# Patient Record
Sex: FEMALE | Race: WHITE | NOT HISPANIC OR LATINO | Employment: OTHER | URBAN - METROPOLITAN AREA
[De-identification: names, ages, dates, MRNs, and addresses within clinical notes are randomized per-mention and may not be internally consistent; named-entity substitution may affect disease eponyms.]

---

## 2017-01-10 ENCOUNTER — OFFICE VISIT (OUTPATIENT)
Dept: INTERNAL MEDICINE | Facility: CLINIC | Age: 67
End: 2017-01-10

## 2017-01-10 VITALS
HEART RATE: 77 BPM | WEIGHT: 186.8 LBS | DIASTOLIC BLOOD PRESSURE: 68 MMHG | HEIGHT: 70 IN | OXYGEN SATURATION: 97 % | BODY MASS INDEX: 26.74 KG/M2 | SYSTOLIC BLOOD PRESSURE: 100 MMHG

## 2017-01-10 DIAGNOSIS — I10 ESSENTIAL HYPERTENSION: Primary | ICD-10-CM

## 2017-01-10 DIAGNOSIS — R91.1 PULMONARY NODULE: ICD-10-CM

## 2017-01-10 DIAGNOSIS — R00.2 PALPITATIONS: ICD-10-CM

## 2017-01-10 DIAGNOSIS — I10 ESSENTIAL HYPERTENSION: ICD-10-CM

## 2017-01-10 PROCEDURE — 99214 OFFICE O/P EST MOD 30 MIN: CPT | Performed by: INTERNAL MEDICINE

## 2017-01-10 NOTE — PROGRESS NOTES
Subjective   Tran Sosa is a 66 y.o. female here today to f/u from ER for chest pains.  Pt states that she gets SOB heart palpitations, they last about 10 mins and becoming more frequent    History of Present Illness   She has been having int episodes of feeling lightheaded and dizzy for the last 10months.  She has had elevated BP with the episodes.  She feels flushed with episodes.  She denies any anxiety.    She has had a couple episodes of palp and SOB over the last few days. She had some chest pain yesterday that lasted a few minutes  No relation to exertion   she is scheduled to see cards in the am    The following portions of the patient's history were reviewed and updated as appropriate: allergies, current medications, past medical history, past social history and problem list.  SHe has not been exercising because because she juts hasnt felt well   She has stopped sugar  And lost 22 lbs  Review of Systems   Constitutional: Negative.    HENT: Negative.    Respiratory: Positive for chest tightness and shortness of breath. Negative for apnea, cough, choking, wheezing and stridor.    Cardiovascular: Positive for chest pain and palpitations. Negative for leg swelling.   Gastrointestinal: Negative.    All other systems reviewed and are negative.      Objective   Physical Exam   Constitutional: She is oriented to person, place, and time. She appears well-developed and well-nourished.   HENT:   Head: Normocephalic and atraumatic.   Right Ear: External ear normal.   Left Ear: External ear normal.   Mouth/Throat: Oropharynx is clear and moist.   Eyes: Conjunctivae and EOM are normal. Pupils are equal, round, and reactive to light.   Neck: Normal range of motion. No tracheal deviation present. No thyromegaly present.   Cardiovascular: Normal rate, regular rhythm, normal heart sounds and intact distal pulses.    Pulmonary/Chest: Effort normal and breath sounds normal.   Abdominal: Soft. Bowel sounds are normal. She  exhibits no distension. There is no tenderness.   Musculoskeletal: Normal range of motion. She exhibits no edema or deformity.   Neurological: She is alert and oriented to person, place, and time.   Skin: Skin is warm and dry.   Psychiatric: She has a normal mood and affect. Her behavior is normal. Judgment and thought content normal.   Vitals reviewed.      Vitals:    01/10/17 1608   BP: 100/68   Pulse: 77   SpO2: 97%        Current Outpatient Prescriptions:   •  aspirin 81 MG tablet, Take 81 mg by mouth daily. HOLD PRIOR TO SURGERY, Disp: , Rfl:   •  atorvastatin (LIPITOR) 80 MG tablet, Take 1 tablet by mouth Daily., Disp: 90 tablet, Rfl: 3  •  Calcium Carbonate (CALCIUM 600 PO), Take 1 tablet by mouth Daily. HOLD PRIOR TO SURGERY, Disp: , Rfl:   •  celecoxib (CELEBREX) 200 MG capsule, Take 200 mg by mouth daily as needed. HOLD PRIOR TO SURGERY, Disp: , Rfl:   •  gabapentin (NEURONTIN) 300 MG capsule, Take 1 capsule by mouth Every Night., Disp: 90 capsule, Rfl: 3  •  Multiple Vitamin (MULTI VITAMIN DAILY PO), Take 1 tablet by mouth daily. HOLD PRIOR TO SURGERY, Disp: , Rfl:   •  traZODone (DESYREL) 150 MG tablet, Take 1 tablet by mouth Every Night., Disp: 90 tablet, Rfl: 3  •  traZODone (DESYREL) 50 MG tablet, Take 1 tablet by mouth At Night As Needed for sleep., Disp: 90 tablet, Rfl: 3  •  triamterene-hydrochlorothiazide (MAXZIDE-25) 37.5-25 MG per tablet, Take 1 tablet by mouth Every Morning., Disp: 90 tablet, Rfl: 3      Assessment/Plan   Diagnoses and all orders for this visit:    Essential hypertension    Pulmonary nodule    Palpitations      1. HTN- with palp- she has some classic sx of a pheo so I will order 24hr urine for VMA and metanephrines.  2. Chest tightness-with sx above- she is seeing cards in the am and likely needs a stress test and a halter/event monitor  3. pulm nodule-   4.  Left hip pain-  She is avoiding NSAIDs right now.  She is seeing ortho

## 2017-01-10 NOTE — MR AVS SNAPSHOT
Tran Sosa   1/10/2017 4:00 PM   Office Visit    Dept Phone:  [phone]   Encounter #:  [id number]    Provider:  Diane Macdonald MD   Department:  [facility] INTERNAL MEDICINE                Your Full Care Plan              Today's Medication Changes          These changes are accurate as of: 1/10/17  5:17 PM.  If you have any questions, ask your nurse or doctor.               Stop taking medication(s)listed here:     mometasone 50 MCG/ACT nasal spray   Commonly known as:  NASONEX   Stopped by:  Diane Macdonald MD           montelukast 10 MG tablet   Commonly known as:  SINGULAIR   Stopped by:  Diane Macdonald MD           PEPCID 20 MG tablet   Generic drug:  famotidine   Stopped by:  Diane Macdonald MD           Vitamin D3 2000 UNITS capsule   Stopped by:  Diane Macdonald MD                      Your Updated Medication List          This list is accurate as of: 1/10/17  5:17 PM.  Always use your most recent med list.                aspirin 81 MG tablet       atorvastatin 80 MG tablet   Commonly known as:  LIPITOR   Take 1 tablet by mouth Daily.       CALCIUM 600 PO       CELEBREX 200 MG capsule   Generic drug:  celecoxib       gabapentin 300 MG capsule   Commonly known as:  NEURONTIN   Take 1 capsule by mouth Every Night.       MULTI VITAMIN DAILY PO       * traZODone 150 MG tablet   Commonly known as:  DESYREL   Take 1 tablet by mouth Every Night.       * traZODone 50 MG tablet   Commonly known as:  DESYREL   Take 1 tablet by mouth At Night As Needed for sleep.       triamterene-hydrochlorothiazide 37.5-25 MG per tablet   Commonly known as:  MAXZIDE-25   Take 1 tablet by mouth Every Morning.       * Notice:  This list has 2 medication(s) that are the same as other medications prescribed for you. Read the directions carefully, and ask your doctor or other care provider to review them with you.            We Performed the Following     Catecholamine+VMA, 24-Hr Urine     Metanephrines,  Pheochromocyt       You Were Diagnosed With        Codes Comments    Essential hypertension    -  Primary ICD-10-CM: I10  ICD-9-CM: 401.9     Pulmonary nodule     ICD-10-CM: R91.1  ICD-9-CM: 793.11     Palpitations     ICD-10-CM: R00.2  ICD-9-CM: 785.1       Instructions     None    Patient Instructions History      Upcoming Appointments     Visit Type Date Time Department    OFFICE VISIT 1/10/2017  4:00 PM [facility]    NEW PATIENT 2017 10:00 AM [facility] [place]    [facility] 2017  9:40 AM [facility]    PHYSICAL 2017  9:00 AM [facility]      [employer] Signup     [employer]App[employer]ink allows you to send messages to your doctor, view your test results, renew your prescriptions, schedule appointments, and more. To sign up, go to Placely and click on the Sign Up Now link in the New User? box. Enter your Plerts Activation Code exactly as it appears below along with the last four digits of your Social Security Number and your Date of Birth () to complete the sign-up process. If you do not sign up before the expiration date, you must request a new code.    Plerts Activation Code: [email or web address]  Expires: 2017  5:17 PM    If you have questions, you can email [email or web address] or call [phone] to talk to our Plerts staff. Remember, Plerts is NOT to be used for urgent needs. For medical emergencies, dial 911.               Other Info from Your Visit           Your Appointments     2017 10:00 AM EST   New Patient with Sandra Fall, ENRRIQUE   [facility] CARDIOLOGY (--)    [address]  [facility] [id number] [phone]           Bring all previous medical records and films, along with current medications and insurance information.            2017  9:40 AM EDT   [facility]   [facility] INTERNAL MEDICINE (--)    [address] [place] [address] [phone]            Apr 20, 2017  9:00 AM EDT   Physical with Diane Macdonald MD   [facility] INTERNAL MEDICINE (--)    [address] [place] [address] [phone]           Arrive 15 minutes prior to appointment.              Allergies     Sulfa Antibiotics  Hives    Morphine And Related  Rash      Vital Signs     Blood Pressure Pulse Height Weight Last Menstrual Period Oxygen Saturation    100/68 (BP Location: Left arm, Patient Position: Sitting) 77 70\" (177.8 cm) 186 lb 12.8 oz (84.7 kg) (LMP Unknown) 97%    Body Mass Index Smoking Status                26.8 kg/m2 Former Smoker          Problems and Diagnoses Noted     High blood pressure    Pulmonary nodule        Palpitations            "

## 2017-01-11 ENCOUNTER — OFFICE VISIT (OUTPATIENT)
Dept: CARDIOLOGY | Facility: CLINIC | Age: 67
End: 2017-01-11

## 2017-01-11 VITALS
DIASTOLIC BLOOD PRESSURE: 80 MMHG | SYSTOLIC BLOOD PRESSURE: 130 MMHG | BODY MASS INDEX: 26.74 KG/M2 | HEIGHT: 70 IN | WEIGHT: 186.8 LBS | HEART RATE: 71 BPM

## 2017-01-11 DIAGNOSIS — R07.2 PRECORDIAL PAIN: Primary | ICD-10-CM

## 2017-01-11 DIAGNOSIS — R00.2 PALPITATIONS: ICD-10-CM

## 2017-01-11 DIAGNOSIS — R06.02 SHORTNESS OF BREATH: ICD-10-CM

## 2017-01-11 DIAGNOSIS — R06.00 PND (PAROXYSMAL NOCTURNAL DYSPNEA): ICD-10-CM

## 2017-01-11 DIAGNOSIS — I10 ESSENTIAL HYPERTENSION: ICD-10-CM

## 2017-01-11 PROCEDURE — 93000 ELECTROCARDIOGRAM COMPLETE: CPT | Performed by: NURSE PRACTITIONER

## 2017-01-11 PROCEDURE — 99204 OFFICE O/P NEW MOD 45 MIN: CPT | Performed by: NURSE PRACTITIONER

## 2017-01-11 NOTE — MR AVS SNAPSHOT
Tran Sosa   1/11/2017 10:00 AM   Office Visit    Dept Phone:  [phone]   Encounter #:  [id number]    Provider:  ENRRIQUE Gr   Department:  [facility] CARDIOLOGY                Your Full Care Plan              Your Updated Medication List          This list is accurate as of: 1/11/17 11:10 AM.  Always use your most recent med list.                aspirin 81 MG tablet       atorvastatin 80 MG tablet   Commonly known as:  LIPITOR   Take 1 tablet by mouth Daily.       CALCIUM 600 PO       CELEBREX 200 MG capsule   Generic drug:  celecoxib       gabapentin 300 MG capsule   Commonly known as:  NEURONTIN   Take 1 capsule by mouth Every Night.       MULTI VITAMIN DAILY PO       * traZODone 150 MG tablet   Commonly known as:  DESYREL   Take 1 tablet by mouth Every Night.       * traZODone 50 MG tablet   Commonly known as:  DESYREL   Take 1 tablet by mouth At Night As Needed for sleep.       triamterene-hydrochlorothiazide 37.5-25 MG per tablet   Commonly known as:  MAXZIDE-25   Take 1 tablet by mouth Every Morning.       * Notice:  This list has 2 medication(s) that are the same as other medications prescribed for you. Read the directions carefully, and ask your doctor or other care provider to review them with you.            We Performed the Following     Ambulatory Referral to Sleep Medicine     Holter / Event CHRISTIEO Patch       You Were Diagnosed With        Codes Comments    Precordial pain    -  Primary ICD-10-CM: R07.2  ICD-9-CM: 786.51     Shortness of breath     ICD-10-CM: R06.02  ICD-9-CM: 786.05     Palpitations     ICD-10-CM: R00.2  ICD-9-CM: 785.1     PND (paroxysmal nocturnal dyspnea)     ICD-10-CM: R06.00  ICD-9-CM: 786.09     Essential hypertension     ICD-10-CM: I10  ICD-9-CM: 401.9       Instructions     None    Patient Instructions History      Upcoming Appointments     Visit Type Date Time Department    NEW PATIENT 1/11/2017 10:00 AM  [facility] CV ECHO STRESS TEST [place] 2017  1:15 PM  [facility]    [facility] 2017  9:40 AM [facility]    PHYSICAL 2017  9:00 AM [facility]      M-DISChart Signup     [facility] Myoonet allows you to send messages to your doctor, view your test results, renew your prescriptions, schedule appointments, and more. To sign up, go to Ium and click on the Sign Up Now link in the New User? box. Enter your Myoonet Activation Code exactly as it appears below along with the last four digits of your Social Security Number and your Date of Birth () to complete the sign-up process. If you do not sign up before the expiration date, you must request a new code.    Myoonet Activation Code: [email or web address]  Expires: 2017  5:17 PM    If you have questions, you can email [email or web address] or call [phone] to talk to our Myoonet staff. Remember, Myoonet is NOT to be used for urgent needs. For medical emergencies, dial 911.               Other Info from Your Visit           Your Appointments     2017  1:15 PM EST   ECHO STRESS TEST VISIT with [facility] E[facility]/VAS   [facility] OUTPATIENT ECHOCARDIOGRAPHY ([place])    [address] [place] [id number] [phone]           No food or drink for 2 hours prior to your test. No caffeine or chocolate 24 hours prior to you test. (this includes coffee, tea, soda, all decaffeinated beverages, cappuccino flavorings, noooz or vivarian, diet medications, excedrin, anacin or any energy drinks) wear comfortable clothing and walking shoes. Bring your insurance cards with you. Bring all medications in their original bottles. If you are diabetic, do not take your diabetic medications after consulting with your primary care physician. if you take insulin, only take half the dose after consulting with your primary care physician.            2017  9:40 AM EDT   [facility]  "with [facility]   [facility] INTERNAL MEDICINE (--)    [address] [id number] [phone]            Apr 20, 2017  9:00 AM EDT   Physical with Diane Macdonald MD   [facility] INTERNAL MEDICINE (--)    [address] [id number] [phone]           Arrive 15 minutes prior to appointment.              Allergies     Sulfa Antibiotics  Hives    Morphine And Related  Rash      Reason for Visit     Chest Pain           Vital Signs     Blood Pressure Pulse Height Weight Last Menstrual Period Body Mass Index    130/80 (BP Location: Right arm) 71 70\" (177.8 cm) 186 lb 12.8 oz (84.7 kg) (LMP Unknown) 26.8 kg/m2    Smoking Status                   Former Smoker           Problems and Diagnoses Noted     High blood pressure    Precordial chest pain    -  Primary    Shortness of breath        Palpitations        Shortness of breath at night            "

## 2017-01-11 NOTE — PROGRESS NOTES
"  Date of Office Visit: 2017  Encounter Provider: ENRRIQUE Bagley  Place of Service: [facility] CARDIOLOGY  Patient Name: Tran Sosa  :1950    Chief Complaint   Patient presents with   • Chest Pain   :     HPI: Tran Sosa is a 66 y.o. female who presents today for evaluation of recent symptoms.  The patient reports a past medical history of breast cancer, pulmonary nodules, and arthritis.  She also has a history of essential hypertension which she states has been transient recently.  She has a history of hyperlipidemia which she feels is well-controlled on her current dose of atorvastatin.  She denies any history of coronary artery disease, heart failure, or diabetes.  The patient explains October she had one month of dizziness which she felt was secondary to vertigo.    The patient reports that she has had \"small episodes\" over the past 10 months of waking up in the middle the night short of breath with her heart pounding.  She said initially the symptoms would only last a few minutes and were very intermittent.  She said she may go weeks without having an episode.  Her daughter who is a nurse practitioner actually spent the night with her one night and said that she does not snore.  The patient explains that she had foot surgery in August and had to really \"bad episodes\" of waking up short of breath with palpitations.    The patient said in December she was at work and had not been feeling well that week.  She had been very fatigued and had a decreased appetite.  She said it was as if she was trying to get sick.  She had checked her blood pressure which showed diastolic readings elevated at .  He said her coworker said one day the left side of her face was red and the right side was within normal color.  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  The pain radiated to her neck " and ears.  She said it was very intense.  She said after 15 minutes of the chest pain and started to ease and resolved within an hour and 40 minutes.  She decided to present to the emergency department for further evaluation.    The patient presented to the [facility] emergency department on 12/27/2016 with the concern of fatigue and elevated diastolic blood pressure readings in the 90s and 100s.  The day that she presented to the ED she was working and had an abrupt wave of pain diffusely across her chest that radiated to her neck.  This was associated with a rapid palpitations, nausea, diaphoresis, and shortness of breath.  She checked her blood pressure which was elevated at 154/105.  She decided to present to the emergency department for further evaluation.  Upon review of the ED records, her blood pressure was elevated at 169/91 and heart rate 82 bpm.  She had an EKG completed which was interpreted as normal.  Her d-dimer was within normal limits.  She had a chest x-ray which showed multiple pulmonary nodules that is been stable since 2013 although the left lower lobe nodule was slightly larger than compared to 2011.  She was recommended to follow-up with her primary care physician.  Her hemoglobin, hematocrit, comprehensive metabolic panel, BNP, and troponin level were all within normal limits.    Since the emergency department visit she is continued to experience intermittent chest pain that occurs with nonexertional activities.  She continues to experience shortness of breath, palpitations, and paroxysmal nocturnal dyspnea.  This past Sunday she said her heart was racing and felt irregular as if she was having PVCs.  She was very fatigued after that episode.  On Tuesday she felt fluttering and shortness of breath.  She just overall has not been feeling herself.  She did follow-up with her primary care physician who has recommended that she have adrenal gland testing and a CT scan of her chest.      Past  Medical History   Diagnosis Date   • Allergic rhinitis    • Arthralgia of multiple sites    • Diverticulosis    • Dysphagia    • Elevated TSH    • Fatigue    • History of breast cancer 1984     RIGHT    • Hyperlipidemia    • Hypertension    • Insomnia    • Junctional rhythm      one time; she think year    • Osteopenia    • Postmenopausal status    • Pulmonary nodules    • Rib fracture    • Vitamin B 12 deficiency    • Vitamin D deficiency        Past Surgical History   Procedure Laterality Date   •  section     • Uterine artery embolization     • Hysterectomy       W/BSO   • Mastectomy Left      PROPHYLACTICALLY   • Mastectomy partial with axillary lymph node excision Right      MALIGNANT   • Tram flap delayed     • Tonsillectomy     • Orif wrist fracture Right    • Hammer toe repair Right 2016     Procedure: MULTIPLE RT CLAW TOE REPAIR WITH WEIL OSTEOTOMY; RIGHT GREAT TOE AKIN OSTEOTOMY.;  Surgeon: Rigoberto Ba MD;  Location: [facility] OR [facility];  Service:    • Cardiac catheterization        at [facility]; told normal coronary arteries        Social History     Social History   • Marital status:      Spouse name: N/A   • Number of children: 1   • Years of education: N/A     Occupational History   • RN AT [employer]      Social History Main Topics   • Smoking status: Former Smoker     Packs/day: 1.00     Years: 10.00     Types: Cigarettes     Quit date:    • Smokeless tobacco: Not on file      Comment: QUIT AT AGE 30 YRS. OLD   • Alcohol use Yes      Comment: SPECIAL OCCASION   • Drug use: No   • Sexual activity: Defer     Other Topics Concern   • Not on file     Social History Narrative       Family History   Problem Relation Age of Onset   • Ovarian cancer Mother    • Lung cancer Father    • Hypertension Sister    • Hyperlipidemia Sister    • Other Sister      RENAL DISEASE   • Hypertension Brother    • Hyperlipidemia Brother        Review of Systems   Constitution: Negative  for chills, diaphoresis, fever, malaise/fatigue, night sweats, weight gain and weight loss.   HENT: Negative for hearing loss, nosebleeds, sore throat and tinnitus.    Eyes: Negative for blurred vision, double vision, pain and visual disturbance.   Cardiovascular: Positive for chest pain, dyspnea on exertion, palpitations and paroxysmal nocturnal dyspnea. Negative for claudication, cyanosis, irregular heartbeat, leg swelling, near-syncope, orthopnea and syncope.   Respiratory: Positive for shortness of breath. Negative for cough, hemoptysis, snoring and wheezing.    Endocrine: Negative for cold intolerance, heat intolerance and polyuria.   Hematologic/Lymphatic: Negative for bleeding problem. Does not bruise/bleed easily.   Skin: Negative for color change, dry skin, flushing and itching.   Musculoskeletal: Positive for joint pain. Negative for falls, joint swelling, muscle cramps, muscle weakness and myalgias.   Gastrointestinal: Negative for abdominal pain, constipation, heartburn, melena, nausea and vomiting.   Genitourinary: Negative for dysuria and hematuria.   Neurological: Negative for excessive daytime sleepiness, dizziness, light-headedness, loss of balance, numbness, paresthesias, seizures and vertigo.   Psychiatric/Behavioral: Negative for altered mental status, depression, memory loss and substance abuse. The patient does not have insomnia and is not nervous/anxious.    Allergic/Immunologic: Negative for environmental allergies.       Allergies   Allergen Reactions   • Sulfa Antibiotics Hives   • Morphine And Related Rash         Current Outpatient Prescriptions:   •  aspirin 81 MG tablet, Take 81 mg by mouth daily. HOLD PRIOR TO SURGERY, Disp: , Rfl:   •  atorvastatin (LIPITOR) 80 MG tablet, Take 1 tablet by mouth Daily., Disp: 90 tablet, Rfl: 3  •  Calcium Carbonate (CALCIUM 600 PO), Take 1 tablet by mouth Daily. HOLD PRIOR TO SURGERY, Disp: , Rfl:   •  celecoxib (CELEBREX) 200 MG capsule, Take 200 mg  "by mouth daily as needed. HOLD PRIOR TO SURGERY, Disp: , Rfl:   •  gabapentin (NEURONTIN) 300 MG capsule, Take 1 capsule by mouth Every Night., Disp: 90 capsule, Rfl: 3  •  Multiple Vitamin (MULTI VITAMIN DAILY PO), Take 1 tablet by mouth daily. HOLD PRIOR TO SURGERY, Disp: , Rfl:   •  traZODone (DESYREL) 150 MG tablet, Take 1 tablet by mouth Every Night., Disp: 90 tablet, Rfl: 3  •  traZODone (DESYREL) 50 MG tablet, Take 1 tablet by mouth At Night As Needed for sleep., Disp: 90 tablet, Rfl: 3  •  triamterene-hydrochlorothiazide (MAXZIDE-25) 37.5-25 MG per tablet, Take 1 tablet by mouth Every Morning., Disp: 90 tablet, Rfl: 3     Objective:     Vitals:    01/11/17 1009 01/11/17 1011 01/11/17 1013 01/11/17 1014   BP: 130/82 132/84 132/82 130/80   BP Location: Right arm Left arm Left arm Right arm   Patient Position: Lying Sitting Standing    Pulse: 65 69 83 71   Weight: 186 lb 12.8 oz (84.7 kg)      Height: 70\" (177.8 cm)        Body mass index is 26.8 kg/(m^2).    PHYSICAL EXAM:    Vitals Reviewed.   General Appearance: No acute distress, well developed and well nourished.   Eyes: Conjunctiva and lids: No erythema, swelling, or discharge. Sclera non-icteric. Glasses.   HENT: Atraumatic, normocephalic. External eyes, ears, and nose normal. No hearing loss noted. Mucous membranes normal. Lips not cyanotic. Neck supple with no tenderness.  Respiratory: No signs of respiratory distress. Respiration rhythm and depth normal.   Clear to auscultation. No rales, crackles, rhonchi, or wheezing auscultated.   Cardiovascular:  Jugular Venous Pressure: Normal  Heart Rate and Rhythm: Normal, Heart Sounds: Normal S1 and S2. No S3 or S4 noted.  Murmurs: No murmurs noted. No rubs, thrills, or gallops.   Arterial Pulses: Carotid pulses normal. No carotid bruit noted. Posterior tibialis and dorsalis pedis pulses normal.   Lower Extremities: No edema noted.  Gastrointestinal:  Abdomen soft, non-distended, non-tender. Normal bowel " sounds. No hepatomegaly.   Musculoskeletal: Normal movement of extremities  Skin and Nails: General appearance normal. No pallor, cyanosis, diaphoresis. Skin temperature normal. No clubbing of fingernails.   Psychiatric: Patient alert and oriented to person, place, and time. Speech and behavior appropriate. Normal mood and affect.       ECG 12 Lead  Date/Time: 1/11/2017 9:54 AM  Performed by: ELIESER CARDOZO  Authorized by: ELIESER CARDOZO   Comparison: compared with previous ECG from 12/27/2016  Similar to previous ECG  Rhythm: sinus rhythm  Rate: normal  BPM: 71  Conduction: conduction normal  ST Segments: ST segments normal  T Waves: T waves normal  QRS axis: normal  Other: no other findings  Clinical impression: normal ECG              Assessment:       Diagnosis Plan   1. Precordial pain  Adult Stress Echo With Color & Doppler    Holter / Event ZIO Patch   2. Shortness of breath  Adult Stress Echo With Color & Doppler    Holter / Event ZIO Patch    Ambulatory Referral to Sleep Medicine   3. Palpitations  Adult Stress Echo With Color & Doppler    Holter / Event ZIO Patch    Ambulatory Referral to Sleep Medicine   4. PND (paroxysmal nocturnal dyspnea)  Adult Stress Echo With Color & Doppler    Holter / Event ZIO Patch    Ambulatory Referral to Sleep Medicine   5. Essential hypertension  Adult Stress Echo With Color & Doppler    Holter / Event ZIO Patch          Plan:       The patient presents today with intermittent episodes of chest pain, shortness of breath, palpitations, dizziness, and paroxysmal nocturnal dyspnea over the past 10 months.  I reviewed all of her ED records which did not show any significant abnormalities as far as a cardiac standpoint.  She is following up with her primary care physician about her blood pressure, adrenal glands issues, and pulmonary nodules.  From a cardiac standpoint, I have recommended that she have a stress echocardiogram completed for further evaluation of the chest pain  and shortness of breath.  For the palpitations have recommended a 14 day Zio patch monitor.  If she has any episodes of palpitations of asked her to record these symptoms in her diary.  I reviewed her recent blood work from October and she was noted to have a normal TSH at that time.  The patient will continue to monitor her blood pressure.  She has been experiencing paroxysmal nocturnal dyspnea and palpitations in the middle of the night.  I have recommended that she have a sleep apnea evaluation completed.  I will follow-up with the patient with the results of her testing.  I also told her if she has any palpitations while working over at [facility] that she can come over to the office for EKG and just call the office ahead of time.    As always, it has been a pleasure to participate in your patient's care.      Sincerely,         ENRRIQUE Beard

## 2017-01-13 ENCOUNTER — TELEPHONE (OUTPATIENT)
Dept: CARDIOLOGY | Facility: CLINIC | Age: 67
End: 2017-01-13

## 2017-01-13 ENCOUNTER — HOSPITAL ENCOUNTER (OUTPATIENT)
Dept: CARDIOLOGY | Facility: HOSPITAL | Age: 67
Discharge: HOME | End: 2017-01-13
Admitting: NURSE PRACTITIONER

## 2017-01-13 VITALS
OXYGEN SATURATION: 98 % | HEIGHT: 70 IN | WEIGHT: 186 LBS | BODY MASS INDEX: 26.63 KG/M2 | SYSTOLIC BLOOD PRESSURE: 136 MMHG | HEART RATE: 84 BPM | DIASTOLIC BLOOD PRESSURE: 84 MMHG

## 2017-01-13 DIAGNOSIS — R07.2 PRECORDIAL PAIN: ICD-10-CM

## 2017-01-13 DIAGNOSIS — R06.02 SHORTNESS OF BREATH: ICD-10-CM

## 2017-01-13 DIAGNOSIS — R06.00 PND (PAROXYSMAL NOCTURNAL DYSPNEA): ICD-10-CM

## 2017-01-13 DIAGNOSIS — R00.2 PALPITATIONS: ICD-10-CM

## 2017-01-13 DIAGNOSIS — I10 ESSENTIAL HYPERTENSION: ICD-10-CM

## 2017-01-13 LAB
ASCENDING AORTA: 3.4 CM
BH CV ECHO MEAS - ACS: 2.1 CM
BH CV ECHO MEAS - AO MAX PG: 12.1 MMHG
BH CV ECHO MEAS - AO ROOT AREA (BSA CORRECTED): 1.7
BH CV ECHO MEAS - AO ROOT AREA: 8.9 CM^2
BH CV ECHO MEAS - AO ROOT DIAM: 3.4 CM
BH CV ECHO MEAS - AO V2 MAX: 173.7 CM/SEC
BH CV ECHO MEAS - BSA(HAYCOCK): 2.1 M^2
BH CV ECHO MEAS - BSA: 2 M^2
BH CV ECHO MEAS - BZI_BMI: 26.7 KILOGRAMS/M^2
BH CV ECHO MEAS - BZI_METRIC_HEIGHT: 177.8 CM
BH CV ECHO MEAS - BZI_METRIC_WEIGHT: 84.4 KG
BH CV ECHO MEAS - CONTRAST EF 4CH: 63.3 ML/M^2
BH CV ECHO MEAS - EDV(CUBED): 142.6 ML
BH CV ECHO MEAS - EDV(MOD-SP4): 98 ML
BH CV ECHO MEAS - EDV(TEICH): 130.9 ML
BH CV ECHO MEAS - EF(CUBED): 76.4 %
BH CV ECHO MEAS - EF(MOD-SP4): 63.3 %
BH CV ECHO MEAS - EF(TEICH): 68.1 %
BH CV ECHO MEAS - ESV(CUBED): 33.6 ML
BH CV ECHO MEAS - ESV(MOD-SP4): 36 ML
BH CV ECHO MEAS - ESV(TEICH): 41.8 ML
BH CV ECHO MEAS - FS: 38.2 %
BH CV ECHO MEAS - IVS/LVPW: 1
BH CV ECHO MEAS - IVSD: 0.96 CM
BH CV ECHO MEAS - LAT PEAK E' VEL: 8 CM/SEC
BH CV ECHO MEAS - LV DIASTOLIC VOL/BSA (35-75): 48.4 ML/M^2
BH CV ECHO MEAS - LV MASS(C)D: 180.5 GRAMS
BH CV ECHO MEAS - LV MASS(C)DI: 89.2 GRAMS/M^2
BH CV ECHO MEAS - LV SYSTOLIC VOL/BSA (12-30): 17.8 ML/M^2
BH CV ECHO MEAS - LVIDD: 5.2 CM
BH CV ECHO MEAS - LVIDS: 3.2 CM
BH CV ECHO MEAS - LVLD AP4: 6.4 CM
BH CV ECHO MEAS - LVLS AP4: 5.7 CM
BH CV ECHO MEAS - LVPWD: 0.92 CM
BH CV ECHO MEAS - MED PEAK E' VEL: 12 CM/SEC
BH CV ECHO MEAS - MR MAX PG: 100.7 MMHG
BH CV ECHO MEAS - MR MAX VEL: 501.7 CM/SEC
BH CV ECHO MEAS - MV A DUR: 0.08 SEC
BH CV ECHO MEAS - MV A MAX VEL: 73.2 CM/SEC
BH CV ECHO MEAS - MV DEC SLOPE: 320.7 CM/SEC^2
BH CV ECHO MEAS - MV DEC TIME: 0.23 SEC
BH CV ECHO MEAS - MV E MAX VEL: 76.2 CM/SEC
BH CV ECHO MEAS - MV E/A: 1
BH CV ECHO MEAS - MV P1/2T MAX VEL: 76.6 CM/SEC
BH CV ECHO MEAS - MV P1/2T: 70 MSEC
BH CV ECHO MEAS - MVA P1/2T LCG: 2.9 CM^2
BH CV ECHO MEAS - MVA(P1/2T): 3.1 CM^2
BH CV ECHO MEAS - PULM A REVS DUR: 0.1 SEC
BH CV ECHO MEAS - PULM A REVS VEL: 27.2 CM/SEC
BH CV ECHO MEAS - PULM DIAS VEL: 35.1 CM/SEC
BH CV ECHO MEAS - PULM S/D: 1.4
BH CV ECHO MEAS - PULM SYS VEL: 50.4 CM/SEC
BH CV ECHO MEAS - RAP SYSTOLE: 8 MMHG
BH CV ECHO MEAS - RVSP: 29 MMHG
BH CV ECHO MEAS - SI(CUBED): 53.8 ML/M^2
BH CV ECHO MEAS - SI(MOD-SP4): 30.6 ML/M^2
BH CV ECHO MEAS - SI(TEICH): 44 ML/M^2
BH CV ECHO MEAS - SV(CUBED): 109 ML
BH CV ECHO MEAS - SV(MOD-SP4): 62 ML
BH CV ECHO MEAS - SV(TEICH): 89.1 ML
BH CV ECHO MEAS - TAPSE (>1.6): 2 CM2
BH CV ECHO MEAS - TR MAX VEL: 229.4 CM/SEC
BH CV STRESS BP STAGE 1: NORMAL
BH CV STRESS BP STAGE 2: NORMAL
BH CV STRESS DURATION MIN STAGE 1: 3
BH CV STRESS DURATION MIN STAGE 2: 3
BH CV STRESS DURATION SEC STAGE 1: 0
BH CV STRESS DURATION SEC STAGE 2: 0
BH CV STRESS ECHO POST STRESS EJECTION FRACTION EF: 75 %
BH CV STRESS GRADE STAGE 1: 10
BH CV STRESS GRADE STAGE 2: 12
BH CV STRESS HR STAGE 1: 125
BH CV STRESS HR STAGE 2: 145
BH CV STRESS METS STAGE 1: 5
BH CV STRESS METS STAGE 2: 7.5
BH CV STRESS PROTOCOL 1: NORMAL
BH CV STRESS SPEED STAGE 1: 1.7
BH CV STRESS SPEED STAGE 2: 2.5
BH CV STRESS STAGE 1: 1
BH CV STRESS STAGE 2: 2
BH CV XLRA - RV BASE: 3.2 CM
BH CV XLRA - TDI S': 11 CM/SEC
E/E' RATIO: 10
LEFT ATRIUM VOLUME INDEX: 20 ML/M2
MAXIMAL PREDICTED HEART RATE: 156 BPM
PERCENT MAX PREDICTED HR: 92.95 %
SINUS: 2.8 CM
STJ: 2.8 CM
STRESS BASELINE BP: NORMAL MMHG
STRESS BASELINE HR: 84 BPM
STRESS O2 SAT REST: 98 %
STRESS PERCENT HR: 109 %
STRESS POST ESTIMATED WORKLOAD: 7 METS
STRESS POST EXERCISE DUR MIN: 6 MIN
STRESS POST EXERCISE DUR SEC: 0 SEC
STRESS POST PEAK BP: NORMAL MMHG
STRESS POST PEAK HR: 145 BPM
STRESS TARGET HR: 133 BPM

## 2017-01-13 PROCEDURE — 93320 DOPPLER ECHO COMPLETE: CPT | Performed by: INTERNAL MEDICINE

## 2017-01-13 PROCEDURE — 93018 CV STRESS TEST I&R ONLY: CPT | Performed by: INTERNAL MEDICINE

## 2017-01-13 PROCEDURE — 93016 CV STRESS TEST SUPVJ ONLY: CPT | Performed by: INTERNAL MEDICINE

## 2017-01-13 PROCEDURE — 93017 CV STRESS TEST TRACING ONLY: CPT

## 2017-01-13 PROCEDURE — 93325 DOPPLER ECHO COLOR FLOW MAPG: CPT

## 2017-01-13 PROCEDURE — 93350 STRESS TTE ONLY: CPT | Performed by: INTERNAL MEDICINE

## 2017-01-13 PROCEDURE — 93320 DOPPLER ECHO COMPLETE: CPT

## 2017-01-13 PROCEDURE — 93325 DOPPLER ECHO COLOR FLOW MAPG: CPT | Performed by: INTERNAL MEDICINE

## 2017-01-13 PROCEDURE — 93350 STRESS TTE ONLY: CPT

## 2017-01-13 NOTE — TELEPHONE ENCOUNTER
Stress echocardiogram results:    · Normal Baseline echo  · Mild mitral valve regurgitation is present.  · STRESS ECHO  · Arrhythmias during recovery: rare PACs, rare PVCs.  · Normal stress echo with no significant echocardiographic evidence for myocardial ischemia.    The patient was placed on a 14 day Zio patch monitor.  I suspect she is experiencing ectopy and we'll further evaluate.  I have left a message for patient to return my call.

## 2017-01-16 ENCOUNTER — APPOINTMENT (OUTPATIENT)
Dept: LAB | Facility: HOSPITAL | Age: 67
End: 2017-01-16

## 2017-01-16 PROCEDURE — 84585 ASSAY OF URINE VMA: CPT | Performed by: INTERNAL MEDICINE

## 2017-01-16 PROCEDURE — 82384 ASSAY THREE CATECHOLAMINES: CPT | Performed by: INTERNAL MEDICINE

## 2017-01-16 NOTE — TELEPHONE ENCOUNTER
Patient informed about stress echocardiogram results. She is wearing 14 zio patch monitor results. I will follow up with her about those results.

## 2017-01-16 NOTE — TELEPHONE ENCOUNTER
-Patient called for echo results. 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

## 2017-01-18 ENCOUNTER — HOSPITAL ENCOUNTER (OUTPATIENT)
Dept: CT IMAGING | Facility: HOSPITAL | Age: 67
Discharge: HOME | End: 2017-01-18

## 2017-01-18 DIAGNOSIS — R91.1 PULMONARY NODULE: ICD-10-CM

## 2017-01-18 PROCEDURE — 82565 ASSAY OF CREATININE: CPT

## 2017-01-19 ENCOUNTER — TELEPHONE (OUTPATIENT)
Dept: CARDIOLOGY | Facility: HOSPITAL | Age: 67
End: 2017-01-19

## 2017-01-19 LAB
CREAT BLDA-MCNC: 1 MG/DL (ref 0.6–1.3)
DOPAMINE 24H UR-MRATE: 154 UG/24 HR (ref 0–510)
DOPAMINE UR-MCNC: 77 UG/L
EPINEPH 24H UR-MRATE: 2 UG/24 HR (ref 0–20)
EPINEPH UR-MCNC: 1 UG/L
NOREPINEPH 24H UR-MRATE: 30 UG/24 HR (ref 0–135)
NOREPINEPH UR-MCNC: 15 UG/L
VMA 24H UR-MRATE: 3 MG/24 HR (ref 0–7.5)
VMA UR-MCNC: 1.5 MG/L

## 2017-01-19 NOTE — TELEPHONE ENCOUNTER
----- Message from ENRRIQUE Gr sent at 1/18/2017  5:23 PM EST -----    The patient canceled the stress echocardiogram and Holter monitor    ----- Message -----     From: ENRRIQUE Gr     Sent: 1/11/2017  11:23 AM       To: ENRRIQUE Gr      Follow up on stress echocardiogram 1/18/2017

## 2017-02-02 LAB — TOAL ENROLLMENT DAYS: 14

## 2017-02-03 ENCOUNTER — TELEPHONE (OUTPATIENT)
Dept: CARDIOLOGY | Facility: HOSPITAL | Age: 67
End: 2017-02-03

## 2017-02-03 DIAGNOSIS — I47.1 PAT (PAROXYSMAL ATRIAL TACHYCARDIA) (HCC): Primary | ICD-10-CM

## 2017-02-03 NOTE — TELEPHONE ENCOUNTER
ZIO PATCH Monitors:    The total recording time was 14 days  2. There were 8 triggered events and to diary entries  3. The average heart rate was 77 with a minimum 49 and a maximum of 203.  4. The underlying rhythm is sinus. There were occasional runs of supraventricular tachycardia. The longest was 9 beats at a maximum rate of 203 bpm. These events do not appear to be atrial fibrillation.  5. There were occasional premature ventricular ectopics including 13 couplets and one triplet. There were no runs of ventricular tachycardia.  6. There were no pauses or bradycardia recorded.    I have left a message for patient to return my call to discuss.

## 2017-02-03 NOTE — TELEPHONE ENCOUNTER
----- Message from ENRRIQUE Gr sent at 1/13/2017  6:16 PM EST -----    Follow-up on 14 day Zio patch monitor placed 1/13/2017

## 2017-02-07 ENCOUNTER — TELEPHONE (OUTPATIENT)
Dept: CARDIOLOGY | Facility: CLINIC | Age: 67
End: 2017-02-07

## 2017-02-07 ENCOUNTER — HOSPITAL ENCOUNTER (OUTPATIENT)
Dept: CT IMAGING | Facility: HOSPITAL | Age: 67
Discharge: HOME | End: 2017-02-07
Admitting: INTERNAL MEDICINE

## 2017-02-07 PROCEDURE — 82565 ASSAY OF CREATININE: CPT

## 2017-02-07 PROCEDURE — 71260 CT THORAX DX C+: CPT

## 2017-02-07 PROCEDURE — 0 IOPAMIDOL 61 % SOLUTION: Performed by: INTERNAL MEDICINE

## 2017-02-07 RX ADMIN — IOPAMIDOL 75 ML: 612 INJECTION, SOLUTION INTRAVENOUS at 13:54

## 2017-02-07 NOTE — TELEPHONE ENCOUNTER
Patient returning your call regarding her Zio patch.  If you like you can leave results on phone. [phone]

## 2017-02-07 NOTE — TELEPHONE ENCOUNTER
Patient informed about the test results. She is having more symptoms and happening more frequently. She would like to consider ablation for PSVT. I will discuss with Dr. Koby Choudhury. She would like to have the procedure completed as soon as possible.

## 2017-02-08 LAB — CREAT BLDA-MCNC: 1 MG/DL (ref 0.6–1.3)

## 2017-02-10 PROBLEM — I47.19 PAT (PAROXYSMAL ATRIAL TACHYCARDIA): Status: ACTIVE | Noted: 2017-02-10

## 2017-02-10 PROBLEM — I47.1 PAT (PAROXYSMAL ATRIAL TACHYCARDIA): Status: ACTIVE | Noted: 2017-02-10

## 2017-02-10 RX ORDER — METOPROLOL SUCCINATE 25 MG/1
25 TABLET, EXTENDED RELEASE ORAL DAILY
Qty: 30 TABLET | Refills: 1 | Status: SHIPPED | OUTPATIENT
Start: 2017-02-10 | End: 2017-04-16 | Stop reason: SDUPTHER

## 2017-02-10 NOTE — TELEPHONE ENCOUNTER
Patient called today and said that she would like to go ahead with oblation and would accept any time that you could get schedule. She will just work her schedule around the date. [phone]

## 2017-02-10 NOTE — TELEPHONE ENCOUNTER
I discussed the plan of care Dr. Koby Choudhury. He reviewed the Zio patch strips. He said the patient is experiencing periods of atrial tachycardia.  He feels that she would benefit from a beta blocker and a sleep apnea test. She will then need to follow up with Dr. Choudhury.     I have left a message for patient to return my call.

## 2017-02-13 NOTE — TELEPHONE ENCOUNTER
"  I just spoke with patient via phone. She received my message on Friday and started the beta blocker. She felt much better after taking the beta blocker. She denies any further palpitations.     She was questioning if she should take her Maxzide.  She really wants to because she feels \"puffy\".    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.  I told her that she can take the Maxzide in the morning the beta blocker at nighttime.  If she notices that her blood pressure heart rates are getting too low, she will call me.    She is scheduled to have a sleep apnea consultation in early April.  I've asked her to keep a diary of her palpitations.  She will call with any concerns.  I will arrange a follow-up appointment with Dr. Koby Choudhury in late April or early May.   "

## 2017-03-23 ENCOUNTER — TRANSCRIBE ORDERS (OUTPATIENT)
Dept: ADMINISTRATIVE | Facility: HOSPITAL | Age: 67
End: 2017-03-23

## 2017-03-23 DIAGNOSIS — M25.552 LEFT HIP PAIN: Primary | ICD-10-CM

## 2017-03-28 ENCOUNTER — HOSPITAL ENCOUNTER (OUTPATIENT)
Dept: GENERAL RADIOLOGY | Facility: HOSPITAL | Age: 67
Discharge: HOME | End: 2017-03-28
Attending: ORTHOPAEDIC SURGERY | Admitting: ORTHOPAEDIC SURGERY

## 2017-03-28 DIAGNOSIS — M25.552 LEFT HIP PAIN: ICD-10-CM

## 2017-03-28 PROCEDURE — 25010000002 METHYLPREDNISOLONE PER 40 MG: Performed by: RADIOLOGY

## 2017-03-28 PROCEDURE — 0 IOPAMIDOL 61 % SOLUTION: Performed by: RADIOLOGY

## 2017-03-28 PROCEDURE — 77002 NEEDLE LOCALIZATION BY XRAY: CPT

## 2017-03-28 RX ORDER — LIDOCAINE HYDROCHLORIDE 10 MG/ML
10 INJECTION, SOLUTION INFILTRATION; PERINEURAL
Status: COMPLETED | OUTPATIENT
Start: 2017-03-28 | End: 2017-03-28

## 2017-03-28 RX ORDER — BUPIVACAINE HYDROCHLORIDE 2.5 MG/ML
10 INJECTION, SOLUTION EPIDURAL; INFILTRATION; INTRACAUDAL
Status: COMPLETED | OUTPATIENT
Start: 2017-03-28 | End: 2017-03-28

## 2017-03-28 RX ORDER — METHYLPREDNISOLONE ACETATE 40 MG/ML
80 INJECTION, SUSPENSION INTRA-ARTICULAR; INTRALESIONAL; INTRAMUSCULAR; SOFT TISSUE
Status: COMPLETED | OUTPATIENT
Start: 2017-03-28 | End: 2017-03-28

## 2017-03-28 RX ADMIN — LIDOCAINE HYDROCHLORIDE 3 ML: 10 INJECTION, SOLUTION INFILTRATION; PERINEURAL at 11:16

## 2017-03-28 RX ADMIN — BUPIVACAINE HYDROCHLORIDE 5 ML: 2.5 INJECTION, SOLUTION EPIDURAL; INFILTRATION; INTRACAUDAL; PERINEURAL at 11:16

## 2017-03-28 RX ADMIN — IOPAMIDOL 1 ML: 612 INJECTION, SOLUTION INTRAVENOUS at 11:16

## 2017-03-28 RX ADMIN — METHYLPREDNISOLONE ACETATE 80 MG: 40 INJECTION, SUSPENSION INTRA-ARTICULAR; INTRALESIONAL; INTRAMUSCULAR; SOFT TISSUE at 11:16

## 2017-03-29 ENCOUNTER — HOSPITAL ENCOUNTER (OUTPATIENT)
Dept: GENERAL RADIOLOGY | Facility: HOSPITAL | Age: 67
Discharge: HOME | End: 2017-03-29
Attending: ORTHOPAEDIC SURGERY

## 2017-04-11 ENCOUNTER — HOSPITAL ENCOUNTER (OUTPATIENT)
Dept: SLEEP MEDICINE | Facility: HOSPITAL | Age: 67
Discharge: HOME | End: 2017-04-11
Admitting: NURSE PRACTITIONER

## 2017-04-11 DIAGNOSIS — R06.02 SHORTNESS OF BREATH: ICD-10-CM

## 2017-04-11 DIAGNOSIS — G47.33 OSA (OBSTRUCTIVE SLEEP APNEA): Primary | ICD-10-CM

## 2017-04-11 DIAGNOSIS — R00.2 PALPITATIONS: ICD-10-CM

## 2017-04-11 DIAGNOSIS — R06.00 PND (PAROXYSMAL NOCTURNAL DYSPNEA): ICD-10-CM

## 2017-04-11 PROCEDURE — G0463 HOSPITAL OUTPT CLINIC VISIT: HCPCS

## 2017-04-12 NOTE — PROGRESS NOTES
DATE OF CONSULTATION: 04/11/2017    SLEEP MEDICINE CONSULTATION    HISTORY OF PRESENT ILLNESS: The patient is a 66-year-old  female who complains of loud snoring, awakening with gasping for breath, sore throat, and dry mouth.     At night she has chronic pain, that interferes with sleep, and she has bruxism and awakens with sour taste or burning sensation in the chest. She always had sleep problems and during childhood she had sleepwalking symptoms.     Sleep schedule: Bedtime 8 p.m. to 4 a.m., gets about 8 hours of sleep, and sleep latency is 5 minutes. When she wakes up she does feel refreshed.    Her [place] Sleepiness Scale score is 5.     PAST MEDICAL HISTORY:   1.  Heart disease with irregular heartbeats.    2.  High blood pressure.    3.  Supraventricular tachycardia.    4.  Atrial tachycardia.     CURRENT MEDICATIONS:  1.  Maxzide.  2.  Metoprolol XL 25 mg daily.    3.  Trazodone.    4.  Pravastatin.  5.  Neurontin.  6.  Biotene.  7.  Calcium.    ALLERGIES:  1.  SULFA.   2.  ANTIBIOTICS.    3.  MORPHINE.     FAMILY HISTORY: Positive for heart disease and cancer.     SOCIAL HISTORY: Smokes 1-2 packs of cigarettes daily, started at age 20 but stopped at age 30. Alcohol intake is minimal. Caffeine intake is minimal.   REVIEW OF SYSTEMS: A 10-system review significant for nasal congestion, postnasal drip, pain in joints and muscles, irregular heartbeat, and shortness of breath.     PHYSICAL EXAMINATION:  VITAL SIGNS: Weight 188 pounds, height 5 feet 10 inches, body mass index 27, neck collar size 14 inches, blood pressure 116/79, heart rate 64.  HEENT: Normal.  NECK:  Supple. No bruits.  CARDIAC: Normal.   LUNGS: Clear to auscultation.   EXTREMITIES: No edema.     IMPRESSION: Patient has symptoms of obstructive sleep apnea syndrome, has supraventricular tachycardia and atrial tachycardia, and should be evaluated for obstructive sleep apnea syndrome.     RECOMMENDATIONS: Proceed with polysomnography.  Treat with CPAP therapy if needed. Follow up following the polysomnography.       Adam Ruiz M.D.  RCSHAYLA:lavelle  D:   04/11/2017 11:33:00  T:   04/11/2017 23:57:44  Job ID:   [id number]  Document ID:   [id number]  cc:   Diane Macdonald M.D.

## 2017-04-14 DIAGNOSIS — M85.80 OSTEOPENIA: ICD-10-CM

## 2017-04-14 DIAGNOSIS — E78.5 HYPERLIPIDEMIA, UNSPECIFIED HYPERLIPIDEMIA TYPE: ICD-10-CM

## 2017-04-16 DIAGNOSIS — I47.1 PAT (PAROXYSMAL ATRIAL TACHYCARDIA) (HCC): ICD-10-CM

## 2017-04-17 RX ORDER — METOPROLOL SUCCINATE 25 MG/1
TABLET, EXTENDED RELEASE ORAL
Qty: 30 TABLET | Refills: 0 | Status: SHIPPED | OUTPATIENT
Start: 2017-04-17 | End: 2017-04-28 | Stop reason: SDUPTHER

## 2017-04-28 ENCOUNTER — OFFICE VISIT (OUTPATIENT)
Dept: CARDIOLOGY | Facility: CLINIC | Age: 67
End: 2017-04-28

## 2017-04-28 VITALS
DIASTOLIC BLOOD PRESSURE: 98 MMHG | HEIGHT: 71 IN | HEART RATE: 58 BPM | SYSTOLIC BLOOD PRESSURE: 150 MMHG | WEIGHT: 190 LBS | BODY MASS INDEX: 26.6 KG/M2

## 2017-04-28 DIAGNOSIS — R00.0 SINUS TACHYCARDIA: ICD-10-CM

## 2017-04-28 DIAGNOSIS — I47.1 PAT (PAROXYSMAL ATRIAL TACHYCARDIA) (HCC): Primary | ICD-10-CM

## 2017-04-28 PROCEDURE — 99203 OFFICE O/P NEW LOW 30 MIN: CPT | Performed by: INTERNAL MEDICINE

## 2017-04-28 PROCEDURE — 93000 ELECTROCARDIOGRAM COMPLETE: CPT | Performed by: INTERNAL MEDICINE

## 2017-04-28 RX ORDER — METOPROLOL SUCCINATE 50 MG/1
50 TABLET, EXTENDED RELEASE ORAL DAILY
Qty: 90 TABLET | Refills: 3 | Status: SHIPPED | OUTPATIENT
Start: 2017-04-28 | End: 2017-08-10 | Stop reason: SDUPTHER

## 2017-04-28 NOTE — PROGRESS NOTES
Date of Office Visit: 2017  Encounter Provider: Koby Choudhury MD  Place of Service: [facility] CARDIOLOGY  Patient Name: Tran Sosa  : 1950    Subjective:     Encounter Date:2017      Patient ID: Tran Sosa is a 66 y.o. female who has a cc of irreg hb for years and had worse episodes in Aug -- More paroxysms in Nov.     Better w bb already     Normal stress echo in    No anginal chest pain,   No sig jain,   No soa except when not having episodes   No fainting,  No orthostasis.   No edema.   Exercise tolerance: good     There have been no hospital admission since the last visit.     There have been no bleeding events.       Past Medical History:   Diagnosis Date   • Abdominal pain, epigastric    • Allergic rhinitis    • Arthralgia of multiple sites    • Bronchitis    • Diverticulosis    • Dysphagia    • Elevated TSH    • Fatigue    • History of breast cancer     RIGHT    • Hyperlipidemia    • Hypertension    • Insomnia    • Junctional rhythm     one time; she think year    • Lung mass    • Mitral regurgitation     Mild per 2-D echocardiogram 2017   • Osteopenia    • Osteoporosis    • Postmenopausal status    • Pulmonary nodules    • Reactive airway disease    • Recurrent urinary tract infection    • Rib fracture    • Uterine leiomyoma    • Vitamin B 12 deficiency    • Vitamin D deficiency        Social History     Social History   • Marital status:      Spouse name: N/A   • Number of children: 1   • Years of education: N/A     Occupational History   • RN AT [employer]      Social History Main Topics   • Smoking status: Former Smoker     Packs/day: 1.00     Years: 10.00     Types: Cigarettes     Quit date:    • Smokeless tobacco: Not on file      Comment: QUIT AT AGE 30 YRS. OLD   • Alcohol use Yes      Comment: SPECIAL OCCASION   • Drug use: No   • Sexual activity: Defer     Other Topics Concern   • Not on file     Social History  "Narrative       Review of Systems   Constitution: Negative for fever and night sweats.   HENT: Negative for ear pain and stridor.    Eyes: Negative for discharge and visual halos.   Cardiovascular: Negative for cyanosis.   Respiratory: Negative for hemoptysis and sputum production.    Hematologic/Lymphatic: Negative for adenopathy.   Skin: Negative for nail changes and unusual hair distribution.   Musculoskeletal: Negative for gout and joint swelling.   Gastrointestinal: Negative for bowel incontinence and flatus.   Genitourinary: Negative for dysuria and flank pain.   Neurological: Negative for seizures and tremors.   Psychiatric/Behavioral: Negative for altered mental status. The patient is not nervous/anxious.             Objective:     Vitals:    04/28/17 0835   BP: 150/98   Pulse: 58   Weight: 190 lb (86.2 kg)   Height: 70.5\" (179.1 cm)         Physical Exam   Constitutional: She is oriented to person, place, and time.   HENT:   Head: Normocephalic and atraumatic.   Eyes: Right eye exhibits no discharge. Left eye exhibits no discharge.   Neck: No JVD present. No thyromegaly present.   Cardiovascular: Normal rate and regular rhythm.  Exam reveals no gallop and no friction rub.    No murmur heard.  Pulmonary/Chest: Effort normal and breath sounds normal. She has no rales.   Abdominal: Soft. Bowel sounds are normal. There is no tenderness.   Musculoskeletal: Normal range of motion. She exhibits no edema or deformity.   Neurological: She is alert and oriented to person, place, and time. She exhibits normal muscle tone.   Skin: Skin is warm and dry. No erythema.   Psychiatric: She has a normal mood and affect. Her behavior is normal. Thought content normal.         ECG 12 Lead  Date/Time: 4/28/2017 9:23 AM  Performed by: KANDY NAVA  Authorized by: KANDY NAVA   Comparison: compared with previous ECG   Similar to previous ECG  Rhythm: sinus rhythm  Rate: normal  Conduction: conduction normal  ST Segments: ST " segments normal  T Waves: T waves normal  QRS axis: normal  Clinical impression: normal ECG            Lab Review:       Assessment:          Diagnosis Plan   1. PAT (paroxysmal atrial tachycardia)  ECG 12 Lead   2. Sinus tachycardia  ECG 12 Lead          Plan:           She has PAT of prob mult morphologies that have been responsive to bb   She has no heart disease by history, exam and ecg and echo   No AF     I think we should simply increase dose of bb and then supplement it on a prn basis.             I spent 20 minutes or more w pt and chart.

## 2017-05-10 ENCOUNTER — HOSPITAL ENCOUNTER (OUTPATIENT)
Dept: SLEEP MEDICINE | Facility: HOSPITAL | Age: 67
Discharge: HOME | End: 2017-05-10
Admitting: PSYCHIATRY & NEUROLOGY

## 2017-05-10 DIAGNOSIS — G47.33 OSA (OBSTRUCTIVE SLEEP APNEA): ICD-10-CM

## 2017-05-10 PROCEDURE — G0398 HOME SLEEP TEST/TYPE 2 PORTA: HCPCS

## 2017-05-25 ENCOUNTER — TELEPHONE (OUTPATIENT)
Dept: SLEEP MEDICINE | Facility: HOSPITAL | Age: 67
End: 2017-05-25

## 2017-08-08 ENCOUNTER — HOSPITAL ENCOUNTER (OUTPATIENT)
Dept: SLEEP MEDICINE | Facility: HOSPITAL | Age: 67
Discharge: HOME | End: 2017-08-08
Admitting: PSYCHIATRY & NEUROLOGY

## 2017-08-08 PROCEDURE — G0463 HOSPITAL OUTPT CLINIC VISIT: HCPCS

## 2017-08-08 NOTE — PROGRESS NOTES
Tran Sosa  1950  67 y.o. female     Date of service 8/8/2017     SLEEP MEDICINE FOLLOW UP    HISTORY OF PRESENT ILLNESS: The patient is a 67 y.o.  female has moderately severe obstructive sleep apnea syndrome.  Her home sleep study in May 2017 revealed moderately severe obstructive sleep apnea syndrome with AHI of 15.0 per sleep hour (normal less than 5 per sleep hour) and minimum SPO2 84 %.     The patient is on auto CPAP therapy.  I have reviewed the compliance data and it indicates excellent compliance with 86.7% usage for more than 4 hours with an average usage of 6 hours and 7 minutes and AHI down to 3.7 and auto CPAP mean pressure 5.8 cm of water.  The patient is compliant and benefiting from it.    At night she has chronic pain, that interferes with sleep, and she has bruxism and awakens with sour taste or burning sensation in the chest. She always had sleep problems and during childhood she had sleepwalking symptoms.     Sleep schedule: Bedtime 9 p.m. to 5 a.m., gets about 8 hours of sleep, and sleep latency is 5 minutes. When she wakes up she does feel refreshed.    Her Astor Sleepiness Scale score was 5 before CPAP therapy and is down to 2 now.     PAST MEDICAL HISTORY:   1.  Heart disease with irregular heartbeats.    2.  High blood pressure.    3.  Supraventricular tachycardia.    4.  Atrial tachycardia.   5.  The patient has osteoarthritis and had a traumatic fall and undergoing left hip surgery soon.    CURRENT MEDICATIONS:  1.  Maxzide.  2.  Metoprolol XL 25 mg daily.    3.  Trazodone.    4.  Pravastatin.  5.  Neurontin.  6.  Biotene.  7.  Calcium.  8.  Tramadol when necessary.    ALLERGIES:  1.  SULFA.   2.  ANTIBIOTICS.    3.  MORPHINE.     FAMILY HISTORY: Positive for heart disease and cancer.     SOCIAL HISTORY: Smokes 1-2 packs of cigarettes daily, started at age 20 but stopped at age 30. Alcohol intake is minimal. Caffeine intake is minimal.     REVIEW OF SYSTEMS: A  10-system review significant for nasal congestion, postnasal drip, acid reflux, left hip pain and pain in joints and muscles, irregular heartbeat, and shortness of breath.     PHYSICAL EXAMINATION:  VITAL SIGNS: Weight 200 pounds, height 5 feet 10 inches, body mass index 29, neck collar size 14 inches, blood pressure 131/76, heart rate 64.  HEENT: Normal.  NECK:  Supple. No bruits.  CARDIAC: Normal.   LUNGS: Clear to auscultation.   EXTREMITIES: No edema.     IMPRESSION: Moderately severe obstructive sleep apnea syndrome with AHI of 15.0 per sleep hour (normal less than 5 per sleep hour) and minimum SPO2 84 %.     The patient is on auto CPAP therapy.  I have reviewed the compliance data and it indicates excellent compliance with 86.7% usage for more than 4 hours with an average usage of 6 hours and 7 minutes and AHI down to 3.7 and auto CPAP mean pressure 5.8 cm of water.  The patient is compliant and benefiting from it.  The patient reported that she is completely awake and clear headed now.    RECOMMENDATIONS: Continue auto CPAP therapy and follow-up one year.    Adam Ruiz M.D.  8/8/2017

## 2017-08-09 LAB
25(OH)D3+25(OH)D2 SERPL-MCNC: 37.9 NG/ML (ref 30–100)
ALBUMIN SERPL-MCNC: 4 G/DL (ref 3.5–5.2)
ALBUMIN/GLOB SERPL: 1.3 G/DL
ALP SERPL-CCNC: 73 U/L (ref 39–117)
ALT SERPL-CCNC: 19 U/L (ref 1–33)
AST SERPL-CCNC: 15 U/L (ref 1–32)
BILIRUB SERPL-MCNC: 0.3 MG/DL (ref 0.1–1.2)
BUN SERPL-MCNC: 14 MG/DL (ref 8–23)
BUN/CREAT SERPL: 15.4 (ref 7–25)
CALCIUM SERPL-MCNC: 9.5 MG/DL (ref 8.6–10.5)
CHLORIDE SERPL-SCNC: 105 MMOL/L (ref 98–107)
CHOLEST SERPL-MCNC: 165 MG/DL (ref 0–200)
CO2 SERPL-SCNC: 25.5 MMOL/L (ref 22–29)
CREAT SERPL-MCNC: 0.91 MG/DL (ref 0.57–1)
GLOBULIN SER CALC-MCNC: 3 GM/DL
GLUCOSE SERPL-MCNC: 95 MG/DL (ref 65–99)
HCV AB S/CO SERPL IA: 0.1 S/CO RATIO (ref 0–0.9)
HDLC SERPL-MCNC: 51 MG/DL (ref 40–60)
LDLC SERPL CALC-MCNC: 83 MG/DL (ref 0–100)
LDLC/HDLC SERPL: 1.62 {RATIO}
POTASSIUM SERPL-SCNC: 4.2 MMOL/L (ref 3.5–5.2)
PROT SERPL-MCNC: 7 G/DL (ref 6–8.5)
SODIUM SERPL-SCNC: 147 MMOL/L (ref 136–145)
TRIGL SERPL-MCNC: 156 MG/DL (ref 0–150)
TSH SERPL DL<=0.005 MIU/L-ACNC: 1.94 MIU/ML (ref 0.27–4.2)
VLDLC SERPL CALC-MCNC: 31.2 MG/DL (ref 5–40)

## 2017-08-10 ENCOUNTER — OFFICE VISIT (OUTPATIENT)
Dept: INTERNAL MEDICINE | Facility: CLINIC | Age: 67
End: 2017-08-10

## 2017-08-10 VITALS
HEIGHT: 71 IN | BODY MASS INDEX: 28.21 KG/M2 | SYSTOLIC BLOOD PRESSURE: 120 MMHG | OXYGEN SATURATION: 97 % | HEART RATE: 64 BPM | WEIGHT: 201.5 LBS | DIASTOLIC BLOOD PRESSURE: 72 MMHG

## 2017-08-10 DIAGNOSIS — G47.33 OSA ON CPAP: ICD-10-CM

## 2017-08-10 DIAGNOSIS — I47.1 PAT (PAROXYSMAL ATRIAL TACHYCARDIA) (HCC): Primary | ICD-10-CM

## 2017-08-10 DIAGNOSIS — M25.552 PAIN OF LEFT HIP JOINT: ICD-10-CM

## 2017-08-10 DIAGNOSIS — Z00.00 HEALTH CARE MAINTENANCE: ICD-10-CM

## 2017-08-10 DIAGNOSIS — M54.30 SCIATICA, UNSPECIFIED LATERALITY: ICD-10-CM

## 2017-08-10 DIAGNOSIS — M81.0 OSTEOPOROSIS: ICD-10-CM

## 2017-08-10 DIAGNOSIS — Z99.89 OSA ON CPAP: ICD-10-CM

## 2017-08-10 PROCEDURE — 90670 PCV13 VACCINE IM: CPT | Performed by: INTERNAL MEDICINE

## 2017-08-10 PROCEDURE — 90471 IMMUNIZATION ADMIN: CPT | Performed by: INTERNAL MEDICINE

## 2017-08-10 PROCEDURE — 99397 PER PM REEVAL EST PAT 65+ YR: CPT | Performed by: INTERNAL MEDICINE

## 2017-08-10 RX ORDER — TRAZODONE HYDROCHLORIDE 150 MG/1
150 TABLET ORAL NIGHTLY
Qty: 90 TABLET | Refills: 3 | Status: SHIPPED | OUTPATIENT
Start: 2017-08-10 | End: 2017-11-02

## 2017-08-10 RX ORDER — ATORVASTATIN CALCIUM 80 MG/1
80 TABLET, FILM COATED ORAL DAILY
Qty: 90 TABLET | Refills: 3 | Status: SHIPPED | OUTPATIENT
Start: 2017-08-10 | End: 2017-11-02

## 2017-08-10 RX ORDER — METOPROLOL SUCCINATE 50 MG/1
50 TABLET, EXTENDED RELEASE ORAL DAILY
Qty: 90 TABLET | Refills: 3 | Status: SHIPPED | OUTPATIENT
Start: 2017-08-10 | End: 2018-09-05 | Stop reason: SDUPTHER

## 2017-08-10 RX ORDER — CELECOXIB 200 MG/1
200 CAPSULE ORAL DAILY PRN
Qty: 90 CAPSULE | Refills: 3 | Status: SHIPPED | OUTPATIENT
Start: 2017-08-10 | End: 2017-08-31 | Stop reason: HOSPADM

## 2017-08-10 RX ORDER — TRAZODONE HYDROCHLORIDE 50 MG/1
50 TABLET ORAL NIGHTLY PRN
Qty: 90 TABLET | Refills: 3 | Status: SHIPPED | OUTPATIENT
Start: 2017-08-10 | End: 2017-12-26 | Stop reason: SDUPTHER

## 2017-08-10 RX ORDER — HYDROCODONE BITARTRATE AND ACETAMINOPHEN 7.5; 325 MG/1; MG/1
1 TABLET ORAL EVERY 6 HOURS PRN
Qty: 30 TABLET | Refills: 0 | Status: SHIPPED | OUTPATIENT
Start: 2017-08-10 | End: 2017-08-31 | Stop reason: HOSPADM

## 2017-08-10 RX ORDER — GABAPENTIN 300 MG/1
300 CAPSULE ORAL NIGHTLY
Qty: 90 CAPSULE | Refills: 3 | Status: SHIPPED | OUTPATIENT
Start: 2017-08-10 | End: 2017-11-02

## 2017-08-10 RX ORDER — TRAMADOL HYDROCHLORIDE 50 MG/1
TABLET ORAL
Refills: 0 | COMMUNITY
Start: 2017-07-03 | End: 2017-08-15 | Stop reason: SDUPTHER

## 2017-08-10 RX ORDER — TRIAMTERENE AND HYDROCHLOROTHIAZIDE 37.5; 25 MG/1; MG/1
1 TABLET ORAL EVERY MORNING
Qty: 90 TABLET | Refills: 3 | Status: SHIPPED | OUTPATIENT
Start: 2017-08-10 | End: 2018-07-19

## 2017-08-10 NOTE — PROGRESS NOTES
Subjective   Tran Sosa is a 67 y.o. female and is here for a comprehensive physical exam. The patient reports problems - aleyda.  She was dx with ALEYDA and is on a CPAP.  SHe thinks that this is now helping  Pt also seen by cards and on a beta blocker. For PAT and she is doing well  She has now been having worsening hip pain for the past few months.  She has seen ortho and she is scheduling to get her hip replaced.  She takes celebrex daily with tramadol rpn and she is not sure that is helping much  Injections initially helped but now not.  SHe is getting left hip sx later this month    Pt is UTD with annual gyn exam and mammo     Do you take any herbs or supplements that were not prescribed by a doctor? She does calcium and vit d     Social History:no reg exercise due to hip pain  Not eating a healthy diet lately    Social History     Social History   • Marital status:      Spouse name: N/A   • Number of children: 1   • Years of education: N/A     Occupational History   • RN AT [employer]      Social History Main Topics   • Smoking status: Former Smoker     Packs/day: 1.00     Years: 10.00     Types: Cigarettes     Quit date: 1980   • Smokeless tobacco: Not on file      Comment: QUIT AT AGE 30 YRS. OLD   • Alcohol use Yes      Comment: SPECIAL OCCASION   • Drug use: No   • Sexual activity: Defer     Other Topics Concern   • Not on file     Social History Narrative       Family History:   Family History   Problem Relation Age of Onset   • Ovarian cancer Mother    • Lung cancer Father    • Hypertension Sister      benign essential hypertension   • Hyperlipidemia Sister    • Other Sister      RENAL DISEASE   • Hypertension Brother      benign essential hypertension   • Hyperlipidemia Brother        Past Medical History:   Past Medical History:   Diagnosis Date   • Abdominal pain, epigastric    • Allergic rhinitis    • Arthralgia of multiple sites    • Bronchitis    • Diverticulosis    • Dysphagia    • Elevated  "TSH    • Fatigue    • History of breast cancer 1984    RIGHT    • Hyperlipidemia    • Hypertension    • Insomnia    • Junctional rhythm     one time; she think year 2000   • Lung mass    • Mitral regurgitation     Mild per 2-D echocardiogram 1/13/2017   • Osteopenia    • Osteoporosis    • Postmenopausal status    • Pulmonary nodules    • Reactive airway disease    • Recurrent urinary tract infection    • Rib fracture    • Uterine leiomyoma    • Vitamin B 12 deficiency    • Vitamin D deficiency            Review of Systems    A comprehensive review of systems was negative.    Objective   /72 (BP Location: Left arm, Patient Position: Sitting, Cuff Size: Large Adult)  Pulse 64  Ht 70.5\" (179.1 cm)  Wt 201 lb 8 oz (91.4 kg)  LMP  (LMP Unknown)  SpO2 97%  BMI 28.5 kg/m2    General Appearance:    Alert, cooperative, no distress, appears stated age   Head:    Normocephalic, without obvious abnormality, atraumatic   Eyes:    PERRL, conjunctiva/corneas clear, EOM's intact, fundi     benign, both eyes   Ears:    Normal TM's and external ear canals, both ears   Nose:   Nares normal, septum midline, mucosa normal, no drainage    or sinus tenderness   Throat:   Lips, mucosa, and tongue normal; teeth and gums normal   Neck:   Supple, symmetrical, trachea midline, no adenopathy;     thyroid:  no enlargement/tenderness/nodules; no carotid    bruit or JVD   Back:     Symmetric, no curvature, ROM normal, no CVA tenderness   Lungs:     Clear to auscultation bilaterally, respirations unlabored   Chest Wall:    No tenderness or deformity    Heart:    Regular rate and rhythm, S1 and S2 normal, no murmur, rub   or gallop       Abdomen:     Soft, non-tender, bowel sounds active all four quadrants,     no masses, no organomegaly           Extremities:   Extremities normal, atraumatic, no cyanosis or edema   Pulses:   2+ and symmetric all extremities   Skin:   Skin color, texture, turgor normal, no rashes or lesions   Lymph " nodes:   Cervical, supraclavicular, and axillary nodes normal   Neurologic:   CNII-XII intact, normal strength, sensation and reflexes     throughout         Medications:   Current Outpatient Prescriptions:   •  aspirin 81 MG tablet, Take 81 mg by mouth daily. HOLD PRIOR TO SURGERY, Disp: , Rfl:   •  atorvastatin (LIPITOR) 80 MG tablet, Take 1 tablet by mouth Daily., Disp: 90 tablet, Rfl: 3  •  Calcium Carbonate (CALCIUM 600 PO), Take 1 tablet by mouth Daily. HOLD PRIOR TO SURGERY, Disp: , Rfl:   •  celecoxib (CELEBREX) 200 MG capsule, Take 200 mg by mouth daily as needed. HOLD PRIOR TO SURGERY, Disp: , Rfl:   •  gabapentin (NEURONTIN) 300 MG capsule, Take 1 capsule by mouth Every Night., Disp: 90 capsule, Rfl: 3  •  metoprolol succinate XL (TOPROL-XL) 50 MG 24 hr tablet, Take 1 tablet by mouth Daily., Disp: 90 tablet, Rfl: 3  •  Multiple Vitamin (MULTI VITAMIN DAILY PO), Take 1 tablet by mouth daily. HOLD PRIOR TO SURGERY, Disp: , Rfl:   •  traMADol (ULTRAM) 50 MG tablet, TK 1 - 2 TS PO Q 4 - 6 H PRN  P, Disp: , Rfl: 0  •  traZODone (DESYREL) 150 MG tablet, Take 1 tablet by mouth Every Night., Disp: 90 tablet, Rfl: 3  •  traZODone (DESYREL) 50 MG tablet, Take 1 tablet by mouth At Night As Needed for sleep., Disp: 90 tablet, Rfl: 3  •  triamterene-hydrochlorothiazide (MAXZIDE-25) 37.5-25 MG per tablet, Take 1 tablet by mouth Every Morning., Disp: 90 tablet, Rfl: 3       Assessment/Plan   Healthy female exam.      1. Healthcare Maintenance:  2. Patient Counseling:  --Nutrition: Stressed importance of moderation in sodium/caffeine intake, saturated fat and cholesterol, caloric balance, sufficient intake of fresh fruits, vegetables, fiber, calcium and vit D  --Exercise: she plans to resume exercise after her hip sx  --Substance Abuse: no tob occas etoh  --Dental health: she does go to the dentist rec  --Immunizations reviewed.prevnar today  --Discussed benefits of screening colonoscopy itd  3. PAT- stable with  metoprolol  4. Left hip pain-  Worsening sleep as well  She is scheduled to have sx at the end of the month.  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  She also cannot sleep at the end of his days because the pain is so severe.  The tramadol is not helping at all.  I'm giving her some hydrocodone to get her through until her surgery particularly for the days after she works  5. HPl- cont atovastatin  6. HTN-  Doing well with maxide

## 2017-08-15 ENCOUNTER — HOSPITAL ENCOUNTER (OUTPATIENT)
Dept: GENERAL RADIOLOGY | Facility: HOSPITAL | Age: 67
Discharge: HOME | End: 2017-08-15
Admitting: ORTHOPAEDIC SURGERY

## 2017-08-15 ENCOUNTER — HOSPITAL ENCOUNTER (OUTPATIENT)
Dept: GENERAL RADIOLOGY | Facility: HOSPITAL | Age: 67
Discharge: HOME | End: 2017-08-15

## 2017-08-15 ENCOUNTER — APPOINTMENT (OUTPATIENT)
Dept: PREADMISSION TESTING | Facility: HOSPITAL | Age: 67
End: 2017-08-15

## 2017-08-15 VITALS
OXYGEN SATURATION: 96 % | TEMPERATURE: 98.4 F | HEIGHT: 71 IN | BODY MASS INDEX: 28.42 KG/M2 | DIASTOLIC BLOOD PRESSURE: 86 MMHG | RESPIRATION RATE: 16 BRPM | WEIGHT: 203 LBS | HEART RATE: 62 BPM | SYSTOLIC BLOOD PRESSURE: 139 MMHG

## 2017-08-15 LAB
ABO GROUP BLD: NORMAL
BILIRUB UR QL STRIP: NEGATIVE
BLD GP AB SCN SERPL QL: NEGATIVE
CLARITY UR: CLEAR
COLOR UR: YELLOW
DEPRECATED RDW RBC AUTO: 44.1 FL (ref 37–54)
ERYTHROCYTE [DISTWIDTH] IN BLOOD BY AUTOMATED COUNT: 12.6 % (ref 11.7–13)
GLUCOSE UR STRIP-MCNC: NEGATIVE MG/DL
HCT VFR BLD AUTO: 38.1 % (ref 35.6–45.5)
HGB BLD-MCNC: 12 G/DL (ref 11.9–15.5)
HGB UR QL STRIP.AUTO: NEGATIVE
INR PPP: 0.96 (ref 0.9–1.1)
KETONES UR QL STRIP: NEGATIVE
LEUKOCYTE ESTERASE UR QL STRIP.AUTO: NEGATIVE
MCH RBC QN AUTO: 30.2 PG (ref 26.9–32)
MCHC RBC AUTO-ENTMCNC: 31.5 G/DL (ref 32.4–36.3)
MCV RBC AUTO: 95.7 FL (ref 80.5–98.2)
NITRITE UR QL STRIP: NEGATIVE
PH UR STRIP.AUTO: 6 [PH] (ref 5–8)
PLATELET # BLD AUTO: 252 10*3/MM3 (ref 140–500)
PMV BLD AUTO: 10.8 FL (ref 6–12)
PROT UR QL STRIP: NEGATIVE
PROTHROMBIN TIME: 12.4 SECONDS (ref 11.7–14.2)
RBC # BLD AUTO: 3.98 10*6/MM3 (ref 3.9–5.2)
RH BLD: POSITIVE
SP GR UR STRIP: 1.01 (ref 1–1.03)
UROBILINOGEN UR QL STRIP: NORMAL
WBC NRBC COR # BLD: 4.82 10*3/MM3 (ref 4.5–10.7)

## 2017-08-15 PROCEDURE — 93005 ELECTROCARDIOGRAM TRACING: CPT

## 2017-08-15 PROCEDURE — 86850 RBC ANTIBODY SCREEN: CPT | Performed by: ORTHOPAEDIC SURGERY

## 2017-08-15 PROCEDURE — 73502 X-RAY EXAM HIP UNI 2-3 VIEWS: CPT

## 2017-08-15 PROCEDURE — 81003 URINALYSIS AUTO W/O SCOPE: CPT | Performed by: ORTHOPAEDIC SURGERY

## 2017-08-15 PROCEDURE — 93010 ELECTROCARDIOGRAM REPORT: CPT | Performed by: INTERNAL MEDICINE

## 2017-08-15 PROCEDURE — 36415 COLL VENOUS BLD VENIPUNCTURE: CPT

## 2017-08-15 PROCEDURE — 86901 BLOOD TYPING SEROLOGIC RH(D): CPT | Performed by: ORTHOPAEDIC SURGERY

## 2017-08-15 PROCEDURE — 86900 BLOOD TYPING SEROLOGIC ABO: CPT | Performed by: ORTHOPAEDIC SURGERY

## 2017-08-15 PROCEDURE — 71020 HC CHEST PA AND LATERAL: CPT

## 2017-08-15 PROCEDURE — 85027 COMPLETE CBC AUTOMATED: CPT | Performed by: ORTHOPAEDIC SURGERY

## 2017-08-15 PROCEDURE — 85610 PROTHROMBIN TIME: CPT | Performed by: ORTHOPAEDIC SURGERY

## 2017-08-15 RX ORDER — TRAMADOL HYDROCHLORIDE 50 MG/1
50 TABLET ORAL EVERY 6 HOURS PRN
COMMUNITY
End: 2017-08-31 | Stop reason: HOSPADM

## 2017-08-15 NOTE — DISCHARGE INSTRUCTIONS
Take the following medications the morning of surgery with a small sip of water:    HYDROCODONE  OR TRAMADOL IF NEEDED    General Instructions:  • Do not eat solid food after midnight the night before surgery.  • You may drink clear liquids day of surgery but must stop at least one hour before your hospital arrival time @ 0500 AM STOP DRINKING!!!  • It is beneficial for you to have a clear drink that contains carbohydrates the day of surgery.  We suggest a 20 ounce bottle of Gatorade or Powerade for non-diabetic patients or a 20 ounce bottle of G2 or Powerade Zero for diabetic patients.    Clear liquids are liquids you can see through.  Nothing red in color.     Plain water                               Sports drinks  Sodas                                   Gelatin (Jell-O)  Fruit juices without pulp such as white grape juice and apple juice  Popsicles that contain no fruit or yogurt  Tea or coffee (no cream or milk added)  Gatorade / Powerade  G2 / Powerade Zero  • Patients who avoid smoking, chewing tobacco and alcohol for 4 weeks prior to surgery have a reduced risk of post-operative complications.  Quit smoking as many days before surgery as you can.  • Do not smoke, use chewing tobacco or drink alcohol the day of surgery.   • BRING your C-PAP machine.  • Bring any papers given to you in the doctor’s office.  • Wear clean comfortable clothes and socks.  • Do not wear contact lenses or make-up.  Bring a case for your glasses.   • Bring crutches or walker if applicable.  • Leave all other valuables and jewelry at home.  • The Pre-Admission Testing nurse will instruct you to bring medications if unable to obtain an accurate list in Pre-Admission Testing.        You were given a blood bank ID arm band remember to bring it with you the day of surgery.    Preventing a Surgical Site Infection:  • For 2 to 3 days before surgery, avoid shaving with a razor because the razor can irritate skin and make it easier to develop  an infection.  • The night prior to surgery sleep in a clean bed with clean clothing.  Do not allow pets to sleep with you.  • Shower on the morning of surgery using a fresh bar of anti-bacterial soap (such as Dial) and clean washcloth.  Dry with a clean towel and dress in clean clothing.  • Ask your surgeon if you will be receiving antibiotics prior to surgery.  • Make sure you, your family, and all healthcare providers clean their hands with soap and water or an alcohol based hand  before caring for you or your wound.    Day of surgery: 08- ARRIVE @ 0600 AM REPORT TO THE MAIN OR   Upon arrival, a Pre-op nurse and Anesthesiologist will review your health history, obtain vital signs, and answer questions you may have.  The only belongings needed at this time will be your home medications and  your C-PAP machine.  If you are staying overnight your family can leave the rest of your belongings in the car and bring them to your room later.  A Pre-op nurse will start an IV and you may receive medication in preparation for surgery, including something to help you relax.  Your family will be able to see you in the Pre-op area.  While you are in surgery your family should notify the waiting room  if they leave the waiting room area and provide a contact phone number.    Please be aware that surgery does come with discomfort.  We want to make every effort to control your discomfort so please discuss any uncontrolled symptoms with your nurse.   Your doctor will most likely have prescribed pain medications.      If you are going home after surgery you will receive individualized written care instructions before being discharged.  A responsible adult must drive you to and from the hospital on the day of your surgery and stay with you for 24 hours.    If you are staying overnight following surgery, you will be transported to your hospital room following the recovery period.  [facility]  has all private rooms.    If you have any questions please call Pre-Admission Testing at [phone].  Deductibles and co-payments are collected on the day of service. Please be prepared to pay the required co-pay, deductible or deposit on the day of service as defined by your plan.    2% CHLORAHEXIDINE GLUCONATE* CLOTH  Preparing or “prepping” skin before surgery can reduce the risk of infection at the surgical site. To make the process easier, [facility] has chosen disposable cloths moistened with a rinse-free, 2% Chlorhexidine Gluconate (CHG) antiseptic solution. The steps below outline the prepping process and should be carefully followed.        Use the prep cloth on the area that is circled in the diagram             Directions Night before Surgery 08- PM PRIOR TO SURGERY (LEFT HIP AREA)  1) Shower using a fresh bar of anti-bacterial soap (such as Dial) and clean washcloth.  Use a clean towel to completely dry your skin.  2) Do not use any lotions, oils or creams on your skin.  3) Open the package and remove 1 cloth, wipe your skin for 30 seconds in a circular motion.  Allow to dry for 3 minutes.  4) Repeat #3 with second cloth.  5) Do not touch your eyes, ears, or mouth with the prep cloth.  6) Allow the wet prep solution to air dry.  7) Discard the prep cloth and wash your hands with soap and water.   8) Dress in clean bed clothes and sleep on fresh clean bed sheets.   9) You may experience some temporary itching after the prep.    Directions Day of Surgery 08- am prior to surgery (LEFT HIP AREA)  1) Repeat steps 1,2,3,4,5,6,7, and 9.   2) Dress in clean clothes before coming to the hospital.    BACTROBAN NASAL OINTMENT  There are many germs normally in your nose. Bactroban is an ointment that will help reduce these germs. Please follow these instructions for Bactroban use:    ____The day before surgery in the morning  [id number]_______    ____The day before surgery in the  evening              [id number]_______    ____The day of surgery in the morning    [id number]_______    **Squirt ½ package of Bactroban Ointment onto a cotton applicator and apply to inside of 1st nostril.  Squirt the remaining Bactroban and apply to the inside of the other nostril.

## 2017-08-18 ENCOUNTER — HOSPITAL ENCOUNTER (OUTPATIENT)
Dept: BONE DENSITY | Facility: HOSPITAL | Age: 67
Discharge: HOME | End: 2017-08-18
Admitting: INTERNAL MEDICINE

## 2017-08-18 DIAGNOSIS — M81.0 OSTEOPOROSIS: ICD-10-CM

## 2017-08-18 PROCEDURE — 77080 DXA BONE DENSITY AXIAL: CPT

## 2017-08-29 ENCOUNTER — ANESTHESIA (OUTPATIENT)
Dept: PERIOP | Facility: HOSPITAL | Age: 67
End: 2017-08-29

## 2017-08-29 ENCOUNTER — APPOINTMENT (OUTPATIENT)
Dept: GENERAL RADIOLOGY | Facility: HOSPITAL | Age: 67
End: 2017-08-29

## 2017-08-29 ENCOUNTER — ANESTHESIA EVENT (OUTPATIENT)
Dept: PERIOP | Facility: HOSPITAL | Age: 67
End: 2017-08-29

## 2017-08-29 ENCOUNTER — HOSPITAL ENCOUNTER (INPATIENT)
Facility: HOSPITAL | Age: 67
LOS: 2 days | Discharge: HOME HEALTH | End: 2017-08-31
Attending: ORTHOPAEDIC SURGERY | Admitting: ORTHOPAEDIC SURGERY

## 2017-08-29 DIAGNOSIS — Z96.642 STATUS POST TOTAL REPLACEMENT OF LEFT HIP: Primary | ICD-10-CM

## 2017-08-29 DIAGNOSIS — R53.1 GENERALIZED WEAKNESS: ICD-10-CM

## 2017-08-29 PROBLEM — M16.9 OA (OSTEOARTHRITIS) OF HIP: Status: ACTIVE | Noted: 2017-08-29

## 2017-08-29 PROCEDURE — 25010000002 PROPOFOL 10 MG/ML EMULSION: Performed by: ANESTHESIOLOGY

## 2017-08-29 PROCEDURE — 73501 X-RAY EXAM HIP UNI 1 VIEW: CPT

## 2017-08-29 PROCEDURE — 25010000002 FENTANYL CITRATE (PF) 100 MCG/2ML SOLUTION: Performed by: ANESTHESIOLOGY

## 2017-08-29 PROCEDURE — 25010000002 DEXAMETHASONE PER 1 MG: Performed by: ANESTHESIOLOGY

## 2017-08-29 PROCEDURE — 25010000002 ROPIVACAINE PER 1 MG: Performed by: ANESTHESIOLOGY

## 2017-08-29 PROCEDURE — 97162 PT EVAL MOD COMPLEX 30 MIN: CPT

## 2017-08-29 PROCEDURE — 25010000002 PHENYLEPHRINE PER 1 ML: Performed by: ANESTHESIOLOGY

## 2017-08-29 PROCEDURE — 25010000002 MIDAZOLAM PER 1 MG: Performed by: ANESTHESIOLOGY

## 2017-08-29 PROCEDURE — 25010000002 NEOSTIGMINE PER 0.5 MG: Performed by: ANESTHESIOLOGY

## 2017-08-29 PROCEDURE — 25010000002 KETOROLAC TROMETHAMINE PER 15 MG: Performed by: ORTHOPAEDIC SURGERY

## 2017-08-29 PROCEDURE — 25010000002 EPINEPHRINE PER 0.1 MG: Performed by: ORTHOPAEDIC SURGERY

## 2017-08-29 PROCEDURE — C1776 JOINT DEVICE (IMPLANTABLE): HCPCS | Performed by: ORTHOPAEDIC SURGERY

## 2017-08-29 PROCEDURE — 0SRB04Z REPLACEMENT OF LEFT HIP JOINT WITH CERAMIC ON POLYETHYLENE SYNTHETIC SUBSTITUTE, OPEN APPROACH: ICD-10-PCS | Performed by: ORTHOPAEDIC SURGERY

## 2017-08-29 PROCEDURE — 25010000002 ONDANSETRON PER 1 MG: Performed by: ANESTHESIOLOGY

## 2017-08-29 PROCEDURE — 25010000002 ROPIVACAINE PER 1 MG: Performed by: ORTHOPAEDIC SURGERY

## 2017-08-29 PROCEDURE — 25010000003 CEFAZOLIN IN DEXTROSE 2-4 GM/100ML-% SOLUTION: Performed by: ORTHOPAEDIC SURGERY

## 2017-08-29 PROCEDURE — 97110 THERAPEUTIC EXERCISES: CPT

## 2017-08-29 DEVICE — IMPLANTABLE DEVICE: Type: IMPLANTABLE DEVICE | Site: HIP | Status: FUNCTIONAL

## 2017-08-29 DEVICE — SHLL 3 SPK 54MM: Type: IMPLANTABLE DEVICE | Site: HIP | Status: FUNCTIONAL

## 2017-08-29 DEVICE — CAP TOTL HIP PRIMARY MEDIUM DEMAND: Type: IMPLANTABLE DEVICE | Site: HIP | Status: FUNCTIONAL

## 2017-08-29 DEVICE — ADAPT HIP BIOLOX OPTN TYPE1 TPR PLS3: Type: IMPLANTABLE DEVICE | Site: HIP | Status: FUNCTIONAL

## 2017-08-29 DEVICE — LINER ACET HIWALL EPOLY PLS3 SZ24 40: Type: IMPLANTABLE DEVICE | Site: HIP | Status: FUNCTIONAL

## 2017-08-29 DEVICE — HD FEM/HIP G7 BIOLOX/DELTA OPTN 40MM: Type: IMPLANTABLE DEVICE | Site: HIP | Status: FUNCTIONAL

## 2017-08-29 RX ORDER — ROPIVACAINE HYDROCHLORIDE 2 MG/ML
INJECTION, SOLUTION EPIDURAL; INFILTRATION; PERINEURAL AS NEEDED
Status: DISCONTINUED | OUTPATIENT
Start: 2017-08-29 | End: 2017-08-29 | Stop reason: SURG

## 2017-08-29 RX ORDER — NALOXONE HCL 0.4 MG/ML
0.2 VIAL (ML) INJECTION AS NEEDED
Status: DISCONTINUED | OUTPATIENT
Start: 2017-08-29 | End: 2017-08-29 | Stop reason: HOSPADM

## 2017-08-29 RX ORDER — SENNA AND DOCUSATE SODIUM 50; 8.6 MG/1; MG/1
2 TABLET, FILM COATED ORAL 2 TIMES DAILY
Status: DISCONTINUED | OUTPATIENT
Start: 2017-08-29 | End: 2017-08-31 | Stop reason: HOSPADM

## 2017-08-29 RX ORDER — ASPIRIN 325 MG
325 TABLET, DELAYED RELEASE (ENTERIC COATED) ORAL 2 TIMES DAILY WITH MEALS
Status: DISCONTINUED | OUTPATIENT
Start: 2017-08-29 | End: 2017-08-31 | Stop reason: HOSPADM

## 2017-08-29 RX ORDER — ONDANSETRON 2 MG/ML
4 INJECTION INTRAMUSCULAR; INTRAVENOUS EVERY 6 HOURS PRN
Status: DISCONTINUED | OUTPATIENT
Start: 2017-08-29 | End: 2017-08-31 | Stop reason: HOSPADM

## 2017-08-29 RX ORDER — MIDAZOLAM HYDROCHLORIDE 1 MG/ML
2 INJECTION INTRAMUSCULAR; INTRAVENOUS
Status: DISCONTINUED | OUTPATIENT
Start: 2017-08-29 | End: 2017-08-29 | Stop reason: HOSPADM

## 2017-08-29 RX ORDER — ONDANSETRON 4 MG/1
4 TABLET, FILM COATED ORAL EVERY 6 HOURS PRN
Status: DISCONTINUED | OUTPATIENT
Start: 2017-08-29 | End: 2017-08-31 | Stop reason: HOSPADM

## 2017-08-29 RX ORDER — TRAMADOL HYDROCHLORIDE 50 MG/1
50 TABLET ORAL EVERY 6 HOURS PRN
Status: DISCONTINUED | OUTPATIENT
Start: 2017-08-29 | End: 2017-08-29 | Stop reason: SDUPTHER

## 2017-08-29 RX ORDER — ACETAMINOPHEN 500 MG
1000 TABLET ORAL ONCE
Status: COMPLETED | OUTPATIENT
Start: 2017-08-29 | End: 2017-08-29

## 2017-08-29 RX ORDER — KETOROLAC TROMETHAMINE 30 MG/ML
15 INJECTION, SOLUTION INTRAMUSCULAR; INTRAVENOUS EVERY 8 HOURS PRN
Status: DISCONTINUED | OUTPATIENT
Start: 2017-08-29 | End: 2017-08-31 | Stop reason: HOSPADM

## 2017-08-29 RX ORDER — FAMOTIDINE 10 MG/ML
20 INJECTION, SOLUTION INTRAVENOUS ONCE
Status: COMPLETED | OUTPATIENT
Start: 2017-08-29 | End: 2017-08-29

## 2017-08-29 RX ORDER — DIAZEPAM 5 MG/ML
5 INJECTION, SOLUTION INTRAMUSCULAR; INTRAVENOUS 2 TIMES DAILY PRN
Status: ACTIVE | OUTPATIENT
Start: 2017-08-29 | End: 2017-08-30

## 2017-08-29 RX ORDER — PROMETHAZINE HYDROCHLORIDE 25 MG/ML
12.5 INJECTION, SOLUTION INTRAMUSCULAR; INTRAVENOUS ONCE AS NEEDED
Status: DISCONTINUED | OUTPATIENT
Start: 2017-08-29 | End: 2017-08-29 | Stop reason: HOSPADM

## 2017-08-29 RX ORDER — METOPROLOL SUCCINATE 50 MG/1
50 TABLET, EXTENDED RELEASE ORAL EVERY EVENING
Status: DISCONTINUED | OUTPATIENT
Start: 2017-08-29 | End: 2017-08-31 | Stop reason: HOSPADM

## 2017-08-29 RX ORDER — TRAZODONE HYDROCHLORIDE 150 MG/1
150 TABLET ORAL NIGHTLY
Status: DISCONTINUED | OUTPATIENT
Start: 2017-08-29 | End: 2017-08-29

## 2017-08-29 RX ORDER — OXYCODONE HYDROCHLORIDE AND ACETAMINOPHEN 5; 325 MG/1; MG/1
2 TABLET ORAL EVERY 4 HOURS PRN
Status: DISCONTINUED | OUTPATIENT
Start: 2017-08-29 | End: 2017-08-30

## 2017-08-29 RX ORDER — FENTANYL CITRATE 50 UG/ML
50 INJECTION, SOLUTION INTRAMUSCULAR; INTRAVENOUS
Status: DISCONTINUED | OUTPATIENT
Start: 2017-08-29 | End: 2017-08-29 | Stop reason: HOSPADM

## 2017-08-29 RX ORDER — DIPHENHYDRAMINE HCL 25 MG
50 CAPSULE ORAL EVERY 6 HOURS PRN
Status: DISCONTINUED | OUTPATIENT
Start: 2017-08-29 | End: 2017-08-31 | Stop reason: HOSPADM

## 2017-08-29 RX ORDER — PROMETHAZINE HYDROCHLORIDE 25 MG/1
25 SUPPOSITORY RECTAL ONCE AS NEEDED
Status: DISCONTINUED | OUTPATIENT
Start: 2017-08-29 | End: 2017-08-29 | Stop reason: HOSPADM

## 2017-08-29 RX ORDER — TRANEXAMIC ACID 100 MG/ML
INJECTION, SOLUTION INTRAVENOUS AS NEEDED
Status: DISCONTINUED | OUTPATIENT
Start: 2017-08-29 | End: 2017-08-29 | Stop reason: SURG

## 2017-08-29 RX ORDER — CEFAZOLIN SODIUM 2 G/100ML
2 INJECTION, SOLUTION INTRAVENOUS ONCE
Status: COMPLETED | OUTPATIENT
Start: 2017-08-29 | End: 2017-08-29

## 2017-08-29 RX ORDER — SODIUM CHLORIDE 0.9 % (FLUSH) 0.9 %
1-10 SYRINGE (ML) INJECTION AS NEEDED
Status: DISCONTINUED | OUTPATIENT
Start: 2017-08-29 | End: 2017-08-31 | Stop reason: HOSPADM

## 2017-08-29 RX ORDER — ONDANSETRON 2 MG/ML
4 INJECTION INTRAMUSCULAR; INTRAVENOUS ONCE AS NEEDED
Status: DISCONTINUED | OUTPATIENT
Start: 2017-08-29 | End: 2017-08-29 | Stop reason: HOSPADM

## 2017-08-29 RX ORDER — CLINDAMYCIN PHOSPHATE 900 MG/50ML
900 INJECTION INTRAVENOUS EVERY 8 HOURS
Status: COMPLETED | OUTPATIENT
Start: 2017-08-29 | End: 2017-08-30

## 2017-08-29 RX ORDER — PROMETHAZINE HYDROCHLORIDE 25 MG/1
12.5 TABLET ORAL ONCE AS NEEDED
Status: DISCONTINUED | OUTPATIENT
Start: 2017-08-29 | End: 2017-08-29 | Stop reason: HOSPADM

## 2017-08-29 RX ORDER — ROCURONIUM BROMIDE 10 MG/ML
INJECTION, SOLUTION INTRAVENOUS AS NEEDED
Status: DISCONTINUED | OUTPATIENT
Start: 2017-08-29 | End: 2017-08-29 | Stop reason: SURG

## 2017-08-29 RX ORDER — MIDAZOLAM HYDROCHLORIDE 1 MG/ML
1 INJECTION INTRAMUSCULAR; INTRAVENOUS
Status: DISCONTINUED | OUTPATIENT
Start: 2017-08-29 | End: 2017-08-29 | Stop reason: HOSPADM

## 2017-08-29 RX ORDER — SODIUM CHLORIDE, SODIUM LACTATE, POTASSIUM CHLORIDE, CALCIUM CHLORIDE 600; 310; 30; 20 MG/100ML; MG/100ML; MG/100ML; MG/100ML
9 INJECTION, SOLUTION INTRAVENOUS CONTINUOUS
Status: DISCONTINUED | OUTPATIENT
Start: 2017-08-29 | End: 2017-08-31 | Stop reason: HOSPADM

## 2017-08-29 RX ORDER — MAGNESIUM HYDROXIDE 1200 MG/15ML
LIQUID ORAL
Status: DISCONTINUED | OUTPATIENT
Start: 1840-12-31 | End: 2017-08-29 | Stop reason: HOSPADM

## 2017-08-29 RX ORDER — ACETAMINOPHEN 325 MG/1
325 TABLET ORAL EVERY 4 HOURS PRN
Status: DISCONTINUED | OUTPATIENT
Start: 2017-08-29 | End: 2017-08-31 | Stop reason: HOSPADM

## 2017-08-29 RX ORDER — DOCUSATE SODIUM 100 MG/1
100 CAPSULE, LIQUID FILLED ORAL 2 TIMES DAILY PRN
Status: DISCONTINUED | OUTPATIENT
Start: 2017-08-29 | End: 2017-08-31 | Stop reason: HOSPADM

## 2017-08-29 RX ORDER — ACETAMINOPHEN 325 MG/1
650 TABLET ORAL EVERY 4 HOURS PRN
Status: DISCONTINUED | OUTPATIENT
Start: 2017-08-29 | End: 2017-08-31 | Stop reason: HOSPADM

## 2017-08-29 RX ORDER — CELECOXIB 200 MG/1
200 CAPSULE ORAL ONCE
Status: COMPLETED | OUTPATIENT
Start: 2017-08-29 | End: 2017-08-29

## 2017-08-29 RX ORDER — HYDRALAZINE HYDROCHLORIDE 20 MG/ML
5 INJECTION INTRAMUSCULAR; INTRAVENOUS
Status: DISCONTINUED | OUTPATIENT
Start: 2017-08-29 | End: 2017-08-29 | Stop reason: HOSPADM

## 2017-08-29 RX ORDER — EPHEDRINE SULFATE 50 MG/ML
INJECTION, SOLUTION INTRAVENOUS AS NEEDED
Status: DISCONTINUED | OUTPATIENT
Start: 2017-08-29 | End: 2017-08-29 | Stop reason: SURG

## 2017-08-29 RX ORDER — PROMETHAZINE HYDROCHLORIDE 25 MG/1
25 TABLET ORAL ONCE AS NEEDED
Status: DISCONTINUED | OUTPATIENT
Start: 2017-08-29 | End: 2017-08-29 | Stop reason: HOSPADM

## 2017-08-29 RX ORDER — FENTANYL CITRATE 50 UG/ML
INJECTION, SOLUTION INTRAMUSCULAR; INTRAVENOUS AS NEEDED
Status: DISCONTINUED | OUTPATIENT
Start: 2017-08-29 | End: 2017-08-29 | Stop reason: SURG

## 2017-08-29 RX ORDER — ONDANSETRON 4 MG/1
4 TABLET, ORALLY DISINTEGRATING ORAL EVERY 6 HOURS PRN
Status: DISCONTINUED | OUTPATIENT
Start: 2017-08-29 | End: 2017-08-31 | Stop reason: HOSPADM

## 2017-08-29 RX ORDER — DIPHENHYDRAMINE HYDROCHLORIDE 50 MG/ML
12.5 INJECTION INTRAMUSCULAR; INTRAVENOUS
Status: DISCONTINUED | OUTPATIENT
Start: 2017-08-29 | End: 2017-08-29 | Stop reason: HOSPADM

## 2017-08-29 RX ORDER — ATORVASTATIN CALCIUM 80 MG/1
80 TABLET, FILM COATED ORAL NIGHTLY
Status: DISCONTINUED | OUTPATIENT
Start: 2017-08-29 | End: 2017-08-31 | Stop reason: HOSPADM

## 2017-08-29 RX ORDER — BISACODYL 10 MG
10 SUPPOSITORY, RECTAL RECTAL DAILY PRN
Status: DISCONTINUED | OUTPATIENT
Start: 2017-08-29 | End: 2017-08-31 | Stop reason: HOSPADM

## 2017-08-29 RX ORDER — SODIUM CHLORIDE 450 MG/100ML
100 INJECTION, SOLUTION INTRAVENOUS CONTINUOUS
Status: DISCONTINUED | OUTPATIENT
Start: 2017-08-29 | End: 2017-08-31 | Stop reason: HOSPADM

## 2017-08-29 RX ORDER — FERROUS SULFATE 325(65) MG
325 TABLET ORAL
Status: DISCONTINUED | OUTPATIENT
Start: 2017-08-29 | End: 2017-08-31 | Stop reason: HOSPADM

## 2017-08-29 RX ORDER — OXYCODONE HYDROCHLORIDE AND ACETAMINOPHEN 5; 325 MG/1; MG/1
1 TABLET ORAL EVERY 4 HOURS PRN
Status: DISCONTINUED | OUTPATIENT
Start: 2017-08-29 | End: 2017-08-30

## 2017-08-29 RX ORDER — UREA 10 %
1 LOTION (ML) TOPICAL NIGHTLY PRN
Status: DISCONTINUED | OUTPATIENT
Start: 2017-08-29 | End: 2017-08-31 | Stop reason: HOSPADM

## 2017-08-29 RX ORDER — FLUMAZENIL 0.1 MG/ML
0.2 INJECTION INTRAVENOUS AS NEEDED
Status: DISCONTINUED | OUTPATIENT
Start: 2017-08-29 | End: 2017-08-29 | Stop reason: HOSPADM

## 2017-08-29 RX ORDER — GABAPENTIN 300 MG/1
300 CAPSULE ORAL NIGHTLY
Status: DISCONTINUED | OUTPATIENT
Start: 2017-08-29 | End: 2017-08-31 | Stop reason: HOSPADM

## 2017-08-29 RX ORDER — SODIUM CHLORIDE 0.9 % (FLUSH) 0.9 %
1-10 SYRINGE (ML) INJECTION AS NEEDED
Status: DISCONTINUED | OUTPATIENT
Start: 2017-08-29 | End: 2017-08-29 | Stop reason: HOSPADM

## 2017-08-29 RX ORDER — DEXAMETHASONE SODIUM PHOSPHATE 10 MG/ML
INJECTION INTRAMUSCULAR; INTRAVENOUS AS NEEDED
Status: DISCONTINUED | OUTPATIENT
Start: 2017-08-29 | End: 2017-08-29 | Stop reason: SURG

## 2017-08-29 RX ORDER — GLYCOPYRROLATE 0.2 MG/ML
INJECTION INTRAMUSCULAR; INTRAVENOUS AS NEEDED
Status: DISCONTINUED | OUTPATIENT
Start: 2017-08-29 | End: 2017-08-29 | Stop reason: SURG

## 2017-08-29 RX ORDER — TRAZODONE HYDROCHLORIDE 50 MG/1
50 TABLET ORAL NIGHTLY PRN
Status: DISCONTINUED | OUTPATIENT
Start: 2017-08-29 | End: 2017-08-31 | Stop reason: HOSPADM

## 2017-08-29 RX ORDER — DIAZEPAM 5 MG/1
5 TABLET ORAL EVERY 6 HOURS PRN
Status: DISCONTINUED | OUTPATIENT
Start: 2017-08-29 | End: 2017-08-31 | Stop reason: HOSPADM

## 2017-08-29 RX ORDER — LIDOCAINE HYDROCHLORIDE 20 MG/ML
INJECTION, SOLUTION INFILTRATION; PERINEURAL AS NEEDED
Status: DISCONTINUED | OUTPATIENT
Start: 2017-08-29 | End: 2017-08-29 | Stop reason: SURG

## 2017-08-29 RX ORDER — LABETALOL HYDROCHLORIDE 5 MG/ML
5 INJECTION, SOLUTION INTRAVENOUS
Status: DISCONTINUED | OUTPATIENT
Start: 2017-08-29 | End: 2017-08-29 | Stop reason: HOSPADM

## 2017-08-29 RX ORDER — BUPIVACAINE HYDROCHLORIDE AND EPINEPHRINE 2.5; 5 MG/ML; UG/ML
INJECTION, SOLUTION EPIDURAL; INFILTRATION; INTRACAUDAL; PERINEURAL AS NEEDED
Status: DISCONTINUED | OUTPATIENT
Start: 2017-08-29 | End: 2017-08-29 | Stop reason: SURG

## 2017-08-29 RX ORDER — TRIAMTERENE AND HYDROCHLOROTHIAZIDE 37.5; 25 MG/1; MG/1
1 TABLET ORAL EVERY MORNING
Status: DISCONTINUED | OUTPATIENT
Start: 2017-08-30 | End: 2017-08-31 | Stop reason: HOSPADM

## 2017-08-29 RX ORDER — EPHEDRINE SULFATE 50 MG/ML
5 INJECTION, SOLUTION INTRAVENOUS ONCE AS NEEDED
Status: DISCONTINUED | OUTPATIENT
Start: 2017-08-29 | End: 2017-08-29 | Stop reason: HOSPADM

## 2017-08-29 RX ORDER — PROPOFOL 10 MG/ML
VIAL (ML) INTRAVENOUS AS NEEDED
Status: DISCONTINUED | OUTPATIENT
Start: 2017-08-29 | End: 2017-08-29 | Stop reason: SURG

## 2017-08-29 RX ORDER — ONDANSETRON 2 MG/ML
INJECTION INTRAMUSCULAR; INTRAVENOUS AS NEEDED
Status: DISCONTINUED | OUTPATIENT
Start: 2017-08-29 | End: 2017-08-29 | Stop reason: SURG

## 2017-08-29 RX ORDER — TRAZODONE HYDROCHLORIDE 100 MG/1
100 TABLET ORAL NIGHTLY
Status: DISCONTINUED | OUTPATIENT
Start: 2017-08-29 | End: 2017-08-31 | Stop reason: HOSPADM

## 2017-08-29 RX ADMIN — FENTANYL CITRATE 50 MCG: 50 INJECTION INTRAMUSCULAR; INTRAVENOUS at 08:53

## 2017-08-29 RX ADMIN — EPHEDRINE SULFATE 5 MG: 50 INJECTION INTRAMUSCULAR; INTRAVENOUS; SUBCUTANEOUS at 09:17

## 2017-08-29 RX ADMIN — ATORVASTATIN CALCIUM 80 MG: 80 TABLET, FILM COATED ORAL at 20:54

## 2017-08-29 RX ADMIN — EPHEDRINE SULFATE 5 MG: 50 INJECTION INTRAMUSCULAR; INTRAVENOUS; SUBCUTANEOUS at 10:37

## 2017-08-29 RX ADMIN — CEFAZOLIN SODIUM 2 G: 2 INJECTION, SOLUTION INTRAVENOUS at 10:33

## 2017-08-29 RX ADMIN — CEFAZOLIN SODIUM 2 G: 2 INJECTION, SOLUTION INTRAVENOUS at 08:59

## 2017-08-29 RX ADMIN — SODIUM CHLORIDE, POTASSIUM CHLORIDE, SODIUM LACTATE AND CALCIUM CHLORIDE 9 ML/HR: 600; 310; 30; 20 INJECTION, SOLUTION INTRAVENOUS at 12:04

## 2017-08-29 RX ADMIN — ROPIVACAINE HYDROCHLORIDE 20 ML: 2 INJECTION, SOLUTION EPIDURAL; INFILTRATION at 08:13

## 2017-08-29 RX ADMIN — OXYCODONE HYDROCHLORIDE AND ACETAMINOPHEN 1 TABLET: 5; 325 TABLET ORAL at 20:54

## 2017-08-29 RX ADMIN — KETOROLAC TROMETHAMINE 15 MG: 30 INJECTION, SOLUTION INTRAMUSCULAR at 12:08

## 2017-08-29 RX ADMIN — FERROUS SULFATE TAB 325 MG (65 MG ELEMENTAL FE) 325 MG: 325 (65 FE) TAB at 17:29

## 2017-08-29 RX ADMIN — EPHEDRINE SULFATE 10 MG: 50 INJECTION INTRAMUSCULAR; INTRAVENOUS; SUBCUTANEOUS at 09:46

## 2017-08-29 RX ADMIN — PHENYLEPHRINE HYDROCHLORIDE 100 MCG: 10 INJECTION INTRAVENOUS at 09:46

## 2017-08-29 RX ADMIN — DIAZEPAM 5 MG: 5 TABLET ORAL at 11:55

## 2017-08-29 RX ADMIN — METOPROLOL SUCCINATE 50 MG: 50 TABLET, FILM COATED, EXTENDED RELEASE ORAL at 17:29

## 2017-08-29 RX ADMIN — OXYCODONE HYDROCHLORIDE AND ACETAMINOPHEN 1 TABLET: 5; 325 TABLET ORAL at 13:59

## 2017-08-29 RX ADMIN — ONDANSETRON 4 MG: 2 INJECTION INTRAMUSCULAR; INTRAVENOUS at 10:31

## 2017-08-29 RX ADMIN — FENTANYL CITRATE 50 MCG: 50 INJECTION INTRAMUSCULAR; INTRAVENOUS at 08:37

## 2017-08-29 RX ADMIN — PROPOFOL 40 MG: 10 INJECTION, EMULSION INTRAVENOUS at 08:57

## 2017-08-29 RX ADMIN — CLINDAMYCIN PHOSPHATE 900 MG: 18 INJECTION, SOLUTION INTRAMUSCULAR; INTRAVENOUS at 17:29

## 2017-08-29 RX ADMIN — FAMOTIDINE 20 MG: 10 INJECTION INTRAVENOUS at 08:06

## 2017-08-29 RX ADMIN — PHENYLEPHRINE HYDROCHLORIDE 100 MCG: 10 INJECTION INTRAVENOUS at 10:08

## 2017-08-29 RX ADMIN — LIDOCAINE HYDROCHLORIDE 50 MG: 20 INJECTION, SOLUTION INFILTRATION; PERINEURAL at 08:53

## 2017-08-29 RX ADMIN — MIDAZOLAM 2 MG: 1 INJECTION INTRAMUSCULAR; INTRAVENOUS at 08:06

## 2017-08-29 RX ADMIN — DEXAMETHASONE SODIUM PHOSPHATE 4 MG: 10 INJECTION INTRAMUSCULAR; INTRAVENOUS at 09:01

## 2017-08-29 RX ADMIN — FENTANYL CITRATE 50 MCG: 50 INJECTION INTRAMUSCULAR; INTRAVENOUS at 11:28

## 2017-08-29 RX ADMIN — EPHEDRINE SULFATE 5 MG: 50 INJECTION INTRAMUSCULAR; INTRAVENOUS; SUBCUTANEOUS at 09:13

## 2017-08-29 RX ADMIN — SODIUM CHLORIDE, POTASSIUM CHLORIDE, SODIUM LACTATE AND CALCIUM CHLORIDE 9 ML/HR: 600; 310; 30; 20 INJECTION, SOLUTION INTRAVENOUS at 08:06

## 2017-08-29 RX ADMIN — SODIUM CHLORIDE, POTASSIUM CHLORIDE, SODIUM LACTATE AND CALCIUM CHLORIDE: 600; 310; 30; 20 INJECTION, SOLUTION INTRAVENOUS at 09:42

## 2017-08-29 RX ADMIN — PHENYLEPHRINE HYDROCHLORIDE 100 MCG: 10 INJECTION INTRAVENOUS at 10:28

## 2017-08-29 RX ADMIN — GLYCOPYRROLATE 0.4 MG: 0.2 INJECTION INTRAMUSCULAR; INTRAVENOUS at 10:37

## 2017-08-29 RX ADMIN — CELECOXIB 200 MG: 200 CAPSULE ORAL at 06:38

## 2017-08-29 RX ADMIN — FENTANYL CITRATE 50 MCG: 50 INJECTION INTRAMUSCULAR; INTRAVENOUS at 11:23

## 2017-08-29 RX ADMIN — NEOSTIGMINE METHYLSULFATE 3.5 MG: 1 INJECTION INTRAMUSCULAR; INTRAVENOUS; SUBCUTANEOUS at 10:37

## 2017-08-29 RX ADMIN — DOCUSATE SODIUM -SENNOSIDES 2 TABLET: 50; 8.6 TABLET, COATED ORAL at 17:28

## 2017-08-29 RX ADMIN — ACETAMINOPHEN 1000 MG: 500 TABLET ORAL at 06:37

## 2017-08-29 RX ADMIN — BUPIVACAINE HYDROCHLORIDE AND EPINEPHRINE BITARTRATE 30 ML: 2.5; .0091 INJECTION, SOLUTION EPIDURAL; INFILTRATION; INTRACAUDAL; PERINEURAL at 08:13

## 2017-08-29 RX ADMIN — TRAZODONE HYDROCHLORIDE 100 MG: 100 TABLET, FILM COATED ORAL at 20:55

## 2017-08-29 RX ADMIN — PROPOFOL 150 MG: 10 INJECTION, EMULSION INTRAVENOUS at 08:53

## 2017-08-29 RX ADMIN — GABAPENTIN 300 MG: 300 CAPSULE ORAL at 20:55

## 2017-08-29 RX ADMIN — SODIUM CHLORIDE, POTASSIUM CHLORIDE, SODIUM LACTATE AND CALCIUM CHLORIDE: 600; 310; 30; 20 INJECTION, SOLUTION INTRAVENOUS at 08:49

## 2017-08-29 RX ADMIN — ROCURONIUM BROMIDE 40 MG: 10 INJECTION INTRAVENOUS at 08:53

## 2017-08-29 RX ADMIN — FENTANYL CITRATE 50 MCG: 50 INJECTION INTRAMUSCULAR; INTRAVENOUS at 12:03

## 2017-08-29 RX ADMIN — ASPIRIN 325 MG: 325 TABLET, DELAYED RELEASE ORAL at 17:32

## 2017-08-29 RX ADMIN — TRANEXAMIC ACID 1000 MG: 100 INJECTION, SOLUTION INTRAVENOUS at 09:21

## 2017-08-29 RX ADMIN — FENTANYL CITRATE 50 MCG: 50 INJECTION INTRAMUSCULAR; INTRAVENOUS at 11:47

## 2017-08-29 RX ADMIN — TRAZODONE HYDROCHLORIDE 50 MG: 150 TABLET, FILM COATED ORAL at 22:01

## 2017-08-29 RX ADMIN — FENTANYL CITRATE 50 MCG: 50 INJECTION INTRAMUSCULAR; INTRAVENOUS at 08:07

## 2017-08-29 RX ADMIN — PHENYLEPHRINE HYDROCHLORIDE 100 MCG: 10 INJECTION INTRAVENOUS at 10:46

## 2017-08-29 NOTE — ANESTHESIA PROCEDURE NOTES
Airway  Urgency: elective    Airway not difficult    General Information and Staff    Patient location during procedure: OR  Anesthesiologist: REJI LOFTON    Indications and Patient Condition  Indications for airway management: airway protection    Preoxygenated: yes  MILS maintained throughout  Mask difficulty assessment: 1 - vent by mask    Final Airway Details  Final airway type: endotracheal airway      Successful airway: ETT  Cuffed: yes   Successful intubation technique: direct laryngoscopy  Facilitating devices/methods: intubating stylet  Endotracheal tube insertion site: oral  Blade: Venita  Blade size: #3  ETT size: 7.0 mm  Cormack-Lehane Classification: grade IIa - partial view of glottis  Placement verified by: chest auscultation and capnometry   Measured from: lips  ETT to lips (cm): 21  Number of attempts at approach: 1

## 2017-08-29 NOTE — ANESTHESIA POSTPROCEDURE EVALUATION
"Patient: Tran Sosa    Procedure Summary     Date Anesthesia Start Anesthesia Stop Room / Location    08/29/17 0849 1109  [facility] /  [facility]       Procedure Diagnosis Surgeon Provider    LT TOTAL HIP ARTHROPLASTY (Left Hip) Unilateral primary osteoarthritis, left hip  (Unilateral primary osteoarthritis, left hip [M16.12]) MD Elizabeth Cary MD          Anesthesia Type: general  Last vitals  BP   134/68 (08/29/17 1125)    Temp        Pulse   68 (08/29/17 1125)   Resp   14 (08/29/17 1125)    SpO2   97 % (08/29/17 1125)      Post Anesthesia Care and Evaluation    Patient location during evaluation: PACU  Patient participation: complete - patient participated  Level of consciousness: awake and alert  Pain management: adequate  Airway patency: patent  Anesthetic complications: No anesthetic complications    Cardiovascular status: acceptable  Respiratory status: acceptable  Hydration status: acceptable    Comments: /68  Pulse 68  Temp 36.5 °C (97.7 °F) (Oral)   Resp 14  Ht 71\" (180.3 cm)  Wt 206 lb 3 oz (93.5 kg)  LMP  (LMP Unknown)  SpO2 97%  BMI 28.76 kg/m2        "

## 2017-08-29 NOTE — ANESTHESIA PROCEDURE NOTES
Peripheral Block    Patient location during procedure: holding area  Start time: 8/29/2017 8:05 AM  Stop time: 8/29/2017 8:13 AM  Reason for block: at surgeon's request and post-op pain management  Performed by  Anesthesiologist: FATOUMATA BONNER  Preanesthetic Checklist  Completed: patient identified, site marked, surgical consent, pre-op evaluation, timeout performed, IV checked, risks and benefits discussed and monitors and equipment checked  Prep:  Sterile barriers:gloves and cap  Prep: ChloraPrep  Patient monitoring: blood pressure monitoring, continuous pulse oximetry and EKG  Procedure  Sedation:yes    Guidance:ultrasound guided  ULTRASOUND INTERPRETATION. Using ultrasound guidance a 21 G gauge needle was placed in close proximity to the nerve, at which point, under ultrasound guidance anesthetic was injected in the area of the nerve and spread of the anesthesia was seen on ultrasound in close proximity thereto.  There were no abnormalities seen on ultrasound; a digital image was taken; and the patient tolerated the procedure with no complications. Images:still images obtained    Laterality:left  Block Type:fascia iliaca compartment  Injection Technique:single-shot  Needle Type:echogenic  Needle Gauge:21 G    Medications  Comment:Dexamethasone 4 mg  Local Injected:ropivacaine 0.2% and bupivacaine 0.25% with epinephrine Local Amount Injected:50mL  Post Assessment  Injection Assessment: negative aspiration for heme, no paresthesia on injection and incremental injection  Patient Tolerance:comfortable throughout block  Complications:no  Additional Notes  Ultrasound interpretation:  Needle placed beneath fascia iliaca.  No abnormalities noted.  Still image obtained.

## 2017-08-30 LAB
ANION GAP SERPL CALCULATED.3IONS-SCNC: 12.5 MMOL/L
BUN BLD-MCNC: 16 MG/DL (ref 8–23)
BUN/CREAT SERPL: 19.8 (ref 7–25)
CALCIUM SPEC-SCNC: 8.4 MG/DL (ref 8.6–10.5)
CHLORIDE SERPL-SCNC: 96 MMOL/L (ref 98–107)
CO2 SERPL-SCNC: 24.5 MMOL/L (ref 22–29)
CREAT BLD-MCNC: 0.81 MG/DL (ref 0.57–1)
GFR SERPL CREATININE-BSD FRML MDRD: 71 ML/MIN/1.73
GLUCOSE BLD-MCNC: 115 MG/DL (ref 65–99)
HCT VFR BLD AUTO: 30.5 % (ref 35.6–45.5)
HGB BLD-MCNC: 9.9 G/DL (ref 11.9–15.5)
POTASSIUM BLD-SCNC: 4.2 MMOL/L (ref 3.5–5.2)
SODIUM BLD-SCNC: 133 MMOL/L (ref 136–145)

## 2017-08-30 PROCEDURE — 80048 BASIC METABOLIC PNL TOTAL CA: CPT | Performed by: ORTHOPAEDIC SURGERY

## 2017-08-30 PROCEDURE — 85018 HEMOGLOBIN: CPT | Performed by: ORTHOPAEDIC SURGERY

## 2017-08-30 PROCEDURE — 85014 HEMATOCRIT: CPT | Performed by: ORTHOPAEDIC SURGERY

## 2017-08-30 PROCEDURE — 97150 GROUP THERAPEUTIC PROCEDURES: CPT

## 2017-08-30 PROCEDURE — 97110 THERAPEUTIC EXERCISES: CPT

## 2017-08-30 RX ORDER — HYDROCODONE BITARTRATE AND ACETAMINOPHEN 10; 325 MG/1; MG/1
1 TABLET ORAL EVERY 4 HOURS PRN
Status: DISCONTINUED | OUTPATIENT
Start: 2017-08-30 | End: 2017-08-31 | Stop reason: HOSPADM

## 2017-08-30 RX ORDER — HYDROCODONE BITARTRATE AND ACETAMINOPHEN 10; 325 MG/1; MG/1
2 TABLET ORAL EVERY 4 HOURS PRN
Status: DISCONTINUED | OUTPATIENT
Start: 2017-08-30 | End: 2017-08-31 | Stop reason: HOSPADM

## 2017-08-30 RX ADMIN — HYDROCODONE BITARTRATE AND ACETAMINOPHEN 1 TABLET: 10; 325 TABLET ORAL at 20:22

## 2017-08-30 RX ADMIN — METOPROLOL SUCCINATE 50 MG: 50 TABLET, FILM COATED, EXTENDED RELEASE ORAL at 16:26

## 2017-08-30 RX ADMIN — MUPIROCIN: 20 OINTMENT TOPICAL at 16:26

## 2017-08-30 RX ADMIN — DIAZEPAM 5 MG: 5 TABLET ORAL at 01:55

## 2017-08-30 RX ADMIN — DOCUSATE SODIUM -SENNOSIDES 2 TABLET: 50; 8.6 TABLET, COATED ORAL at 16:26

## 2017-08-30 RX ADMIN — ASPIRIN 325 MG: 325 TABLET, DELAYED RELEASE ORAL at 16:26

## 2017-08-30 RX ADMIN — FERROUS SULFATE TAB 325 MG (65 MG ELEMENTAL FE) 325 MG: 325 (65 FE) TAB at 08:15

## 2017-08-30 RX ADMIN — CLINDAMYCIN PHOSPHATE 900 MG: 18 INJECTION, SOLUTION INTRAMUSCULAR; INTRAVENOUS at 01:55

## 2017-08-30 RX ADMIN — ASPIRIN 325 MG: 325 TABLET, DELAYED RELEASE ORAL at 08:15

## 2017-08-30 RX ADMIN — HYDROCODONE BITARTRATE AND ACETAMINOPHEN 1 TABLET: 10; 325 TABLET ORAL at 08:16

## 2017-08-30 RX ADMIN — DOCUSATE SODIUM -SENNOSIDES 2 TABLET: 50; 8.6 TABLET, COATED ORAL at 08:15

## 2017-08-30 RX ADMIN — HYDROCODONE BITARTRATE AND ACETAMINOPHEN 1 TABLET: 10; 325 TABLET ORAL at 12:16

## 2017-08-30 RX ADMIN — GABAPENTIN 300 MG: 300 CAPSULE ORAL at 20:22

## 2017-08-30 RX ADMIN — HYDROCODONE BITARTRATE AND ACETAMINOPHEN 1 TABLET: 10; 325 TABLET ORAL at 16:25

## 2017-08-30 RX ADMIN — DIAZEPAM 5 MG: 5 TABLET ORAL at 20:24

## 2017-08-30 RX ADMIN — TRAZODONE HYDROCHLORIDE 100 MG: 100 TABLET, FILM COATED ORAL at 20:22

## 2017-08-30 RX ADMIN — ATORVASTATIN CALCIUM 80 MG: 80 TABLET, FILM COATED ORAL at 20:22

## 2017-08-30 RX ADMIN — OXYCODONE HYDROCHLORIDE AND ACETAMINOPHEN 1 TABLET: 5; 325 TABLET ORAL at 01:55

## 2017-08-31 VITALS
DIASTOLIC BLOOD PRESSURE: 67 MMHG | HEIGHT: 71 IN | TEMPERATURE: 98.4 F | OXYGEN SATURATION: 96 % | HEART RATE: 77 BPM | RESPIRATION RATE: 16 BRPM | WEIGHT: 206.19 LBS | SYSTOLIC BLOOD PRESSURE: 105 MMHG | BODY MASS INDEX: 28.87 KG/M2

## 2017-08-31 LAB
HCT VFR BLD AUTO: 30 % (ref 35.6–45.5)
HGB BLD-MCNC: 9.7 G/DL (ref 11.9–15.5)

## 2017-08-31 PROCEDURE — 85018 HEMOGLOBIN: CPT | Performed by: ORTHOPAEDIC SURGERY

## 2017-08-31 PROCEDURE — 97110 THERAPEUTIC EXERCISES: CPT

## 2017-08-31 PROCEDURE — 85014 HEMATOCRIT: CPT | Performed by: ORTHOPAEDIC SURGERY

## 2017-08-31 PROCEDURE — 97150 GROUP THERAPEUTIC PROCEDURES: CPT

## 2017-08-31 RX ORDER — BISACODYL 10 MG
10 SUPPOSITORY, RECTAL RECTAL DAILY PRN
Status: CANCELLED | OUTPATIENT
Start: 2017-08-31

## 2017-08-31 RX ORDER — DIAZEPAM 5 MG/ML
5 INJECTION, SOLUTION INTRAMUSCULAR; INTRAVENOUS 2 TIMES DAILY PRN
Status: CANCELLED | OUTPATIENT
Start: 2017-08-31 | End: 2017-09-01

## 2017-08-31 RX ORDER — ONDANSETRON 4 MG/1
4 TABLET, FILM COATED ORAL EVERY 6 HOURS PRN
Status: CANCELLED | OUTPATIENT
Start: 2017-08-31

## 2017-08-31 RX ORDER — CLINDAMYCIN PHOSPHATE 900 MG/50ML
900 INJECTION INTRAVENOUS EVERY 8 HOURS
Status: CANCELLED | OUTPATIENT
Start: 2017-08-31 | End: 2017-08-31

## 2017-08-31 RX ORDER — HYDROCODONE BITARTRATE AND ACETAMINOPHEN 10; 325 MG/1; MG/1
TABLET ORAL
Qty: 100 TABLET | Refills: 0 | Status: SHIPPED | OUTPATIENT
Start: 2017-08-31 | End: 2017-11-02

## 2017-08-31 RX ORDER — UREA 10 %
1 LOTION (ML) TOPICAL NIGHTLY PRN
Status: CANCELLED | OUTPATIENT
Start: 2017-08-31

## 2017-08-31 RX ORDER — FERROUS SULFATE 325(65) MG
325 TABLET ORAL
Status: CANCELLED | OUTPATIENT
Start: 2017-08-31

## 2017-08-31 RX ORDER — DIAZEPAM 5 MG/1
5 TABLET ORAL EVERY 6 HOURS PRN
Status: CANCELLED | OUTPATIENT
Start: 2017-08-31 | End: 2017-09-04

## 2017-08-31 RX ORDER — DOCUSATE SODIUM 100 MG/1
100 CAPSULE, LIQUID FILLED ORAL 2 TIMES DAILY PRN
Status: CANCELLED | OUTPATIENT
Start: 2017-08-31

## 2017-08-31 RX ORDER — ACETAMINOPHEN 325 MG/1
325 TABLET ORAL EVERY 4 HOURS PRN
Status: CANCELLED | OUTPATIENT
Start: 2017-08-31

## 2017-08-31 RX ORDER — SODIUM CHLORIDE 450 MG/100ML
100 INJECTION, SOLUTION INTRAVENOUS CONTINUOUS
Status: CANCELLED | OUTPATIENT
Start: 2017-08-31

## 2017-08-31 RX ORDER — OXYCODONE HYDROCHLORIDE AND ACETAMINOPHEN 5; 325 MG/1; MG/1
1 TABLET ORAL EVERY 4 HOURS PRN
Status: CANCELLED | OUTPATIENT
Start: 2017-08-31 | End: 2017-09-04

## 2017-08-31 RX ORDER — KETOROLAC TROMETHAMINE 30 MG/ML
15 INJECTION, SOLUTION INTRAMUSCULAR; INTRAVENOUS EVERY 8 HOURS PRN
Status: CANCELLED | OUTPATIENT
Start: 2017-08-31 | End: 2017-09-02

## 2017-08-31 RX ORDER — NALOXONE HCL 0.4 MG/ML
0.4 VIAL (ML) INJECTION
Status: CANCELLED | OUTPATIENT
Start: 2017-08-31

## 2017-08-31 RX ORDER — ONDANSETRON 2 MG/ML
4 INJECTION INTRAMUSCULAR; INTRAVENOUS EVERY 6 HOURS PRN
Status: CANCELLED | OUTPATIENT
Start: 2017-08-31

## 2017-08-31 RX ORDER — SENNA AND DOCUSATE SODIUM 50; 8.6 MG/1; MG/1
2 TABLET, FILM COATED ORAL 2 TIMES DAILY
Status: CANCELLED | OUTPATIENT
Start: 2017-08-31

## 2017-08-31 RX ORDER — MORPHINE SULFATE 2 MG/ML
6 INJECTION, SOLUTION INTRAMUSCULAR; INTRAVENOUS
Status: CANCELLED | OUTPATIENT
Start: 2017-08-31 | End: 2017-09-10

## 2017-08-31 RX ORDER — ACETAMINOPHEN 325 MG/1
650 TABLET ORAL EVERY 4 HOURS PRN
Status: CANCELLED | OUTPATIENT
Start: 2017-08-31

## 2017-08-31 RX ORDER — SODIUM CHLORIDE 0.9 % (FLUSH) 0.9 %
1-10 SYRINGE (ML) INJECTION AS NEEDED
Status: CANCELLED | OUTPATIENT
Start: 2017-08-31

## 2017-08-31 RX ORDER — ONDANSETRON 4 MG/1
4 TABLET, ORALLY DISINTEGRATING ORAL EVERY 6 HOURS PRN
Status: CANCELLED | OUTPATIENT
Start: 2017-08-31

## 2017-08-31 RX ORDER — OXYCODONE HYDROCHLORIDE AND ACETAMINOPHEN 5; 325 MG/1; MG/1
2 TABLET ORAL EVERY 4 HOURS PRN
Status: CANCELLED | OUTPATIENT
Start: 2017-08-31 | End: 2017-09-10

## 2017-08-31 RX ORDER — DIPHENHYDRAMINE HCL 25 MG
50 CAPSULE ORAL EVERY 6 HOURS PRN
Status: CANCELLED | OUTPATIENT
Start: 2017-08-31

## 2017-08-31 RX ADMIN — FERROUS SULFATE TAB 325 MG (65 MG ELEMENTAL FE) 325 MG: 325 (65 FE) TAB at 07:49

## 2017-08-31 RX ADMIN — HYDROCODONE BITARTRATE AND ACETAMINOPHEN 1 TABLET: 10; 325 TABLET ORAL at 01:15

## 2017-08-31 RX ADMIN — MUPIROCIN: 20 OINTMENT TOPICAL at 07:49

## 2017-08-31 RX ADMIN — ASPIRIN 325 MG: 325 TABLET, DELAYED RELEASE ORAL at 07:49

## 2017-08-31 RX ADMIN — HYDROCODONE BITARTRATE AND ACETAMINOPHEN 1 TABLET: 10; 325 TABLET ORAL at 06:55

## 2017-08-31 RX ADMIN — TRIAMTERENE AND HYDROCHLOROTHIAZIDE 1 TABLET: 37.5; 25 TABLET ORAL at 06:55

## 2017-08-31 RX ADMIN — DOCUSATE SODIUM -SENNOSIDES 2 TABLET: 50; 8.6 TABLET, COATED ORAL at 07:49

## 2017-09-04 ENCOUNTER — APPOINTMENT (OUTPATIENT)
Dept: CT IMAGING | Facility: HOSPITAL | Age: 67
End: 2017-09-04

## 2017-09-04 ENCOUNTER — HOSPITAL ENCOUNTER (EMERGENCY)
Facility: HOSPITAL | Age: 67
Discharge: HOME | End: 2017-09-04
Attending: EMERGENCY MEDICINE | Admitting: EMERGENCY MEDICINE

## 2017-09-04 VITALS
BODY MASS INDEX: 28.63 KG/M2 | OXYGEN SATURATION: 93 % | SYSTOLIC BLOOD PRESSURE: 147 MMHG | DIASTOLIC BLOOD PRESSURE: 82 MMHG | HEIGHT: 70 IN | WEIGHT: 200 LBS | TEMPERATURE: 98.4 F | RESPIRATION RATE: 20 BRPM | HEART RATE: 94 BPM

## 2017-09-04 DIAGNOSIS — G89.18 POST-OP PAIN: ICD-10-CM

## 2017-09-04 DIAGNOSIS — R06.09 DYSPNEA ON EXERTION: Primary | ICD-10-CM

## 2017-09-04 DIAGNOSIS — R00.0 SINUS TACHYCARDIA: ICD-10-CM

## 2017-09-04 LAB
ALBUMIN SERPL-MCNC: 4.4 G/DL (ref 3.5–5.2)
ALBUMIN/GLOB SERPL: 1.2 G/DL
ALP SERPL-CCNC: 65 U/L (ref 39–117)
ALT SERPL W P-5'-P-CCNC: 17 U/L (ref 1–33)
ANION GAP SERPL CALCULATED.3IONS-SCNC: 17.9 MMOL/L
AST SERPL-CCNC: 23 U/L (ref 1–32)
BASOPHILS # BLD AUTO: 0.04 10*3/MM3 (ref 0–0.2)
BASOPHILS NFR BLD AUTO: 0.5 % (ref 0–1.5)
BILIRUB SERPL-MCNC: 0.4 MG/DL (ref 0.1–1.2)
BUN BLD-MCNC: 17 MG/DL (ref 8–23)
BUN/CREAT SERPL: 18.5 (ref 7–25)
CALCIUM SPEC-SCNC: 10.2 MG/DL (ref 8.6–10.5)
CHLORIDE SERPL-SCNC: 97 MMOL/L (ref 98–107)
CO2 SERPL-SCNC: 23.1 MMOL/L (ref 22–29)
CREAT BLD-MCNC: 0.92 MG/DL (ref 0.57–1)
D DIMER PPP FEU-MCNC: 3.03 MCGFEU/ML (ref 0–0.49)
DEPRECATED RDW RBC AUTO: 43.4 FL (ref 37–54)
EOSINOPHIL # BLD AUTO: 0.16 10*3/MM3 (ref 0–0.7)
EOSINOPHIL NFR BLD AUTO: 2 % (ref 0.3–6.2)
ERYTHROCYTE [DISTWIDTH] IN BLOOD BY AUTOMATED COUNT: 12.9 % (ref 11.7–13)
GFR SERPL CREATININE-BSD FRML MDRD: 61 ML/MIN/1.73
GLOBULIN UR ELPH-MCNC: 3.7 GM/DL
GLUCOSE BLD-MCNC: 119 MG/DL (ref 65–99)
HCT VFR BLD AUTO: 34.6 % (ref 35.6–45.5)
HGB BLD-MCNC: 11.6 G/DL (ref 11.9–15.5)
HOLD SPECIMEN: NORMAL
HOLD SPECIMEN: NORMAL
IMM GRANULOCYTES # BLD: 0.04 10*3/MM3 (ref 0–0.03)
IMM GRANULOCYTES NFR BLD: 0.5 % (ref 0–0.5)
INR PPP: 0.93 (ref 0.9–1.1)
LYMPHOCYTES # BLD AUTO: 1.72 10*3/MM3 (ref 0.9–4.8)
LYMPHOCYTES NFR BLD AUTO: 21.9 % (ref 19.6–45.3)
MCH RBC QN AUTO: 30.9 PG (ref 26.9–32)
MCHC RBC AUTO-ENTMCNC: 33.5 G/DL (ref 32.4–36.3)
MCV RBC AUTO: 92.3 FL (ref 80.5–98.2)
MONOCYTES # BLD AUTO: 0.89 10*3/MM3 (ref 0.2–1.2)
MONOCYTES NFR BLD AUTO: 11.3 % (ref 5–12)
NEUTROPHILS # BLD AUTO: 5.01 10*3/MM3 (ref 1.9–8.1)
NEUTROPHILS NFR BLD AUTO: 63.8 % (ref 42.7–76)
PLATELET # BLD AUTO: 359 10*3/MM3 (ref 140–500)
PMV BLD AUTO: 10.3 FL (ref 6–12)
POTASSIUM BLD-SCNC: 3.6 MMOL/L (ref 3.5–5.2)
PROT SERPL-MCNC: 8.1 G/DL (ref 6–8.5)
PROTHROMBIN TIME: 12.1 SECONDS (ref 11.7–14.2)
RBC # BLD AUTO: 3.75 10*6/MM3 (ref 3.9–5.2)
SODIUM BLD-SCNC: 138 MMOL/L (ref 136–145)
TROPONIN T SERPL-MCNC: <0.01 NG/ML (ref 0–0.03)
WBC NRBC COR # BLD: 7.86 10*3/MM3 (ref 4.5–10.7)
WHOLE BLOOD HOLD SPECIMEN: NORMAL
WHOLE BLOOD HOLD SPECIMEN: NORMAL

## 2017-09-04 PROCEDURE — 85379 FIBRIN DEGRADATION QUANT: CPT | Performed by: EMERGENCY MEDICINE

## 2017-09-04 PROCEDURE — 85610 PROTHROMBIN TIME: CPT | Performed by: EMERGENCY MEDICINE

## 2017-09-04 PROCEDURE — 85025 COMPLETE CBC W/AUTO DIFF WBC: CPT | Performed by: EMERGENCY MEDICINE

## 2017-09-04 PROCEDURE — 93005 ELECTROCARDIOGRAM TRACING: CPT | Performed by: EMERGENCY MEDICINE

## 2017-09-04 PROCEDURE — 96374 THER/PROPH/DIAG INJ IV PUSH: CPT

## 2017-09-04 PROCEDURE — 93010 ELECTROCARDIOGRAM REPORT: CPT | Performed by: INTERNAL MEDICINE

## 2017-09-04 PROCEDURE — 25010000002 HYDROMORPHONE PER 4 MG: Performed by: EMERGENCY MEDICINE

## 2017-09-04 PROCEDURE — 80053 COMPREHEN METABOLIC PANEL: CPT | Performed by: EMERGENCY MEDICINE

## 2017-09-04 PROCEDURE — 99283 EMERGENCY DEPT VISIT LOW MDM: CPT

## 2017-09-04 PROCEDURE — 96375 TX/PRO/DX INJ NEW DRUG ADDON: CPT

## 2017-09-04 PROCEDURE — 84484 ASSAY OF TROPONIN QUANT: CPT | Performed by: EMERGENCY MEDICINE

## 2017-09-04 PROCEDURE — 71275 CT ANGIOGRAPHY CHEST: CPT

## 2017-09-04 PROCEDURE — 0 IOPAMIDOL PER 1 ML: Performed by: EMERGENCY MEDICINE

## 2017-09-04 PROCEDURE — 25010000002 ONDANSETRON PER 1 MG: Performed by: EMERGENCY MEDICINE

## 2017-09-04 PROCEDURE — 96361 HYDRATE IV INFUSION ADD-ON: CPT

## 2017-09-04 RX ORDER — HYDROMORPHONE HYDROCHLORIDE 1 MG/ML
0.5 INJECTION, SOLUTION INTRAMUSCULAR; INTRAVENOUS; SUBCUTANEOUS ONCE
Status: COMPLETED | OUTPATIENT
Start: 2017-09-04 | End: 2017-09-04

## 2017-09-04 RX ORDER — SODIUM CHLORIDE 0.9 % (FLUSH) 0.9 %
10 SYRINGE (ML) INJECTION AS NEEDED
Status: DISCONTINUED | OUTPATIENT
Start: 2017-09-04 | End: 2017-09-04 | Stop reason: HOSPADM

## 2017-09-04 RX ORDER — ONDANSETRON 2 MG/ML
4 INJECTION INTRAMUSCULAR; INTRAVENOUS ONCE
Status: COMPLETED | OUTPATIENT
Start: 2017-09-04 | End: 2017-09-04

## 2017-09-04 RX ORDER — ONDANSETRON 4 MG/1
4 TABLET, FILM COATED ORAL EVERY 6 HOURS PRN
Qty: 18 TABLET | Refills: 0 | Status: SHIPPED | OUTPATIENT
Start: 2017-09-04 | End: 2017-11-02

## 2017-09-04 RX ADMIN — IOPAMIDOL 90 ML: 755 INJECTION, SOLUTION INTRAVENOUS at 19:13

## 2017-09-04 RX ADMIN — SODIUM CHLORIDE 1000 ML: 9 INJECTION, SOLUTION INTRAVENOUS at 18:21

## 2017-09-04 RX ADMIN — HYDROMORPHONE HYDROCHLORIDE 0.5 MG: 1 INJECTION, SOLUTION INTRAMUSCULAR; INTRAVENOUS; SUBCUTANEOUS at 18:26

## 2017-09-04 RX ADMIN — ONDANSETRON 4 MG: 2 INJECTION INTRAMUSCULAR; INTRAVENOUS at 18:25

## 2017-09-04 NOTE — ED NOTES
Patient reports a total left hip replacement left Tuesday. Today she reports since yesterday she has been experiencing episodes of shortness of breath, nausea and diaphoresis. She denies worsened dyspnea with exertion, states she was woken up from sleeping by SOB.     Chastity Reilly RN  09/04/17 1800

## 2017-09-04 NOTE — ED PROVIDER NOTES
EMERGENCY DEPARTMENT ENCOUNTER    CHIEF COMPLAINT  Chief Complaint: SOA  History given by: Pt  History limited by: N/A  Room Number: 17/17  PMD: Diane Macdonald MD      HPI:  Pt is a 67 y.o. female who presents complaining of worsening SOA that worsens with exertion that began 6 days ago. Pt reports she had a total L Hip replacement surgery last Tuesday, and started feeling nauseous, diaphoretic, short of breath, and lightheaded after she woke up. She reports her L leg has been swelling and felt like her Hr was raising with onset of symptoms. She denies coughing, fever, or abd pain. Pt denies a HX of blood clots.      Duration:  6 days ago  Onset: gradual  Timing: constant  Quality: SOA  Intensity/Severity: moderate  Progression: worsened  Associated Symptoms: nausea, diaphoresis, lightheadedness  Aggravating Factors: exertion  Alleviating Factors: none  Previous Episodes: none  Treatment before arrival: No treatment prior to arrival.     PAST MEDICAL HISTORY  Active Ambulatory Problems     Diagnosis Date Noted   • Atopic rhinitis 05/20/2016   • Arthralgia of multiple joints 05/20/2016   • Dysphagia 05/20/2016   • Hyperlipidemia 05/20/2016   • Hypertension 05/20/2016   • Insomnia 05/20/2016   • Osteopenia 05/20/2016   • Cobalamin deficiency 05/20/2016   • Vitamin D deficiency 05/20/2016   • Sciatica 05/20/2016   • PAT (paroxysmal atrial tachycardia) 02/10/2017   • Sinus tachycardia 04/28/2017   • SHAI on CPAP 08/10/2017   • Pain of left hip joint 08/10/2017   • OA (osteoarthritis) of hip 08/29/2017     Resolved Ambulatory Problems     Diagnosis Date Noted   • Increased thyroid stimulating hormone level 05/20/2016     Past Medical History:   Diagnosis Date   • Allergic rhinitis    • Arthralgia of multiple sites    • Arthritis    • Bronchitis    • Cancer    • Diverticulosis    • Dysphagia    • Elevated TSH    • Fatigue    • History of breast cancer 1984   • Hyperlipidemia    • Hypertension    • Insomnia    • Junctional  rhythm    • Lung nodule    • Mitral regurgitation    • Osteopenia    • Osteoporosis    • Postmenopausal status    • Pulmonary nodules    • Reactive airway disease    • Recurrent urinary tract infection    • Rib fracture    • Uterine leiomyoma    • Vitamin B 12 deficiency    • Vitamin D deficiency        PAST SURGICAL HISTORY  Past Surgical History:   Procedure Laterality Date   • CARDIAC CATHETERIZATION       at [facility]; told normal coronary arteries    •  SECTION     • HAMMER TOE REPAIR Right 2016    Procedure: MULTIPLE RT CLAW TOE REPAIR WITH WEIL OSTEOTOMY; RIGHT GREAT TOE AKIN OSTEOTOMY.;  Surgeon: Rigoberto Ba MD;  Location: [facility];  Service:    • HYSTERECTOMY      W/BSO   • MASTECTOMY Left     PROPHYLACTICALLY   • MASTECTOMY PARTIAL WITH AXILLARY LYMPH NODE EXCISION Right     MALIGNANT   • ORIF WRIST FRACTURE Right    • TONSILLECTOMY     • TOTAL HIP ARTHROPLASTY Left 2017    Procedure: LT TOTAL HIP ARTHROPLASTY;  Surgeon: Yaya Reina MD;  Location: [facility];  Service:    • TRAM FLAP DELAYED     • UTERINE ARTERY EMBOLIZATION         FAMILY HISTORY  Family History   Problem Relation Age of Onset   • Ovarian cancer Mother    • Lung cancer Father    • Hypertension Sister      benign essential hypertension   • Hyperlipidemia Sister    • Other Sister      RENAL DISEASE   • Hypertension Brother      benign essential hypertension   • Hyperlipidemia Brother    • Malig Hyperthermia Neg Hx        SOCIAL HISTORY  Social History     Social History   • Marital status:      Spouse name: N/A   • Number of children: 1   • Years of education: N/A     Occupational History   • RN AT [employer], operated by [employer]      Social History Main Topics   • Smoking status: Former Smoker     Packs/day: 1.00     Years: 10.00     Types: Cigarettes     Quit date:    • Smokeless tobacco: Never Used      Comment: QUIT AT AGE 30 YRS. OLD   • Alcohol use Yes      Comment: TWICE MONTHLY 1-2 DRINKS    • Drug use: Yes     Special: Hydrocodone   • Sexual activity: Defer     Other Topics Concern   • Not on file     Social History Narrative       ALLERGIES  Sulfa antibiotics and Morphine and related    REVIEW OF SYSTEMS  Review of Systems   Constitutional: Positive for diaphoresis. Negative for fever.   HENT: Negative for sore throat.    Eyes: Negative.    Respiratory: Positive for shortness of breath. Negative for cough.    Cardiovascular: Negative for chest pain.   Gastrointestinal: Positive for nausea. Negative for abdominal pain, diarrhea and vomiting.   Genitourinary: Negative for dysuria.   Musculoskeletal: Negative for neck pain.   Skin: Negative for rash.   Allergic/Immunologic: Negative.    Neurological: Positive for light-headedness. Negative for weakness, numbness and headaches.   Hematological: Negative.    Psychiatric/Behavioral: Negative.    All other systems reviewed and are negative.      PHYSICAL EXAM  ED Triage Vitals   Temp Heart Rate Resp BP SpO2   09/04/17 1733 09/04/17 1733 09/04/17 1733 09/04/17 1733 09/04/17 1733   99.3 °F (37.4 °C) 135 25 150/88 95 %      Temp src Heart Rate Source Patient Position BP Location FiO2 (%)   09/04/17 1733 09/04/17 1733 09/04/17 1733 09/04/17 1733 --   Tympanic Monitor Standing Left arm        Physical Exam   Constitutional: She is oriented to person, place, and time and well-developed, well-nourished, and in no distress. She appears distressed (mild).   HENT:   Head: Normocephalic and atraumatic.   Eyes: EOM are normal. Pupils are equal, round, and reactive to light.   Neck: Normal range of motion. Neck supple.   Cardiovascular: Regular rhythm and normal heart sounds.  Tachycardia present.    No murmur heard.  Pulmonary/Chest: Effort normal and breath sounds normal. No respiratory distress.   Abdominal: Soft. Bowel sounds are normal. She exhibits no distension. There is no tenderness. There is no rebound and no guarding.   Musculoskeletal: Normal range of  motion. She exhibits no edema.   L thigh is more swollen than the R. Pt has a healing L posterior hip incision, that is clean, dry and intact.    Neurological: She is alert and oriented to person, place, and time. She has normal sensation and normal strength.   Skin: Skin is warm. No rash noted. She is diaphoretic (mild).   Psychiatric: Mood and affect normal.   Nursing note and vitals reviewed.      LAB RESULTS  Lab Results (last 24 hours)     Procedure Component Value Units Date/Time    CBC & Differential [[id number]] Collected:  09/04/17 1756    Specimen:  Blood Updated:  09/04/17 1831    Narrative:       The following orders were created for panel order CBC & Differential.  Procedure                               Abnormality         Status                     ---------                               -----------         ------                     CBC Auto Differential[[id number]]        Abnormal            Final result                 Please view results for these tests on the individual orders.    Comprehensive Metabolic Panel [[id number]]  (Abnormal) Collected:  09/04/17 1756    Specimen:  Blood Updated:  09/04/17 1850     Glucose 119 (H) mg/dL      BUN 17 mg/dL      Creatinine 0.92 mg/dL      Sodium 138 mmol/L      Potassium 3.6 mmol/L      Chloride 97 (L) mmol/L      CO2 23.1 mmol/L      Calcium 10.2 mg/dL      Total Protein 8.1 g/dL      Albumin 4.40 g/dL      ALT (SGPT) 17 U/L      AST (SGOT) 23 U/L      Alkaline Phosphatase 65 U/L      Total Bilirubin 0.4 mg/dL      eGFR Non African Amer 61 mL/min/1.73      Globulin 3.7 gm/dL      A/G Ratio 1.2 g/dL      BUN/Creatinine Ratio 18.5     Anion Gap 17.9 mmol/L     D-dimer, Quantitative [[id number]]  (Abnormal) Collected:  09/04/17 1756    Specimen:  Blood Updated:  09/04/17 1843     D-Dimer, Quantitative 3.03 (H) MCGFEU/mL     Narrative:       The Stago D-Dimer test used in conjunction with a clinical pretest probability (PTP) assessment model, has been approved by  the FDA to rule out the presence of venous thromboembolism (VTE) in outpatients suspected of deep venous thrombosis (DVT) or pulmonary embolism (PE).     Protime-INR [[id number]]  (Normal) Collected:  09/04/17 1756    Specimen:  Blood Updated:  09/04/17 1843     Protime 12.1 Seconds      INR 0.93    Troponin [[id number]]  (Normal) Collected:  09/04/17 1756    Specimen:  Blood Updated:  09/04/17 1850     Troponin T <0.010 ng/mL     Narrative:       Troponin T Reference Ranges:  Less than 0.03 ng/mL:    Negative for AMI  0.03 to 0.09 ng/mL:      Indeterminant for AMI  Greater than 0.09 ng/mL: Positive for AMI    CBC Auto Differential [[id number]]  (Abnormal) Collected:  09/04/17 1756    Specimen:  Blood Updated:  09/04/17 1831     WBC 7.86 10*3/mm3      RBC 3.75 (L) 10*6/mm3      Hemoglobin 11.6 (L) g/dL      Hematocrit 34.6 (L) %      MCV 92.3 fL      MCH 30.9 pg      MCHC 33.5 g/dL      RDW 12.9 %      RDW-SD 43.4 fl      MPV 10.3 fL      Platelets 359 10*3/mm3      Neutrophil % 63.8 %      Lymphocyte % 21.9 %      Monocyte % 11.3 %      Eosinophil % 2.0 %      Basophil % 0.5 %      Immature Grans % 0.5 %      Neutrophils, Absolute 5.01 10*3/mm3      Lymphocytes, Absolute 1.72 10*3/mm3      Monocytes, Absolute 0.89 10*3/mm3      Eosinophils, Absolute 0.16 10*3/mm3      Basophils, Absolute 0.04 10*3/mm3      Immature Grans, Absolute 0.04 (H) 10*3/mm3           I ordered the above labs and reviewed the results    RADIOLOGY  CT Angiogram Chest With Contrast   Preliminary Result   1.  Suboptimal examination for accurate diagnosis/exclusion of acute   pulmonary embolism. However no gross pulmonary thrombus is identified on   this limited study. If indicated repeat examination may be performed.   2.  Multiple lung nodules, with some increase in size compared to an   older study of 10/16/2013, however no significant change from   02/07/2017.   3.  7.8 x 2.3 cm fatty lesion likely a lipoma of the back, in the   intermuscular  plane.                   I ordered the above noted radiological studies. Interpreted by radiologist. Reviewed by me in PACS.       PROCEDURES  Procedures  EKG           EKG time: 1757  Rhythm/Rate: sinus tachycardia, 101 BPM  P waves and MN: normal   QRS, axis: normal   ST and T waves: normal     Interpreted Contemporaneously by me, independently viewed  changed compared to prior on 8/15/17      PROGRESS AND CONSULTS  ED Course     1749  Preliminary EKG was ordered.   1818  Ordered D-dimer for further evaluation.   1820  Ordered blood work including CBC, Protime-INR, and Troponin for further evaluation. Ordered IVF bolus for hydration.   1821   Ordered CTA chest for further evaluation.   1822  Ordered Zofran for nausea and Dilaudid for pain.   1856   Rechecked pt who is improved. I informed her the D-dimer was 3.03, and plan to perform a CTA for her chest for further evaluation. Pt understands and agrees with plan.   1955   Rechecked pt who is resting comfortably. I informed pt her CTA chest showed nothing acute. We discussed plan to discharge home with medication for nausea. Pt understands and agrees with plan. All questions addressed.       MEDICAL DECISION MAKING  Results were reviewed/discussed with the patient and they were also made aware of online access. Pt also made aware that some labs, such as cultures, will not be resulted during ER visit and follow up with PMD is necessary.     MDM  Number of Diagnoses or Management Options     Amount and/or Complexity of Data Reviewed  Clinical lab tests: ordered and reviewed (D-dimer 3.03)  Tests in the radiology section of CPT®: ordered and reviewed (CTA chest - showed nothing acute. )  Tests in the medicine section of CPT®: ordered and reviewed (See note. )  Independent visualization of images, tracings, or specimens: yes    Patient Progress  Patient progress: stable         DIAGNOSIS  Final diagnoses:   Dyspnea on exertion   Sinus tachycardia   Post-op pain        DISPOSITION  DISCHARGE    Patient discharged in stable condition.    Reviewed implications of results, diagnosis, meds, responsibility to follow up, warning signs and symptoms of possible worsening, potential complications and reasons to return to ER.    Patient/Family voiced understanding of above instructions.    Discussed plan for discharge, as there is no emergent indication for admission.  Pt/family is agreeable and understands need for follow up and repeat testing.  Pt is aware that discharge does not mean that nothing is wrong but it indicates no emergency is present that requires admission and they must continue care with follow-up as given below or physician of their choice.     FOLLOW-UP  Diane Macdonald MD  [id number]  [address] [place], [place] [id number] [phone]      As needed         Medication List      New Prescriptions          ondansetron 4 MG tablet   Commonly known as:  ZOFRAN   Take 1 tablet by mouth Every 6 (Six) Hours As Needed for Nausea or   Vomiting.         Changed          atorvastatin 80 MG tablet   Commonly known as:  LIPITOR   Take 1 tablet by mouth Daily.   What changed:  when to take this       metoprolol succinate XL 50 MG 24 hr tablet   Commonly known as:  TOPROL-XL   Take 1 tablet by mouth Daily.   What changed:  when to take this       * traZODone 50 MG tablet   Commonly known as:  DESYREL   Take 1 tablet by mouth At Night As Needed for Sleep.   What changed:  reasons to take this       * traZODone 150 MG tablet   Commonly known as:  DESYREL   Take 1 tablet by mouth Every Night.   What changed:  how much to take       * Notice:  This list has 2 medication(s) that are the same as other   medications prescribed for you. Read the directions carefully, and ask   your doctor or other care provider to review them with you.            Latest Documented Vital Signs:  As of 11:11 PM  BP- 147/82 HR- 94 Temp- 98.4 °F (36.9 °C) O2 sat- 93%    --  Documentation assistance provided  by lacie Rosa for Dr. Freddie Dale MD.  Information recorded by the scribe was done at my direction and has been verified and validated by me.     Danita Arrington  09/04/17 1806       Danita Arrington  09/04/17 1957       Freddie Dale MD  09/04/17 1981

## 2017-09-05 NOTE — DISCHARGE INSTRUCTIONS
Continue your pain medication and follow up with your orthopedist.  Please return to the ED if symptoms worsen with increasing pain, chest pain, or increasing trouble breathing.

## 2017-09-06 ENCOUNTER — TELEPHONE (OUTPATIENT)
Dept: SOCIAL WORK | Facility: HOSPITAL | Age: 67
End: 2017-09-06

## 2017-10-30 RX ORDER — ATORVASTATIN CALCIUM 80 MG/1
TABLET, FILM COATED ORAL
Qty: 90 TABLET | Refills: 0 | Status: SHIPPED | OUTPATIENT
Start: 2017-10-30 | End: 2018-08-22 | Stop reason: SDUPTHER

## 2017-10-30 RX ORDER — TRAZODONE HYDROCHLORIDE 150 MG/1
TABLET ORAL
Qty: 90 TABLET | Refills: 0 | Status: SHIPPED | OUTPATIENT
Start: 2017-10-30 | End: 2018-01-24 | Stop reason: SDUPTHER

## 2017-11-02 ENCOUNTER — OFFICE VISIT (OUTPATIENT)
Dept: INTERNAL MEDICINE | Facility: CLINIC | Age: 67
End: 2017-11-02

## 2017-11-02 VITALS
HEIGHT: 70 IN | HEART RATE: 75 BPM | OXYGEN SATURATION: 97 % | WEIGHT: 208.5 LBS | BODY MASS INDEX: 29.85 KG/M2 | DIASTOLIC BLOOD PRESSURE: 70 MMHG | SYSTOLIC BLOOD PRESSURE: 130 MMHG

## 2017-11-02 DIAGNOSIS — I10 ESSENTIAL HYPERTENSION: ICD-10-CM

## 2017-11-02 DIAGNOSIS — G47.00 INSOMNIA, UNSPECIFIED TYPE: ICD-10-CM

## 2017-11-02 DIAGNOSIS — M16.12 PRIMARY OSTEOARTHRITIS OF LEFT HIP: Primary | ICD-10-CM

## 2017-11-02 PROCEDURE — 99213 OFFICE O/P EST LOW 20 MIN: CPT | Performed by: INTERNAL MEDICINE

## 2017-11-02 RX ORDER — CELECOXIB 200 MG/1
CAPSULE ORAL
Refills: 3 | COMMUNITY
Start: 2017-09-21 | End: 2018-09-05 | Stop reason: SDUPTHER

## 2017-11-02 NOTE — PROGRESS NOTES
Subjective   Tran Sosa is a 67 y.o. female who is here to follow up Insomnia. She had left hip replacement in August and is doing well.     History of Present Illness   She is doing better after her surgery.  She has had a head cold but she is otherwise feeling well and starting back on Sunday  She is taking occas pain meds but usually not needed  She has been taking the trazodone for sleep  It helps some  She does have a hx of osteopenia  She has been on fosamax and and reclast for many years.     The following portions of the patient's history were reviewed and updated as appropriate: allergies, current medications, past medical history, past social history and problem list.  She does eat calcium in diet and takes vit D  Review of Systems   All other systems reviewed and are negative.      Objective   Physical Exam   Constitutional: She is oriented to person, place, and time. She appears well-developed and well-nourished.   HENT:   Head: Normocephalic and atraumatic.   Right Ear: External ear normal.   Left Ear: External ear normal.   Mouth/Throat: Oropharynx is clear and moist.   Eyes: Conjunctivae and EOM are normal. Pupils are equal, round, and reactive to light.   Neck: Normal range of motion. No tracheal deviation present. No thyromegaly present.   Cardiovascular: Normal rate, regular rhythm, normal heart sounds and intact distal pulses.    Pulmonary/Chest: Effort normal and breath sounds normal.   Musculoskeletal: Normal range of motion. She exhibits no edema or deformity.   Neurological: She is alert and oriented to person, place, and time.   Skin: Skin is warm and dry.   Psychiatric: She has a normal mood and affect. Her behavior is normal. Judgment and thought content normal.   Vitals reviewed.    Vitals:    11/02/17 1303   BP: 130/70   Pulse: 75   SpO2: 97%     Current Outpatient Prescriptions:   •  atorvastatin (LIPITOR) 80 MG tablet, TAKE 1 TABLET BY MOUTH DAILY, Disp: 90 tablet, Rfl: 0  •   Calcium Carbonate (CALCIUM 600 PO), Take 1 tablet by mouth Daily. HOLD PRIOR TO SURGERY, Disp: , Rfl:   •  celecoxib (CeleBREX) 200 MG capsule, TK ONE C PO D PRN P. HOLD PRIOR TO SURGERY, Disp: , Rfl: 3  •  metoprolol succinate XL (TOPROL-XL) 50 MG 24 hr tablet, Take 1 tablet by mouth Daily. (Patient taking differently: Take 50 mg by mouth Every Evening.), Disp: 90 tablet, Rfl: 3  •  Multiple Vitamin (MULTI VITAMIN DAILY PO), Take 1 tablet by mouth daily. HOLD PRIOR TO SURGERY, Disp: , Rfl:   •  traZODone (DESYREL) 150 MG tablet, TAKE 1 TABLET BY MOUTH EVERY NIGHT, Disp: 90 tablet, Rfl: 0  •  traZODone (DESYREL) 50 MG tablet, Take 1 tablet by mouth At Night As Needed for Sleep. (Patient taking differently: Take 50 mg by mouth At Night As Needed for Sleep (if needed in addition to 100mg dose).), Disp: 90 tablet, Rfl: 3  •  triamterene-hydrochlorothiazide (MAXZIDE-25) 37.5-25 MG per tablet, Take 1 tablet by mouth Every Morning., Disp: 90 tablet, Rfl: 3     Assessment/Plan   Tran was seen today for insomnia.    Diagnoses and all orders for this visit:    Primary osteoarthritis of left hip    Insomnia, unspecified type    1. Insomnia  2. OA- better  3. Post menopausal on bisphosphanates for years.  We will consider prolia

## 2017-11-20 ENCOUNTER — OFFICE VISIT (OUTPATIENT)
Dept: INTERNAL MEDICINE | Facility: CLINIC | Age: 67
End: 2017-11-20

## 2017-11-20 VITALS
TEMPERATURE: 98.2 F | SYSTOLIC BLOOD PRESSURE: 120 MMHG | OXYGEN SATURATION: 98 % | BODY MASS INDEX: 29.42 KG/M2 | HEIGHT: 70 IN | WEIGHT: 205.5 LBS | DIASTOLIC BLOOD PRESSURE: 78 MMHG | HEART RATE: 71 BPM

## 2017-11-20 DIAGNOSIS — J01.00 ACUTE NON-RECURRENT MAXILLARY SINUSITIS: Primary | ICD-10-CM

## 2017-11-20 DIAGNOSIS — R42 VERTIGO: ICD-10-CM

## 2017-11-20 PROCEDURE — 99213 OFFICE O/P EST LOW 20 MIN: CPT | Performed by: NURSE PRACTITIONER

## 2017-11-20 RX ORDER — AMOXICILLIN 875 MG/1
875 TABLET, COATED ORAL 2 TIMES DAILY
Qty: 20 TABLET | Refills: 0 | Status: SHIPPED | OUTPATIENT
Start: 2017-11-20 | End: 2018-02-09

## 2017-11-20 NOTE — PROGRESS NOTES
Subjective   rTan Sosa is a 67 y.o. female. Patient is here today for   Chief Complaint   Patient presents with   • URI     Pt complains of having a non productive cough, HA, facial pain & dizziness with vomiting x6 days. Pt stated that she had taken OTC pills for URI.    • Diarrhea     Pt complains of having diarrhea x5 days.    .    History of Present Illness   C/o sinus pressure x 6 days associated with dizziness, nausea. She has tried Dayquil with minimal relief. She c/o headache and upper teeth aches. She has also had some diarrhea. She has had about 8 episodes of diarrhea a day. She hasn't taken anything for her loose stools. Denies abdominal pain.    The following portions of the patient's history were reviewed and updated as appropriate: allergies, current medications, past family history, past medical history, past social history, past surgical history and problem list.    Review of Systems   Constitutional: Negative.    HENT: Positive for congestion, postnasal drip, sinus pain and sinus pressure.    Respiratory: Positive for cough. Negative for shortness of breath and wheezing.    Cardiovascular: Negative.    Gastrointestinal: Positive for diarrhea and nausea. Negative for abdominal pain.   Neurological: Positive for dizziness.       Objective   Vitals:    11/20/17 1140   BP: 120/78   Pulse: 71   Temp: 98.2 °F (36.8 °C)   SpO2: 98%     Physical Exam   Constitutional: She is oriented to person, place, and time. Vital signs are normal. She appears well-developed and well-nourished.   Cardiovascular: Normal rate, regular rhythm and normal heart sounds.    Pulmonary/Chest: Effort normal and breath sounds normal.   Abdominal: Soft. Normal appearance. Bowel sounds are increased. There is no tenderness.   Lymphadenopathy:     She has no cervical adenopathy.   Neurological: She is alert and oriented to person, place, and time. She has normal strength. No cranial nerve deficit or sensory deficit.   Increased  dizziness when she laid down    Skin: Skin is warm, dry and intact.   Psychiatric: She has a normal mood and affect. Her speech is normal and behavior is normal. Thought content normal.   Nursing note and vitals reviewed.      Assessment/Plan   Tran was seen today for uri and diarrhea.    Diagnoses and all orders for this visit:    Acute non-recurrent maxillary sinusitis  -     amoxicillin (AMOXIL) 875 MG tablet; Take 1 tablet by mouth 2 (Two) Times a Day.    Vertigo    Patient has some zofran left over from a previous script. She will follow up if her vertigo continues after she is finished with her antibiotic.

## 2017-11-30 ENCOUNTER — OFFICE VISIT (OUTPATIENT)
Dept: INTERNAL MEDICINE | Facility: CLINIC | Age: 67
End: 2017-11-30

## 2017-11-30 VITALS
HEIGHT: 70 IN | OXYGEN SATURATION: 96 % | BODY MASS INDEX: 30.56 KG/M2 | HEART RATE: 80 BPM | DIASTOLIC BLOOD PRESSURE: 76 MMHG | SYSTOLIC BLOOD PRESSURE: 120 MMHG | WEIGHT: 213.5 LBS

## 2017-11-30 DIAGNOSIS — R60.9 EDEMA, UNSPECIFIED TYPE: ICD-10-CM

## 2017-11-30 DIAGNOSIS — I10 ESSENTIAL HYPERTENSION: ICD-10-CM

## 2017-11-30 DIAGNOSIS — H81.10 BENIGN PAROXYSMAL POSITIONAL VERTIGO, UNSPECIFIED LATERALITY: Primary | ICD-10-CM

## 2017-11-30 PROCEDURE — 99214 OFFICE O/P EST MOD 30 MIN: CPT | Performed by: INTERNAL MEDICINE

## 2017-11-30 RX ORDER — MECLIZINE HCL 12.5 MG/1
12.5 TABLET ORAL 3 TIMES DAILY PRN
Qty: 30 TABLET | Refills: 2 | Status: SHIPPED | OUTPATIENT
Start: 2017-11-30 | End: 2020-06-23

## 2017-11-30 NOTE — PROGRESS NOTES
Subjective   Tran Sosa is a 67 y.o. female here today for ankle swelling and dizziness.      History of Present Illness   Pt has been having some increased swelling and SOB since she has her sx.  She says the swelling has gotten better with elevating her legs.  She denies any CP.  She has no energy.  She is feeling depressed not anxious.  She had these sx in sept and she had a neg ddimer and ct angio  She also had aneg  She has been having dizziness since starting the metoprolol xl.  She has not had any palpitations since going on the 50mg 3 months ago SHe has severe bouts of vertigo that makes her nauseated      The following portions of the patient's history were reviewed and updated as appropriate: allergies, current medications, past medical history, past social history and problem list.  No change in diet    Review of Systems   Cardiovascular: Leg swelling: bilateral ankle swelling.   Neurological: Positive for dizziness.   All other systems reviewed and are negative.      Objective   Physical Exam   Constitutional: She is oriented to person, place, and time. She appears well-developed and well-nourished.   HENT:   Head: Normocephalic and atraumatic.   Right Ear: External ear normal.   Left Ear: External ear normal.   Mouth/Throat: Oropharynx is clear and moist.   Eyes: Conjunctivae and EOM are normal. Pupils are equal, round, and reactive to light.   Neck: Normal range of motion. No tracheal deviation present. No thyromegaly present.   Cardiovascular: Normal rate, regular rhythm, normal heart sounds and intact distal pulses.    Pulmonary/Chest: Effort normal and breath sounds normal.   Abdominal: Soft. Bowel sounds are normal. She exhibits no distension. There is no tenderness.   Musculoskeletal: Normal range of motion. She exhibits no edema or deformity.   Neurological: She is alert and oriented to person, place, and time.   Skin: Skin is warm and dry.   Psychiatric: She has a normal mood and affect.  Her behavior is normal. Judgment and thought content normal.   Vitals reviewed.      Vitals:    11/30/17 1533   BP: 120/76   Pulse: 80   SpO2: 96%          Current Outpatient Prescriptions:   •  amoxicillin (AMOXIL) 875 MG tablet, Take 1 tablet by mouth 2 (Two) Times a Day., Disp: 20 tablet, Rfl: 0  •  atorvastatin (LIPITOR) 80 MG tablet, TAKE 1 TABLET BY MOUTH DAILY, Disp: 90 tablet, Rfl: 0  •  celecoxib (CeleBREX) 200 MG capsule, TK ONE C PO D PRN P. HOLD PRIOR TO SURGERY, Disp: , Rfl: 3  •  metoprolol succinate XL (TOPROL-XL) 50 MG 24 hr tablet, Take 1 tablet by mouth Daily. (Patient taking differently: Take 50 mg by mouth Every Evening.), Disp: 90 tablet, Rfl: 3  •  Multiple Vitamin (MULTI VITAMIN DAILY PO), Take 1 tablet by mouth daily. HOLD PRIOR TO SURGERY, Disp: , Rfl:   •  traZODone (DESYREL) 150 MG tablet, TAKE 1 TABLET BY MOUTH EVERY NIGHT, Disp: 90 tablet, Rfl: 0  •  traZODone (DESYREL) 50 MG tablet, Take 1 tablet by mouth At Night As Needed for Sleep. (Patient taking differently: Take 50 mg by mouth At Night As Needed for Sleep (if needed in addition to 100mg dose).), Disp: 90 tablet, Rfl: 3  •  triamterene-hydrochlorothiazide (MAXZIDE-25) 37.5-25 MG per tablet, Take 1 tablet by mouth Every Morning., Disp: 90 tablet, Rfl: 3  •  Calcium Carbonate (CALCIUM 600 PO), Take 1 tablet by mouth Daily. HOLD PRIOR TO SURGERY, Disp: , Rfl:   •  meclizine (ANTIVERT) 12.5 MG tablet, Take 1 tablet by mouth 3 (Three) Times a Day As Needed for dizziness., Disp: 30 tablet, Rfl: 2      Assessment/Plan   Diagnoses and all orders for this visit:    Benign paroxysmal positional vertigo, unspecified laterality  -     Ambulatory Referral to Physical Therapy Evaluate and treat  -     meclizine (ANTIVERT) 12.5 MG tablet; Take 1 tablet by mouth 3 (Three) Times a Day As Needed for dizziness.  -     Comprehensive Metabolic Panel  -     CBC & Differential  -     TSH Rfx On Abnormal To Free T4    Essential hypertension    Edema,  unspecified type      1. BPV-  Refer to vestibular rehab Use meclizine.  I'm also going to check labs on her today.  2. HTN- she is going to wean down the metoprolol.  3.  Edema: I am concerned about a blood clot but the patient says she is sure that is not what it is as it gets better when she elevates her leg and she is Abad had a workup for that.  She will think about it and if she decides to do Doppler she will let me know.  4.  Short of breath: I'm also concerned that she could've a pulmonary embolus some but she did have a workup for this a couple months ago and was negative.  Patient thinks it is from her weight gain that is making her short of breath.  She is a normal exercise due to her profound vertigo

## 2017-12-01 LAB
ALBUMIN SERPL-MCNC: 4.3 G/DL (ref 3.5–5.2)
ALBUMIN/GLOB SERPL: 1.5 G/DL
ALP SERPL-CCNC: 78 U/L (ref 39–117)
ALT SERPL-CCNC: 20 U/L (ref 1–33)
AST SERPL-CCNC: 16 U/L (ref 1–32)
BASOPHILS # BLD AUTO: 0.04 10*3/MM3 (ref 0–0.2)
BASOPHILS NFR BLD AUTO: 0.7 % (ref 0–1.5)
BILIRUB SERPL-MCNC: 0.2 MG/DL (ref 0.1–1.2)
BUN SERPL-MCNC: 20 MG/DL (ref 8–23)
BUN/CREAT SERPL: 26.3 (ref 7–25)
CALCIUM SERPL-MCNC: 9.4 MG/DL (ref 8.6–10.5)
CHLORIDE SERPL-SCNC: 101 MMOL/L (ref 98–107)
CO2 SERPL-SCNC: 25.6 MMOL/L (ref 22–29)
CREAT SERPL-MCNC: 0.76 MG/DL (ref 0.57–1)
EOSINOPHIL # BLD AUTO: 0.1 10*3/MM3 (ref 0–0.7)
EOSINOPHIL NFR BLD AUTO: 1.8 % (ref 0.3–6.2)
ERYTHROCYTE [DISTWIDTH] IN BLOOD BY AUTOMATED COUNT: 14.1 % (ref 11.7–13)
GFR SERPLBLD CREATININE-BSD FMLA CKD-EPI: 76 ML/MIN/1.73
GFR SERPLBLD CREATININE-BSD FMLA CKD-EPI: 93 ML/MIN/1.73
GLOBULIN SER CALC-MCNC: 2.9 GM/DL
GLUCOSE SERPL-MCNC: 93 MG/DL (ref 65–99)
HCT VFR BLD AUTO: 38.7 % (ref 35.6–45.5)
HGB BLD-MCNC: 12.3 G/DL (ref 11.9–15.5)
IMM GRANULOCYTES # BLD: 0.02 10*3/MM3 (ref 0–0.03)
IMM GRANULOCYTES NFR BLD: 0.4 % (ref 0–0.5)
LYMPHOCYTES # BLD AUTO: 1.55 10*3/MM3 (ref 0.9–4.8)
LYMPHOCYTES NFR BLD AUTO: 27.2 % (ref 19.6–45.3)
MCH RBC QN AUTO: 29.9 PG (ref 26.9–32)
MCHC RBC AUTO-ENTMCNC: 31.8 G/DL (ref 32.4–36.3)
MCV RBC AUTO: 93.9 FL (ref 80.5–98.2)
MONOCYTES # BLD AUTO: 0.68 10*3/MM3 (ref 0.2–1.2)
MONOCYTES NFR BLD AUTO: 12 % (ref 5–12)
NEUTROPHILS # BLD AUTO: 3.3 10*3/MM3 (ref 1.9–8.1)
NEUTROPHILS NFR BLD AUTO: 57.9 % (ref 42.7–76)
PLATELET # BLD AUTO: 299 10*3/MM3 (ref 140–500)
POTASSIUM SERPL-SCNC: 4.4 MMOL/L (ref 3.5–5.2)
PROT SERPL-MCNC: 7.2 G/DL (ref 6–8.5)
RBC # BLD AUTO: 4.12 10*6/MM3 (ref 3.9–5.2)
SODIUM SERPL-SCNC: 141 MMOL/L (ref 136–145)
TSH SERPL DL<=0.005 MIU/L-ACNC: 1.65 MIU/ML (ref 0.27–4.2)
WBC # BLD AUTO: 5.69 10*3/MM3 (ref 4.5–10.7)

## 2017-12-08 ENCOUNTER — TREATMENT (OUTPATIENT)
Dept: PHYSICAL THERAPY | Facility: CLINIC | Age: 67
End: 2017-12-08

## 2017-12-08 DIAGNOSIS — H81.12 BPPV (BENIGN PAROXYSMAL POSITIONAL VERTIGO), LEFT: Primary | ICD-10-CM

## 2017-12-08 PROCEDURE — 97161 PT EVAL LOW COMPLEX 20 MIN: CPT | Performed by: PHYSICAL THERAPIST

## 2017-12-08 PROCEDURE — 95992 CANALITH REPOSITIONING PROC: CPT | Performed by: PHYSICAL THERAPIST

## 2017-12-10 NOTE — PROGRESS NOTES
Physical Therapy Initial Evaluation-  Vestibular Therapy      Patient Name: Tran Sosa       Patient MRN: [id number]  : 1950  Physician:Diane Macdonald MD  Date: 2017    Encounter Diagnoses   Name Primary?   • BPPV (benign paroxysmal positional vertigo), left Yes       Objective Testing:  Positional Testing  Positional Testing: With infrared goggles  Vertebrobasilar Artery Screen - Right: Negative  Vertebrobasilar Artery Screen - Left: Negative  Meg-Hallpike Right: No nystagmus  Bent-Hallpike Left: Right-beating Nystagmus  Horizontal Roll Test Right: No nystagmus  Horizontal Roll Test Left: Right-beating             THERAPY ASSESSMENT: Patient is a 67 y.o. female. Patient presents to physical therapy due to complaints of dizziness and episodes of vertigo. Signs and symptoms are consistent with L canalithiasis or possible LC involvement. Patient has nystagmus but not consistent with a specific canal or side. She reports symptoms re much improved this week and is still taking anti dizziness medication. If no improvement with treatment today will retest without medication in system. Patient is a good candidate for physical therapy to address the following:  Functional Limitations: Difficulty moving, Decreased ability to perform critical demands of job, Decreased ability to perform ADL's   Length of Therapy: 1 month   PT Frequency: PT 2x week   PT Interventions: Home Exercise Program, CRP  Patient Agrees with Plan of Care: Yes    REHAB POTENTIAL: excellent            SHORT TERM GOALS: To be met in 2 weeks:  1. Patient is independent with HEP.  2. Patient will report at least 30% improvement in overall condition.       LONG TERM GOALS:To be met in 4 weeks:  1. Patient will report decreased disequilibrium/dizziness by at least 90%.  2. Patient will report no loss of balance with ADLs to demonstrate improved functional balance.  3. Patient denies dizziness with daily activity.    Other Procedure CPT 14075  minutes 0    Timed Code Treatment: 0   Minutes     Total Treatment Time: 45     Minutes      PT SIGNATURE: Patricia Diaz, PREMA   DATE TREATMENT INITIATED: 12/10/2017    Initial Certification  Certification Period: 3/10/2018  I certify that the therapy services are furnished while this patient is under my care.  The services outlined above are required by this patient, and will be reviewed every 90 days.     PHYSICIAN: Diane Macdonald MD      DATE:     Please sign and return via fax to [phone].. Thank you, [facility].

## 2017-12-26 RX ORDER — TRAZODONE HYDROCHLORIDE 50 MG/1
TABLET ORAL
Qty: 90 TABLET | Refills: 1 | Status: SHIPPED | OUTPATIENT
Start: 2017-12-26 | End: 2018-06-21 | Stop reason: SDUPTHER

## 2018-01-22 RX ORDER — TRAZODONE HYDROCHLORIDE 150 MG/1
TABLET ORAL
Qty: 90 TABLET | Refills: 0 | OUTPATIENT
Start: 2018-01-22

## 2018-01-24 ENCOUNTER — TELEPHONE (OUTPATIENT)
Dept: INTERNAL MEDICINE | Facility: CLINIC | Age: 68
End: 2018-01-24

## 2018-01-24 RX ORDER — TRAZODONE HYDROCHLORIDE 150 MG/1
150 TABLET ORAL NIGHTLY
Qty: 90 TABLET | Refills: 1 | Status: SHIPPED | OUTPATIENT
Start: 2018-01-24 | End: 2018-07-15 | Stop reason: SDUPTHER

## 2018-01-24 NOTE — TELEPHONE ENCOUNTER
----- Message from Diane Macdonald MD sent at 1/24/2018  2:13 PM EST -----  Regarding: RE: MED CORRECTION REFILL  Contact: [phone]  Please go ahead and refill whatever she was on.  I'm sure that was an error  ----- Message -----     From: Ladarius Woodward     Sent: 1/24/2018  12:22 PM       To: Diane Macdonald MD  Subject: FW: MED CORRECTION REFILL                        Is it okay to to send in the 150mg  ----- Message -----     From: Marleny Florez     Sent: 1/24/2018  11:12 AM       To: Ladarius Woodward  Subject: MED CORRECTION REFILL                            Patient went to  her Trazodone scripts and only got 1. She is suppose to get 1 script for 50mg and another one for 150mg for a total of 200mg Pharmacy says that they didn't get a script for the 150mg. She needs to know why it wasn't sent or if Dr Macdonald changed it. She had a miserable night, unable to sleep. Please call her at [phone].

## 2018-02-02 ENCOUNTER — RESULTS ENCOUNTER (OUTPATIENT)
Dept: INTERNAL MEDICINE | Facility: CLINIC | Age: 68
End: 2018-02-02

## 2018-02-02 DIAGNOSIS — I10 ESSENTIAL HYPERTENSION: ICD-10-CM

## 2018-02-02 DIAGNOSIS — M16.12 PRIMARY OSTEOARTHRITIS OF LEFT HIP: ICD-10-CM

## 2018-02-06 LAB
ALBUMIN SERPL-MCNC: 4.5 G/DL (ref 3.5–5.2)
ALBUMIN/GLOB SERPL: 1.9 G/DL
ALP SERPL-CCNC: 74 U/L (ref 39–117)
ALT SERPL-CCNC: 17 U/L (ref 1–33)
AST SERPL-CCNC: 14 U/L (ref 1–32)
BASOPHILS # BLD AUTO: 0.03 10*3/MM3 (ref 0–0.2)
BASOPHILS NFR BLD AUTO: 0.6 % (ref 0–1.5)
BILIRUB SERPL-MCNC: 0.4 MG/DL (ref 0.1–1.2)
BUN SERPL-MCNC: 18 MG/DL (ref 8–23)
BUN/CREAT SERPL: 21.4 (ref 7–25)
CALCIUM SERPL-MCNC: 9.1 MG/DL (ref 8.6–10.5)
CHLORIDE SERPL-SCNC: 104 MMOL/L (ref 98–107)
CHOLEST SERPL-MCNC: 179 MG/DL (ref 0–200)
CO2 SERPL-SCNC: 26.5 MMOL/L (ref 22–29)
CREAT SERPL-MCNC: 0.84 MG/DL (ref 0.57–1)
EOSINOPHIL # BLD AUTO: 0.11 10*3/MM3 (ref 0–0.7)
EOSINOPHIL NFR BLD AUTO: 2.2 % (ref 0.3–6.2)
ERYTHROCYTE [DISTWIDTH] IN BLOOD BY AUTOMATED COUNT: 13.8 % (ref 11.7–13)
GFR SERPLBLD CREATININE-BSD FMLA CKD-EPI: 68 ML/MIN/1.73
GFR SERPLBLD CREATININE-BSD FMLA CKD-EPI: 82 ML/MIN/1.73
GLOBULIN SER CALC-MCNC: 2.4 GM/DL
GLUCOSE SERPL-MCNC: 94 MG/DL (ref 65–99)
HCT VFR BLD AUTO: 41.7 % (ref 35.6–45.5)
HDLC SERPL-MCNC: 54 MG/DL (ref 40–60)
HGB BLD-MCNC: 13.1 G/DL (ref 11.9–15.5)
IMM GRANULOCYTES # BLD: 0 10*3/MM3 (ref 0–0.03)
IMM GRANULOCYTES NFR BLD: 0 % (ref 0–0.5)
LDLC SERPL CALC-MCNC: 101 MG/DL (ref 0–100)
LDLC/HDLC SERPL: 1.87 {RATIO}
LYMPHOCYTES # BLD AUTO: 1.7 10*3/MM3 (ref 0.9–4.8)
LYMPHOCYTES NFR BLD AUTO: 33.9 % (ref 19.6–45.3)
MCH RBC QN AUTO: 29.7 PG (ref 26.9–32)
MCHC RBC AUTO-ENTMCNC: 31.4 G/DL (ref 32.4–36.3)
MCV RBC AUTO: 94.6 FL (ref 80.5–98.2)
MONOCYTES # BLD AUTO: 0.48 10*3/MM3 (ref 0.2–1.2)
MONOCYTES NFR BLD AUTO: 9.6 % (ref 5–12)
NEUTROPHILS # BLD AUTO: 2.7 10*3/MM3 (ref 1.9–8.1)
NEUTROPHILS NFR BLD AUTO: 53.7 % (ref 42.7–76)
PLATELET # BLD AUTO: 281 10*3/MM3 (ref 140–500)
POTASSIUM SERPL-SCNC: 4.2 MMOL/L (ref 3.5–5.2)
PROT SERPL-MCNC: 6.9 G/DL (ref 6–8.5)
RBC # BLD AUTO: 4.41 10*6/MM3 (ref 3.9–5.2)
SODIUM SERPL-SCNC: 143 MMOL/L (ref 136–145)
TRIGL SERPL-MCNC: 119 MG/DL (ref 0–150)
TSH SERPL DL<=0.005 MIU/L-ACNC: 1.48 MIU/ML (ref 0.27–4.2)
VLDLC SERPL CALC-MCNC: 23.8 MG/DL (ref 5–40)
WBC # BLD AUTO: 5.02 10*3/MM3 (ref 4.5–10.7)

## 2018-02-09 ENCOUNTER — OFFICE VISIT (OUTPATIENT)
Dept: INTERNAL MEDICINE | Facility: CLINIC | Age: 68
End: 2018-02-09

## 2018-02-09 VITALS
BODY MASS INDEX: 30.78 KG/M2 | OXYGEN SATURATION: 97 % | DIASTOLIC BLOOD PRESSURE: 74 MMHG | TEMPERATURE: 98.3 F | HEART RATE: 81 BPM | WEIGHT: 215 LBS | SYSTOLIC BLOOD PRESSURE: 124 MMHG | HEIGHT: 70 IN

## 2018-02-09 DIAGNOSIS — G47.00 INSOMNIA, UNSPECIFIED TYPE: ICD-10-CM

## 2018-02-09 DIAGNOSIS — I10 ESSENTIAL HYPERTENSION: Primary | ICD-10-CM

## 2018-02-09 DIAGNOSIS — E78.5 HYPERLIPIDEMIA, UNSPECIFIED HYPERLIPIDEMIA TYPE: ICD-10-CM

## 2018-02-09 PROCEDURE — 99213 OFFICE O/P EST LOW 20 MIN: CPT | Performed by: INTERNAL MEDICINE

## 2018-02-09 RX ORDER — AMOXICILLIN 500 MG/1
1000 CAPSULE ORAL AS NEEDED
Qty: 4 CAPSULE | Refills: 2 | Status: SHIPPED | OUTPATIENT
Start: 2018-02-09 | End: 2018-05-07

## 2018-02-09 NOTE — PROGRESS NOTES
Subjective   Tran Sosa is a 67 y.o. female. Patient is here to follow up on blood pressure and cholesterol.     History of Present Illness   Pt has been taking cholesterol meds as prescribed.  No difficulties with myalgias.   Pt has been taking BP meds as prescribed without any problems.  No HA  No episodes of orthostasis    The following portions of the patient's history were reviewed and updated as appropriate: allergies, current medications, past medical history, past social history and problem list.  She is working on diet and starting to exercise    Review of Systems   All other systems reviewed and are negative.      Objective   Physical Exam   Constitutional: She is oriented to person, place, and time. She appears well-developed and well-nourished.   HENT:   Head: Normocephalic and atraumatic.   Right Ear: External ear normal.   Left Ear: External ear normal.   Mouth/Throat: Oropharynx is clear and moist.   Eyes: Conjunctivae and EOM are normal. Pupils are equal, round, and reactive to light.   Neck: Normal range of motion. No tracheal deviation present. No thyromegaly present.   Cardiovascular: Normal rate, regular rhythm, normal heart sounds and intact distal pulses.    Pulmonary/Chest: Effort normal and breath sounds normal.   Abdominal: Soft. Bowel sounds are normal. She exhibits no distension. There is no tenderness.   Musculoskeletal: Normal range of motion. She exhibits no edema or deformity.   Neurological: She is alert and oriented to person, place, and time.   Skin: Skin is warm and dry.   Psychiatric: She has a normal mood and affect. Her behavior is normal. Judgment and thought content normal.   Vitals reviewed.      Assessment/Plan   Tran was seen today for hypertension and hyperlipidemia.    Diagnoses and all orders for this visit:    Essential hypertension    Hyperlipidemia, unspecified hyperlipidemia type    Insomnia, unspecified type    Other orders  -     amoxicillin (AMOXIL) 500 MG  capsule; Take 2 capsules by mouth As Needed (dental work).        1. htn- stable with current meds  2. HPL_ better  She will cont liptor  3. Insomnia-  She has been stable on 200mg at night of trazodone  We will cont this

## 2018-05-03 ENCOUNTER — TRANSCRIBE ORDERS (OUTPATIENT)
Dept: ADMINISTRATIVE | Facility: HOSPITAL | Age: 68
End: 2018-05-03

## 2018-05-03 ENCOUNTER — LAB (OUTPATIENT)
Dept: LAB | Facility: HOSPITAL | Age: 68
End: 2018-05-03
Attending: PLASTIC SURGERY

## 2018-05-03 ENCOUNTER — HOSPITAL ENCOUNTER (OUTPATIENT)
Dept: CARDIOLOGY | Facility: HOSPITAL | Age: 68
Discharge: HOME | End: 2018-05-03
Attending: PLASTIC SURGERY | Admitting: PLASTIC SURGERY

## 2018-05-03 DIAGNOSIS — Z01.818 PRE-OP TESTING: Primary | ICD-10-CM

## 2018-05-03 DIAGNOSIS — Z01.818 PRE-OP TESTING: ICD-10-CM

## 2018-05-03 LAB
ANION GAP SERPL CALCULATED.3IONS-SCNC: 10.9 MMOL/L
BUN BLD-MCNC: 16 MG/DL (ref 8–23)
BUN/CREAT SERPL: 17.8 (ref 7–25)
CALCIUM SPEC-SCNC: 9 MG/DL (ref 8.6–10.5)
CHLORIDE SERPL-SCNC: 103 MMOL/L (ref 98–107)
CO2 SERPL-SCNC: 27.1 MMOL/L (ref 22–29)
CREAT BLD-MCNC: 0.9 MG/DL (ref 0.57–1)
GFR SERPL CREATININE-BSD FRML MDRD: 63 ML/MIN/1.73
GLUCOSE BLD-MCNC: 92 MG/DL (ref 65–99)
POTASSIUM BLD-SCNC: 4 MMOL/L (ref 3.5–5.2)
SODIUM BLD-SCNC: 141 MMOL/L (ref 136–145)

## 2018-05-03 PROCEDURE — 36415 COLL VENOUS BLD VENIPUNCTURE: CPT

## 2018-05-03 PROCEDURE — 93005 ELECTROCARDIOGRAM TRACING: CPT | Performed by: PLASTIC SURGERY

## 2018-05-03 PROCEDURE — 80048 BASIC METABOLIC PNL TOTAL CA: CPT

## 2018-05-03 PROCEDURE — 93010 ELECTROCARDIOGRAM REPORT: CPT | Performed by: INTERNAL MEDICINE

## 2018-05-07 ENCOUNTER — OFFICE VISIT (OUTPATIENT)
Dept: CARDIOLOGY | Facility: CLINIC | Age: 68
End: 2018-05-07

## 2018-05-07 VITALS
HEIGHT: 70 IN | WEIGHT: 206 LBS | DIASTOLIC BLOOD PRESSURE: 88 MMHG | HEART RATE: 76 BPM | BODY MASS INDEX: 29.49 KG/M2 | SYSTOLIC BLOOD PRESSURE: 120 MMHG

## 2018-05-07 DIAGNOSIS — G47.33 OSA ON CPAP: ICD-10-CM

## 2018-05-07 DIAGNOSIS — I10 ESSENTIAL HYPERTENSION: ICD-10-CM

## 2018-05-07 DIAGNOSIS — I47.1 PAT (PAROXYSMAL ATRIAL TACHYCARDIA) (HCC): Primary | ICD-10-CM

## 2018-05-07 DIAGNOSIS — Z99.89 OSA ON CPAP: ICD-10-CM

## 2018-05-07 PROCEDURE — 99214 OFFICE O/P EST MOD 30 MIN: CPT | Performed by: NURSE PRACTITIONER

## 2018-05-07 PROCEDURE — 93000 ELECTROCARDIOGRAM COMPLETE: CPT | Performed by: NURSE PRACTITIONER

## 2018-05-07 RX ORDER — MELOXICAM 15 MG/1
TABLET ORAL DAILY
Refills: 1 | COMMUNITY
Start: 2018-05-03 | End: 2018-09-05

## 2018-05-07 RX ORDER — OXYCODONE HYDROCHLORIDE 10 MG/1
TABLET ORAL
Refills: 0 | COMMUNITY
Start: 2018-05-03 | End: 2018-09-05

## 2018-05-07 NOTE — PROGRESS NOTES
Date of Office Visit: 2018  Encounter Provider: ENRRIUQE Reddy  Place of Service: [facility]  Patient Name: Tran Sosa  :1950    Chief Complaint   Patient presents with   • Rapid Heart Rate   :     HPI: Tran Sosa is a 67 y.o. female who is a patient of Dr. Newman with a history of PAT. She was last seen in 2017 and presents today for routine follow up. From a cardiac standpoint she is doing okay. She says that she has had a few episodes of tachycardia on average lasting about 10 minutes since her last visit. She did not feel well on the higher dose of BB so she went back to 25 mg daily and does fine. She knows that if she has any prolonged episodes she can take extra doses prn. She did not that when she had hip surgery she had some increase in HR and did take some extra doses then.     She did recently have a traumatic fall (she tripped over uneven pavement). She broke her left wrist and her nose. She is having surgery tomorrow. She has not noted any real increase in episodes with this traumatic event. She was also diagnosed with SHAI and is using CPAP and she thinks that has made a difference in decreasing her episodes. She has been trying to exercise regularly.    She denies chest pain, shortness of breath, edema or syncope.        Past Medical History:   Diagnosis Date   • Allergic rhinitis    • Arthralgia of multiple sites    • Arthritis     OSTEO   • Bronchitis    • Cancer     BREAST CANCER   • Diverticulosis    • Dysphagia     COUPLE OF EPISODES   • Elevated TSH    • Fatigue    • History of breast cancer     RIGHT    • Hyperlipidemia    • Hypertension    • Insomnia    • Junctional rhythm     ONE EPISODE   • Lung nodule     BIOPSY BENIGN   • Mitral regurgitation     Mild per 2-D echocardiogram 2017   • Osteopenia    • Osteoporosis    • Postmenopausal status    • Pulmonary nodules    • Reactive airway disease    •  Recurrent urinary tract infection    • Rib fracture    • Uterine leiomyoma    • Vitamin B 12 deficiency    • Vitamin D deficiency        Past Surgical History:   Procedure Laterality Date   • CARDIAC CATHETERIZATION       at [facility]; told normal coronary arteries    •  SECTION     • HAMMER TOE REPAIR Right 2016    Procedure: MULTIPLE RT CLAW TOE REPAIR WITH WEIL OSTEOTOMY; RIGHT GREAT TOE AKIN OSTEOTOMY.;  Surgeon: Rigoberto Ba MD;  Location: [facility];  Service:    • HYSTERECTOMY      W/BSO   • MASTECTOMY Left     PROPHYLACTICALLY   • MASTECTOMY PARTIAL WITH AXILLARY LYMPH NODE EXCISION Right     MALIGNANT   • ORIF WRIST FRACTURE Right    • TONSILLECTOMY     • TOTAL HIP ARTHROPLASTY Left 2017    Procedure: LT TOTAL HIP ARTHROPLASTY;  Surgeon: Yaya Reina MD;  Location: [facility];  Service:    • TRAM FLAP DELAYED     • UTERINE ARTERY EMBOLIZATION         Social History     Social History   • Marital status:      Spouse name: N/A   • Number of children: 1   • Years of education: N/A     Occupational History   • RN AT [employer]      Social History Main Topics   • Smoking status: Former Smoker     Packs/day: 1.00     Years: 10.00     Types: Cigarettes     Quit date:    • Smokeless tobacco: Never Used      Comment: QUIT AT AGE 30 YRS. OLD   • Alcohol use Yes      Comment: TWICE MONTHLY 1-2 DRINKS   • Drug use:      Types: Hydrocodone   • Sexual activity: Defer     Other Topics Concern   • Not on file     Social History Narrative   • No narrative on file       Family History   Problem Relation Age of Onset   • Ovarian cancer Mother    • Lung cancer Father    • Hypertension Sister      benign essential hypertension   • Hyperlipidemia Sister    • Other Sister      RENAL DISEASE   • Hypertension Brother      benign essential hypertension   • Hyperlipidemia Brother    • Malig Hyperthermia Neg Hx        Review of Systems   Constitution: Negative for chills, fever  and malaise/fatigue.   Cardiovascular: Negative for chest pain, dyspnea on exertion, leg swelling, near-syncope, orthopnea, palpitations, paroxysmal nocturnal dyspnea and syncope.   Respiratory: Negative for cough and shortness of breath.    Musculoskeletal: Positive for joint pain (left wrist broken, and broken nose). Negative for joint swelling and myalgias.   Gastrointestinal: Negative for abdominal pain, diarrhea, melena, nausea and vomiting.   Genitourinary: Negative for frequency and hematuria.   Neurological: Negative for light-headedness, numbness, paresthesias and seizures.   Allergic/Immunologic: Negative.    All other systems reviewed and are negative.      Allergies   Allergen Reactions   • Sulfa Antibiotics Hives   • Morphine And Related Rash         Current Outpatient Prescriptions:   •  atorvastatin (LIPITOR) 80 MG tablet, TAKE 1 TABLET BY MOUTH DAILY, Disp: 90 tablet, Rfl: 0  •  Calcium Carbonate (CALCIUM 600 PO), Take 1 tablet by mouth Daily. HOLD PRIOR TO SURGERY, Disp: , Rfl:   •  celecoxib (CeleBREX) 200 MG capsule, TK ONE C PO D PRN P. HOLD PRIOR TO SURGERY, Disp: , Rfl: 3  •  meclizine (ANTIVERT) 12.5 MG tablet, Take 1 tablet by mouth 3 (Three) Times a Day As Needed for dizziness., Disp: 30 tablet, Rfl: 2  •  metoprolol succinate XL (TOPROL-XL) 50 MG 24 hr tablet, Take 1 tablet by mouth Daily. (Patient taking differently: Take 50 mg by mouth Every Evening.), Disp: 90 tablet, Rfl: 3  •  Multiple Vitamin (MULTI VITAMIN DAILY PO), Take 1 tablet by mouth daily. HOLD PRIOR TO SURGERY, Disp: , Rfl:   •  traZODone (DESYREL) 150 MG tablet, Take 1 tablet by mouth Every Night., Disp: 90 tablet, Rfl: 1  •  traZODone (DESYREL) 50 MG tablet, TAKE 1 TABLET BY MOUTH AT NIGHT AS NEEDED FOR SLEEP, Disp: 90 tablet, Rfl: 1  •  triamterene-hydrochlorothiazide (MAXZIDE-25) 37.5-25 MG per tablet, Take 1 tablet by mouth Every Morning., Disp: 90 tablet, Rfl: 3  •  meloxicam (MOBIC) 15 MG tablet, Take  by mouth Daily.,  "Disp: , Rfl: 1  •  oxyCODONE (ROXICODONE) 10 MG tablet, TK 1-2 TS PO Q 6 H PRN P, Disp: , Rfl: 0      Objective:     Vitals:    05/07/18 0853   BP: 120/88   Pulse: 76   Weight: 93.4 kg (206 lb)   Height: 177.8 cm (70\")     Body mass index is 29.56 kg/m².    PHYSICAL EXAM:    Vitals Reviewed.   General Appearance: No acute distress, well developed and well nourished.   Eyes: Sclera non-icteric. She does have bruising and some swelling around both eyes, yellowish-green in color.   HENT: Normocephalic. Nasal area swollen, bruising noted. Laceration on chin with sutures intact. No bleeding.  Respiratory: No signs of respiratory distress. Respiration rhythm and depth normal.   Clear to auscultation. No rales, crackles, rhonchi, or wheezing auscultated.   Cardiovascular:  Jugular Venous Pressure: Normal  Heart Rate and Rhythm: Normal, Heart Sounds: Normal S1 and S2. No S3 or S4 noted.  Murmurs: No murmurs noted. No rubs, thrills, or gallops.   Arterial Pulses:  Posterior tibialis and dorsalis pedis pulses normal.   Lower Extremities: No edema noted.  Gastrointestinal:  Abdomen soft, non-distended, non-tender.   Musculoskeletal: HUFFMAN. Left forearm in splint.   Skin: Warm and dry.   Psychiatric: Patient alert and oriented to person, place, and time. Speech and behavior appropriate. Normal mood and affect.       ECG 12 Lead  Date/Time: 5/7/2018 9:45 AM  Performed by: PUJA RAMSAY  Authorized by: PUJA RAMSAY   Comparison: compared with previous ECG from 4/28/2017  Similar to previous ECG  Rhythm: sinus rhythm  Clinical impression: normal ECG              Assessment:       Diagnosis Plan   1. PAT (paroxysmal atrial tachycardia)     2. Essential hypertension     3. SHAI on CPAP            Plan:       1. PAT. Well controlled on beta blocker.     2. HTN, controlled.     3. SHAI, compliant with CPAP.    Return to see Dr. Choudhury in 1 year.     As always, it has been a pleasure to participate in your patient's " care.      Sincerely,         ENRRIQUE Martell

## 2018-05-10 PROCEDURE — 99284 EMERGENCY DEPT VISIT MOD MDM: CPT

## 2018-05-10 RX ORDER — PRAVASTATIN SODIUM 40 MG
80 TABLET ORAL DAILY
COMMUNITY
End: 2018-08-22 | Stop reason: SDUPTHER

## 2018-05-11 ENCOUNTER — HOSPITAL ENCOUNTER (EMERGENCY)
Facility: HOSPITAL | Age: 68
Discharge: HOME | End: 2018-05-11
Attending: EMERGENCY MEDICINE | Admitting: EMERGENCY MEDICINE

## 2018-05-11 ENCOUNTER — APPOINTMENT (OUTPATIENT)
Dept: GENERAL RADIOLOGY | Facility: HOSPITAL | Age: 68
End: 2018-05-11

## 2018-05-11 VITALS
RESPIRATION RATE: 16 BRPM | HEIGHT: 71 IN | TEMPERATURE: 98.4 F | DIASTOLIC BLOOD PRESSURE: 77 MMHG | WEIGHT: 200 LBS | OXYGEN SATURATION: 94 % | SYSTOLIC BLOOD PRESSURE: 137 MMHG | BODY MASS INDEX: 28 KG/M2 | HEART RATE: 74 BPM

## 2018-05-11 DIAGNOSIS — R00.2 HEART PALPITATIONS: Primary | ICD-10-CM

## 2018-05-11 LAB
ALBUMIN SERPL-MCNC: 4 G/DL (ref 3.5–5.2)
ALBUMIN/GLOB SERPL: 1.3 G/DL
ALP SERPL-CCNC: 75 U/L (ref 39–117)
ALT SERPL W P-5'-P-CCNC: 12 U/L (ref 1–33)
ANION GAP SERPL CALCULATED.3IONS-SCNC: 13.5 MMOL/L
AST SERPL-CCNC: 20 U/L (ref 1–32)
BASOPHILS # BLD AUTO: 0.04 10*3/MM3 (ref 0–0.2)
BASOPHILS NFR BLD AUTO: 0.5 % (ref 0–1.5)
BILIRUB SERPL-MCNC: 0.4 MG/DL (ref 0.1–1.2)
BUN BLD-MCNC: 20 MG/DL (ref 8–23)
BUN/CREAT SERPL: 25.3 (ref 7–25)
CALCIUM SPEC-SCNC: 9.2 MG/DL (ref 8.6–10.5)
CHLORIDE SERPL-SCNC: 103 MMOL/L (ref 98–107)
CO2 SERPL-SCNC: 23.5 MMOL/L (ref 22–29)
CREAT BLD-MCNC: 0.79 MG/DL (ref 0.57–1)
DEPRECATED RDW RBC AUTO: 43.9 FL (ref 37–54)
EOSINOPHIL # BLD AUTO: 0.17 10*3/MM3 (ref 0–0.7)
EOSINOPHIL NFR BLD AUTO: 2.3 % (ref 0.3–6.2)
ERYTHROCYTE [DISTWIDTH] IN BLOOD BY AUTOMATED COUNT: 12.8 % (ref 11.7–13)
GFR SERPL CREATININE-BSD FRML MDRD: 73 ML/MIN/1.73
GLOBULIN UR ELPH-MCNC: 3.2 GM/DL
GLUCOSE BLD-MCNC: 115 MG/DL (ref 65–99)
HCT VFR BLD AUTO: 36.6 % (ref 35.6–45.5)
HGB BLD-MCNC: 11.7 G/DL (ref 11.9–15.5)
IMM GRANULOCYTES # BLD: 0.02 10*3/MM3 (ref 0–0.03)
IMM GRANULOCYTES NFR BLD: 0.3 % (ref 0–0.5)
LYMPHOCYTES # BLD AUTO: 1.53 10*3/MM3 (ref 0.9–4.8)
LYMPHOCYTES NFR BLD AUTO: 20.8 % (ref 19.6–45.3)
MCH RBC QN AUTO: 30.2 PG (ref 26.9–32)
MCHC RBC AUTO-ENTMCNC: 32 G/DL (ref 32.4–36.3)
MCV RBC AUTO: 94.6 FL (ref 80.5–98.2)
MONOCYTES # BLD AUTO: 1.02 10*3/MM3 (ref 0.2–1.2)
MONOCYTES NFR BLD AUTO: 13.8 % (ref 5–12)
NEUTROPHILS # BLD AUTO: 4.59 10*3/MM3 (ref 1.9–8.1)
NEUTROPHILS NFR BLD AUTO: 62.3 % (ref 42.7–76)
PLATELET # BLD AUTO: 309 10*3/MM3 (ref 140–500)
PMV BLD AUTO: 10.8 FL (ref 6–12)
POTASSIUM BLD-SCNC: 4.3 MMOL/L (ref 3.5–5.2)
PROT SERPL-MCNC: 7.2 G/DL (ref 6–8.5)
RBC # BLD AUTO: 3.87 10*6/MM3 (ref 3.9–5.2)
SODIUM BLD-SCNC: 140 MMOL/L (ref 136–145)
TROPONIN T SERPL-MCNC: <0.01 NG/ML (ref 0–0.03)
WBC NRBC COR # BLD: 7.37 10*3/MM3 (ref 4.5–10.7)

## 2018-05-11 PROCEDURE — 84484 ASSAY OF TROPONIN QUANT: CPT | Performed by: EMERGENCY MEDICINE

## 2018-05-11 PROCEDURE — 93005 ELECTROCARDIOGRAM TRACING: CPT | Performed by: EMERGENCY MEDICINE

## 2018-05-11 PROCEDURE — 96360 HYDRATION IV INFUSION INIT: CPT

## 2018-05-11 PROCEDURE — 85025 COMPLETE CBC W/AUTO DIFF WBC: CPT | Performed by: EMERGENCY MEDICINE

## 2018-05-11 PROCEDURE — 93010 ELECTROCARDIOGRAM REPORT: CPT | Performed by: INTERNAL MEDICINE

## 2018-05-11 PROCEDURE — 80053 COMPREHEN METABOLIC PANEL: CPT | Performed by: EMERGENCY MEDICINE

## 2018-05-11 PROCEDURE — 71046 X-RAY EXAM CHEST 2 VIEWS: CPT

## 2018-05-11 RX ADMIN — SODIUM CHLORIDE 1000 ML: 9 INJECTION, SOLUTION INTRAVENOUS at 02:11

## 2018-05-11 NOTE — ED NOTES
"IV established in pt's right hand d/t failed IV attempt on pt's fractured left arm. Pt states \"go ahead and use the right arm, we've had to use it lately because of the surgery.\" Pt states mastectomy was 33 years ago.     Marina Giraldo RN  05/11/18 0113    "

## 2018-05-11 NOTE — ED PROVIDER NOTES
EMERGENCY DEPARTMENT ENCOUNTER    CHIEF COMPLAINT  Chief Complaint: SOB  History given by: patient  History limited by: nothing  Room Number: 17/17  PMD: Diane Macdonald MD      HPI:  Pt is a 67 y.o. female who presents complaining of SOB. Pt was sitting at home, took a pain pill and suddenly felt SOB. She then had nausea, diaphoresis, and a heart rate in the 30s. She denies Cp or leg swelling. However, she feels better now. Pt just had surgery within the last 2 weeks. She believes she may be dehydrated and has not used her CPAP.    Duration:  today  Onset: sudden  Timing: intermittent  Location: chest/respiratory system  Radiation: general body  Intensity/Severity: moderate  Progression: resolved  Associated Symptoms: nausea, diaphoresis, low heart rate  Aggravating Factors: none  Alleviating Factors: none  Previous Episodes: She notes 2 previous episodes, bith of which were s/p surgeries.  Treatment before arrival: No tx prior to arrival noted.    PAST MEDICAL HISTORY  Active Ambulatory Problems     Diagnosis Date Noted   • Atopic rhinitis 05/20/2016   • Arthralgia of multiple joints 05/20/2016   • Dysphagia 05/20/2016   • Hyperlipidemia 05/20/2016   • Hypertension 05/20/2016   • Insomnia 05/20/2016   • Osteopenia 05/20/2016   • Cobalamin deficiency 05/20/2016   • Vitamin D deficiency 05/20/2016   • Sciatica 05/20/2016   • PAT (paroxysmal atrial tachycardia) 02/10/2017   • Sinus tachycardia 04/28/2017   • SHAI on CPAP 08/10/2017   • Pain of left hip joint 08/10/2017   • OA (osteoarthritis) of hip 08/29/2017     Resolved Ambulatory Problems     Diagnosis Date Noted   • Increased thyroid stimulating hormone level 05/20/2016     Past Medical History:   Diagnosis Date   • Allergic rhinitis    • Arthralgia of multiple sites    • Arthritis    • Bronchitis    • Cancer    • Disease of thyroid gland    • Diverticulosis    • Dysphagia    • Elevated TSH    • Fatigue    • GERD (gastroesophageal reflux disease)    • History of  breast cancer    • Hyperlipidemia    • Hypertension    • Insomnia    • Junctional rhythm    • Lung nodule    • Mitral regurgitation    • Osteopenia    • Osteoporosis    • Postmenopausal status    • Pulmonary nodules    • Reactive airway disease    • Recurrent urinary tract infection    • Rib fracture    • Sleep apnea    • Uterine leiomyoma    • Vitamin B 12 deficiency    • Vitamin D deficiency        PAST SURGICAL HISTORY  Past Surgical History:   Procedure Laterality Date   • CARDIAC CATHETERIZATION       at [facility]; told normal coronary arteries    •  SECTION     • HAMMER TOE REPAIR Right 2016    Procedure: MULTIPLE RT CLAW TOE REPAIR WITH WEIL OSTEOTOMY; RIGHT GREAT TOE AKIN OSTEOTOMY.;  Surgeon: Rigoberto Ba MD;  Location: [facility];  Service:    • HYSTERECTOMY      W/BSO   • JOINT REPLACEMENT     • MASTECTOMY Left     PROPHYLACTICALLY   • MASTECTOMY PARTIAL WITH AXILLARY LYMPH NODE EXCISION Right     MALIGNANT   • ORIF WRIST FRACTURE Right    • TONSILLECTOMY     • TOTAL HIP ARTHROPLASTY Left 2017    Procedure: LT TOTAL HIP ARTHROPLASTY;  Surgeon: Yaya Reina MD;  Location: [facility];  Service:    • TRAM FLAP DELAYED     • UTERINE ARTERY EMBOLIZATION         FAMILY HISTORY  Family History   Problem Relation Age of Onset   • Ovarian cancer Mother    • Lung cancer Father    • Hypertension Sister      benign essential hypertension   • Hyperlipidemia Sister    • Other Sister      RENAL DISEASE   • Hypertension Brother      benign essential hypertension   • Hyperlipidemia Brother    • Malig Hyperthermia Neg Hx        SOCIAL HISTORY  Social History     Social History   • Marital status:      Spouse name: N/A   • Number of children: 1   • Years of education: N/A     Occupational History   • RN AT [employer]      Social History Main Topics   • Smoking status: Former Smoker     Packs/day: 1.00     Years: 10.00     Types: Cigarettes     Quit date:    •  Smokeless tobacco: Never Used      Comment: QUIT AT AGE 30 YRS. OLD   • Alcohol use Yes      Comment: TWICE MONTHLY 1-2 DRINKS   • Drug use: No   • Sexual activity: Defer     Other Topics Concern   • Not on file     Social History Narrative   • No narrative on file       ALLERGIES  Sulfa antibiotics and Morphine and related    REVIEW OF SYSTEMS  Review of Systems   Constitutional: Positive for diaphoresis. Negative for fever.   HENT: Negative for sore throat.    Eyes: Negative.    Respiratory: Positive for shortness of breath. Negative for cough.    Cardiovascular: Negative for chest pain.        Heart rate in 30s   Gastrointestinal: Positive for nausea. Negative for abdominal pain, diarrhea and vomiting.   Genitourinary: Negative for dysuria.   Musculoskeletal: Negative for neck pain.   Skin: Negative for rash.   Allergic/Immunologic: Negative.    Neurological: Negative for weakness, numbness and headaches.   Hematological: Negative.    Psychiatric/Behavioral: Negative.    All other systems reviewed and are negative.      PHYSICAL EXAM  ED Triage Vitals [05/10/18 2340]   Temp Heart Rate Resp BP SpO2   98.4 °F (36.9 °C) 79 16 (!) 145/102 98 %      Temp src Heart Rate Source Patient Position BP Location FiO2 (%)   Oral Monitor -- -- --       Physical Exam   Constitutional: She is oriented to person, place, and time and well-developed, well-nourished, and in no distress. No distress.   HENT:   Head: Normocephalic and atraumatic.   Eyes: EOM are normal. Pupils are equal, round, and reactive to light.   Neck: Normal range of motion. Neck supple.   Cardiovascular: Normal rate, regular rhythm and normal heart sounds.    Pulmonary/Chest: Effort normal and breath sounds normal. No respiratory distress.   Abdominal: Soft. There is no tenderness. There is no rebound and no guarding.   Musculoskeletal: Normal range of motion. She exhibits no edema (BLE).   Cast on L forearm, no palpable cords     Neurological: She is alert and  oriented to person, place, and time. She has normal sensation and normal strength.   Skin: Skin is warm and dry. Bruising (nose, associated with bandage) noted. No rash noted.   Psychiatric: Mood and affect normal.   Nursing note and vitals reviewed.      LAB RESULTS  Lab Results (last 24 hours)     Procedure Component Value Units Date/Time    CBC & Differential [[id number]] Collected:  05/11/18 0113    Specimen:  Blood Updated:  05/11/18 0139    Narrative:       The following orders were created for panel order CBC & Differential.  Procedure                               Abnormality         Status                     ---------                               -----------         ------                     CBC Auto Differential[[id number]]        Abnormal            Final result                 Please view results for these tests on the individual orders.    Comprehensive Metabolic Panel [[id number]]  (Abnormal) Collected:  05/11/18 0113    Specimen:  Blood Updated:  05/11/18 0151     Glucose 115 (H) mg/dL      BUN 20 mg/dL      Creatinine 0.79 mg/dL      Sodium 140 mmol/L      Potassium 4.3 mmol/L      Chloride 103 mmol/L      CO2 23.5 mmol/L      Calcium 9.2 mg/dL      Total Protein 7.2 g/dL      Albumin 4.00 g/dL      ALT (SGPT) 12 U/L      AST (SGOT) 20 U/L      Comment: Specimen hemolyzed.  Results may be affected.        Alkaline Phosphatase 75 U/L      Total Bilirubin 0.4 mg/dL      eGFR Non African Amer 73 mL/min/1.73      Globulin 3.2 gm/dL      A/G Ratio 1.3 g/dL      BUN/Creatinine Ratio 25.3 (H)     Anion Gap 13.5 mmol/L     Troponin [[id number]]  (Normal) Collected:  05/11/18 0113    Specimen:  Blood Updated:  05/11/18 0149     Troponin T <0.010 ng/mL     Narrative:       Troponin T Reference Ranges:  Less than 0.03 ng/mL:    Negative for AMI  0.03 to 0.09 ng/mL:      Indeterminant for AMI  Greater than 0.09 ng/mL: Positive for AMI    CBC Auto Differential [[id number]]  (Abnormal) Collected:  05/11/18 0113     Specimen:  Blood Updated:  05/11/18 0139     WBC 7.37 10*3/mm3      RBC 3.87 (L) 10*6/mm3      Hemoglobin 11.7 (L) g/dL      Hematocrit 36.6 %      MCV 94.6 fL      MCH 30.2 pg      MCHC 32.0 (L) g/dL      RDW 12.8 %      RDW-SD 43.9 fl      MPV 10.8 fL      Platelets 309 10*3/mm3      Neutrophil % 62.3 %      Lymphocyte % 20.8 %      Monocyte % 13.8 (H) %      Eosinophil % 2.3 %      Basophil % 0.5 %      Immature Grans % 0.3 %      Neutrophils, Absolute 4.59 10*3/mm3      Lymphocytes, Absolute 1.53 10*3/mm3      Monocytes, Absolute 1.02 10*3/mm3      Eosinophils, Absolute 0.17 10*3/mm3      Basophils, Absolute 0.04 10*3/mm3      Immature Grans, Absolute 0.02 10*3/mm3           I ordered the above labs and reviewed the results    RADIOLOGY  XR Chest 2 View   Final Result   1. Under aeration with linear densities likely atelectasis.   2. Known pulmonary nodules are not as well seen, the largest in the   right lung base has increased and is likely related to metastatic   disease       This report was finalized on 5/11/2018 1:46 AM by Vishal Estrada M.D.               I ordered the above noted radiological studies. Interpreted by radiologist. Reviewed by me in PACS.       PROCEDURES  Procedures  EKG           EKG time: 0111  Rhythm/Rate: NSR/68  P waves and DC: normal  QRS, axis: LAD   ST and T waves: peaked T waves in V2 and V3     Interpreted Contemporaneously by me, independently viewed  Peaked T waves changed compared to prior 5/3/18      PROGRESS AND CONSULTS  ED Course     0059- Initial pt check. I advised the pt that we will plan for CXR, EKG, and some blood work to further evaluate the pt. We will f/u with results.    0231- Pt recheck. I advised the pt her testing returned with generally no scute abnormalities. Thus, she will be cleared for discharge.    MEDICAL DECISION MAKING  Results were reviewed/discussed with the patient and they were also made aware of online access. Pt also made aware that some labs,  such as cultures, will not be resulted during ER visit and follow up with PMD is necessary.     MDM  Number of Diagnoses or Management Options     Amount and/or Complexity of Data Reviewed  Clinical lab tests: reviewed and ordered (Hgb- 11.7, Troponin- <0.010)  Tests in the radiology section of CPT®: reviewed and ordered (CXR- atelectasis, pulmonary nodules not as well seen)  Tests in the medicine section of CPT®: reviewed and ordered (See EKG in note)           DIAGNOSIS  Final diagnoses:   Heart palpitations       DISPOSITION  DISCHARGE    Patient discharged in stable condition.    Reviewed implications of results, diagnosis, meds, responsibility to follow up, warning signs and symptoms of possible worsening, potential complications and reasons to return to ER, including recurrence or worsening of sx.    Patient/Family voiced understanding of above instructions.    Discussed plan for discharge, as there is no emergent indication for admission. Patient referred to primary care provider for BP management due to today's BP. Pt/family is agreeable and understands need for follow up and repeat testing.  Pt is aware that discharge does not mean that nothing is wrong but it indicates no emergency is present that requires admission and they must continue care with follow-up as given below or physician of their choice.     FOLLOW-UP  Diane Macdonald MD  [address]  [address] [place], [place] [address] [phone]               Medication List      Changed    metoprolol succinate XL 50 MG 24 hr tablet  Commonly known as:  TOPROL-XL  Take 1 tablet by mouth Daily.  What changed:  when to take this              Latest Documented Vital Signs:  As of 2:34 AM  BP- 155/82 HR- 68 Temp- 98.4 °F (36.9 °C) (Oral) O2 sat- 91%    --  Documentation assistance provided by lacie Aranda for Dr. Cabello.  Information recorded by the lacie was done at my direction and has been verified and validated by me.     Cuate Aranda  05/11/18  0109       Cuate Aranda  05/11/18 0118       Cuate Aranda  05/11/18 0234       Rajan Malik MD  05/11/18 0304

## 2018-05-11 NOTE — ED TRIAGE NOTES
Pt here via EMS stretcher for c/o soa - pt had surgery on Tuesday, states history of going into SVT post op - tonight was at home, was feeling faint, checked pulse, which was 28, then pt became diaphoretic and pulse started racing, and pt became SOA.  Pt in no respiratory distress at this time.  Triage complete.

## 2018-05-15 ENCOUNTER — TELEPHONE (OUTPATIENT)
Dept: SOCIAL WORK | Facility: HOSPITAL | Age: 68
End: 2018-05-15

## 2018-05-17 ENCOUNTER — TELEPHONE (OUTPATIENT)
Dept: INTERNAL MEDICINE | Facility: CLINIC | Age: 68
End: 2018-05-17

## 2018-05-17 DIAGNOSIS — R91.1 PULMONARY NODULE: Primary | ICD-10-CM

## 2018-05-17 NOTE — TELEPHONE ENCOUNTER
CT CHEST ORDERED    ----- Message from Diane Macdonald MD sent at 5/17/2018 12:43 PM EDT -----  Ct chest with contrast eval pulm nodule    ----- Message -----  From: Ladarius Woodward  Sent: 5/17/2018  11:23 AM  To: Diane Macdonald MD    Patient called and is concerned about the impression of this xray.  Requesting a call from provider [phone]

## 2018-05-23 ENCOUNTER — HOSPITAL ENCOUNTER (OUTPATIENT)
Dept: CT IMAGING | Facility: HOSPITAL | Age: 68
Discharge: HOME | End: 2018-05-23
Admitting: INTERNAL MEDICINE

## 2018-05-23 DIAGNOSIS — R91.1 PULMONARY NODULE: ICD-10-CM

## 2018-05-23 PROCEDURE — 71260 CT THORAX DX C+: CPT

## 2018-05-23 PROCEDURE — 82565 ASSAY OF CREATININE: CPT

## 2018-05-23 PROCEDURE — 25010000002 IOPAMIDOL 61 % SOLUTION: Performed by: INTERNAL MEDICINE

## 2018-05-23 RX ADMIN — IOPAMIDOL 75 ML: 612 INJECTION, SOLUTION INTRAVENOUS at 08:31

## 2018-05-24 LAB — CREAT BLDA-MCNC: 1.2 MG/DL (ref 0.6–1.3)

## 2018-06-05 ENCOUNTER — TELEPHONE (OUTPATIENT)
Dept: INTERNAL MEDICINE | Facility: CLINIC | Age: 68
End: 2018-06-05

## 2018-06-05 RX ORDER — AMOXICILLIN 500 MG/1
1000 CAPSULE ORAL 2 TIMES DAILY PRN
Qty: 4 CAPSULE | Refills: 2 | OUTPATIENT
Start: 2018-06-05 | End: 2019-01-09

## 2018-06-05 NOTE — TELEPHONE ENCOUNTER
----- Message from Diane Macdonald MD sent at 6/5/2018  1:10 PM EDT -----  ok  ----- Message -----  From: Chastity Ortiz  Sent: 6/5/2018  11:37 AM  To: Diane Macdonald MD    PT IS REQUESTING A REFILL FOR ORAL ANTIBIOTICS FOR HER UP COMING DENATL PROCEDURE.  AMOXIL 500 MG, TAKE 1 TAB BID AS NEEDED FOR DENTAL WORK, DISP. 4 REFILL 2  ----- Message -----  From: Alejandra Mcwilliams  Sent: 6/5/2018  11:10 AM  To: Chastity Ortiz    PT is has has a hip transplant is having a dentist daf[place]. On Friday and needs a prescription for that day.     [facility]: [phone]

## 2018-06-11 ENCOUNTER — DOCUMENTATION (OUTPATIENT)
Dept: PHYSICAL THERAPY | Facility: CLINIC | Age: 68
End: 2018-06-11

## 2018-06-11 ENCOUNTER — TRANSCRIBE ORDERS (OUTPATIENT)
Dept: PHYSICAL THERAPY | Facility: CLINIC | Age: 68
End: 2018-06-11

## 2018-06-11 DIAGNOSIS — H81.12 BPPV (BENIGN PAROXYSMAL POSITIONAL VERTIGO), LEFT: Primary | ICD-10-CM

## 2018-06-11 DIAGNOSIS — I47.1 PAT (PAROXYSMAL ATRIAL TACHYCARDIA) (HCC): ICD-10-CM

## 2018-06-11 DIAGNOSIS — M79.642 PAIN OF LEFT HAND: Primary | ICD-10-CM

## 2018-06-11 RX ORDER — METOPROLOL SUCCINATE 50 MG/1
50 TABLET, EXTENDED RELEASE ORAL DAILY
Qty: 90 TABLET | Refills: 0 | Status: SHIPPED | OUTPATIENT
Start: 2018-06-11 | End: 2018-09-05 | Stop reason: SDUPTHER

## 2018-06-11 NOTE — PROGRESS NOTES
Discharge Report    Patient Name: Tran Sosa       Patient MRN: [id number]  : 1950  Physician:  Date: 2018    Encounter Diagnoses   Name Primary?   • BPPV (benign paroxysmal positional vertigo), left Yes       Treatment has included CRP  Assessment: Patient did not return to PT. Will assume symptoms resolved.   Progress towards goals: Partially Met    Discharge Reason: Patient achieved goals and Patient did not attend final appointment      Plan of Care: Continue with current home exercise program as instructed    Prognosis: excellent    Understanding at Discharge: excellent

## 2018-06-12 ENCOUNTER — TREATMENT (OUTPATIENT)
Dept: PHYSICAL THERAPY | Facility: CLINIC | Age: 68
End: 2018-06-12

## 2018-06-12 DIAGNOSIS — S52.572D RADIUS FRACTURE DIE PUNCH, CLOSED, LEFT, WITH ROUTINE HEALING, SUBSEQUENT ENCOUNTER: Primary | ICD-10-CM

## 2018-06-12 DIAGNOSIS — Z98.890 S/P ORIF (OPEN REDUCTION INTERNAL FIXATION) FRACTURE: ICD-10-CM

## 2018-06-12 DIAGNOSIS — Z87.81 S/P ORIF (OPEN REDUCTION INTERNAL FIXATION) FRACTURE: ICD-10-CM

## 2018-06-12 DIAGNOSIS — M25.60 LIMITED JOINT RANGE OF MOTION: ICD-10-CM

## 2018-06-12 DIAGNOSIS — Z47.89 SURGICAL AFTERCARE, MUSCULOSKELETAL SYSTEM: ICD-10-CM

## 2018-06-12 PROCEDURE — 97161 PT EVAL LOW COMPLEX 20 MIN: CPT | Performed by: PHYSICAL THERAPIST

## 2018-06-12 PROCEDURE — 97110 THERAPEUTIC EXERCISES: CPT | Performed by: PHYSICAL THERAPIST

## 2018-06-12 PROCEDURE — 97140 MANUAL THERAPY 1/> REGIONS: CPT | Performed by: PHYSICAL THERAPIST

## 2018-06-12 NOTE — PROGRESS NOTES
Physical Therapy Initial Evaluation and Plan of Care    Patient Name: Tran Sosa       Patient MRN: [id number]  : 1950  Physician:Yves Hicks MD  Date: 2018    Encounter Diagnoses   Name Primary?   • Radius fracture die punch, closed, left, with routine healing, subsequent encounter Yes   • Surgical aftercare, musculoskeletal system    • Limited joint range of motion    • S/P ORIF (open reduction internal fixation) fracture        Subjective Evaluation    History of Present Illness  Date of onset: 2018  Date of surgery: 2018 (2nd surgery 18)  Mechanism of injury: Fell face first in [place] from a curb onto sidewalk. Fractured face and L radius. Had ORIF on  and due to bone collapse had another on . Now has a dorsal plate. NO AROM of wrist.   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 [employer]   Precautions and Work Restrictions: 10 lb weight limitsQuality of life: good    Pain  Current pain ratin  At best pain ratin  At worst pain rating: 10  Location: top of hand  Quality: throbbing and tight  Relieving factors: medications  Aggravating factors: movement    Hand dominance: right    Diagnostic Tests  X-ray: abnormal    Treatments  Previous treatment: immobilization  Patient Goals  Patient goals for therapy: increased strength, decreased edema, increased motion, decreased pain, independence with ADLs/IADLs, return to sport/leisure activities and return to work             Objective       Observations     Left Wrist/Hand   Positive for incision (3 x dorsal incisions healing well, 1 volar wrist incision healing well).     Additional Observation Details  In wrist cock up splint    Neurological Testing     Sensation     Wrist/Hand   Left   Intact: light touch    Active Range of Motion     Left Thumb   Flexion     MP: 40 degrees    DIP: 25 degrees  Opposition: 5/10    Left Digits    Flexion   Index     MCP: 70 degrees    PIP: 97 degrees    DIP:  50 degrees  Middle     MCP: 65 degrees    PIP: 97 degrees    DIP: 55 degrees  Ring     MCP: 65 degrees    PIP: 100 degrees    DIP: 55 degrees  Little     MCP: 60 degrees    PIP: 82 degrees    DIP: 72 degrees    Additional Active Range of Motion Details  NA secondary to recent surgery      Strength/Myotome Testing     Additional Strength Details  NA secondary to recent surgery      Tests     Additional Tests Details  NA secondary to recent surgery           Assessment & Plan     Assessment  Impairments: abnormal or restricted ROM, activity intolerance, impaired physical strength, lacks appropriate home exercise program and pain with function  Assessment details: Patient is a good candidate for physical therapy to address the listed impairments and functional limitations.   Prognosis: good  Functional Limitations: carrying objects, lifting, pushing and uncomfortable because of pain  Goals  Plan Goals: STGs: To be met in 2 weeks:  1. Patient is independent with HEP to maintain gains made in PT.  2. Patient is tolerating manual therapy to increase ROM with minimal increase in pain or edema.  3. Patient can make a full composite fist to improve ability to perform ADLs and work tasks within restrictions set by MD.         Plan  Therapy options: will be seen for skilled physical therapy services  Planned modality interventions: cryotherapy, thermotherapy (paraffin bath) and ultrasound  Planned therapy interventions: functional ROM exercises, joint mobilization, home exercise program, manual therapy, stretching and soft tissue mobilization  Frequency: 2x week  Duration in weeks: 4  Treatment plan discussed with: patient        See Exercise, Manual, and Modality Logs for complete treatment.     PROCEDURES AND MODALITIES:  Paraffin:   pre-rx  Moist Heat:    Ice:   post-rx  E-Stim:    Ultrasound:    Ionto:   Traction:    Dry Needling:         Manual PT [id number] 12 minutes and Therapy Exercise [id number] 11 minutes     Timed Code  Treatment: 23 Minutes  Total Treatment Time: 45 Minutes    PT SIGNATURE: Patricia Diaz PT, DPT, CHT  [place] License # [id number]  DATE TREATMENT INITIATED: 6/13/2018    Initial Certification  Certification Period: 9/11/2018  I certify that the therapy services are furnished while this patient is under my care.  The services outlined above are required by this patient, and will be reviewed every 90 days.     PHYSICIAN: Yves Hicks MD      DATE:     Please sign and return via fax to [phone].. Thank you, [facility].

## 2018-06-14 ENCOUNTER — TREATMENT (OUTPATIENT)
Dept: PHYSICAL THERAPY | Facility: CLINIC | Age: 68
End: 2018-06-14

## 2018-06-14 DIAGNOSIS — M25.60 LIMITED JOINT RANGE OF MOTION: ICD-10-CM

## 2018-06-14 DIAGNOSIS — Z87.81 S/P ORIF (OPEN REDUCTION INTERNAL FIXATION) FRACTURE: ICD-10-CM

## 2018-06-14 DIAGNOSIS — Z47.89 SURGICAL AFTERCARE, MUSCULOSKELETAL SYSTEM: ICD-10-CM

## 2018-06-14 DIAGNOSIS — S52.572D RADIUS FRACTURE DIE PUNCH, CLOSED, LEFT, WITH ROUTINE HEALING, SUBSEQUENT ENCOUNTER: Primary | ICD-10-CM

## 2018-06-14 DIAGNOSIS — Z98.890 S/P ORIF (OPEN REDUCTION INTERNAL FIXATION) FRACTURE: ICD-10-CM

## 2018-06-14 PROCEDURE — 97018 PARAFFIN BATH THERAPY: CPT | Performed by: PHYSICAL THERAPIST

## 2018-06-14 PROCEDURE — 97140 MANUAL THERAPY 1/> REGIONS: CPT | Performed by: PHYSICAL THERAPIST

## 2018-06-14 NOTE — PROGRESS NOTES
Physical Therapy Daily Progress Note    Visit #: 2  Tran Sosa reports: Returned to work. Hurts to do activity with thumb and IF while working.   Pain Scale (0-10):     Subjective       Objective   See Exercise, Manual, and Modality Logs for complete treatment.     PROCEDURES AND MODALITIES:  Paraffin: Rx Minutes: 10 min + setup pre-rx  Moist Heat:    Ice:   post-rx  E-Stim:    Ultrasound:    Ionto:   Traction:    Dry Needling:         Manual PT [id number] 23 minutes     Timed Code Treatment: 23 Minutes  Total Treatment Time: 37 Minutes    Assessment/Plan  Thumb is very stiff at CMC joint. Finger mobility is improving.     Progress per Plan of Care      Patricia Diaz, PT, DPT, CHT  Physical Therapist  [place] License # [id number]

## 2018-06-19 ENCOUNTER — TREATMENT (OUTPATIENT)
Dept: PHYSICAL THERAPY | Facility: CLINIC | Age: 68
End: 2018-06-19

## 2018-06-19 DIAGNOSIS — Z98.890 S/P ORIF (OPEN REDUCTION INTERNAL FIXATION) FRACTURE: ICD-10-CM

## 2018-06-19 DIAGNOSIS — M25.60 LIMITED JOINT RANGE OF MOTION: ICD-10-CM

## 2018-06-19 DIAGNOSIS — S52.572D RADIUS FRACTURE DIE PUNCH, CLOSED, LEFT, WITH ROUTINE HEALING, SUBSEQUENT ENCOUNTER: Primary | ICD-10-CM

## 2018-06-19 DIAGNOSIS — Z87.81 S/P ORIF (OPEN REDUCTION INTERNAL FIXATION) FRACTURE: ICD-10-CM

## 2018-06-19 DIAGNOSIS — Z47.89 SURGICAL AFTERCARE, MUSCULOSKELETAL SYSTEM: ICD-10-CM

## 2018-06-19 PROCEDURE — 97140 MANUAL THERAPY 1/> REGIONS: CPT | Performed by: PHYSICAL THERAPIST

## 2018-06-19 PROCEDURE — 97018 PARAFFIN BATH THERAPY: CPT | Performed by: PHYSICAL THERAPIST

## 2018-06-19 NOTE — PROGRESS NOTES
Physical Therapy Daily Progress Note    Visit #: 3  Tran Sosa reports: Fingers are moving better.   Pain Scale (0-10):     Subjective       Objective   See Exercise, Manual, and Modality Logs for complete treatment.     PROCEDURES AND MODALITIES:  Paraffin: Rx Minutes: 10 min + setup pre-rx  Moist Heat:    Ice:   post-rx  E-Stim:    Ultrasound:    Ionto:   Traction:    Dry Needling:         Manual PT [id number] 21 minutes and Therapy Exercise [id number] 3 minutes     Timed Code Treatment: 24 Minutes  Total Treatment Time: 35 Minutes    Assessment/Plan  Patient is able to make a better more composite fist today. MCPJ mobility is better. Thumb is still very stiff jeanmarie at CMCJ.     Progress per Plan of Care      Patricia Diaz PT, DPT, CHT  Physical Therapist  [place] License # [id number]

## 2018-06-21 ENCOUNTER — TREATMENT (OUTPATIENT)
Dept: PHYSICAL THERAPY | Facility: CLINIC | Age: 68
End: 2018-06-21

## 2018-06-21 DIAGNOSIS — Z87.81 S/P ORIF (OPEN REDUCTION INTERNAL FIXATION) FRACTURE: ICD-10-CM

## 2018-06-21 DIAGNOSIS — S52.572D RADIUS FRACTURE DIE PUNCH, CLOSED, LEFT, WITH ROUTINE HEALING, SUBSEQUENT ENCOUNTER: Primary | ICD-10-CM

## 2018-06-21 DIAGNOSIS — Z47.89 SURGICAL AFTERCARE, MUSCULOSKELETAL SYSTEM: ICD-10-CM

## 2018-06-21 DIAGNOSIS — Z98.890 S/P ORIF (OPEN REDUCTION INTERNAL FIXATION) FRACTURE: ICD-10-CM

## 2018-06-21 DIAGNOSIS — M25.60 LIMITED JOINT RANGE OF MOTION: ICD-10-CM

## 2018-06-21 PROCEDURE — 97140 MANUAL THERAPY 1/> REGIONS: CPT | Performed by: PHYSICAL THERAPIST

## 2018-06-21 PROCEDURE — 97018 PARAFFIN BATH THERAPY: CPT | Performed by: PHYSICAL THERAPIST

## 2018-06-21 RX ORDER — TRAZODONE HYDROCHLORIDE 50 MG/1
TABLET ORAL
Qty: 90 TABLET | Refills: 1 | Status: SHIPPED | OUTPATIENT
Start: 2018-06-21 | End: 2018-06-25 | Stop reason: SDUPTHER

## 2018-06-21 NOTE — PROGRESS NOTES
Physical Therapy Daily Progress Note    Visit #: 4  Tran Sosa reports: hand is doing good.   Pain Scale (0-10):     Subjective       Objective   See Exercise, Manual, and Modality Logs for complete treatment.     PROCEDURES AND MODALITIES:  Paraffin: Rx Minutes: 10 min + setup pre-rx  Moist Heat:    Ice:   post-rx  E-Stim:    Ultrasound:    Ionto:   Traction:    Dry Needling:         Manual PT [id number] 21 minutes and Therapy Exercise [id number] 3 minutes     Timed Code Treatment: 24 Minutes  Total Treatment Time: 35 Minutes    Assessment/Plan  Distal volar wrist scar and dorsal hand scars are hypomobile and encouraged patient to continue scar mobilization and massage. Fingers are moving much better and can make a full fist at the end of treatment today.     Progress per Plan of Care      Patricia Diaz PT, DPT, CHT  Physical Therapist  [place] License # [id number]

## 2018-06-25 DIAGNOSIS — I47.1 PAT (PAROXYSMAL ATRIAL TACHYCARDIA) (HCC): ICD-10-CM

## 2018-06-25 RX ORDER — TRAZODONE HYDROCHLORIDE 50 MG/1
TABLET ORAL
Qty: 90 TABLET | Refills: 1 | Status: SHIPPED | OUTPATIENT
Start: 2018-06-25 | End: 2018-09-05 | Stop reason: SDUPTHER

## 2018-06-25 RX ORDER — METOPROLOL SUCCINATE 50 MG/1
50 TABLET, EXTENDED RELEASE ORAL DAILY
Qty: 90 TABLET | Refills: 0 | Status: SHIPPED | OUTPATIENT
Start: 2018-06-25 | End: 2018-09-05 | Stop reason: SDUPTHER

## 2018-06-26 ENCOUNTER — TREATMENT (OUTPATIENT)
Dept: PHYSICAL THERAPY | Facility: CLINIC | Age: 68
End: 2018-06-26

## 2018-06-26 DIAGNOSIS — Z98.890 S/P ORIF (OPEN REDUCTION INTERNAL FIXATION) FRACTURE: ICD-10-CM

## 2018-06-26 DIAGNOSIS — Z87.81 S/P ORIF (OPEN REDUCTION INTERNAL FIXATION) FRACTURE: ICD-10-CM

## 2018-06-26 DIAGNOSIS — S52.572D RADIUS FRACTURE DIE PUNCH, CLOSED, LEFT, WITH ROUTINE HEALING, SUBSEQUENT ENCOUNTER: Primary | ICD-10-CM

## 2018-06-26 DIAGNOSIS — M25.60 LIMITED JOINT RANGE OF MOTION: ICD-10-CM

## 2018-06-26 DIAGNOSIS — Z47.89 SURGICAL AFTERCARE, MUSCULOSKELETAL SYSTEM: ICD-10-CM

## 2018-06-26 PROCEDURE — 97140 MANUAL THERAPY 1/> REGIONS: CPT | Performed by: PHYSICAL THERAPIST

## 2018-06-26 PROCEDURE — 97018 PARAFFIN BATH THERAPY: CPT | Performed by: PHYSICAL THERAPIST

## 2018-06-26 NOTE — PROGRESS NOTES
Physical Therapy Daily Progress Note    Visit #: 5  Tran Sosa reports: Hand is doing good. Still feels stiff but it is moving better.   Pain Scale (0-10):     Subjective       Objective   See Exercise, Manual, and Modality Logs for complete treatment.     PROCEDURES AND MODALITIES:  Paraffin: Rx Minutes: 10 min + setup pre-rx  Moist Heat:    Ice:   post-rx  E-Stim:    Ultrasound:    Ionto:   Traction:    Dry Needling:         Manual PT [id number] 20 minutes and Therapy Exercise [id number] 3 minutes     Timed Code Treatment: 23 Minutes  Total Treatment Time: 35 Minutes    Assessment/Plan  Patient can make a full fist. Thumb CMCJ and IF MCPJ demonstrate the most hypomobility, slowly improving.     Progress per Plan of Care      Patricia Diaz PT, DPT, CHT  Physical Therapist  [place] License # [id number]

## 2018-06-29 ENCOUNTER — TREATMENT (OUTPATIENT)
Dept: PHYSICAL THERAPY | Facility: CLINIC | Age: 68
End: 2018-06-29

## 2018-06-29 DIAGNOSIS — Z98.890 S/P ORIF (OPEN REDUCTION INTERNAL FIXATION) FRACTURE: ICD-10-CM

## 2018-06-29 DIAGNOSIS — Z47.89 SURGICAL AFTERCARE, MUSCULOSKELETAL SYSTEM: ICD-10-CM

## 2018-06-29 DIAGNOSIS — S52.572D RADIUS FRACTURE DIE PUNCH, CLOSED, LEFT, WITH ROUTINE HEALING, SUBSEQUENT ENCOUNTER: Primary | ICD-10-CM

## 2018-06-29 DIAGNOSIS — M25.60 LIMITED JOINT RANGE OF MOTION: ICD-10-CM

## 2018-06-29 DIAGNOSIS — Z87.81 S/P ORIF (OPEN REDUCTION INTERNAL FIXATION) FRACTURE: ICD-10-CM

## 2018-06-29 PROCEDURE — 97018 PARAFFIN BATH THERAPY: CPT | Performed by: PHYSICAL THERAPIST

## 2018-06-29 PROCEDURE — 97140 MANUAL THERAPY 1/> REGIONS: CPT | Performed by: PHYSICAL THERAPIST

## 2018-06-29 PROCEDURE — 97110 THERAPEUTIC EXERCISES: CPT | Performed by: PHYSICAL THERAPIST

## 2018-07-02 ENCOUNTER — TREATMENT (OUTPATIENT)
Dept: PHYSICAL THERAPY | Facility: CLINIC | Age: 68
End: 2018-07-02

## 2018-07-02 DIAGNOSIS — M25.60 LIMITED JOINT RANGE OF MOTION: ICD-10-CM

## 2018-07-02 DIAGNOSIS — Z47.89 SURGICAL AFTERCARE, MUSCULOSKELETAL SYSTEM: ICD-10-CM

## 2018-07-02 DIAGNOSIS — Z98.890 S/P ORIF (OPEN REDUCTION INTERNAL FIXATION) FRACTURE: ICD-10-CM

## 2018-07-02 DIAGNOSIS — Z87.81 S/P ORIF (OPEN REDUCTION INTERNAL FIXATION) FRACTURE: ICD-10-CM

## 2018-07-02 DIAGNOSIS — S52.572D RADIUS FRACTURE DIE PUNCH, CLOSED, LEFT, WITH ROUTINE HEALING, SUBSEQUENT ENCOUNTER: Primary | ICD-10-CM

## 2018-07-02 PROCEDURE — 97018 PARAFFIN BATH THERAPY: CPT | Performed by: PHYSICAL THERAPIST

## 2018-07-02 PROCEDURE — 97110 THERAPEUTIC EXERCISES: CPT | Performed by: PHYSICAL THERAPIST

## 2018-07-02 PROCEDURE — 97140 MANUAL THERAPY 1/> REGIONS: CPT | Performed by: PHYSICAL THERAPIST

## 2018-07-02 NOTE — PROGRESS NOTES
Physical Therapy Daily Progress Note    Visit #: 6  Tran Ian reports: MD is happy with progress. Can start stretching supination now.  Pain Scale (0-10):     Subjective       Objective   See Exercise, Manual, and Modality Logs for complete treatment.     PROCEDURES AND MODALITIES:  Paraffin: Rx Minutes: 10 min + setup pre-rx  Moist Heat:    Ice:   post-rx  E-Stim:    Ultrasound:    Ionto:   Traction:    Dry Needling:         Manual PT [id number] 18 minutes and Therapy Exercise [id number] 5 minutes     Timed Code Treatment: 23 Minutes  Total Treatment Time: 40 Minutes    Assessment/Plan  Fingers are moving great. Added supination stretching today. Progressing well.     Progress per Plan of Care      Patricia Diaz PT, DPT, CHT  Physical Therapist  [place] License # [id number]

## 2018-07-02 NOTE — PROGRESS NOTES
Physical Therapy Daily Progress Note    Visit #: 7  Tran Sosa reports: Fingers are moving good. I am not wearing the brace anymore.   Pain Scale (0-10):     Subjective       Objective       Active Range of Motion     Left Elbow   Forearm supination: 58 degrees      See Exercise, Manual, and Modality Logs for complete treatment.     PROCEDURES AND MODALITIES:  Paraffin: Rx Minutes: 10 min + setup pre-rx  Moist Heat:    Ice:   post-rx  E-Stim:    Ultrasound:    Ionto:   Traction:    Dry Needling:         Manual PT [id number] 18 minutes and Therapy Exercise [id number] 6 minutes     Timed Code Treatment: 24 Minutes  Total Treatment Time: 38 Minutes    Assessment/Plan  Patient remains limited in supination but is improving. Released from wear of brace. Will have plate removed at end of summer.     Progress strengthening /stabilization /functional activity      Patricia Diaz PT, DPT, CHT  Physical Therapist  [place] License # [id number]

## 2018-07-11 ENCOUNTER — TREATMENT (OUTPATIENT)
Dept: PHYSICAL THERAPY | Facility: CLINIC | Age: 68
End: 2018-07-11

## 2018-07-11 DIAGNOSIS — Z47.89 SURGICAL AFTERCARE, MUSCULOSKELETAL SYSTEM: ICD-10-CM

## 2018-07-11 DIAGNOSIS — Z98.890 S/P ORIF (OPEN REDUCTION INTERNAL FIXATION) FRACTURE: ICD-10-CM

## 2018-07-11 DIAGNOSIS — Z87.81 S/P ORIF (OPEN REDUCTION INTERNAL FIXATION) FRACTURE: ICD-10-CM

## 2018-07-11 DIAGNOSIS — S52.572D RADIUS FRACTURE DIE PUNCH, CLOSED, LEFT, WITH ROUTINE HEALING, SUBSEQUENT ENCOUNTER: Primary | ICD-10-CM

## 2018-07-11 DIAGNOSIS — M25.60 LIMITED JOINT RANGE OF MOTION: ICD-10-CM

## 2018-07-11 PROCEDURE — 97018 PARAFFIN BATH THERAPY: CPT | Performed by: PHYSICAL THERAPIST

## 2018-07-11 PROCEDURE — 97140 MANUAL THERAPY 1/> REGIONS: CPT | Performed by: PHYSICAL THERAPIST

## 2018-07-11 NOTE — PROGRESS NOTES
Physical Therapy Daily Progress Note    Visit #: 8  Tran Sosa reports: I have been using my hand a lot. Still having trouble using my thumb.   Pain Scale (0-10):     Subjective       Objective   See Exercise, Manual, and Modality Logs for complete treatment.     PROCEDURES AND MODALITIES:  Paraffin: Rx Minutes: 10 min + setup pre-rx  Moist Heat:    Ice:   post-rx  E-Stim:    Ultrasound:    Ionto:   Traction:    Dry Needling:         Manual PT 38848 18 minutes and Therapy Exercise 13100 3 minutes     Timed Code Treatment: 21 Minutes  Total Treatment Time: 35 Minutes    Assessment/Plan  Finger mobility is good. Thumb retropulsion is lacking due to plate in wrist. Supination AROM and functional ability are improving.     Progress per Plan of Care      Patricia Diaz PT, DPT, CHT  Physical Therapist  [place] License # [id number]

## 2018-07-16 RX ORDER — TRAZODONE HYDROCHLORIDE 150 MG/1
150 TABLET ORAL NIGHTLY
Qty: 90 TABLET | Refills: 1 | Status: SHIPPED | OUTPATIENT
Start: 2018-07-16 | End: 2019-03-05 | Stop reason: ALTCHOICE

## 2018-07-19 RX ORDER — TRIAMTERENE AND HYDROCHLOROTHIAZIDE 37.5; 25 MG/1; MG/1
1 TABLET ORAL EVERY MORNING
Qty: 90 TABLET | Refills: 1 | Status: SHIPPED | OUTPATIENT
Start: 2018-07-19 | End: 2019-03-27

## 2018-08-08 ENCOUNTER — TELEPHONE (OUTPATIENT)
Dept: INTERNAL MEDICINE | Facility: CLINIC | Age: 68
End: 2018-08-08

## 2018-08-08 DIAGNOSIS — Z00.00 HEALTHCARE MAINTENANCE: Primary | ICD-10-CM

## 2018-08-08 LAB
ALBUMIN SERPL-MCNC: 4.4 G/DL (ref 3.5–5.2)
ALBUMIN/GLOB SERPL: 1.8 G/DL
ALP SERPL-CCNC: 70 U/L (ref 39–117)
ALT SERPL-CCNC: 18 U/L (ref 1–33)
AST SERPL-CCNC: 13 U/L (ref 1–32)
BASOPHILS # BLD AUTO: 0.03 10*3/MM3 (ref 0–0.2)
BASOPHILS NFR BLD AUTO: 0.6 % (ref 0–1.5)
BILIRUB SERPL-MCNC: 0.4 MG/DL (ref 0.1–1.2)
BUN SERPL-MCNC: 22 MG/DL (ref 8–23)
BUN/CREAT SERPL: 22.4 (ref 7–25)
CALCIUM SERPL-MCNC: 9.5 MG/DL (ref 8.6–10.5)
CHLORIDE SERPL-SCNC: 101 MMOL/L (ref 98–107)
CHOLEST SERPL-MCNC: 193 MG/DL (ref 0–200)
CO2 SERPL-SCNC: 27.1 MMOL/L (ref 22–29)
CREAT SERPL-MCNC: 0.98 MG/DL (ref 0.57–1)
EOSINOPHIL # BLD AUTO: 0.13 10*3/MM3 (ref 0–0.7)
EOSINOPHIL NFR BLD AUTO: 2.6 % (ref 0.3–6.2)
ERYTHROCYTE [DISTWIDTH] IN BLOOD BY AUTOMATED COUNT: 13.9 % (ref 11.7–13)
GLOBULIN SER CALC-MCNC: 2.4 GM/DL
GLUCOSE SERPL-MCNC: 96 MG/DL (ref 65–99)
HCT VFR BLD AUTO: 38.5 % (ref 35.6–45.5)
HDLC SERPL-MCNC: 51 MG/DL (ref 40–60)
HGB BLD-MCNC: 12.2 G/DL (ref 11.9–15.5)
IMM GRANULOCYTES # BLD: 0.02 10*3/MM3 (ref 0–0.03)
IMM GRANULOCYTES NFR BLD: 0.4 % (ref 0–0.5)
LDLC SERPL CALC-MCNC: 109 MG/DL (ref 0–100)
LDLC/HDLC SERPL: 2.14 {RATIO}
LYMPHOCYTES # BLD AUTO: 1.61 10*3/MM3 (ref 0.9–4.8)
LYMPHOCYTES NFR BLD AUTO: 32 % (ref 19.6–45.3)
MCH RBC QN AUTO: 30 PG (ref 26.9–32)
MCHC RBC AUTO-ENTMCNC: 31.7 G/DL (ref 32.4–36.3)
MCV RBC AUTO: 94.8 FL (ref 80.5–98.2)
MONOCYTES # BLD AUTO: 0.45 10*3/MM3 (ref 0.2–1.2)
MONOCYTES NFR BLD AUTO: 8.9 % (ref 5–12)
NEUTROPHILS # BLD AUTO: 2.81 10*3/MM3 (ref 1.9–8.1)
NEUTROPHILS NFR BLD AUTO: 55.9 % (ref 42.7–76)
PLATELET # BLD AUTO: 286 10*3/MM3 (ref 140–500)
POTASSIUM SERPL-SCNC: 4 MMOL/L (ref 3.5–5.2)
PROT SERPL-MCNC: 6.8 G/DL (ref 6–8.5)
RBC # BLD AUTO: 4.06 10*6/MM3 (ref 3.9–5.2)
SODIUM SERPL-SCNC: 141 MMOL/L (ref 136–145)
TRIGL SERPL-MCNC: 165 MG/DL (ref 0–150)
TSH SERPL DL<=0.005 MIU/L-ACNC: 2.52 MIU/ML (ref 0.27–4.2)
VLDLC SERPL CALC-MCNC: 33 MG/DL (ref 5–40)
WBC # BLD AUTO: 5.03 10*3/MM3 (ref 4.5–10.7)

## 2018-08-08 NOTE — TELEPHONE ENCOUNTER
Labs ordered    ----- Message from Diane Macdonald MD sent at 8/7/2018  4:21 PM EDT -----  cmp flp tsh cbc  ----- Message -----  From: Chastity Sanches MA  Sent: 8/7/2018   3:16 PM  To: Diane Macdonald MD    PT SCHEDULED FOR LABS PLEASE Advise

## 2018-08-14 ENCOUNTER — OFFICE VISIT (OUTPATIENT)
Dept: SLEEP MEDICINE | Facility: HOSPITAL | Age: 68
End: 2018-08-14
Attending: PSYCHIATRY & NEUROLOGY

## 2018-08-14 VITALS
SYSTOLIC BLOOD PRESSURE: 167 MMHG | HEART RATE: 64 BPM | DIASTOLIC BLOOD PRESSURE: 67 MMHG | HEIGHT: 71 IN | WEIGHT: 199 LBS | BODY MASS INDEX: 27.86 KG/M2

## 2018-08-14 DIAGNOSIS — G47.33 OSA (OBSTRUCTIVE SLEEP APNEA): Primary | ICD-10-CM

## 2018-08-14 PROCEDURE — G0463 HOSPITAL OUTPT CLINIC VISIT: HCPCS

## 2018-08-14 NOTE — PATIENT INSTRUCTIONS
[facility] sleep clinic    [address] [place] [address]   Phone number: [phone]    8/14/2018     To whoever it may concern,  The patient has moderately severe obstructive sleep apnea syndrome and has to have her CPAP therapy and should carry her CPAP machine even when she is traveling.  Please make all arrangements for her to carry the CPAP machine.  She should be using CPAP therapy whenever she is sleeping.    Adam Ruiz MD  8/14/2018

## 2018-08-14 NOTE — PROGRESS NOTES
Tran Sosa  1950  68 y.o. female     Date of service 8/14/2018     SLEEP MEDICINE FOLLOW UP    HISTORY OF PRESENT ILLNESS: The patient is a 68 y.o.  female has moderately severe obstructive sleep apnea syndrome.  Her home sleep study in May 2017 revealed moderately severe obstructive sleep apnea syndrome with AHI of 15.0 per sleep hour (normal less than 5 per sleep hour) and minimum SPO2 84 %.     The patient is on auto CPAP therapy. I have reviewed the compliance data and it indicates excellent compliance with 93.3% usage for more than 4 hours with an average usage of 7 hours and 4 minutes and AHI down to 3.2 and auto CPAP mean pressure 5.9 cm of water.  The patient is compliant and benefiting from it.    At night she has chronic pain, that interferes with sleep, and she has bruxism and awakens with sour taste or burning sensation in the chest. She always had sleep problems and during childhood she had sleepwalking symptoms.     Sleep schedule: Bedtime 9 p.m. to 5 a.m., gets about 8 hours of sleep, and sleep latency is 5 minutes. When she wakes up she does feel refreshed.    Her [place] Sleepiness Scale score was 5 before CPAP therapy and is down to 2 now.     PAST MEDICAL HISTORY:   1.  Heart disease with irregular heartbeats.    2.  High blood pressure.    3.  Supraventricular tachycardia.    4.  Atrial tachycardia.   5.  The patient has osteoarthritis and had a traumatic fall and undergoing left hip surgery soon.    CURRENT MEDICATIONS:  1.  Maxzide.  2.  Metoprolol XL 25 mg daily.    3.  Trazodone.    4.  Pravastatin.  5.  Ibuprofen when necessary  6.  Biotene.  7.  Calcium.  8.  Tramadol when necessary.    ALLERGIES:  1.  SULFA.   2.  ANTIBIOTICS.    3.  MORPHINE.     FAMILY HISTORY: Positive for heart disease and cancer.     SOCIAL HISTORY: Smokes 1-2 packs of cigarettes daily, started at age 20 but stopped at age 30. Alcohol intake is minimal. Caffeine intake is minimal.     REVIEW OF  "SYSTEMS: A 10-system review significant for nasal congestion, postnasal drip, acid reflux, and pain in joints and muscles, irregular heartbeat, and shortness of breath.     PHYSICAL EXAMINATION:  Vitals:    08/14/18 0853   BP: 167/67   Pulse: 64   Weight: 90.3 kg (199 lb)   Height: 179.1 cm (70.5\")   Body mass index is 28.15 kg/m².   HEENT: Normal.  NECK:  Supple. No bruits.  CARDIAC: Normal.   LUNGS: Clear to auscultation.   EXTREMITIES: No edema.     IMPRESSION: Moderately severe obstructive sleep apnea syndrome with AHI of 15.0 per sleep hour (normal less than 5 per sleep hour) and minimum SPO2 84 %.  The patient is on auto CPAP therapy and is compliant and benefiting from it.    RECOMMENDATIONS: Continue auto CPAP therapy and follow-up one year.    Adam Ruiz M.D.  8/14/2018   "

## 2018-08-22 RX ORDER — ATORVASTATIN CALCIUM 80 MG/1
80 TABLET, FILM COATED ORAL DAILY
Qty: 90 TABLET | Refills: 1 | Status: SHIPPED | OUTPATIENT
Start: 2018-08-22 | End: 2019-03-27

## 2018-09-05 ENCOUNTER — OFFICE VISIT (OUTPATIENT)
Dept: INTERNAL MEDICINE | Facility: CLINIC | Age: 68
End: 2018-09-05

## 2018-09-05 VITALS
DIASTOLIC BLOOD PRESSURE: 64 MMHG | SYSTOLIC BLOOD PRESSURE: 118 MMHG | TEMPERATURE: 97.8 F | WEIGHT: 204 LBS | HEART RATE: 63 BPM | HEIGHT: 70 IN | BODY MASS INDEX: 29.2 KG/M2 | OXYGEN SATURATION: 96 %

## 2018-09-05 DIAGNOSIS — S42.302G: ICD-10-CM

## 2018-09-05 DIAGNOSIS — G47.00 INSOMNIA, UNSPECIFIED TYPE: Primary | ICD-10-CM

## 2018-09-05 DIAGNOSIS — Z00.00 HEALTH CARE MAINTENANCE: ICD-10-CM

## 2018-09-05 DIAGNOSIS — I10 ESSENTIAL HYPERTENSION: ICD-10-CM

## 2018-09-05 DIAGNOSIS — I47.1 PAT (PAROXYSMAL ATRIAL TACHYCARDIA) (HCC): ICD-10-CM

## 2018-09-05 PROCEDURE — 90732 PPSV23 VACC 2 YRS+ SUBQ/IM: CPT | Performed by: INTERNAL MEDICINE

## 2018-09-05 PROCEDURE — 99397 PER PM REEVAL EST PAT 65+ YR: CPT | Performed by: INTERNAL MEDICINE

## 2018-09-05 PROCEDURE — G0009 ADMIN PNEUMOCOCCAL VACCINE: HCPCS | Performed by: INTERNAL MEDICINE

## 2018-09-05 RX ORDER — METOPROLOL SUCCINATE 50 MG/1
50 TABLET, EXTENDED RELEASE ORAL DAILY
Qty: 90 TABLET | Refills: 3 | Status: SHIPPED | OUTPATIENT
Start: 2018-09-05 | End: 2019-09-19 | Stop reason: SDUPTHER

## 2018-09-05 RX ORDER — TRAZODONE HYDROCHLORIDE 50 MG/1
50-100 TABLET ORAL NIGHTLY PRN
Qty: 120 TABLET | Refills: 1 | OUTPATIENT
Start: 2018-09-05 | End: 2019-01-09

## 2018-09-05 RX ORDER — TRAMADOL HYDROCHLORIDE 50 MG/1
50 TABLET ORAL EVERY 6 HOURS PRN
Qty: 30 TABLET | Refills: 0 | OUTPATIENT
Start: 2018-09-05 | End: 2019-01-09

## 2018-09-05 NOTE — PROGRESS NOTES
Subjective   Tran Sosa is a 68 y.o. female and is here for a comprehensive physical exam. The patient reports problems - left arm fracture.  She is healing but she is still having pain and wanting to get the hardware out soon but will still be another 2 months  Pt has been taking BP meds as prescribed without any problems.  No HA  No episodes of orthostasis    Pt is UTD with annual gyn exam and mammo nothing needs    Do you take any herbs or supplements that were not prescribed by a doctor? MVI occas calcium      Social History: She is staying active  She is eating a healthy diet    Social History     Social History   • Marital status:      Spouse name: N/A   • Number of children: 1   • Years of education: N/A     Occupational History   • RN AT [employer]      Social History Main Topics   • Smoking status: Former Smoker     Packs/day: 1.00     Years: 10.00     Types: Cigarettes     Quit date: 1980   • Smokeless tobacco: Never Used      Comment: QUIT AT AGE 30 YRS. OLD   • Alcohol use Yes      Comment: TWICE MONTHLY 1-2 DRINKS   • Drug use: No   • Sexual activity: Defer     Other Topics Concern   • Not on file     Social History Narrative   • No narrative on file       Family History:   Family History   Problem Relation Age of Onset   • Ovarian cancer Mother    • Lung cancer Father    • Hypertension Sister         benign essential hypertension   • Hyperlipidemia Sister    • Other Sister         RENAL DISEASE   • Hypertension Brother         benign essential hypertension   • Hyperlipidemia Brother    • Malig Hyperthermia Neg Hx        Past Medical History:   Past Medical History:   Diagnosis Date   • Allergic rhinitis    • Arthralgia of multiple sites    • Arthritis     OSTEO   • Bronchitis    • Cancer (CMS/HCC)     BREAST CANCER   • Disease of thyroid gland    • Diverticulosis    • Dysphagia     COUPLE OF EPISODES   • Elevated TSH    • Fatigue    • GERD (gastroesophageal reflux disease)    • History of  "breast cancer 1984    RIGHT    • Hyperlipidemia    • Hypertension    • Insomnia    • Junctional rhythm 1990    ONE EPISODE   • Lung nodule     BIOPSY BENIGN   • Mitral regurgitation     Mild per 2-D echocardiogram 1/13/2017   • Osteopenia    • Osteoporosis    • Postmenopausal status    • Pulmonary nodules    • Reactive airway disease    • Recurrent urinary tract infection    • Rib fracture    • Sleep apnea    • Uterine leiomyoma    • Vitamin B 12 deficiency    • Vitamin D deficiency            Review of Systems    A comprehensive review of systems was negative.    Objective   /64 (BP Location: Left arm, Patient Position: Sitting)   Pulse 63   Temp 97.8 °F (36.6 °C)   Ht 177.8 cm (70\")   Wt 92.5 kg (204 lb)   LMP  (LMP Unknown) Comment: HYSTERECTOMY W/BSO  SpO2 96%   BMI 29.27 kg/m²     General Appearance:    Alert, cooperative, no distress, appears stated age   Head:    Normocephalic, without obvious abnormality, atraumatic   Eyes:    PERRL, conjunctiva/corneas clear, EOM's intact, fundi     benign, both eyes   Ears:    Normal TM's and external ear canals, both ears   Nose:   Nares normal, septum midline, mucosa normal, no drainage    or sinus tenderness   Throat:   Lips, mucosa, and tongue normal; teeth and gums normal   Neck:   Supple, symmetrical, trachea midline, no adenopathy;     thyroid:  no enlargement/tenderness/nodules; no carotid    bruit or JVD   Back:     Symmetric, no curvature, ROM normal, no CVA tenderness   Lungs:     Clear to auscultation bilaterally, respirations unlabored   Chest Wall:    No tenderness or deformity    Heart:    Regular rate and rhythm, S1 and S2 normal, no murmur, rub   or gallop       Abdomen:     Soft, non-tender, bowel sounds active all four quadrants,     no masses, no organomegaly           Extremities:   Extremities normal, atraumatic, no cyanosis or edema   Pulses:   2+ and symmetric all extremities   Skin:   Skin color, texture, turgor normal, no rashes or " lesions   Lymph nodes:   Cervical, supraclavicular, and axillary nodes normal   Neurologic:   CNII-XII intact, normal strength, sensation and reflexes     throughout       Medications:   Current Outpatient Prescriptions:   •  amoxicillin (AMOXIL) 500 MG capsule, Take 2 capsules by mouth 2 (Two) Times a Day As Needed (DENTAL WORK) for up to 4 doses., Disp: 4 capsule, Rfl: 2  •  atorvastatin (LIPITOR) 80 MG tablet, Take 1 tablet by mouth Daily., Disp: 90 tablet, Rfl: 1  •  azithromycin (ZITHROMAX) 500 MG tablet, Take one tablet daily as needed for travelers diarrhea., Disp: 3 tablet, Rfl: 0  •  celecoxib (CeleBREX) 200 MG capsule, Take 1 capsule by mouth Daily as needed, Disp: 90 capsule, Rfl: 1  •  meclizine (ANTIVERT) 12.5 MG tablet, Take 1 tablet by mouth 3 (Three) Times a Day As Needed for dizziness., Disp: 30 tablet, Rfl: 2  •  metoprolol succinate XL (TOPROL-XL) 50 MG 24 hr tablet, Take 1 tablet by mouth Daily. (Patient taking differently: Take 50 mg by mouth Every Evening.), Disp: 90 tablet, Rfl: 3  •  Multiple Vitamin (MULTI VITAMIN DAILY PO), Take 1 tablet by mouth daily. HOLD PRIOR TO SURGERY, Disp: , Rfl:   •  traZODone (DESYREL) 150 MG tablet, TAKE 1 TABLET BY MOUTH EVERY NIGHT, Disp: 90 tablet, Rfl: 1  •  traZODone (DESYREL) 50 MG tablet, TAKE 1 TABLET BY MOUTH AT NIGHT AS NEEDED FOR SLEEP, Disp: 90 tablet, Rfl: 1  •  triamterene-hydrochlorothiazide (MAXZIDE-25) 37.5-25 MG per tablet, Take 1 tablet by mouth Every Morning., Disp: 90 tablet, Rfl: 1  •  Calcium Carbonate (CALCIUM 600 PO), Take 1 tablet by mouth Daily. HOLD PRIOR TO SURGERY, Disp: , Rfl:        Assessment/Plan   Healthy female exam.      1. Healthcare Maintenance:  2. Patient Counseling:  --Nutrition: Stressed importance of moderation in sodium/caffeine intake, saturated fat and cholesterol, caloric balance, sufficient intake of fresh fruits, vegetables, fiber, calcium and vit D  --Exercise: .she is exercising reg  --Substance Abuse: no tob  no etoh  --Dental health: She does go to the dentist reg  --Immunizations reviewed.  --Discussed benefits of screening colonoscopy.due in 2023  3, HPl- OK with current meds   4. PAT- occas sx  Doing well with prn metoprolol   5. Insomnia-  She does occas take an extra trazodone due to the pain in her arm  She will oncrease for now and will plan to back off later   6. Left forarm pain with metal -  She is going to take some tramadoo just as needed on her trip   7.  Travel to [place]-  I have tried the tymphoid

## 2018-09-20 DIAGNOSIS — I47.1 PAT (PAROXYSMAL ATRIAL TACHYCARDIA) (HCC): ICD-10-CM

## 2018-09-20 RX ORDER — METOPROLOL SUCCINATE 50 MG/1
50 TABLET, EXTENDED RELEASE ORAL DAILY
Qty: 90 TABLET | Refills: 0 | OUTPATIENT
Start: 2018-09-20 | End: 2019-01-09

## 2018-09-24 RX ORDER — TRAZODONE HYDROCHLORIDE 150 MG/1
150 TABLET ORAL NIGHTLY
Qty: 90 TABLET | Refills: 3 | OUTPATIENT
Start: 2018-09-24 | End: 2019-03-05 | Stop reason: ALTCHOICE

## 2018-11-23 ENCOUNTER — HOSPITAL ENCOUNTER (EMERGENCY)
Facility: HOSPITAL | Age: 68
Discharge: HOME | End: 2018-11-23
Attending: EMERGENCY MEDICINE | Admitting: EMERGENCY MEDICINE

## 2018-11-23 ENCOUNTER — APPOINTMENT (OUTPATIENT)
Dept: CARDIOLOGY | Facility: HOSPITAL | Age: 68
End: 2018-11-23
Attending: EMERGENCY MEDICINE

## 2018-11-23 VITALS
DIASTOLIC BLOOD PRESSURE: 67 MMHG | OXYGEN SATURATION: 96 % | HEART RATE: 79 BPM | RESPIRATION RATE: 16 BRPM | WEIGHT: 223.5 LBS | TEMPERATURE: 97.1 F | SYSTOLIC BLOOD PRESSURE: 122 MMHG | HEIGHT: 70 IN | BODY MASS INDEX: 32 KG/M2

## 2018-11-23 DIAGNOSIS — R60.0 EDEMA OF LEFT LOWER EXTREMITY: Primary | ICD-10-CM

## 2018-11-23 LAB
BH CV LOWER VASCULAR LEFT COMMON FEMORAL AUGMENT: NORMAL
BH CV LOWER VASCULAR LEFT COMMON FEMORAL COMPETENT: NORMAL
BH CV LOWER VASCULAR LEFT COMMON FEMORAL COMPRESS: NORMAL
BH CV LOWER VASCULAR LEFT COMMON FEMORAL PHASIC: NORMAL
BH CV LOWER VASCULAR LEFT COMMON FEMORAL SPONT: NORMAL
BH CV LOWER VASCULAR LEFT DISTAL FEMORAL COMPRESS: NORMAL
BH CV LOWER VASCULAR LEFT GASTRONEMIUS COMPRESS: NORMAL
BH CV LOWER VASCULAR LEFT GREATER SAPH AK COMPRESS: NORMAL
BH CV LOWER VASCULAR LEFT GREATER SAPH BK COMPRESS: NORMAL
BH CV LOWER VASCULAR LEFT LESSER SAPH COMPRESS: NORMAL
BH CV LOWER VASCULAR LEFT MID FEMORAL AUGMENT: NORMAL
BH CV LOWER VASCULAR LEFT MID FEMORAL COMPETENT: NORMAL
BH CV LOWER VASCULAR LEFT MID FEMORAL COMPRESS: NORMAL
BH CV LOWER VASCULAR LEFT MID FEMORAL PHASIC: NORMAL
BH CV LOWER VASCULAR LEFT MID FEMORAL SPONT: NORMAL
BH CV LOWER VASCULAR LEFT PERONEAL COMPRESS: NORMAL
BH CV LOWER VASCULAR LEFT POPLITEAL AUGMENT: NORMAL
BH CV LOWER VASCULAR LEFT POPLITEAL COMPETENT: NORMAL
BH CV LOWER VASCULAR LEFT POPLITEAL COMPRESS: NORMAL
BH CV LOWER VASCULAR LEFT POPLITEAL PHASIC: NORMAL
BH CV LOWER VASCULAR LEFT POPLITEAL SPONT: NORMAL
BH CV LOWER VASCULAR LEFT POSTERIOR TIBIAL COMPRESS: NORMAL
BH CV LOWER VASCULAR LEFT PROXIMAL FEMORAL COMPRESS: NORMAL
BH CV LOWER VASCULAR LEFT SAPHENOFEMORAL JUNCTION AUGMENT: NORMAL
BH CV LOWER VASCULAR LEFT SAPHENOFEMORAL JUNCTION COMPETENT: NORMAL
BH CV LOWER VASCULAR LEFT SAPHENOFEMORAL JUNCTION COMPRESS: NORMAL
BH CV LOWER VASCULAR LEFT SAPHENOFEMORAL JUNCTION PHASIC: NORMAL
BH CV LOWER VASCULAR LEFT SAPHENOFEMORAL JUNCTION SPONT: NORMAL
BH CV LOWER VASCULAR RIGHT COMMON FEMORAL AUGMENT: NORMAL
BH CV LOWER VASCULAR RIGHT COMMON FEMORAL COMPETENT: NORMAL
BH CV LOWER VASCULAR RIGHT COMMON FEMORAL COMPRESS: NORMAL
BH CV LOWER VASCULAR RIGHT COMMON FEMORAL PHASIC: NORMAL
BH CV LOWER VASCULAR RIGHT COMMON FEMORAL SPONT: NORMAL

## 2018-11-23 PROCEDURE — 99283 EMERGENCY DEPT VISIT LOW MDM: CPT

## 2018-11-23 PROCEDURE — 93971 EXTREMITY STUDY: CPT

## 2018-11-23 NOTE — ED PROVIDER NOTES
EMERGENCY DEPARTMENT ENCOUNTER    CHIEF COMPLAINT  Chief Complaint: LLE pain and swelling  History given by: Pt  History limited by: Nothing  Room Number: 31/31  PMD: Diane Macdonald MD      HPI:  Pt is a 68 y.o. female who presents complaining of L leg pain and swelling which began a few days ago, and feel like a tightness. The pain is worse when she is standing or walking. The pt has not h/o DVT. The pt denies recent injury or trauma to LLE. The pt is concerned about a DVT due to recent long distance travel. The pt denies CP or SOA.    The pt recently travelled to [facility], and had surgery on her L arm 2 weeks ago.     Duration:  2-3 days  Onset: Gradual  Timing: Constant   Location: LLE  Quality: pain and swelling  Intensity/Severity: Moderate  Progression: Worsening  Associated Symptoms: None  Aggravating Factors: Walking/standing  Alleviating Factors: Unknown  Previous Episodes: None    PAST MEDICAL HISTORY  Active Ambulatory Problems     Diagnosis Date Noted   • Atopic rhinitis 05/20/2016   • Arthralgia of multiple joints 05/20/2016   • Dysphagia 05/20/2016   • Hyperlipidemia 05/20/2016   • Hypertension 05/20/2016   • Insomnia 05/20/2016   • Osteopenia 05/20/2016   • Cobalamin deficiency 05/20/2016   • Vitamin D deficiency 05/20/2016   • Sciatica 05/20/2016   • PAT (paroxysmal atrial tachycardia) (CMS/HCC) 02/10/2017   • Sinus tachycardia 04/28/2017   • SHAI (obstructive sleep apnea) 08/10/2017   • Pain of left hip joint 08/10/2017   • OA (osteoarthritis) of hip 08/29/2017     Resolved Ambulatory Problems     Diagnosis Date Noted   • Increased thyroid stimulating hormone level 05/20/2016     Past Medical History:   Diagnosis Date   • Allergic rhinitis    • Arthralgia of multiple sites    • Arthritis    • Bronchitis    • Cancer (CMS/[facility])    • Diverticulosis    • Dysphagia    • Elevated TSH    • Fatigue    • GERD (gastroesophageal reflux disease)    • History of breast cancer 1984   • Hyperlipidemia    •  Hypertension    • Insomnia    • Junctional rhythm    • Lung nodule    • Mitral regurgitation    • Osteopenia    • Osteoporosis    • Postmenopausal status    • Pulmonary nodules    • Reactive airway disease    • Recurrent urinary tract infection    • Rib fracture    • Sleep apnea    • Uterine leiomyoma    • Vitamin B 12 deficiency    • Vitamin D deficiency        PAST SURGICAL HISTORY  Past Surgical History:   Procedure Laterality Date   • CARDIAC CATHETERIZATION       at [facility]; told normal coronary arteries    •  SECTION     • HYSTERECTOMY      W/BSO   • JOINT REPLACEMENT     • MASTECTOMY Left     PROPHYLACTICALLY   • MASTECTOMY PARTIAL WITH AXILLARY LYMPH NODE EXCISION Right     MALIGNANT   • ORIF WRIST FRACTURE Right    • TONSILLECTOMY     • TRAM FLAP DELAYED     • UTERINE ARTERY EMBOLIZATION         FAMILY HISTORY  Family History   Problem Relation Age of Onset   • Ovarian cancer Mother    • Lung cancer Father    • Hypertension Sister         benign essential hypertension   • Hyperlipidemia Sister    • Other Sister         RENAL DISEASE   • Hypertension Brother         benign essential hypertension   • Hyperlipidemia Brother    • Malig Hyperthermia Neg Hx        SOCIAL HISTORY  Social History     Socioeconomic History   • Marital status:      Spouse name: Not on file   • Number of children: 1   • Years of education: Not on file   • Highest education level: Not on file   Social Needs   • Financial resource strain: Not on file   • Food insecurity - worry: Not on file   • Food insecurity - inability: Not on file   • Transportation needs - medical: Not on file   • Transportation needs - non-medical: Not on file   Occupational History   • Occupation: RN AT [employer]   Tobacco Use   • Smoking status: Former Smoker     Packs/day: 1.00     Years: 10.00     Pack years: 10.00     Types: Cigarettes     Last attempt to quit: 1980     Years since quittin.9   • Smokeless tobacco: Never  Used   • Tobacco comment: QUIT AT AGE 30 YRS. OLD   Substance and Sexual Activity   • Alcohol use: Yes     Comment: TWICE MONTHLY 1-2 DRINKS   • Drug use: No   • Sexual activity: Defer   Other Topics Concern   • Not on file   Social History Narrative   • Not on file       ALLERGIES  Sulfa antibiotics and Morphine and related    REVIEW OF SYSTEMS  Review of Systems   Constitutional: Negative for fever.   Respiratory: Negative for shortness of breath.    Cardiovascular: Positive for leg swelling (Left). Negative for chest pain.       PHYSICAL EXAM  ED Triage Vitals [11/23/18 1219]   Temp Heart Rate Resp BP SpO2   97.1 °F (36.2 °C) 88 15 -- 99 %      Temp src Heart Rate Source Patient Position BP Location FiO2 (%)   Tympanic -- -- -- --       Physical Exam   Constitutional: She is oriented to person, place, and time. No distress.   Eyes: EOM are normal.   Neck: Normal range of motion.   Cardiovascular: Normal rate and regular rhythm.   Pulmonary/Chest: Effort normal and breath sounds normal. No respiratory distress.   Musculoskeletal:   Mild swelling to L calf compared to R, intact distal pulses, not cool to touch   Neurological: She is alert and oriented to person, place, and time. She has normal sensation and normal strength.   Skin: Skin is warm and dry.   Psychiatric: Affect normal.   Nursing note and vitals reviewed.        RADIOLOGY  No orders to display   Doppler LLE is negative for DVT      I ordered the above noted radiological studies. Interpreted by radiologist. Reviewed by me in PACS.       PROCEDURES  Procedures      PROGRESS AND CONSULTS      1234 Ordered doppler LLE for further evaluation.    1440 Rechecked the pt, and discussed her doppler which was negative for DVT. Discussed that I believe the pt has dependent edema. Discussed the plan to discharge the pt, and that she can wear compression socks for the swelling. The pt agrees with the plan and all questions were addressed.       MEDICAL DECISION  MAKING  Results were reviewed/discussed with the patient and they were also made aware of online access. Pt also made aware that some labs, such as cultures, will not be resulted during ER visit and follow up with PMD is necessary.     MDM  Number of Diagnoses or Management Options  Edema of left lower extremity:      Amount and/or Complexity of Data Reviewed  Tests in the radiology section of CPT®: ordered and reviewed (Doppler LLE: No DVT)  Decide to obtain previous medical records or to obtain history from someone other than the patient: yes  Review and summarize past medical records: yes (The pt has had XRs of her left hip on 8/29/17 after L hip replacement. The pt also had a DEXA scan in 2017.)    Patient Progress  Patient progress: improved         DIAGNOSIS  Final diagnoses:   Edema of left lower extremity       DISPOSITION  Disposition: Discharged.    Patient discharged in stable condition.    Reviewed implications of results, diagnosis, meds, responsibility to follow up, warning signs and symptoms of possible worsening, potential complications and reasons to return to ER, including new or worsening symptoms.    Patient/Family voiced understanding of above instructions.    Discussed plan for discharge, as there is no emergent indication for admission.  Pt/family is agreeable and understands need for follow up and repeat testing.  Pt is aware that discharge does not mean that nothing is wrong but it indicates no emergency is present and they must continue care with follow-up as given below or physician of their choice.     FOLLOW-UP  Diane Macdonald MD  [address]  [address] [place] [id number] [phone]    Schedule an appointment as soon as possible for a visit   As needed         Medication List      No changes were made to your prescriptions during this visit.         Latest Documented Vital Signs:  As of 2:43 PM  BP- 125/66 HR- 88 Temp- 97.1 °F (36.2 °C) (Tympanic) O2 sat-  99%    --  Documentation assistance provided by lacie Arreola for Dr. Wooten.  Information recorded by the scribmariam was done at my direction and has been verified and validated by me.     Maranda Arreola  11/23/18 1247       Maranda Arreola  11/23/18 1517       Nii Wooten MD  11/23/18 1423

## 2018-11-23 NOTE — DISCHARGE INSTRUCTIONS
Keep left leg elevated.  Apply compression stockings to left lower extremity.  Return immediately if any fever, rash, chest pain, shortness of breath, or any concerns.

## 2018-11-23 NOTE — ED NOTES
"Patient presents with left sided leg pain when in [place] on vacation 3-4 weeks ago, pain was intermittent.  Patient notes long air plane rides +16 hours.  Patient denies any abrupt shortness of breath.  Patient notes pain in left leg in calf has worsened over the past week, and states, \"it started affecting my ability to walk when I was shopping this morning, so I needed to get it checked out.\"  Patient breathing is even and unlabored.  Dependent edema +1 noted to left calf. Distal pulses are intact bilaterally.  NAD noted at this time. Breathing is even and unlabored.      Nitish Crockett RN  11/23/18 7984    "

## 2018-11-27 ENCOUNTER — OFFICE VISIT (OUTPATIENT)
Dept: INTERNAL MEDICINE | Facility: CLINIC | Age: 68
End: 2018-11-27

## 2018-11-27 ENCOUNTER — HOSPITAL ENCOUNTER (OUTPATIENT)
Dept: GENERAL RADIOLOGY | Facility: HOSPITAL | Age: 68
Discharge: HOME | End: 2018-11-27
Admitting: FAMILY MEDICINE

## 2018-11-27 VITALS
DIASTOLIC BLOOD PRESSURE: 70 MMHG | SYSTOLIC BLOOD PRESSURE: 120 MMHG | OXYGEN SATURATION: 98 % | WEIGHT: 210 LBS | TEMPERATURE: 98.2 F | BODY MASS INDEX: 30.06 KG/M2 | HEIGHT: 70 IN | HEART RATE: 87 BPM

## 2018-11-27 DIAGNOSIS — M25.562 ACUTE PAIN OF LEFT KNEE: Primary | ICD-10-CM

## 2018-11-27 DIAGNOSIS — M25.562 ACUTE PAIN OF LEFT KNEE: ICD-10-CM

## 2018-11-27 PROCEDURE — 99213 OFFICE O/P EST LOW 20 MIN: CPT | Performed by: FAMILY MEDICINE

## 2018-11-27 PROCEDURE — 73562 X-RAY EXAM OF KNEE 3: CPT

## 2018-11-27 NOTE — PROGRESS NOTES
Subjective   Tran Sosa is a 68 y.o. female.   Chief Complaint   Patient presents with   • Leg Swelling     left       History of Present Illness     #1 left knee pain and swelling of left leg below-knee-started about 10 days ago after patient traveled.  Swelling was so significantly that 4 days ago she went to ER.  She had lower extremity Doppler done which was negative for DVT.  She was recommended to get compression stocking, but didn't have a chance to get it yet.  She rested over last 3 days and it helped.    There was no known injury.  Pain is on and off, worse with walking.  Better with rest.  Intensity 2 out of 10.  There is no redness associated, but initially there was a swelling.  Patient didn't use any medications.  She says she tries to avoid NSAIDs because of kidney tests that are at the upper normal limits.  She didn't use ice or any other medications.    The following portions of the patient's history were reviewed and updated as appropriate: allergies, current medications, past medical history, past social history and problem list.    Review of Systems   Constitutional: Negative.    Respiratory: Negative.    Cardiovascular: Negative for chest pain.   Skin: Negative.          Objective   Wt Readings from Last 3 Encounters:   11/27/18 95.3 kg (210 lb)   11/23/18 101 kg (223 lb 8 oz)   09/05/18 92.5 kg (204 lb)      Vitals:    11/27/18 1355   BP: 120/70   Pulse: 87   Temp: 98.2 °F (36.8 °C)   SpO2: 98%     Temp Readings from Last 3 Encounters:   11/27/18 98.2 °F (36.8 °C)   11/23/18 97.1 °F (36.2 °C) (Tympanic)   09/05/18 97.8 °F (36.6 °C)     BP Readings from Last 3 Encounters:   11/27/18 120/70   11/23/18 122/67   09/05/18 118/64     Pulse Readings from Last 3 Encounters:   11/27/18 87   11/23/18 79   09/05/18 63       Physical Exam   Constitutional: She is oriented to person, place, and time. She appears well-developed and well-nourished.   HENT:   Head: Normocephalic and atraumatic.   Neck:  Neck supple.   Cardiovascular: Normal rate, regular rhythm and normal heart sounds.   Pulmonary/Chest: Effort normal and breath sounds normal.   Musculoskeletal:   Mild left LE edema -above ankle. No redness.  Knees- no effusion.   Neurological: She is alert and oriented to person, place, and time.   Skin: Skin is warm, dry and intact.   Psychiatric: She has a normal mood and affect. Her behavior is normal.       Assessment/Plan   Tran was seen today for leg swelling.    Diagnoses and all orders for this visit:    Acute pain of left knee  -     XR Knee 3 View Left; Future  -     Ambulatory Referral to Physical Therapy        #1 left knee pain/edema of LE- we'll check an x-ray.  I'm referring patient to physical therapy.  I'm recommending compression stockings.  Doppler results were reviewed and negative.  Follow-up as needed.

## 2018-12-26 ENCOUNTER — TELEPHONE (OUTPATIENT)
Dept: SLEEP MEDICINE | Facility: HOSPITAL | Age: 68
End: 2018-12-26

## 2019-01-09 ENCOUNTER — APPOINTMENT (OUTPATIENT)
Dept: GENERAL RADIOLOGY | Facility: HOSPITAL | Age: 69
End: 2019-01-09

## 2019-01-09 PROCEDURE — 71046 X-RAY EXAM CHEST 2 VIEWS: CPT | Performed by: GENERAL PRACTICE

## 2019-01-17 ENCOUNTER — OFFICE VISIT (OUTPATIENT)
Dept: INTERNAL MEDICINE | Facility: CLINIC | Age: 69
End: 2019-01-17

## 2019-01-17 VITALS
SYSTOLIC BLOOD PRESSURE: 114 MMHG | BODY MASS INDEX: 30.06 KG/M2 | OXYGEN SATURATION: 98 % | DIASTOLIC BLOOD PRESSURE: 60 MMHG | HEART RATE: 76 BPM | WEIGHT: 210 LBS | HEIGHT: 70 IN | TEMPERATURE: 98 F

## 2019-01-17 DIAGNOSIS — J18.9 PNEUMONIA OF RIGHT MIDDLE LOBE DUE TO INFECTIOUS ORGANISM: Primary | ICD-10-CM

## 2019-01-17 PROCEDURE — 99213 OFFICE O/P EST LOW 20 MIN: CPT | Performed by: INTERNAL MEDICINE

## 2019-01-17 PROCEDURE — 94640 AIRWAY INHALATION TREATMENT: CPT | Performed by: INTERNAL MEDICINE

## 2019-01-17 RX ORDER — ALBUTEROL SULFATE 2.5 MG/3ML
2.5 SOLUTION RESPIRATORY (INHALATION) EVERY 6 HOURS PRN
Status: DISCONTINUED | OUTPATIENT
Start: 2019-01-17 | End: 2020-06-23

## 2019-01-17 RX ORDER — BUDESONIDE AND FORMOTEROL FUMARATE DIHYDRATE 160; 4.5 UG/1; UG/1
2 AEROSOL RESPIRATORY (INHALATION)
Qty: 1 INHALER | Refills: 2 | Status: SHIPPED | OUTPATIENT
Start: 2019-01-17 | End: 2019-03-25

## 2019-01-17 RX ADMIN — ALBUTEROL SULFATE 2.5 MG: 2.5 SOLUTION RESPIRATORY (INHALATION) at 13:23

## 2019-01-17 NOTE — PROGRESS NOTES
Subjective   Tran Sosa is a 68 y.o. female states she has diagnosed with pneumonia from  downstairs last week and here to follow up on it.  Pt states still have cough and sob but getting better slowly.    History of Present Illness   She has been sick with a cough for 3 weeks.    She says it got worse last week and she was seen in the [facility] and she was started an abx.  She is some better better as she has more energy but she cont to have a bad cough that wears her out.  She is wheezing and SOB  Coughing up some thick mucous on occas but not a lot      The following portions of the patient's history were reviewed and updated as appropriate: allergies, current medications, past medical history, past social history and problem list.  No hx of asthma     Review of Systems   All other systems reviewed and are negative.      Objective   Physical Exam   Constitutional: She is oriented to person, place, and time. She appears well-developed and well-nourished.   HENT:   Head: Normocephalic and atraumatic.   Right Ear: External ear normal.   Left Ear: External ear normal.   Mouth/Throat: Oropharynx is clear and moist.   Eyes: Conjunctivae and EOM are normal. Pupils are equal, round, and reactive to light.   Neck: Normal range of motion. No tracheal deviation present. No thyromegaly present.   Cardiovascular: Normal rate, regular rhythm, normal heart sounds and intact distal pulses.   Pulmonary/Chest: Effort normal and breath sounds normal.   Abdominal: Soft. Bowel sounds are normal. She exhibits no distension. There is no tenderness.   Musculoskeletal: Normal range of motion. She exhibits no edema or deformity.   Neurological: She is alert and oriented to person, place, and time.   Skin: Skin is warm and dry.   Psychiatric: She has a normal mood and affect. Her behavior is normal. Judgment and thought content normal.   Vitals reviewed.      Vitals:    01/17/19 1229   BP: 114/60   Pulse: 76   Temp: 98 °F (36.7 °C)    SpO2: 98%     Current Outpatient Medications:   •  atorvastatin (LIPITOR) 80 MG tablet, Take 1 tablet by mouth Daily., Disp: 90 tablet, Rfl: 1  •  cefdinir (OMNICEF) 300 MG capsule, Take 1 capsule by mouth 2 (Two) Times a Day., Disp: 20 capsule, Rfl: 0  •  celecoxib (CeleBREX) 200 MG capsule, Take 1 capsule by mouth Daily as needed, Disp: 90 capsule, Rfl: 1  •  meclizine (ANTIVERT) 12.5 MG tablet, Take 1 tablet by mouth 3 (Three) Times a Day As Needed for dizziness., Disp: 30 tablet, Rfl: 2  •  metoprolol succinate XL (TOPROL-XL) 50 MG 24 hr tablet, Take 1 tablet by mouth Daily., Disp: 90 tablet, Rfl: 3  •  promethazine-dextromethorphan (PROMETHAZINE-DM) 6.25-15 MG/5ML syrup, Take 5 mL by mouth At Night As Needed for Cough., Disp: 180 mL, Rfl: 0  •  traZODone (DESYREL) 150 MG tablet, TAKE 1 TABLET BY MOUTH EVERY NIGHT (Patient taking differently: Take 200 mg by mouth Every Night.), Disp: 90 tablet, Rfl: 1  •  triamterene-hydrochlorothiazide (MAXZIDE-25) 37.5-25 MG per tablet, Take 1 tablet by mouth Every Morning., Disp: 90 tablet, Rfl: 1  •  budesonide-formoterol (SYMBICORT) 160-4.5 MCG/ACT inhaler, Inhale 2 puffs 2 (Two) Times a Day., Disp: 1 inhaler, Rfl: 2  •  Calcium Carbonate (CALCIUM 600 PO), Take 1 tablet by mouth Daily. HOLD PRIOR TO SURGERY, Disp: , Rfl:   •  Multiple Vitamin (MULTI VITAMIN DAILY PO), Take 1 tablet by mouth daily. HOLD PRIOR TO SURGERY, Disp: , Rfl:          Assessment/Plan   Diagnoses and all orders for this visit:    Pneumonia of right middle lobe due to infectious organism (CMS/HCC)    Other orders  -     budesonide-formoterol (SYMBICORT) 160-4.5 MCG/ACT inhaler; Inhale 2 puffs 2 (Two) Times a Day.    1. Pneumnia- some better but still a bad cough-  Lungs a really clear  She did feel some better after a neb  We will try a symbicort inhaler 2 pills bid and follow sx  If pt worsens or fails to improve she will need to call us back

## 2019-03-05 ENCOUNTER — TELEPHONE (OUTPATIENT)
Dept: INTERNAL MEDICINE | Facility: CLINIC | Age: 69
End: 2019-03-05

## 2019-03-05 ENCOUNTER — RESULTS ENCOUNTER (OUTPATIENT)
Dept: INTERNAL MEDICINE | Facility: CLINIC | Age: 69
End: 2019-03-05

## 2019-03-05 DIAGNOSIS — I47.1 PAT (PAROXYSMAL ATRIAL TACHYCARDIA) (HCC): ICD-10-CM

## 2019-03-05 DIAGNOSIS — I10 ESSENTIAL HYPERTENSION: ICD-10-CM

## 2019-03-05 RX ORDER — TRAZODONE HYDROCHLORIDE 100 MG/1
200 TABLET ORAL NIGHTLY
Qty: 180 TABLET | Refills: 1 | Status: SHIPPED | OUTPATIENT
Start: 2019-03-05 | End: 2019-09-05 | Stop reason: SDUPTHER

## 2019-03-05 NOTE — TELEPHONE ENCOUNTER
RX SENT TO PHARMACY    ----- Message from Marietta Smith sent at 3/5/2019 10:02 AM EST -----  Pt needs refill RX sent to [facility] for:     traZODone (DESYREL) 150 MG tablet 90 tablet 1   Sig - Route: TAKE 1 TABLET BY MOUTH EVERY NIGHT - Oral   Patient taking differently: Take 200 mg by mouth Every Night  also (?)  traZODone (DESYREL) 150 MG tablet 90 tablet 1 7/16/2018    Sig - Route: TAKE 1 TABLET BY MOUTH EVERY NIGHT - Oral   Patient taking differently: Take 200 mg by mouth Every Night.

## 2019-03-20 LAB
ALBUMIN SERPL-MCNC: 4.4 G/DL (ref 3.5–5.2)
ALBUMIN/GLOB SERPL: 1.6 G/DL
ALP SERPL-CCNC: 58 U/L (ref 39–117)
ALT SERPL-CCNC: 24 U/L (ref 1–33)
AST SERPL-CCNC: 19 U/L (ref 1–32)
BILIRUB SERPL-MCNC: 0.2 MG/DL (ref 0.2–1.2)
BUN SERPL-MCNC: 19 MG/DL (ref 8–23)
BUN/CREAT SERPL: 25.7 (ref 7–25)
CALCIUM SERPL-MCNC: 9.6 MG/DL (ref 8.6–10.5)
CHLORIDE SERPL-SCNC: 99 MMOL/L (ref 98–107)
CHOLEST SERPL-MCNC: 224 MG/DL (ref 0–200)
CO2 SERPL-SCNC: 25.3 MMOL/L (ref 22–29)
CREAT SERPL-MCNC: 0.74 MG/DL (ref 0.57–1)
GLOBULIN SER CALC-MCNC: 2.8 GM/DL
GLUCOSE SERPL-MCNC: 106 MG/DL (ref 65–99)
HDLC SERPL-MCNC: 56 MG/DL (ref 40–60)
LDLC SERPL CALC-MCNC: 145 MG/DL (ref 0–100)
LDLC/HDLC SERPL: 2.6 {RATIO}
POTASSIUM SERPL-SCNC: 3.6 MMOL/L (ref 3.5–5.2)
PROT SERPL-MCNC: 7.2 G/DL (ref 6–8.5)
SODIUM SERPL-SCNC: 139 MMOL/L (ref 136–145)
T4 FREE SERPL-MCNC: NORMAL NG/DL
TRIGL SERPL-MCNC: 113 MG/DL (ref 0–150)
TSH SERPL DL<=0.005 MIU/L-ACNC: 1.41 MIU/ML (ref 0.27–4.2)
VLDLC SERPL CALC-MCNC: 22.6 MG/DL (ref 5–40)

## 2019-03-25 ENCOUNTER — HOSPITAL ENCOUNTER (OUTPATIENT)
Dept: GENERAL RADIOLOGY | Facility: HOSPITAL | Age: 69
Discharge: HOME | End: 2019-03-25
Admitting: INTERNAL MEDICINE

## 2019-03-25 ENCOUNTER — OFFICE VISIT (OUTPATIENT)
Dept: INTERNAL MEDICINE | Facility: CLINIC | Age: 69
End: 2019-03-25

## 2019-03-25 VITALS
DIASTOLIC BLOOD PRESSURE: 66 MMHG | OXYGEN SATURATION: 97 % | BODY MASS INDEX: 28.89 KG/M2 | SYSTOLIC BLOOD PRESSURE: 112 MMHG | WEIGHT: 201.8 LBS | HEIGHT: 70 IN | HEART RATE: 74 BPM | TEMPERATURE: 97.9 F

## 2019-03-25 DIAGNOSIS — J18.9 PNEUMONIA DUE TO INFECTIOUS ORGANISM, UNSPECIFIED LATERALITY, UNSPECIFIED PART OF LUNG: Primary | ICD-10-CM

## 2019-03-25 DIAGNOSIS — M85.80 OSTEOPENIA, UNSPECIFIED LOCATION: ICD-10-CM

## 2019-03-25 DIAGNOSIS — I10 ESSENTIAL HYPERTENSION: ICD-10-CM

## 2019-03-25 DIAGNOSIS — G47.00 INSOMNIA, UNSPECIFIED TYPE: ICD-10-CM

## 2019-03-25 DIAGNOSIS — E78.5 HYPERLIPIDEMIA, UNSPECIFIED HYPERLIPIDEMIA TYPE: ICD-10-CM

## 2019-03-25 PROCEDURE — 71046 X-RAY EXAM CHEST 2 VIEWS: CPT

## 2019-03-25 PROCEDURE — 99214 OFFICE O/P EST MOD 30 MIN: CPT | Performed by: INTERNAL MEDICINE

## 2019-03-25 NOTE — PROGRESS NOTES
Subjective   Tran Sosa is a 68 y.o. female here to follow up on labs.    History of Present Illness   She has gone on the keto diet and has lost 30lbs  Pt has been taking cholesterol meds as prescribed.  No difficulties with myalgias.   She does well with the trazodone  Pt has been taking BP meds as prescribed without any problems.  No HA  No episodes of orthostasis    The following portions of the patient's history were reviewed and updated as appropriate: allergies, current medications, past medical history, past social history and problem list.  She is now eating low carb and gone off the high fat    Review of Systems   All other systems reviewed and are negative.      Objective   Physical Exam   Constitutional: She is oriented to person, place, and time. She appears well-developed and well-nourished.   HENT:   Head: Normocephalic and atraumatic.   Right Ear: External ear normal.   Left Ear: External ear normal.   Mouth/Throat: Oropharynx is clear and moist.   Eyes: Conjunctivae and EOM are normal. Pupils are equal, round, and reactive to light.   Neck: Normal range of motion. No tracheal deviation present. No thyromegaly present.   Cardiovascular: Normal rate, regular rhythm, normal heart sounds and intact distal pulses.   Pulmonary/Chest: Effort normal and breath sounds normal.   Abdominal: Soft. Bowel sounds are normal. She exhibits no distension. There is no tenderness.   Musculoskeletal: Normal range of motion. She exhibits no edema or deformity.   Neurological: She is alert and oriented to person, place, and time.   Skin: Skin is warm and dry.   Psychiatric: She has a normal mood and affect. Her behavior is normal. Judgment and thought content normal.   Vitals reviewed.      Vitals:    03/25/19 0900   BP: 112/66   Pulse: 74   Temp: 97.9 °F (36.6 °C)   SpO2: 97%     Results Encounter on 03/05/2019   Component Date Value Ref Range Status   • Glucose 03/19/2019 106* 65 - 99 mg/dL Final   • BUN 03/19/2019  19  8 - 23 mg/dL Final   • Creatinine 03/19/2019 0.74  0.57 - 1.00 mg/dL Final   • eGFR Non  Am 03/19/2019 79  >60 mL/min/1.73 Final   • eGFR African Am 03/19/2019 95  >60 mL/min/1.73 Final   • BUN/Creatinine Ratio 03/19/2019 25.7* 7.0 - 25.0 Final   • Sodium 03/19/2019 139  136 - 145 mmol/L Final   • Potassium 03/19/2019 3.6  3.5 - 5.2 mmol/L Final   • Chloride 03/19/2019 99  98 - 107 mmol/L Final   • Total CO2 03/19/2019 25.3  22.0 - 29.0 mmol/L Final   • Calcium 03/19/2019 9.6  8.6 - 10.5 mg/dL Final   • Total Protein 03/19/2019 7.2  6.0 - 8.5 g/dL Final   • Albumin 03/19/2019 4.40  3.50 - 5.20 g/dL Final   • Globulin 03/19/2019 2.8  gm/dL Final   • A/G Ratio 03/19/2019 1.6  g/dL Final   • Total Bilirubin 03/19/2019 0.2  0.2 - 1.2 mg/dL Final   • Alkaline Phosphatase 03/19/2019 58  39 - 117 U/L Final   • AST (SGOT) 03/19/2019 19  1 - 32 U/L Final   • ALT (SGPT) 03/19/2019 24  1 - 33 U/L Final   • Total Cholesterol 03/19/2019 224* 0 - 200 mg/dL Final   • Triglycerides 03/19/2019 113  0 - 150 mg/dL Final   • HDL Cholesterol 03/19/2019 56  40 - 60 mg/dL Final   • VLDL Cholesterol 03/19/2019 22.6  5 - 40 mg/dL Final   • LDL Cholesterol  03/19/2019 145* 0 - 100 mg/dL Final   • LDL/HDL Ratio 03/19/2019 2.60   Final   • TSH 03/19/2019 1.41  0.27 - 4.2 mIU/mL Final   • Free T4 03/19/2019 CANCELED   Final-Edited    Comment: Test not performed    Result canceled by the ancillary.       Current Outpatient Medications:   •  atorvastatin (LIPITOR) 80 MG tablet, Take 1 tablet by mouth Daily., Disp: 90 tablet, Rfl: 1  •  Calcium Carbonate (CALCIUM 600 PO), Take 1 tablet by mouth Daily. HOLD PRIOR TO SURGERY, Disp: , Rfl:   •  metoprolol succinate XL (TOPROL-XL) 50 MG 24 hr tablet, Take 1 tablet by mouth Daily., Disp: 90 tablet, Rfl: 3  •  Multiple Vitamin (MULTI VITAMIN DAILY PO), Take 1 tablet by mouth daily. HOLD PRIOR TO SURGERY, Disp: , Rfl:   •  traZODone (DESYREL) 100 MG tablet, Take 2 tablets by mouth Every  Night., Disp: 180 tablet, Rfl: 1  •  triamterene-hydrochlorothiazide (MAXZIDE-25) 37.5-25 MG per tablet, Take 1 tablet by mouth Every Morning., Disp: 90 tablet, Rfl: 1  •  meclizine (ANTIVERT) 12.5 MG tablet, Take 1 tablet by mouth 3 (Three) Times a Day As Needed for dizziness., Disp: 30 tablet, Rfl: 2    Current Facility-Administered Medications:   •  albuterol (PROVENTIL) nebulizer solution 0.083% 2.5 mg/3mL, 2.5 mg, Nebulization, Q6H PRN, Diane Macdonald MD, 2.5 mg at 01/17/19 1323         Assessment/Plan   Diagnoses and all orders for this visit:    Pneumonia due to infectious organism, unspecified laterality, unspecified part of lung  -     XR Chest PA & Lateral    Essential hypertension    Hyperlipidemia, unspecified hyperlipidemia type    Osteopenia, unspecified location  -     DEXA Bone Density Axial; Future    Insomnia, unspecified type      1.  FU pneumonia resolved  2.  HTN- ok with current meds  3. HPL- ok with lipitor normally but keto made it worse.   She is lowering fat and she is   4.  Insomnia-  SHe is doing well with trazodone

## 2019-03-27 RX ORDER — ATORVASTATIN CALCIUM 80 MG/1
80 TABLET, FILM COATED ORAL DAILY
Qty: 90 TABLET | Refills: 1 | Status: SHIPPED | OUTPATIENT
Start: 2019-03-27 | End: 2019-09-25 | Stop reason: SDUPTHER

## 2019-03-27 RX ORDER — TRIAMTERENE AND HYDROCHLOROTHIAZIDE 37.5; 25 MG/1; MG/1
1 TABLET ORAL EVERY MORNING
Qty: 90 TABLET | Refills: 1 | Status: SHIPPED | OUTPATIENT
Start: 2019-03-27 | End: 2019-09-25 | Stop reason: SDUPTHER

## 2019-08-13 ENCOUNTER — HOSPITAL ENCOUNTER (OUTPATIENT)
Dept: BONE DENSITY | Facility: HOSPITAL | Age: 69
Discharge: HOME | End: 2019-08-13
Admitting: INTERNAL MEDICINE

## 2019-08-13 DIAGNOSIS — M85.80 OSTEOPENIA, UNSPECIFIED LOCATION: ICD-10-CM

## 2019-08-13 PROCEDURE — 77080 DXA BONE DENSITY AXIAL: CPT

## 2019-08-20 ENCOUNTER — OFFICE VISIT (OUTPATIENT)
Dept: SLEEP MEDICINE | Facility: HOSPITAL | Age: 69
End: 2019-08-20
Attending: PSYCHIATRY & NEUROLOGY

## 2019-08-20 VITALS
OXYGEN SATURATION: 98 % | HEIGHT: 71 IN | BODY MASS INDEX: 29.46 KG/M2 | DIASTOLIC BLOOD PRESSURE: 70 MMHG | SYSTOLIC BLOOD PRESSURE: 130 MMHG | HEART RATE: 61 BPM | WEIGHT: 210.4 LBS

## 2019-08-20 DIAGNOSIS — G47.33 OSA (OBSTRUCTIVE SLEEP APNEA): Primary | ICD-10-CM

## 2019-08-20 PROCEDURE — G0463 HOSPITAL OUTPT CLINIC VISIT: HCPCS

## 2019-08-20 NOTE — PROGRESS NOTES
"Tran Zavalamarline  1950  69 y.o. female     Date of service 8/20/2019     SLEEP MEDICINE FOLLOW UP    HISTORY OF PRESENT ILLNESS: The patient is a 69 y.o.  female has a history of hypertension, hyperlipidemia, paroxysmal atrial tachycardia, sinus tachycardia, atopic rhinitis, vitamin D deficiency, DJD.     The patient has moderately severe obstructive sleep apnea syndrome.  Her home sleep study in May 2017 revealed moderately severe obstructive sleep apnea syndrome with AHI of 15.0 per sleep hour (normal less than 5 per sleep hour) and minimum SPO2 84 %.  Patient's [place] Sleepiness Scale score was 5 prior to CPAP therapy.    The patient is on auto CPAP therapy. I have reviewed the compliance data and it indicates excellent compliance with 96.7% usage for more than 4 hours with an average usage of 7 hours and 53 minutes and AHI down to 2.7 with CPAP therapy and auto CPAP mean pressure 5.8 cm of water. The patient is compliant and benefiting from it.    Sleep schedule: Bedtime 9 p.m. to 5 a.m., gets about 8 hours of sleep, and sleep latency is 5 minutes. When she wakes up she does feel refreshed.    Her [place] Sleepiness Scale score was 5 before CPAP therapy and is down to 2 now.     10 REVIEW OF SYSTEMS: A 10-system review significant for abdominal bloating, ear pain, acid reflux, and pain in joints and muscles, irregular heartbeat, and shortness of breath.     PMH, PSH, Medications, allergies, FH, SH are reviewed and updated in the chart.     PHYSICAL EXAMINATION:  Vitals:    08/20/19 0900   BP: 130/70   BP Location: Left arm   Patient Position: Sitting   Pulse: 61   SpO2: 98%   Weight: 95.4 kg (210 lb 6.4 oz)   Height: 180.3 cm (71\")   Body mass index is 29.34 kg/m².   HEENT: Normal.  NECK:  Supple. No bruits.  CARDIAC: Normal.   LUNGS: Clear to auscultation.   EXTREMITIES: No edema.     IMPRESSION: Moderately severe obstructive sleep apnea syndrome with AHI of 15.0 per sleep hour (normal less than 5 " per sleep hour) and minimum SPO2 84 %. The patient is on auto CPAP therapy and is compliant and benefiting from it.    RECOMMENDATIONS: Continue auto CPAP therapy and follow-up one year.    Adam Ruiz M.D.  8/20/2019

## 2019-09-05 RX ORDER — TRAZODONE HYDROCHLORIDE 100 MG/1
200 TABLET ORAL NIGHTLY
Qty: 180 TABLET | Refills: 1 | Status: SHIPPED | OUTPATIENT
Start: 2019-09-05 | End: 2019-09-25 | Stop reason: SDUPTHER

## 2019-09-18 DIAGNOSIS — E78.5 HYPERLIPIDEMIA, UNSPECIFIED HYPERLIPIDEMIA TYPE: ICD-10-CM

## 2019-09-18 DIAGNOSIS — I10 ESSENTIAL HYPERTENSION: ICD-10-CM

## 2019-09-19 DIAGNOSIS — I47.1 PAT (PAROXYSMAL ATRIAL TACHYCARDIA) (HCC): ICD-10-CM

## 2019-09-19 RX ORDER — METOPROLOL SUCCINATE 50 MG/1
50 TABLET, EXTENDED RELEASE ORAL DAILY
Qty: 90 TABLET | Refills: 0 | Status: SHIPPED | OUTPATIENT
Start: 2019-09-19 | End: 2019-09-25 | Stop reason: SDUPTHER

## 2019-09-20 LAB
ALBUMIN SERPL-MCNC: 4.4 G/DL (ref 3.5–5.2)
ALBUMIN/GLOB SERPL: 1.7 G/DL
ALP SERPL-CCNC: 82 U/L (ref 39–117)
ALT SERPL-CCNC: 17 U/L (ref 1–33)
AST SERPL-CCNC: 16 U/L (ref 1–32)
BASOPHILS # BLD AUTO: 0.03 10*3/MM3 (ref 0–0.2)
BASOPHILS NFR BLD AUTO: 0.6 % (ref 0–1.5)
BILIRUB SERPL-MCNC: 0.4 MG/DL (ref 0.2–1.2)
BUN SERPL-MCNC: 18 MG/DL (ref 8–23)
BUN/CREAT SERPL: 21.7 (ref 7–25)
CALCIUM SERPL-MCNC: 9.2 MG/DL (ref 8.6–10.5)
CHLORIDE SERPL-SCNC: 97 MMOL/L (ref 98–107)
CHOLEST SERPL-MCNC: 188 MG/DL (ref 0–200)
CO2 SERPL-SCNC: 26.7 MMOL/L (ref 22–29)
CREAT SERPL-MCNC: 0.83 MG/DL (ref 0.57–1)
EOSINOPHIL # BLD AUTO: 0.11 10*3/MM3 (ref 0–0.4)
EOSINOPHIL NFR BLD AUTO: 2.3 % (ref 0.3–6.2)
ERYTHROCYTE [DISTWIDTH] IN BLOOD BY AUTOMATED COUNT: 12.7 % (ref 12.3–15.4)
GLOBULIN SER CALC-MCNC: 2.6 GM/DL
GLUCOSE SERPL-MCNC: 93 MG/DL (ref 65–99)
HCT VFR BLD AUTO: 39.4 % (ref 34–46.6)
HDLC SERPL-MCNC: 52 MG/DL (ref 40–60)
HGB BLD-MCNC: 12.9 G/DL (ref 12–15.9)
IMM GRANULOCYTES # BLD AUTO: 0.02 10*3/MM3 (ref 0–0.05)
IMM GRANULOCYTES NFR BLD AUTO: 0.4 % (ref 0–0.5)
LDLC SERPL CALC-MCNC: 98 MG/DL (ref 0–100)
LDLC/HDLC SERPL: 1.88 {RATIO}
LYMPHOCYTES # BLD AUTO: 1.35 10*3/MM3 (ref 0.7–3.1)
LYMPHOCYTES NFR BLD AUTO: 28.5 % (ref 19.6–45.3)
MCH RBC QN AUTO: 30.6 PG (ref 26.6–33)
MCHC RBC AUTO-ENTMCNC: 32.7 G/DL (ref 31.5–35.7)
MCV RBC AUTO: 93.6 FL (ref 79–97)
MONOCYTES # BLD AUTO: 0.44 10*3/MM3 (ref 0.1–0.9)
MONOCYTES NFR BLD AUTO: 9.3 % (ref 5–12)
NEUTROPHILS # BLD AUTO: 2.79 10*3/MM3 (ref 1.7–7)
NEUTROPHILS NFR BLD AUTO: 58.9 % (ref 42.7–76)
NRBC BLD AUTO-RTO: 0 /100 WBC (ref 0–0.2)
PLATELET # BLD AUTO: 270 10*3/MM3 (ref 140–450)
POTASSIUM SERPL-SCNC: 3.7 MMOL/L (ref 3.5–5.2)
PROT SERPL-MCNC: 7 G/DL (ref 6–8.5)
RBC # BLD AUTO: 4.21 10*6/MM3 (ref 3.77–5.28)
SODIUM SERPL-SCNC: 137 MMOL/L (ref 136–145)
TRIGL SERPL-MCNC: 190 MG/DL (ref 0–150)
TSH SERPL DL<=0.005 MIU/L-ACNC: 1.05 UIU/ML (ref 0.27–4.2)
VLDLC SERPL CALC-MCNC: 38 MG/DL
WBC # BLD AUTO: 4.74 10*3/MM3 (ref 3.4–10.8)

## 2019-09-25 ENCOUNTER — OFFICE VISIT (OUTPATIENT)
Dept: INTERNAL MEDICINE | Facility: CLINIC | Age: 69
End: 2019-09-25

## 2019-09-25 VITALS
OXYGEN SATURATION: 98 % | SYSTOLIC BLOOD PRESSURE: 114 MMHG | BODY MASS INDEX: 29.16 KG/M2 | TEMPERATURE: 98.4 F | DIASTOLIC BLOOD PRESSURE: 70 MMHG | HEIGHT: 71 IN | HEART RATE: 60 BPM | WEIGHT: 208.3 LBS

## 2019-09-25 DIAGNOSIS — I10 ESSENTIAL HYPERTENSION: ICD-10-CM

## 2019-09-25 DIAGNOSIS — Z00.00 WELCOME TO MEDICARE PREVENTIVE VISIT: Primary | ICD-10-CM

## 2019-09-25 DIAGNOSIS — I47.1 PAT (PAROXYSMAL ATRIAL TACHYCARDIA) (HCC): ICD-10-CM

## 2019-09-25 DIAGNOSIS — E78.5 HYPERLIPIDEMIA, UNSPECIFIED HYPERLIPIDEMIA TYPE: ICD-10-CM

## 2019-09-25 PROBLEM — M25.552 PAIN OF LEFT HIP JOINT: Status: RESOLVED | Noted: 2017-08-10 | Resolved: 2019-09-25

## 2019-09-25 PROCEDURE — G0402 INITIAL PREVENTIVE EXAM: HCPCS | Performed by: INTERNAL MEDICINE

## 2019-09-25 PROCEDURE — 99213 OFFICE O/P EST LOW 20 MIN: CPT | Performed by: INTERNAL MEDICINE

## 2019-09-25 RX ORDER — TRAZODONE HYDROCHLORIDE 100 MG/1
200 TABLET ORAL NIGHTLY
Qty: 180 TABLET | Refills: 1 | Status: SHIPPED | OUTPATIENT
Start: 2019-09-25 | End: 2020-01-28

## 2019-09-25 RX ORDER — METOPROLOL SUCCINATE 50 MG/1
50 TABLET, EXTENDED RELEASE ORAL DAILY
Qty: 90 TABLET | Refills: 3 | Status: SHIPPED | OUTPATIENT
Start: 2019-09-25 | End: 2020-07-21

## 2019-09-25 RX ORDER — TRIAMTERENE AND HYDROCHLOROTHIAZIDE 37.5; 25 MG/1; MG/1
1 TABLET ORAL EVERY MORNING
Qty: 90 TABLET | Refills: 3 | Status: SHIPPED | OUTPATIENT
Start: 2019-09-25 | End: 2020-10-21

## 2019-09-25 RX ORDER — ATORVASTATIN CALCIUM 80 MG/1
80 TABLET, FILM COATED ORAL DAILY
Qty: 90 TABLET | Refills: 3 | Status: SHIPPED | OUTPATIENT
Start: 2019-09-25 | End: 2020-07-21

## 2019-09-25 NOTE — PATIENT INSTRUCTIONS
Medicare Wellness  Personal Prevention Plan of Service     Date of Office Visit:  2019  Encounter Provider:  Diane Macdonald MD  Place of Service:  [facility]  Patient Name: Tran Sosa  :  1950    As part of the Medicare Wellness portion of your visit today, we are providing you with this personalized preventive plan of services (PPPS). This plan is based upon recommendations of the [place] Preventive Services Task Force (USPSTF) and the Advisory Committee on Immunization Practices (ACIP).    This lists the preventive care services that should be considered, and provides dates of when you are due. Items listed as completed are up-to-date and do not require any further intervention.    Health Maintenance   Topic Date Due   • MEDICARE ANNUAL WELLNESS  2016   • INFLUENZA VACCINE  2019   • MAMMOGRAM  08/10/2019   • LIPID PANEL  2020   • DXA SCAN  2021   • COLONOSCOPY  10/09/2023   • TDAP/TD VACCINES (2 - Td) 2028   • HEPATITIS C SCREENING  Completed   • PNEUMOCOCCAL VACCINES (65+ LOW/MEDIUM RISK)  Completed   • ZOSTER VACCINE  Completed       No orders of the defined types were placed in this encounter.      No Follow-up on file.          Exercising to Lose Weight  Exercise is structured, repetitive physical activity to improve fitness and health. Getting regular exercise is important for everyone. It is especially important if you are overweight. Being overweight increases your risk of heart disease, stroke, diabetes, high blood pressure, and several types of cancer. Reducing your calorie intake and exercising can help you lose weight.  Exercise is usually categorized as moderate or vigorous intensity. To lose weight, most people need to do a certain amount of moderate-intensity or vigorous-intensity exercise each week.  Moderate-intensity exercise    Moderate-intensity exercise is any activity that gets you moving enough to burn at  least three times more energy (calories) than if you were sitting.  Examples of moderate exercise include:  · Walking a mile in 15 minutes.  · Doing light yard work.  · Biking at an easy pace.  Most people should get at least 150 minutes (2 hours and 30 minutes) a week of moderate-intensity exercise to maintain their body weight.  Vigorous-intensity exercise  Vigorous-intensity exercise is any activity that gets you moving enough to burn at least six times more calories than if you were sitting. When you exercise at this intensity, you should be working hard enough that you are not able to carry on a conversation.  Examples of vigorous exercise include:  · Running.  · Playing a team sport, such as football, basketball, and soccer.  · Jumping rope.  Most people should get at least 75 minutes (1 hour and 15 minutes) a week of vigorous-intensity exercise to maintain their body weight.  How can exercise affect me?  When you exercise enough to burn more calories than you eat, you lose weight. Exercise also reduces body fat and builds muscle. The more muscle you have, the more calories you burn. Exercise also:  · Improves mood.  · Reduces stress and tension.  · Improves your overall fitness, flexibility, and endurance.  · Increases bone strength.  The amount of exercise you need to lose weight depends on:  · Your age.  · The type of exercise.  · Any health conditions you have.  · Your overall physical ability.  Talk to your health care provider about how much exercise you need and what types of activities are safe for you.  What actions can I take to lose weight?  Nutrition    · Make changes to your diet as told by your health care provider or diet and nutrition specialist (dietitian). This may include:  ? Eating fewer calories.  ? Eating more protein.  ? Eating less unhealthy fats.  ? Eating a diet that includes fresh fruits and vegetables, whole grains, low-fat dairy products, and lean protein.  ? Avoiding foods with  added fat, salt, and sugar.  · Drink plenty of water while you exercise to prevent dehydration or heat stroke.  Activity  · Choose an activity that you enjoy and set realistic goals. Your health care provider can help you make an exercise plan that works for you.  · Exercise at a moderate or vigorous intensity most days of the week.  ? The intensity of exercise may vary from person to person. You can tell how intense a workout is for you by paying attention to your breathing and heartbeat. Most people will notice their breathing and heartbeat get faster with more intense exercise.  · Do resistance training twice each week, such as:  ? Push-ups.  ? Sit-ups.  ? Lifting weights.  ? Using resistance bands.  · Getting short amounts of exercise can be just as helpful as long structured periods of exercise. If you have trouble finding time to exercise, try to include exercise in your daily routine.  ? Get up, stretch, and walk around every 30 minutes throughout the day.  ? Go for a walk during your lunch break.  ? Park your car farther away from your destination.  ? If you take public transportation, get off one stop early and walk the rest of the way.  ? Make phone calls while standing up and walking around.  ? Take the stairs instead of elevators or escalators.  · Wear comfortable clothes and shoes with good support.  · Do not exercise so much that you hurt yourself, feel dizzy, or get very short of breath.  Where to find more information  · [place] Department of Health and Human Services: www.hhs.gov  · Centers for Disease Control and Prevention (CDC): www.cdc.gov  Contact a health care provider:  · Before starting a new exercise program.  · If you have questions or concerns about your weight.  · If you have a medical problem that keeps you from exercising.  Get help right away if you have any of the following while exercising:  · Injury.  · Dizziness.  · Difficulty breathing or shortness of breath that does not go away  when you stop exercising.  · Chest pain.  · Rapid heartbeat.  Summary  · Being overweight increases your risk of heart disease, stroke, diabetes, high blood pressure, and several types of cancer.  · Losing weight happens when you burn more calories than you eat.  · Reducing the amount of calories you eat in addition to getting regular moderate or vigorous exercise each week helps you lose weight.  This information is not intended to replace advice given to you by your health care provider. Make sure you discuss any questions you have with your health care provider.  Document Released: 01/20/2012 Document Revised: 12/31/2018 Document Reviewed: 12/31/2018  [employer] Interactive Patient Education © 2019 [employer]

## 2019-09-25 NOTE — PROGRESS NOTES
The ABCs of the Annual Wellness Visit  [place] to Medicare Visit    No chief complaint on file.      Subjective   History of Present Illness:  Tran Sosa is a 69 y.o. female who presents for a  Welcome to Medicare Visit.    HEALTH RISK ASSESSMENT    Recent Hospitalizations:  No hospitalization(s) within the last year.    Current Medical Providers:  Patient Care Team:  Diane Macdonald MD as PCP - General  Diane Macdonald MD as PCP - Family Medicine    Smoking Status:  Social History     Tobacco Use   Smoking Status Former Smoker   • Packs/day: 1.00   • Years: 10.00   • Pack years: 10.00   • Types: Cigarettes   • Last attempt to quit:    • Years since quittin.7   Smokeless Tobacco Never Used   Tobacco Comment    QUIT AT AGE 30 YRS. OLD       Alcohol Consumption:  Social History     Substance and Sexual Activity   Alcohol Use Yes    Comment: TWICE MONTHLY 1-2 DRINKS       Depression Screen:   PHQ-2/PHQ-9 Depression Screening 2019   Little interest or pleasure in doing things 0   Feeling down, depressed, or hopeless 0   Trouble falling or staying asleep, or sleeping too much 0   Feeling tired or having little energy 0   Poor appetite or overeating 0   Feeling bad about yourself - or that you are a failure or have let yourself or your family down 0   Trouble concentrating on things, such as reading the newspaper or watching television 0   Moving or speaking so slowly that other people could have noticed. Or the opposite - being so fidgety or restless that you have been moving around a lot more than usual 0   Thoughts that you would be better off dead, or of hurting yourself in some way 0   Total Score 0   If you checked off any problems, how difficult have these problems made it for you to do your work, take care of things at home, or get along with other people? Not difficult at all       Fall Risk Screen:  STEADI Fall Risk Assessment was completed, and patient is at MODERATE risk for falls. Assessment  completed on:9/25/2019    Health Habits and Functional and Cognitive Screening:  Functional & Cognitive Status 9/25/2019   Do you have difficulty preparing food and eating? No   Do you have difficulty bathing yourself, getting dressed or grooming yourself? No   Do you have difficulty using the toilet? No   Do you have difficulty moving around from place to place? No   Do you have trouble with steps or getting out of a bed or a chair? No   Current Diet Well Balanced Diet   Dental Exam Up to date   Eye Exam Up to date   Exercise (times per week) 7 times per week   Current Exercise Activities Include Walking   Do you need help using the phone?  No   Are you deaf or do you have serious difficulty hearing?  Yes   Do you need help with transportation? No   Do you need help shopping? No   Do you need help preparing meals?  No   Do you need help with housework?  No   Do you need help with laundry? No   Do you need help taking your medications? No   Do you need help managing money? No   Do you ever drive or ride in a car without wearing a seat belt? No   Have you felt unusual stress, anger or loneliness in the last month? No   Who do you live with? Alone   If you need help, do you have trouble finding someone available to you? No   Have you been bothered in the last four weeks by sexual problems? No   Do you have difficulty concentrating, remembering or making decisions? No         Does the patient have evidence of cognitive impairment? No    Asprin use counseling:Does not need ASA (and currently is not on it)    Visual Acuity:    No exam data present    Age-appropriate Screening Schedule:  Refer to the list below for future screening recommendations based on patient's age, sex and/or medical conditions. Orders for these recommended tests are listed in the plan section. The patient has been provided with a written plan.    Health Maintenance   Topic Date Due   • INFLUENZA VACCINE  08/01/2019   • MAMMOGRAM  08/10/2019   •  LIPID PANEL  09/19/2020   • DXA SCAN  08/13/2021   • COLONOSCOPY  10/09/2023   • TDAP/TD VACCINES (2 - Td) 07/18/2028   • PNEUMOCOCCAL VACCINES (65+ LOW/MEDIUM RISK)  Completed   • ZOSTER VACCINE  Completed          The following portions of the patient's history were reviewed and updated as appropriate: allergies, current medications, past family history, past medical history, past social history, past surgical history and problem list.    Outpatient Medications Prior to Visit   Medication Sig Dispense Refill   • amoxicillin (AMOXIL) 500 MG capsule Take 4 capsules by mouth 1 hour prior to appointment 12 capsule 0   • atorvastatin (LIPITOR) 80 MG tablet Take 1 tablet by mouth Daily. 90 tablet 1   • Calcium Carbonate (CALCIUM 600 PO) Take 1 tablet by mouth Daily. HOLD PRIOR TO SURGERY     • meclizine (ANTIVERT) 12.5 MG tablet Take 1 tablet by mouth 3 (Three) Times a Day As Needed for dizziness. 30 tablet 2   • metoprolol succinate XL (TOPROL-XL) 50 MG 24 hr tablet Take 1 tablet by mouth Daily. 90 tablet 0   • Multiple Vitamin (MULTI VITAMIN DAILY PO) Take 1 tablet by mouth daily. HOLD PRIOR TO SURGERY     • traZODone (DESYREL) 100 MG tablet Take 2 tablets by mouth Every Night. 180 tablet 1   • triamterene-hydrochlorothiazide (MAXZIDE-25) 37.5-25 MG per tablet Take 1 tablet by mouth Every Morning. 90 tablet 1     Facility-Administered Medications Prior to Visit   Medication Dose Route Frequency Provider Last Rate Last Dose   • albuterol (PROVENTIL) nebulizer solution 0.083% 2.5 mg/3mL  2.5 mg Nebulization Q6H PRN Diane Macdonald MD   2.5 mg at 01/17/19 1323       Patient Active Problem List   Diagnosis   • Atopic rhinitis   • Arthralgia of multiple joints   • Hyperlipidemia   • Hypertension   • Osteopenia   • PAT (paroxysmal atrial tachycardia) (CMS/HCC)   • Sinus tachycardia   • SHAI (obstructive sleep apnea)   • OA (osteoarthritis) of hip       Advanced Care Planning:  Patient has an advance directive - a copy has not  "been provided. Have asked the patient to send this to us to add to record    Review of Systems    Compared to one year ago, the patient feels her physical health is better.  Compared to one year ago, the patient feels her mental health is better.    Reviewed chart for potential of high risk medication in the elderly: yes  Reviewed chart for potential of harmful drug interactions in the elderly:yes    Objective         Vitals:    09/25/19 0900   BP: 114/70   BP Location: Left arm   Patient Position: Sitting   Cuff Size: Adult   Pulse: 60   Temp: 98.4 °F (36.9 °C)   TempSrc: Oral   SpO2: 98%   Weight: 94.5 kg (208 lb 4.8 oz)   Height: 180.3 cm (71\")   PainSc:   7       Body mass index is 29.05 kg/m².  Discussed the patient's BMI with her. The BMI is above average; BMI management plan is completed.    Physical Exam    Lab Results   Component Value Date    GLU 93 09/19/2019    CHLPL 188 09/19/2019    TRIG 190 (H) 09/19/2019    HDL 52 09/19/2019    LDL 98 09/19/2019    VLDL 38 09/19/2019        Procedures    Assessment/Plan   Medicare Risks and Personalized Health Plan  CMS Preventative Services Quick Reference  Chronic Pain   Immunizations Discussed/Encouraged (specific immunizations; Influenza )  She is getting flu shot at the pharmacy      The above risks/problems have been discussed with the patient.  Pertinent information has been shared with the patient in the After Visit Summary.  Follow up plans and orders are seen below in the Assessment/Plan Section.    Diagnoses and all orders for this visit:    1. Essential hypertension (Primary)    2. Hyperlipidemia, unspecified hyperlipidemia type    3. PAT (paroxysmal atrial tachycardia) (CMS/[facility])    4. Welcome to Medicare preventive visit        Follow Up:  No Follow-up on file.     An After Visit Summary and PPPS were given to the patient.       She does have a lot of OA and stays active  Uses advil as needed    "

## 2019-09-25 NOTE — PROGRESS NOTES
Subjective   Tran Sosa is a 69 y.o. female here to follow up on labs and medicare wellness.    History of Present Illness   Pt has been taking cholesterol meds as prescribed.  No difficulties with myalgias.   Pt has been taking BP meds as prescribed without any problems.  No HA  No episodes of orthostasis  She does have chronic constipatio    The following portions of the patient's history were reviewed and updated as appropriate: allergies, current medications, past medical history, past social history and problem list.  She has been eatng out more    Review of Systems   All other systems reviewed and are negative.      Objective   Physical Exam   Constitutional: She is oriented to person, place, and time. She appears well-developed and well-nourished.   HENT:   Head: Normocephalic and atraumatic.   Right Ear: External ear normal.   Left Ear: External ear normal.   Mouth/Throat: Oropharynx is clear and moist.   Eyes: Conjunctivae and EOM are normal. Pupils are equal, round, and reactive to light.   Neck: Normal range of motion. No tracheal deviation present. No thyromegaly present.   Cardiovascular: Normal rate, regular rhythm, normal heart sounds and intact distal pulses.   Pulmonary/Chest: Effort normal and breath sounds normal.   Abdominal: Soft. Bowel sounds are normal. She exhibits no distension. There is no tenderness.   Musculoskeletal: Normal range of motion. She exhibits no edema or deformity.   Neurological: She is alert and oriented to person, place, and time.   Skin: Skin is warm and dry.   Psychiatric: She has a normal mood and affect. Her behavior is normal. Judgment and thought content normal.   Vitals reviewed.      Vitals:    09/25/19 0900   BP: 114/70   Pulse: 60   Temp: 98.4 °F (36.9 °C)   SpO2: 98%     Orders Only on 09/18/2019   Component Date Value Ref Range Status   • WBC 09/19/2019 4.74  3.40 - 10.80 10*3/mm3 Final   • RBC 09/19/2019 4.21  3.77 - 5.28 10*6/mm3 Final   • Hemoglobin  09/19/2019 12.9  12.0 - 15.9 g/dL Final   • Hematocrit 09/19/2019 39.4  34.0 - 46.6 % Final   • MCV 09/19/2019 93.6  79.0 - 97.0 fL Final   • MCH 09/19/2019 30.6  26.6 - 33.0 pg Final   • MCHC 09/19/2019 32.7  31.5 - 35.7 g/dL Final   • RDW 09/19/2019 12.7  12.3 - 15.4 % Final   • Platelets 09/19/2019 270  140 - 450 10*3/mm3 Final   • Neutrophil Rel % 09/19/2019 58.9  42.7 - 76.0 % Final   • Lymphocyte Rel % 09/19/2019 28.5  19.6 - 45.3 % Final   • Monocyte Rel % 09/19/2019 9.3  5.0 - 12.0 % Final   • Eosinophil Rel % 09/19/2019 2.3  0.3 - 6.2 % Final   • Basophil Rel % 09/19/2019 0.6  0.0 - 1.5 % Final   • Neutrophils Absolute 09/19/2019 2.79  1.70 - 7.00 10*3/mm3 Final   • Lymphocytes Absolute 09/19/2019 1.35  0.70 - 3.10 10*3/mm3 Final   • Monocytes Absolute 09/19/2019 0.44  0.10 - 0.90 10*3/mm3 Final   • Eosinophils Absolute 09/19/2019 0.11  0.00 - 0.40 10*3/mm3 Final   • Basophils Absolute 09/19/2019 0.03  0.00 - 0.20 10*3/mm3 Final   • Immature Granulocyte Rel % 09/19/2019 0.4  0.0 - 0.5 % Final   • Immature Grans Absolute 09/19/2019 0.02  0.00 - 0.05 10*3/mm3 Final   • nRBC 09/19/2019 0.0  0.0 - 0.2 /100 WBC Final   • TSH 09/19/2019 1.050  0.270 - 4.200 uIU/mL Final   • Total Cholesterol 09/19/2019 188  0 - 200 mg/dL Final   • Triglycerides 09/19/2019 190* 0 - 150 mg/dL Final   • HDL Cholesterol 09/19/2019 52  40 - 60 mg/dL Final   • VLDL Cholesterol 09/19/2019 38  mg/dL Final   • LDL Cholesterol  09/19/2019 98  0 - 100 mg/dL Final   • LDL/HDL Ratio 09/19/2019 1.88   Final   • Glucose 09/19/2019 93  65 - 99 mg/dL Final   • BUN 09/19/2019 18  8 - 23 mg/dL Final   • Creatinine 09/19/2019 0.83  0.57 - 1.00 mg/dL Final   • eGFR Non  Am 09/19/2019 69  >60 mL/min/1.73 Final   • eGFR African Am 09/19/2019 83  >60 mL/min/1.73 Final   • BUN/Creatinine Ratio 09/19/2019 21.7  7.0 - 25.0 Final   • Sodium 09/19/2019 137  136 - 145 mmol/L Final   • Potassium 09/19/2019 3.7  3.5 - 5.2 mmol/L Final   • Chloride  09/19/2019 97* 98 - 107 mmol/L Final   • Total CO2 09/19/2019 26.7  22.0 - 29.0 mmol/L Final   • Calcium 09/19/2019 9.2  8.6 - 10.5 mg/dL Final   • Total Protein 09/19/2019 7.0  6.0 - 8.5 g/dL Final   • Albumin 09/19/2019 4.40  3.50 - 5.20 g/dL Final   • Globulin 09/19/2019 2.6  gm/dL Final   • A/G Ratio 09/19/2019 1.7  g/dL Final   • Total Bilirubin 09/19/2019 0.4  0.2 - 1.2 mg/dL Final   • Alkaline Phosphatase 09/19/2019 82  39 - 117 U/L Final   • AST (SGOT) 09/19/2019 16  1 - 32 U/L Final   • ALT (SGPT) 09/19/2019 17  1 - 33 U/L Final     Current Outpatient Medications:   •  amoxicillin (AMOXIL) 500 MG capsule, Take 4 capsules by mouth 1 hour prior to appointment, Disp: 12 capsule, Rfl: 0  •  atorvastatin (LIPITOR) 80 MG tablet, Take 1 tablet by mouth Daily., Disp: 90 tablet, Rfl: 1  •  Calcium Carbonate (CALCIUM 600 PO), Take 1 tablet by mouth Daily. HOLD PRIOR TO SURGERY, Disp: , Rfl:   •  meclizine (ANTIVERT) 12.5 MG tablet, Take 1 tablet by mouth 3 (Three) Times a Day As Needed for dizziness., Disp: 30 tablet, Rfl: 2  •  metoprolol succinate XL (TOPROL-XL) 50 MG 24 hr tablet, Take 1 tablet by mouth Daily., Disp: 90 tablet, Rfl: 0  •  Multiple Vitamin (MULTI VITAMIN DAILY PO), Take 1 tablet by mouth daily. HOLD PRIOR TO SURGERY, Disp: , Rfl:   •  traZODone (DESYREL) 100 MG tablet, Take 2 tablets by mouth Every Night., Disp: 180 tablet, Rfl: 1  •  triamterene-hydrochlorothiazide (MAXZIDE-25) 37.5-25 MG per tablet, Take 1 tablet by mouth Every Morning., Disp: 90 tablet, Rfl: 1    Current Facility-Administered Medications:   •  albuterol (PROVENTIL) nebulizer solution 0.083% 2.5 mg/3mL, 2.5 mg, Nebulization, Q6H PRN, Diane Macdonald MD, 2.5 mg at 01/17/19 1323           Assessment/Plan   Diagnoses and all orders for this visit:    Essential hypertension    Hyperlipidemia, unspecified hyperlipidemia type    PAT (paroxysmal atrial tachycardia) (CMS/[facility])        1.  HTN- ok with current meds  2. HPL- better off the keto  diet and with Lipitor  3.  PAT- ok on the Toprol  4.  Insomnia occas trazodone  5. Constipation-  We will try linzess

## 2020-01-28 RX ORDER — TRAZODONE HYDROCHLORIDE 100 MG/1
200 TABLET ORAL NIGHTLY
Qty: 60 TABLET | Refills: 5 | Status: SHIPPED | OUTPATIENT
Start: 2020-01-28 | End: 2020-07-21

## 2020-03-31 ENCOUNTER — APPOINTMENT (OUTPATIENT)
Dept: PREADMISSION TESTING | Facility: HOSPITAL | Age: 70
End: 2020-03-31

## 2020-04-27 ENCOUNTER — APPOINTMENT (OUTPATIENT)
Dept: GENERAL RADIOLOGY | Facility: HOSPITAL | Age: 70
End: 2020-04-27

## 2020-04-27 PROCEDURE — 71046 X-RAY EXAM CHEST 2 VIEWS: CPT | Performed by: FAMILY MEDICINE

## 2020-05-06 ENCOUNTER — TELEPHONE (OUTPATIENT)
Dept: CARDIOLOGY | Facility: CLINIC | Age: 70
End: 2020-05-06

## 2020-05-06 ENCOUNTER — OFFICE VISIT (OUTPATIENT)
Dept: CARDIOLOGY | Facility: CLINIC | Age: 70
End: 2020-05-06

## 2020-05-06 VITALS
SYSTOLIC BLOOD PRESSURE: 133 MMHG | HEART RATE: 78 BPM | WEIGHT: 200 LBS | BODY MASS INDEX: 28 KG/M2 | DIASTOLIC BLOOD PRESSURE: 84 MMHG | HEIGHT: 71 IN

## 2020-05-06 DIAGNOSIS — I47.1 PAT (PAROXYSMAL ATRIAL TACHYCARDIA) (HCC): Primary | ICD-10-CM

## 2020-05-06 DIAGNOSIS — I10 ESSENTIAL HYPERTENSION: ICD-10-CM

## 2020-05-06 PROCEDURE — 99214 OFFICE O/P EST MOD 30 MIN: CPT | Performed by: INTERNAL MEDICINE

## 2020-05-06 RX ORDER — MELOXICAM 15 MG/1
15 TABLET ORAL DAILY
COMMUNITY
End: 2020-07-02 | Stop reason: HOSPADM

## 2020-05-06 NOTE — TELEPHONE ENCOUNTER
5/6 Called pt to register for teleheath visit, she said she was exposed to COVID19, and believes she had it.  Pt said she has been in quarantine for approximately 3-6 wks.    Thank you    Kelsie RANGEL

## 2020-05-06 NOTE — PROGRESS NOTES
Date of Office Visit: 2020  Encounter Provider: Koby Choudhury MD  Place of Service: [facility] CARDIOLOGY  Patient Name: Tran Sosa  : 1950    Subjective:     Encounter Date:2020      This patient has consented to a telehealth visit via tele. The visit was scheduled as a tele visit to comply with patient safety concerns in accordance with CDC recommendations.  All vitals recorded within this visit are reported by the patient.  I spent 30 minutes in total including but not limited to the 22 minutes spent in direct conversation with this patient.         Patient ID: Tran Sosa is a 69 y.o. female who has a cc of  PAT       She thinks she had the corona- virus for the last couple of weeks.It was pretty bad. She had a lot of dyspnea.  She feels better.     Her HR was irreg during the virus. And it has been a bit better.     She feels like she is getting better but now she has a lot of acid reflux now.     She takes the CPAP. She does wake up soa and has acid reflux.     She is in metoprolol for the atrial tachycardia.      Her HR is in the 80s at rest. With exertion in goes to 110.     There have been no hospital admission since the last visit.     There have been no bleeding events.       Past Medical History:   Diagnosis Date   • Allergic rhinitis    • Arthralgia of multiple sites    • Arthritis     OSTEO   • Bronchitis    • Cancer (CMS/HCC)     BREAST CANCER   • Diverticulosis    • Dysphagia     COUPLE OF EPISODES   • Elevated TSH    • Fatigue    • GERD (gastroesophageal reflux disease)    • History of breast cancer     RIGHT    • Hyperlipidemia    • Hypertension    • Insomnia    • Junctional rhythm     ONE EPISODE   • Lung nodule     BIOPSY BENIGN   • Mitral regurgitation     Mild per 2-D echocardiogram 2017   • Osteopenia    • Osteoporosis    • Pneumonia    • Postmenopausal status    • Pulmonary nodules    • Reactive airway disease    •  "Recurrent urinary tract infection    • Rib fracture    • Sleep apnea    • Uterine leiomyoma    • Vitamin B 12 deficiency    • Vitamin D deficiency        Social History     Socioeconomic History   • Marital status:      Spouse name: Not on file   • Number of children: 1   • Years of education: Not on file   • Highest education level: Not on file   Occupational History   • Occupation: RN AT [employer]   Tobacco Use   • Smoking status: Former Smoker     Packs/day: 1.00     Years: 10.00     Pack years: 10.00     Types: Cigarettes     Last attempt to quit: 1980     Years since quittin.3   • Smokeless tobacco: Never Used   • Tobacco comment: QUIT AT AGE 30 YRS. OLD   Substance and Sexual Activity   • Alcohol use: Yes     Comment: TWICE MONTHLY 1-2 DRINKS   • Drug use: No     Types: Hydrocodone   • Sexual activity: Defer       Review of Systems   Constitution: Negative for fever and night sweats.   HENT: Negative for ear pain and stridor.    Eyes: Negative for discharge and visual halos.   Cardiovascular: Negative for cyanosis.   Respiratory: Negative for hemoptysis and sputum production.    Hematologic/Lymphatic: Negative for adenopathy.   Skin: Negative for nail changes and unusual hair distribution.   Musculoskeletal: Negative for gout and joint swelling.   Gastrointestinal: Negative for bowel incontinence and flatus.   Genitourinary: Negative for dysuria and flank pain.   Neurological: Negative for seizures and tremors.   Psychiatric/Behavioral: Negative for altered mental status. The patient is not nervous/anxious.             Objective:     Vitals:    20 0916   BP: 133/84   Pulse: 78   Weight: 90.7 kg (200 lb)   Height: 180.3 cm (71\")     Virtual --       Lab Review:       Assessment:          Diagnosis Plan   1. PAT (paroxysmal atrial tachycardia) (CMS/HCC)     2. Essential hypertension            Plan:       She is recovering from the virus but never been tested. She had a tough time. But it slowly " got better.     Her heart rate is better.     Her overall symptoms are better.     I will refer her back to PCP if the reflux continues.     Every day she is getting better.

## 2020-06-22 ENCOUNTER — TRANSCRIBE ORDERS (OUTPATIENT)
Dept: PREADMISSION TESTING | Facility: HOSPITAL | Age: 70
End: 2020-06-22

## 2020-06-22 DIAGNOSIS — Z01.818 OTHER SPECIFIED PRE-OPERATIVE EXAMINATION: Primary | ICD-10-CM

## 2020-06-23 ENCOUNTER — HOSPITAL ENCOUNTER (OUTPATIENT)
Dept: GENERAL RADIOLOGY | Facility: HOSPITAL | Age: 70
Discharge: HOME | End: 2020-06-23

## 2020-06-23 ENCOUNTER — APPOINTMENT (OUTPATIENT)
Dept: PREADMISSION TESTING | Facility: HOSPITAL | Age: 70
End: 2020-06-23

## 2020-06-23 ENCOUNTER — HOSPITAL ENCOUNTER (OUTPATIENT)
Dept: GENERAL RADIOLOGY | Facility: HOSPITAL | Age: 70
Discharge: HOME | End: 2020-06-23
Admitting: ORTHOPAEDIC SURGERY

## 2020-06-23 VITALS
HEIGHT: 72 IN | DIASTOLIC BLOOD PRESSURE: 75 MMHG | OXYGEN SATURATION: 96 % | RESPIRATION RATE: 16 BRPM | BODY MASS INDEX: 29.73 KG/M2 | TEMPERATURE: 98.8 F | SYSTOLIC BLOOD PRESSURE: 117 MMHG | HEART RATE: 83 BPM | WEIGHT: 219.5 LBS

## 2020-06-23 LAB
ALBUMIN SERPL-MCNC: 4.2 G/DL (ref 3.5–5.2)
ALBUMIN/GLOB SERPL: 1.6 G/DL
ALP SERPL-CCNC: 74 U/L (ref 39–117)
ALT SERPL W P-5'-P-CCNC: 20 U/L (ref 1–33)
ANION GAP SERPL CALCULATED.3IONS-SCNC: 10.3 MMOL/L (ref 5–15)
AST SERPL-CCNC: 13 U/L (ref 1–32)
BILIRUB SERPL-MCNC: 0.2 MG/DL (ref 0.2–1.2)
BILIRUB UR QL STRIP: NEGATIVE
BUN BLD-MCNC: 18 MG/DL (ref 8–23)
BUN/CREAT SERPL: 20.2 (ref 7–25)
CALCIUM SPEC-SCNC: 9 MG/DL (ref 8.6–10.5)
CHLORIDE SERPL-SCNC: 103 MMOL/L (ref 98–107)
CLARITY UR: CLEAR
CO2 SERPL-SCNC: 24.7 MMOL/L (ref 22–29)
COLOR UR: YELLOW
CREAT BLD-MCNC: 0.89 MG/DL (ref 0.57–1)
DEPRECATED RDW RBC AUTO: 41 FL (ref 37–54)
ERYTHROCYTE [DISTWIDTH] IN BLOOD BY AUTOMATED COUNT: 12.3 % (ref 12.3–15.4)
GFR SERPL CREATININE-BSD FRML MDRD: 63 ML/MIN/1.73
GLOBULIN UR ELPH-MCNC: 2.6 GM/DL
GLUCOSE BLD-MCNC: 103 MG/DL (ref 65–99)
GLUCOSE UR STRIP-MCNC: NEGATIVE MG/DL
HCT VFR BLD AUTO: 39.6 % (ref 34–46.6)
HGB BLD-MCNC: 13.2 G/DL (ref 12–15.9)
HGB UR QL STRIP.AUTO: NEGATIVE
INR PPP: 0.88 (ref 0.9–1.1)
KETONES UR QL STRIP: NEGATIVE
LEUKOCYTE ESTERASE UR QL STRIP.AUTO: NEGATIVE
MCH RBC QN AUTO: 30.8 PG (ref 26.6–33)
MCHC RBC AUTO-ENTMCNC: 33.3 G/DL (ref 31.5–35.7)
MCV RBC AUTO: 92.3 FL (ref 79–97)
NITRITE UR QL STRIP: NEGATIVE
PH UR STRIP.AUTO: 6.5 [PH] (ref 5–8)
PLATELET # BLD AUTO: 270 10*3/MM3 (ref 140–450)
PMV BLD AUTO: 10.5 FL (ref 6–12)
POTASSIUM BLD-SCNC: 3.5 MMOL/L (ref 3.5–5.2)
PROT SERPL-MCNC: 6.8 G/DL (ref 6–8.5)
PROT UR QL STRIP: NEGATIVE
PROTHROMBIN TIME: 11.6 SECONDS (ref 11.7–14.2)
RBC # BLD AUTO: 4.29 10*6/MM3 (ref 3.77–5.28)
SODIUM BLD-SCNC: 138 MMOL/L (ref 136–145)
SP GR UR STRIP: 1.01 (ref 1–1.03)
UROBILINOGEN UR QL STRIP: NORMAL
WBC NRBC COR # BLD: 5.18 10*3/MM3 (ref 3.4–10.8)

## 2020-06-23 PROCEDURE — 81003 URINALYSIS AUTO W/O SCOPE: CPT | Performed by: ORTHOPAEDIC SURGERY

## 2020-06-23 PROCEDURE — 36415 COLL VENOUS BLD VENIPUNCTURE: CPT

## 2020-06-23 PROCEDURE — 73560 X-RAY EXAM OF KNEE 1 OR 2: CPT

## 2020-06-23 PROCEDURE — 93005 ELECTROCARDIOGRAM TRACING: CPT

## 2020-06-23 PROCEDURE — 80053 COMPREHEN METABOLIC PANEL: CPT | Performed by: ORTHOPAEDIC SURGERY

## 2020-06-23 PROCEDURE — 93010 ELECTROCARDIOGRAM REPORT: CPT | Performed by: INTERNAL MEDICINE

## 2020-06-23 PROCEDURE — 71046 X-RAY EXAM CHEST 2 VIEWS: CPT

## 2020-06-23 PROCEDURE — 85610 PROTHROMBIN TIME: CPT | Performed by: ORTHOPAEDIC SURGERY

## 2020-06-23 PROCEDURE — 85027 COMPLETE CBC AUTOMATED: CPT | Performed by: ORTHOPAEDIC SURGERY

## 2020-06-23 ASSESSMENT — KOOS JR
KOOS JR SCORE: 54.84
KOOS JR SCORE: 13

## 2020-06-23 NOTE — DISCHARGE INSTRUCTIONS
Take the following medications the morning of surgery: NONE    COVID TESTING 6/27/2020@ 1PM    General Instructions:  • Do not eat solid food after midnight the night before surgery.  • You may drink clear liquids day of surgery but must stop at least one hour before your hospital arrival time.  • It is beneficial for you to have a clear drink that contains carbohydrates the day of surgery.  We suggest a 12 to 20 ounce bottle of Gatorade or Powerade for non-diabetic patients or a 12 to 20 ounce bottle of G2 or Powerade Zero for diabetic patients.     Clear liquids are liquids you can see through.  Nothing red in color.     Plain water                               Sports drinks  Sodas                                   Gelatin (Jell-O)  Fruit juices without pulp such as white grape juice and apple juice  Popsicles that contain no fruit or yogurt  Tea or coffee (no cream or milk added)  Gatorade / Powerade  G2 / Powerade Zero      • If applicable bring your C-PAP/ BI-PAP machine.  • Bring any papers given to you in the doctor’s office.  • Wear clean comfortable clothes.  • Do not wear contact lenses, false eyelashes or make-up.  Bring a case for your glasses.   • Remove all piercings.  Leave jewelry and any other valuables at home.  • The Pre-Admission Testing nurse will instruct you to bring medications if unable to obtain an accurate list in Pre-Admission Testing.            Preventing a Surgical Site Infection:  • For 2 to 3 days before surgery, avoid shaving with a razor because the razor can irritate skin and make it easier to develop an infection.    • Any areas of open skin can increase the risk of a post-operative wound infection by allowing bacteria to enter and travel throughout the body.  Notify your surgeon if you have any skin wounds / rashes even if it is not near the expected surgical site.  The area will need assessed to determine if surgery should be delayed until it is healed.  • The night prior to  surgery shower using a fresh bar of anti-bacterial soap (such as Dial) and clean washcloth.  Sleep in a clean bed with clean clothing.  Do not allow pets to sleep with you.  • Shower on the morning of surgery using a fresh bar of anti-bacterial soap (such as Dial) and clean washcloth.  Dry with a clean towel and dress in clean clothing.  • Ask your surgeon if you will be receiving antibiotics prior to surgery.  • Make sure you, your family, and all healthcare providers clean their hands with soap and water or an alcohol based hand  before caring for you or your wound.    Day of surgery: 6/30/2020. MAIN OR. ARRIVAL TIME 730 AM  Your arrival time is approximately two hours before your scheduled surgery time.  Upon arrival, a Pre-op nurse and Anesthesiologist will review your health history, obtain vital signs, and answer questions you may have.  The only belongings needed at this time will be a list of your home medications and if applicable your C-PAP/BI-PAP machine.  If you are staying overnight your family can leave the rest of your belongings in the car and bring them to your room later.  A Pre-op nurse will start an IV and you may receive medication in preparation for surgery, including something to help you relax.  Your family will be able to see you in the Pre-op area.  Two visitors at a time will be allowed in the Pre-op room.  While you are in surgery your family should notify the waiting room  if they leave the waiting room area and provide a contact phone number.    Please be aware that surgery does come with discomfort.  We want to make every effort to control your discomfort so please discuss any uncontrolled symptoms with your nurse.   Your doctor will most likely have prescribed pain medications.      If you are going home after surgery you will receive individualized written care instructions before being discharged.  A responsible adult must drive you to and from the hospital on  the day of your surgery and stay with you for 24 hours.    If you are staying overnight following surgery, you will be transported to your hospital room following the recovery period.  [facility] has all private rooms.    If you have any questions please call Pre-Admission Testing at [phone].  Deductibles and co-payments are collected on the day of service. Please be prepared to pay the required co-pay, deductible or deposit on the day of service as defined by your plan.        CHLORHEXIDINE CLOTH INSTRUCTIONS  The morning of surgery follow these instructions using the Chlorhexidine cloths you've been given.  These steps reduce bacteria on the body.  Do not use the cloths near your eyes, ears mouth, genitalia or on open wounds.  Throw the cloths away after use but do not try to flush them down a toilet.      • Open and remove one cloth at a time from the package.    • Leave the cloth unfolded and begin the bathing.  • Massage the skin with the cloths using gentle pressure to remove bacteria.  Do not scrub harshly.   • Follow the steps below with one 2% CHG cloth per area (6 total cloths).  • One cloth for neck, shoulders and chest.  • One cloth for both arms, hands, fingers and underarms (do underarms last).  • One cloth for the abdomen followed by groin.  • One cloth for right leg and foot including between the toes.  • One cloth for left leg and foot including between the toes.  • The last cloth is to be used for the back of the neck, back and buttocks.    Allow the CHG to air dry 3 minutes on the skin which will give it time to work and decrease the chance of irritation.  The skin may feel sticky until it is dry.  Do not rinse with water or any other liquid or you will lose the beneficial effects of the CHG.  If mild skin irritation occurs, do rinse the skin to remove the CHG.  Report this to the nurse at time of admission.  Do not apply lotions, creams, ointments, deodorants or perfumes  after using the clothes. Dress in clean clothes before coming to the hospital.    BACTROBAN NASAL OINTMENT  There are many germs normally in your nose. Bactroban is an ointment that will help reduce these germs. Please follow these instructions for Bactroban use:      _1___The day before surgery in the morning  Date_6/30_______    _2___The day before surgery in the evening              Date_6/30_______    _3___The day of surgery in the morning    Date_7-1_______    **Squirt ½ package of Bactroban Ointment onto a cotton applicator and apply to inside of 1st nostril.  Squirt the remaining Bactroban and apply to the inside of the other nostril.      Patient Education for Self-Quarantine Process    Following your COVID testing, we strongly recommend that you do not leave your home after you have been tested for COVID except to get medical care. This includes not going to work, school or to public areas.  If this is not possible for you to do please limit your activities to only required outings.  Be sure to wear a mask when you are with other people, practice social distancing and wash your hands frequently.      The following items provide additional details to keep you safe.  • Wash your hands with soap and water frequently for at least 20 seconds.   • Avoid touching your eyes, nose and mouth with unwashed hands.  • Do not share anything - utensils, towels, food from the same bowl.   • Have your own utensils, drinking glass, dishes, towels and bedding.   • Do not have visitors.   • Do use FaceTime to stay in touch with family and friends.  • You should stay in a specific room away from others if possible.   • Stay at least 6 feet away from others in the home if you cannot have a dedicated room to yourself.   • Do not snuggle with your pet. While the CDC says there is no evidence that pets can spread COVID-19 or be infected from humans, it is probably best to avoid “petting, snuggling, being kissed or licked and  sharing food (during self-quarantine)”, according to the CDC.   • Sanitize household surfaces daily. Include all high touch areas (door handles, light switches, phones, countertops, etc.)  • Do not share a bathroom with others, if possible.   • Wear a mask around others in your home if you are unable to stay in a separate room or 6 feet apart. If  you are unable to wear a mask, have your family member wear a mask if they must be within 6 feet of you.   Call your surgeon immediately if you experience any of the following symptoms:  • Sore Throat  • Shortness of Breath or difficulty breathing  • Cough  • Chills  • Body soreness or muscle pain  • Headache  • Fever  • New loss of taste or smell  • Do not arrive for your surgery ill.  Your procedure will need to be rescheduled to another time.  You will need to call your physician before the day of surgery to avoid any unnecessary exposure to hospital staff as well as other patients.

## 2020-06-24 ENCOUNTER — TELEPHONE (OUTPATIENT)
Dept: INTERNAL MEDICINE | Facility: CLINIC | Age: 70
End: 2020-06-24

## 2020-06-24 DIAGNOSIS — B34.9 VIRAL ILLNESS: Primary | ICD-10-CM

## 2020-06-24 NOTE — TELEPHONE ENCOUNTER
covid antibody test ordered    ----- Message from Diane Macdonald MD sent at 6/24/2020 12:59 PM EDT -----  Regarding: FW: Non-Urgent Medical Question  Contact: [phone]  ok  ----- Message -----  From: Chastity Sanches MA  Sent: 6/24/2020  12:44 PM EDT  To: Diane Macdonald MD  Subject: FW: Non-Urgent Medical Question                      ----- Message -----  From: Tran Sosa  Sent: 6/24/2020  12:43 PM EDT  To: [facility] [place] [id number] Clinical Pool  Subject: Non-Urgent Medical Question                      I'm having surgery 6/30 and will get a COVID test on Saturday, 6/27. I was extremely sick in March for 5 weeks and I do believe I might have had COVID. Prior to that  time I was in direct contact with a MD that was later diagnosed and hospitalized with COVID-19.  I would like to get an antibody test along with my COVID test on Saturday but I need a MD order from my primary care MD. I am having that test at [facility].  Thanks so much:)    Tran Sosa

## 2020-06-27 ENCOUNTER — LAB (OUTPATIENT)
Dept: LAB | Facility: HOSPITAL | Age: 70
End: 2020-06-27

## 2020-06-27 DIAGNOSIS — Z01.818 OTHER SPECIFIED PRE-OPERATIVE EXAMINATION: ICD-10-CM

## 2020-06-27 PROCEDURE — C9803 HOPD COVID-19 SPEC COLLECT: HCPCS

## 2020-06-27 PROCEDURE — U0004 COV-19 TEST NON-CDC HGH THRU: HCPCS

## 2020-06-29 LAB
REF LAB TEST METHOD: NORMAL
SARS-COV-2 RNA RESP QL NAA+PROBE: NOT DETECTED

## 2020-06-30 ENCOUNTER — ANESTHESIA EVENT (OUTPATIENT)
Dept: PERIOP | Facility: HOSPITAL | Age: 70
End: 2020-06-30

## 2020-06-30 ENCOUNTER — ANESTHESIA (OUTPATIENT)
Dept: PERIOP | Facility: HOSPITAL | Age: 70
End: 2020-06-30

## 2020-06-30 ENCOUNTER — HOSPITAL ENCOUNTER (INPATIENT)
Facility: HOSPITAL | Age: 70
LOS: 2 days | Discharge: HOME | End: 2020-07-02
Attending: ORTHOPAEDIC SURGERY | Admitting: ORTHOPAEDIC SURGERY

## 2020-06-30 ENCOUNTER — APPOINTMENT (OUTPATIENT)
Dept: GENERAL RADIOLOGY | Facility: HOSPITAL | Age: 70
End: 2020-06-30

## 2020-06-30 DIAGNOSIS — M17.11 PRIMARY OSTEOARTHRITIS OF RIGHT KNEE: Primary | ICD-10-CM

## 2020-06-30 PROCEDURE — 25010000002 SUCCINYLCHOLINE PER 20 MG: Performed by: NURSE ANESTHETIST, CERTIFIED REGISTERED

## 2020-06-30 PROCEDURE — 25010000002 MIDAZOLAM PER 1 MG: Performed by: ANESTHESIOLOGY

## 2020-06-30 PROCEDURE — 97110 THERAPEUTIC EXERCISES: CPT

## 2020-06-30 PROCEDURE — 25010000002 FENTANYL CITRATE (PF) 100 MCG/2ML SOLUTION: Performed by: ANESTHESIOLOGY

## 2020-06-30 PROCEDURE — 25010000002 PROPOFOL 10 MG/ML EMULSION: Performed by: NURSE ANESTHETIST, CERTIFIED REGISTERED

## 2020-06-30 PROCEDURE — 25010000002 EPINEPHRINE 30 MG/30ML SOLUTION 30 ML VIAL: Performed by: ORTHOPAEDIC SURGERY

## 2020-06-30 PROCEDURE — 73560 X-RAY EXAM OF KNEE 1 OR 2: CPT

## 2020-06-30 PROCEDURE — 0SRC0J9 REPLACEMENT OF RIGHT KNEE JOINT WITH SYNTHETIC SUBSTITUTE, CEMENTED, OPEN APPROACH: ICD-10-PCS | Performed by: ORTHOPAEDIC SURGERY

## 2020-06-30 PROCEDURE — 25010000002 ROPIVACAINE PER 1 MG: Performed by: ORTHOPAEDIC SURGERY

## 2020-06-30 PROCEDURE — 25010000003 CEFAZOLIN IN DEXTROSE 2-4 GM/100ML-% SOLUTION: Performed by: ORTHOPAEDIC SURGERY

## 2020-06-30 PROCEDURE — 25010000002 KETOROLAC TROMETHAMINE PER 15 MG: Performed by: ORTHOPAEDIC SURGERY

## 2020-06-30 PROCEDURE — 25010000002 HYDROMORPHONE PER 4 MG: Performed by: NURSE ANESTHETIST, CERTIFIED REGISTERED

## 2020-06-30 PROCEDURE — C1776 JOINT DEVICE (IMPLANTABLE): HCPCS | Performed by: ORTHOPAEDIC SURGERY

## 2020-06-30 PROCEDURE — 25010000002 DEXAMETHASONE PER 1 MG: Performed by: NURSE ANESTHETIST, CERTIFIED REGISTERED

## 2020-06-30 PROCEDURE — 25010000002 ONDANSETRON PER 1 MG: Performed by: ORTHOPAEDIC SURGERY

## 2020-06-30 PROCEDURE — 25010000002 ROPIVACAINE PER 1 MG: Performed by: ANESTHESIOLOGY

## 2020-06-30 PROCEDURE — 25010000002 FENTANYL CITRATE (PF) 100 MCG/2ML SOLUTION: Performed by: NURSE ANESTHETIST, CERTIFIED REGISTERED

## 2020-06-30 PROCEDURE — 25010000002 ONDANSETRON PER 1 MG: Performed by: NURSE ANESTHETIST, CERTIFIED REGISTERED

## 2020-06-30 PROCEDURE — C1713 ANCHOR/SCREW BN/BN,TIS/BN: HCPCS | Performed by: ORTHOPAEDIC SURGERY

## 2020-06-30 PROCEDURE — 97161 PT EVAL LOW COMPLEX 20 MIN: CPT

## 2020-06-30 DEVICE — TRY TIB INTERLOK PRI 75MM: Type: IMPLANTABLE DEVICE | Site: KNEE | Status: FUNCTIONAL

## 2020-06-30 DEVICE — CMT BONE R 1X40: Type: IMPLANTABLE DEVICE | Site: KNEE | Status: FUNCTIONAL

## 2020-06-30 DEVICE — STEM TIB PRI FINN 46X40MM: Type: IMPLANTABLE DEVICE | Site: KNEE | Status: FUNCTIONAL

## 2020-06-30 DEVICE — COMP FEM/KN VANGUARD INTLK CR 67.5MM NS RT: Type: IMPLANTABLE DEVICE | Site: KNEE | Status: FUNCTIONAL

## 2020-06-30 DEVICE — PAT 3PEG THN 34X7.8 34MM: Type: IMPLANTABLE DEVICE | Site: KNEE | Status: FUNCTIONAL

## 2020-06-30 DEVICE — CAP TOTL KN CMT PREMIUM: Type: IMPLANTABLE DEVICE | Site: KNEE | Status: FUNCTIONAL

## 2020-06-30 DEVICE — IMPLANTABLE DEVICE: Type: IMPLANTABLE DEVICE | Site: KNEE | Status: FUNCTIONAL

## 2020-06-30 RX ORDER — LIDOCAINE HYDROCHLORIDE 10 MG/ML
0.5 INJECTION, SOLUTION EPIDURAL; INFILTRATION; INTRACAUDAL; PERINEURAL ONCE AS NEEDED
Status: DISCONTINUED | OUTPATIENT
Start: 2020-06-30 | End: 2020-06-30 | Stop reason: HOSPADM

## 2020-06-30 RX ORDER — FERROUS SULFATE 325(65) MG
325 TABLET ORAL
Status: DISCONTINUED | OUTPATIENT
Start: 2020-07-01 | End: 2020-07-02 | Stop reason: HOSPADM

## 2020-06-30 RX ORDER — SODIUM CHLORIDE 0.9 % (FLUSH) 0.9 %
3 SYRINGE (ML) INJECTION EVERY 12 HOURS SCHEDULED
Status: DISCONTINUED | OUTPATIENT
Start: 2020-06-30 | End: 2020-07-02 | Stop reason: HOSPADM

## 2020-06-30 RX ORDER — DIAZEPAM 5 MG/1
5 TABLET ORAL EVERY 6 HOURS PRN
Status: DISCONTINUED | OUTPATIENT
Start: 2020-06-30 | End: 2020-07-02 | Stop reason: HOSPADM

## 2020-06-30 RX ORDER — KETOROLAC TROMETHAMINE 30 MG/ML
15 INJECTION, SOLUTION INTRAMUSCULAR; INTRAVENOUS EVERY 8 HOURS PRN
Status: COMPLETED | OUTPATIENT
Start: 2020-06-30 | End: 2020-07-01

## 2020-06-30 RX ORDER — LIDOCAINE HYDROCHLORIDE 40 MG/ML
SOLUTION TOPICAL AS NEEDED
Status: DISCONTINUED | OUTPATIENT
Start: 2020-06-30 | End: 2020-06-30 | Stop reason: SURG

## 2020-06-30 RX ORDER — FENTANYL CITRATE 50 UG/ML
50 INJECTION, SOLUTION INTRAMUSCULAR; INTRAVENOUS
Status: DISCONTINUED | OUTPATIENT
Start: 2020-06-30 | End: 2020-06-30 | Stop reason: HOSPADM

## 2020-06-30 RX ORDER — SUCCINYLCHOLINE CHLORIDE 20 MG/ML
INJECTION INTRAMUSCULAR; INTRAVENOUS AS NEEDED
Status: DISCONTINUED | OUTPATIENT
Start: 2020-06-30 | End: 2020-06-30 | Stop reason: SURG

## 2020-06-30 RX ORDER — OXYCODONE HYDROCHLORIDE AND ACETAMINOPHEN 5; 325 MG/1; MG/1
1-2 TABLET ORAL EVERY 4 HOURS PRN
Qty: 84 TABLET | Refills: 0 | Status: ON HOLD | OUTPATIENT
Start: 2020-06-30 | End: 2020-07-14

## 2020-06-30 RX ORDER — PROMETHAZINE HYDROCHLORIDE 25 MG/ML
6.25 INJECTION, SOLUTION INTRAMUSCULAR; INTRAVENOUS
Status: DISCONTINUED | OUTPATIENT
Start: 2020-06-30 | End: 2020-06-30 | Stop reason: HOSPADM

## 2020-06-30 RX ORDER — ROCURONIUM BROMIDE 10 MG/ML
INJECTION, SOLUTION INTRAVENOUS AS NEEDED
Status: DISCONTINUED | OUTPATIENT
Start: 2020-06-30 | End: 2020-06-30 | Stop reason: SURG

## 2020-06-30 RX ORDER — METOPROLOL SUCCINATE 50 MG/1
50 TABLET, EXTENDED RELEASE ORAL NIGHTLY
Status: DISCONTINUED | OUTPATIENT
Start: 2020-06-30 | End: 2020-07-02 | Stop reason: HOSPADM

## 2020-06-30 RX ORDER — LIDOCAINE HYDROCHLORIDE 20 MG/ML
INJECTION, SOLUTION INFILTRATION; PERINEURAL AS NEEDED
Status: DISCONTINUED | OUTPATIENT
Start: 2020-06-30 | End: 2020-06-30 | Stop reason: SURG

## 2020-06-30 RX ORDER — TRANEXAMIC ACID 100 MG/ML
INJECTION, SOLUTION INTRAVENOUS AS NEEDED
Status: DISCONTINUED | OUTPATIENT
Start: 2020-06-30 | End: 2020-06-30 | Stop reason: SURG

## 2020-06-30 RX ORDER — OXYCODONE AND ACETAMINOPHEN 7.5; 325 MG/1; MG/1
1 TABLET ORAL ONCE AS NEEDED
Status: DISCONTINUED | OUTPATIENT
Start: 2020-06-30 | End: 2020-06-30 | Stop reason: HOSPADM

## 2020-06-30 RX ORDER — HYDRALAZINE HYDROCHLORIDE 20 MG/ML
5 INJECTION INTRAMUSCULAR; INTRAVENOUS
Status: DISCONTINUED | OUTPATIENT
Start: 2020-06-30 | End: 2020-06-30 | Stop reason: HOSPADM

## 2020-06-30 RX ORDER — EPHEDRINE SULFATE 50 MG/ML
5 INJECTION, SOLUTION INTRAVENOUS ONCE AS NEEDED
Status: DISCONTINUED | OUTPATIENT
Start: 2020-06-30 | End: 2020-06-30 | Stop reason: HOSPADM

## 2020-06-30 RX ORDER — TRIAMTERENE AND HYDROCHLOROTHIAZIDE 37.5; 25 MG/1; MG/1
1 TABLET ORAL EVERY MORNING
Status: DISCONTINUED | OUTPATIENT
Start: 2020-07-01 | End: 2020-07-02 | Stop reason: HOSPADM

## 2020-06-30 RX ORDER — ACETAMINOPHEN 325 MG/1
650 TABLET ORAL ONCE AS NEEDED
Status: DISCONTINUED | OUTPATIENT
Start: 2020-06-30 | End: 2020-06-30 | Stop reason: HOSPADM

## 2020-06-30 RX ORDER — ONDANSETRON 2 MG/ML
4 INJECTION INTRAMUSCULAR; INTRAVENOUS EVERY 6 HOURS PRN
Status: DISCONTINUED | OUTPATIENT
Start: 2020-06-30 | End: 2020-07-02 | Stop reason: HOSPADM

## 2020-06-30 RX ORDER — ASPIRIN 325 MG
325 TABLET, DELAYED RELEASE (ENTERIC COATED) ORAL 2 TIMES DAILY WITH MEALS
Status: DISCONTINUED | OUTPATIENT
Start: 2020-06-30 | End: 2020-07-02 | Stop reason: HOSPADM

## 2020-06-30 RX ORDER — OXYCODONE HYDROCHLORIDE AND ACETAMINOPHEN 5; 325 MG/1; MG/1
1 TABLET ORAL EVERY 4 HOURS PRN
Status: DISCONTINUED | OUTPATIENT
Start: 2020-06-30 | End: 2020-07-02 | Stop reason: HOSPADM

## 2020-06-30 RX ORDER — SODIUM CHLORIDE 0.9 % (FLUSH) 0.9 %
1-10 SYRINGE (ML) INJECTION AS NEEDED
Status: DISCONTINUED | OUTPATIENT
Start: 2020-06-30 | End: 2020-07-02 | Stop reason: HOSPADM

## 2020-06-30 RX ORDER — PROMETHAZINE HYDROCHLORIDE 12.5 MG/1
12.5 TABLET ORAL EVERY 6 HOURS PRN
Status: DISCONTINUED | OUTPATIENT
Start: 2020-06-30 | End: 2020-07-02 | Stop reason: HOSPADM

## 2020-06-30 RX ORDER — FLUMAZENIL 0.1 MG/ML
0.2 INJECTION INTRAVENOUS AS NEEDED
Status: DISCONTINUED | OUTPATIENT
Start: 2020-06-30 | End: 2020-06-30 | Stop reason: HOSPADM

## 2020-06-30 RX ORDER — ONDANSETRON 2 MG/ML
INJECTION INTRAMUSCULAR; INTRAVENOUS AS NEEDED
Status: DISCONTINUED | OUTPATIENT
Start: 2020-06-30 | End: 2020-06-30 | Stop reason: SURG

## 2020-06-30 RX ORDER — TRAZODONE HYDROCHLORIDE 100 MG/1
200 TABLET ORAL NIGHTLY
Status: DISCONTINUED | OUTPATIENT
Start: 2020-06-30 | End: 2020-07-02 | Stop reason: HOSPADM

## 2020-06-30 RX ORDER — PROMETHAZINE HYDROCHLORIDE 25 MG/1
25 TABLET ORAL ONCE AS NEEDED
Status: DISCONTINUED | OUTPATIENT
Start: 2020-06-30 | End: 2020-06-30 | Stop reason: HOSPADM

## 2020-06-30 RX ORDER — MIDAZOLAM HYDROCHLORIDE 1 MG/ML
1 INJECTION INTRAMUSCULAR; INTRAVENOUS
Status: DISCONTINUED | OUTPATIENT
Start: 2020-06-30 | End: 2020-06-30 | Stop reason: HOSPADM

## 2020-06-30 RX ORDER — SODIUM CHLORIDE 450 MG/100ML
100 INJECTION, SOLUTION INTRAVENOUS CONTINUOUS
Status: DISCONTINUED | OUTPATIENT
Start: 2020-06-30 | End: 2020-07-02 | Stop reason: HOSPADM

## 2020-06-30 RX ORDER — CEFAZOLIN SODIUM 2 G/100ML
2 INJECTION, SOLUTION INTRAVENOUS ONCE
Status: COMPLETED | OUTPATIENT
Start: 2020-06-30 | End: 2020-06-30

## 2020-06-30 RX ORDER — NALOXONE HCL 0.4 MG/ML
0.1 VIAL (ML) INJECTION
Status: DISCONTINUED | OUTPATIENT
Start: 2020-06-30 | End: 2020-07-02 | Stop reason: HOSPADM

## 2020-06-30 RX ORDER — PROMETHAZINE HYDROCHLORIDE 25 MG/ML
12.5 INJECTION, SOLUTION INTRAMUSCULAR; INTRAVENOUS EVERY 4 HOURS PRN
Status: DISCONTINUED | OUTPATIENT
Start: 2020-06-30 | End: 2020-07-02 | Stop reason: HOSPADM

## 2020-06-30 RX ORDER — DEXAMETHASONE SODIUM PHOSPHATE 10 MG/ML
INJECTION INTRAMUSCULAR; INTRAVENOUS AS NEEDED
Status: DISCONTINUED | OUTPATIENT
Start: 2020-06-30 | End: 2020-06-30 | Stop reason: SURG

## 2020-06-30 RX ORDER — ACETAMINOPHEN 325 MG/1
325 TABLET ORAL EVERY 4 HOURS PRN
Status: DISCONTINUED | OUTPATIENT
Start: 2020-06-30 | End: 2020-07-02 | Stop reason: HOSPADM

## 2020-06-30 RX ORDER — DIPHENHYDRAMINE HCL 25 MG
25 CAPSULE ORAL
Status: DISCONTINUED | OUTPATIENT
Start: 2020-06-30 | End: 2020-06-30 | Stop reason: HOSPADM

## 2020-06-30 RX ORDER — CLINDAMYCIN PHOSPHATE 900 MG/50ML
900 INJECTION INTRAVENOUS EVERY 8 HOURS
Status: COMPLETED | OUTPATIENT
Start: 2020-06-30 | End: 2020-07-01

## 2020-06-30 RX ORDER — FAMOTIDINE 10 MG/ML
20 INJECTION, SOLUTION INTRAVENOUS ONCE
Status: COMPLETED | OUTPATIENT
Start: 2020-06-30 | End: 2020-06-30

## 2020-06-30 RX ORDER — HYDROMORPHONE HYDROCHLORIDE 1 MG/ML
0.5 INJECTION, SOLUTION INTRAMUSCULAR; INTRAVENOUS; SUBCUTANEOUS
Status: DISCONTINUED | OUTPATIENT
Start: 2020-06-30 | End: 2020-07-02 | Stop reason: HOSPADM

## 2020-06-30 RX ORDER — SODIUM CHLORIDE 0.9 % (FLUSH) 0.9 %
3-10 SYRINGE (ML) INJECTION AS NEEDED
Status: DISCONTINUED | OUTPATIENT
Start: 2020-06-30 | End: 2020-06-30 | Stop reason: HOSPADM

## 2020-06-30 RX ORDER — ACETAMINOPHEN 500 MG
1000 TABLET ORAL ONCE
Status: COMPLETED | OUTPATIENT
Start: 2020-06-30 | End: 2020-06-30

## 2020-06-30 RX ORDER — OXYCODONE HYDROCHLORIDE AND ACETAMINOPHEN 5; 325 MG/1; MG/1
2 TABLET ORAL EVERY 4 HOURS PRN
Status: DISCONTINUED | OUTPATIENT
Start: 2020-06-30 | End: 2020-07-02 | Stop reason: HOSPADM

## 2020-06-30 RX ORDER — ROPIVACAINE HYDROCHLORIDE 5 MG/ML
INJECTION, SOLUTION EPIDURAL; INFILTRATION; PERINEURAL
Status: COMPLETED | OUTPATIENT
Start: 2020-06-30 | End: 2020-06-30

## 2020-06-30 RX ORDER — NALOXONE HCL 0.4 MG/ML
0.2 VIAL (ML) INJECTION AS NEEDED
Status: DISCONTINUED | OUTPATIENT
Start: 2020-06-30 | End: 2020-06-30 | Stop reason: HOSPADM

## 2020-06-30 RX ORDER — DOCUSATE SODIUM 100 MG/1
100 CAPSULE, LIQUID FILLED ORAL 2 TIMES DAILY PRN
Status: DISCONTINUED | OUTPATIENT
Start: 2020-06-30 | End: 2020-07-02 | Stop reason: HOSPADM

## 2020-06-30 RX ORDER — PROPOFOL 10 MG/ML
VIAL (ML) INTRAVENOUS AS NEEDED
Status: DISCONTINUED | OUTPATIENT
Start: 2020-06-30 | End: 2020-06-30 | Stop reason: SURG

## 2020-06-30 RX ORDER — HYDROMORPHONE HYDROCHLORIDE 1 MG/ML
0.5 INJECTION, SOLUTION INTRAMUSCULAR; INTRAVENOUS; SUBCUTANEOUS
Status: DISCONTINUED | OUTPATIENT
Start: 2020-06-30 | End: 2020-06-30 | Stop reason: HOSPADM

## 2020-06-30 RX ORDER — CLINDAMYCIN PHOSPHATE 900 MG/50ML
900 INJECTION INTRAVENOUS ONCE
Status: DISCONTINUED | OUTPATIENT
Start: 2020-06-30 | End: 2020-06-30

## 2020-06-30 RX ORDER — FENTANYL CITRATE 50 UG/ML
INJECTION, SOLUTION INTRAMUSCULAR; INTRAVENOUS AS NEEDED
Status: DISCONTINUED | OUTPATIENT
Start: 2020-06-30 | End: 2020-06-30 | Stop reason: SURG

## 2020-06-30 RX ORDER — SODIUM CHLORIDE 0.9 % (FLUSH) 0.9 %
3 SYRINGE (ML) INJECTION EVERY 12 HOURS SCHEDULED
Status: DISCONTINUED | OUTPATIENT
Start: 2020-06-30 | End: 2020-06-30 | Stop reason: HOSPADM

## 2020-06-30 RX ORDER — EPHEDRINE SULFATE 50 MG/ML
INJECTION, SOLUTION INTRAVENOUS AS NEEDED
Status: DISCONTINUED | OUTPATIENT
Start: 2020-06-30 | End: 2020-06-30 | Stop reason: SURG

## 2020-06-30 RX ORDER — SODIUM CHLORIDE, SODIUM LACTATE, POTASSIUM CHLORIDE, CALCIUM CHLORIDE 600; 310; 30; 20 MG/100ML; MG/100ML; MG/100ML; MG/100ML
9 INJECTION, SOLUTION INTRAVENOUS CONTINUOUS
Status: DISCONTINUED | OUTPATIENT
Start: 2020-06-30 | End: 2020-07-02 | Stop reason: HOSPADM

## 2020-06-30 RX ORDER — PROMETHAZINE HYDROCHLORIDE 25 MG/1
25 SUPPOSITORY RECTAL ONCE AS NEEDED
Status: DISCONTINUED | OUTPATIENT
Start: 2020-06-30 | End: 2020-06-30 | Stop reason: HOSPADM

## 2020-06-30 RX ORDER — ONDANSETRON 2 MG/ML
4 INJECTION INTRAMUSCULAR; INTRAVENOUS ONCE AS NEEDED
Status: DISCONTINUED | OUTPATIENT
Start: 2020-06-30 | End: 2020-06-30 | Stop reason: HOSPADM

## 2020-06-30 RX ORDER — CELECOXIB 200 MG/1
200 CAPSULE ORAL ONCE
Status: COMPLETED | OUTPATIENT
Start: 2020-06-30 | End: 2020-06-30

## 2020-06-30 RX ORDER — CHOLECALCIFEROL (VITAMIN D3) 125 MCG
5 CAPSULE ORAL NIGHTLY PRN
Status: DISCONTINUED | OUTPATIENT
Start: 2020-06-30 | End: 2020-07-02 | Stop reason: HOSPADM

## 2020-06-30 RX ORDER — ATORVASTATIN CALCIUM 20 MG/1
80 TABLET, FILM COATED ORAL EVERY EVENING
Status: DISCONTINUED | OUTPATIENT
Start: 2020-06-30 | End: 2020-07-02 | Stop reason: HOSPADM

## 2020-06-30 RX ORDER — PROMETHAZINE HYDROCHLORIDE 25 MG/ML
12.5 INJECTION, SOLUTION INTRAMUSCULAR; INTRAVENOUS ONCE AS NEEDED
Status: DISCONTINUED | OUTPATIENT
Start: 2020-06-30 | End: 2020-06-30 | Stop reason: HOSPADM

## 2020-06-30 RX ORDER — MAGNESIUM HYDROXIDE 1200 MG/15ML
LIQUID ORAL AS NEEDED
Status: DISCONTINUED | OUTPATIENT
Start: 2020-06-30 | End: 2020-06-30 | Stop reason: HOSPADM

## 2020-06-30 RX ORDER — ONDANSETRON 4 MG/1
4 TABLET, FILM COATED ORAL EVERY 6 HOURS PRN
Status: DISCONTINUED | OUTPATIENT
Start: 2020-06-30 | End: 2020-07-02 | Stop reason: HOSPADM

## 2020-06-30 RX ORDER — DIPHENHYDRAMINE HYDROCHLORIDE 50 MG/ML
12.5 INJECTION INTRAMUSCULAR; INTRAVENOUS
Status: DISCONTINUED | OUTPATIENT
Start: 2020-06-30 | End: 2020-06-30 | Stop reason: HOSPADM

## 2020-06-30 RX ORDER — HYDROCODONE BITARTRATE AND ACETAMINOPHEN 7.5; 325 MG/1; MG/1
1 TABLET ORAL ONCE AS NEEDED
Status: DISCONTINUED | OUTPATIENT
Start: 2020-06-30 | End: 2020-06-30 | Stop reason: HOSPADM

## 2020-06-30 RX ADMIN — METOPROLOL TARTRATE 1 MG: 5 INJECTION, SOLUTION INTRAVENOUS at 11:50

## 2020-06-30 RX ADMIN — LIDOCAINE HYDROCHLORIDE 1 EACH: 40 SOLUTION TOPICAL at 10:46

## 2020-06-30 RX ADMIN — TRAZODONE HYDROCHLORIDE 200 MG: 100 TABLET ORAL at 21:05

## 2020-06-30 RX ADMIN — ROCURONIUM BROMIDE 10 MG: 10 INJECTION, SOLUTION INTRAVENOUS at 10:46

## 2020-06-30 RX ADMIN — MIDAZOLAM 1 MG: 1 INJECTION INTRAMUSCULAR; INTRAVENOUS at 09:11

## 2020-06-30 RX ADMIN — SODIUM CHLORIDE 100 ML/HR: 4.5 INJECTION, SOLUTION INTRAVENOUS at 15:43

## 2020-06-30 RX ADMIN — SUCCINYLCHOLINE CHLORIDE 120 MG: 20 INJECTION, SOLUTION INTRAMUSCULAR; INTRAVENOUS; PARENTERAL at 10:46

## 2020-06-30 RX ADMIN — FENTANYL CITRATE 50 MCG: 50 INJECTION, SOLUTION INTRAMUSCULAR; INTRAVENOUS at 11:16

## 2020-06-30 RX ADMIN — HYDROMORPHONE HYDROCHLORIDE 0.5 MG: 1 INJECTION, SOLUTION INTRAMUSCULAR; INTRAVENOUS; SUBCUTANEOUS at 13:33

## 2020-06-30 RX ADMIN — KETOROLAC TROMETHAMINE 15 MG: 30 INJECTION, SOLUTION INTRAMUSCULAR at 13:31

## 2020-06-30 RX ADMIN — ATORVASTATIN CALCIUM 80 MG: 20 TABLET, FILM COATED ORAL at 16:40

## 2020-06-30 RX ADMIN — HYDROMORPHONE HYDROCHLORIDE 0.5 MG: 1 INJECTION, SOLUTION INTRAMUSCULAR; INTRAVENOUS; SUBCUTANEOUS at 14:25

## 2020-06-30 RX ADMIN — OXYCODONE HYDROCHLORIDE AND ACETAMINOPHEN 2 TABLET: 5; 325 TABLET ORAL at 21:06

## 2020-06-30 RX ADMIN — ACETAMINOPHEN 1000 MG: 500 TABLET, FILM COATED ORAL at 08:22

## 2020-06-30 RX ADMIN — CEFAZOLIN SODIUM 2 G: 2 INJECTION, SOLUTION INTRAVENOUS at 10:54

## 2020-06-30 RX ADMIN — TRANEXAMIC ACID 1000 MG: 100 INJECTION, SOLUTION INTRAVENOUS at 11:03

## 2020-06-30 RX ADMIN — ONDANSETRON HYDROCHLORIDE 4 MG: 2 SOLUTION INTRAMUSCULAR; INTRAVENOUS at 12:05

## 2020-06-30 RX ADMIN — FENTANYL CITRATE 50 MCG: 50 INJECTION, SOLUTION INTRAMUSCULAR; INTRAVENOUS at 09:11

## 2020-06-30 RX ADMIN — CELECOXIB 200 MG: 200 CAPSULE ORAL at 08:22

## 2020-06-30 RX ADMIN — CEFAZOLIN SODIUM 2 G: 2 INJECTION, SOLUTION INTRAVENOUS at 12:14

## 2020-06-30 RX ADMIN — LIDOCAINE HYDROCHLORIDE 100 MG: 20 INJECTION, SOLUTION INFILTRATION; PERINEURAL at 10:46

## 2020-06-30 RX ADMIN — FAMOTIDINE 20 MG: 10 INJECTION, SOLUTION INTRAVENOUS at 08:38

## 2020-06-30 RX ADMIN — ASPIRIN 325 MG: 325 TABLET, COATED ORAL at 16:40

## 2020-06-30 RX ADMIN — SODIUM CHLORIDE, POTASSIUM CHLORIDE, SODIUM LACTATE AND CALCIUM CHLORIDE: 600; 310; 30; 20 INJECTION, SOLUTION INTRAVENOUS at 12:34

## 2020-06-30 RX ADMIN — FENTANYL CITRATE 50 MCG: 50 INJECTION, SOLUTION INTRAMUSCULAR; INTRAVENOUS at 13:26

## 2020-06-30 RX ADMIN — HYDROMORPHONE HYDROCHLORIDE 0.5 MG: 1 INJECTION, SOLUTION INTRAMUSCULAR; INTRAVENOUS; SUBCUTANEOUS at 12:50

## 2020-06-30 RX ADMIN — MUPIROCIN 1 APPLICATION: 20 OINTMENT TOPICAL at 21:05

## 2020-06-30 RX ADMIN — FENTANYL CITRATE 50 MCG: 50 INJECTION, SOLUTION INTRAMUSCULAR; INTRAVENOUS at 13:39

## 2020-06-30 RX ADMIN — METOPROLOL TARTRATE 2 MG: 5 INJECTION, SOLUTION INTRAVENOUS at 11:37

## 2020-06-30 RX ADMIN — DEXAMETHASONE SODIUM PHOSPHATE 8 MG: 10 INJECTION INTRAMUSCULAR; INTRAVENOUS at 11:05

## 2020-06-30 RX ADMIN — HYDROMORPHONE HYDROCHLORIDE 0.5 MG: 1 INJECTION, SOLUTION INTRAMUSCULAR; INTRAVENOUS; SUBCUTANEOUS at 13:02

## 2020-06-30 RX ADMIN — FENTANYL CITRATE 50 MCG: 50 INJECTION, SOLUTION INTRAMUSCULAR; INTRAVENOUS at 11:19

## 2020-06-30 RX ADMIN — FENTANYL CITRATE 50 MCG: 50 INJECTION, SOLUTION INTRAMUSCULAR; INTRAVENOUS at 11:13

## 2020-06-30 RX ADMIN — OXYCODONE HYDROCHLORIDE AND ACETAMINOPHEN 1 TABLET: 5; 325 TABLET ORAL at 15:43

## 2020-06-30 RX ADMIN — ROPIVACAINE HYDROCHLORIDE 30 ML: 5 INJECTION, SOLUTION EPIDURAL; INFILTRATION; PERINEURAL at 09:15

## 2020-06-30 RX ADMIN — FENTANYL CITRATE 100 MCG: 50 INJECTION, SOLUTION INTRAMUSCULAR; INTRAVENOUS at 10:46

## 2020-06-30 RX ADMIN — SODIUM CHLORIDE, PRESERVATIVE FREE 3 ML: 5 INJECTION INTRAVENOUS at 21:06

## 2020-06-30 RX ADMIN — CLINDAMYCIN IN 5 PERCENT DEXTROSE 900 MG: 18 INJECTION, SOLUTION INTRAVENOUS at 21:05

## 2020-06-30 RX ADMIN — METOPROLOL SUCCINATE 50 MG: 50 TABLET, EXTENDED RELEASE ORAL at 21:05

## 2020-06-30 RX ADMIN — ONDANSETRON 4 MG: 2 INJECTION INTRAMUSCULAR; INTRAVENOUS at 16:39

## 2020-06-30 RX ADMIN — PROPOFOL 25 MCG/KG/MIN: 10 INJECTION, EMULSION INTRAVENOUS at 10:52

## 2020-06-30 RX ADMIN — SODIUM CHLORIDE, POTASSIUM CHLORIDE, SODIUM LACTATE AND CALCIUM CHLORIDE 9 ML/HR: 600; 310; 30; 20 INJECTION, SOLUTION INTRAVENOUS at 08:38

## 2020-06-30 RX ADMIN — EPHEDRINE SULFATE 10 MG: 50 INJECTION INTRAVENOUS at 11:05

## 2020-06-30 RX ADMIN — PROPOFOL 200 MG: 10 INJECTION, EMULSION INTRAVENOUS at 10:46

## 2020-06-30 NOTE — ANESTHESIA PREPROCEDURE EVALUATION
Anesthesia Evaluation     Patient summary reviewed and Nursing notes reviewed   no history of anesthetic complications:  NPO Solid Status: > 8 hours  NPO Liquid Status: > 2 hours           Airway   Mallampati: II  TM distance: >3 FB  Neck ROM: full  Dental - normal exam     Pulmonary - normal exam    breath sounds clear to auscultation  (+) a smoker Former, sleep apnea on CPAP,   Cardiovascular - normal exam    Rhythm: regular  Rate: normal    (+) hypertension, valvular problems/murmurs MR, hyperlipidemia,   (-) angina, orthopnea, PND, RENTERIA      Neuro/Psych  (+) numbness (Sciatica),     GI/Hepatic/Renal/Endo - negative ROS     Musculoskeletal     Abdominal    Substance History - negative use     OB/GYN negative ob/gyn ROS         Other   arthritis,    history of cancer                      Anesthesia Plan    ASA 3     general and regional   (Adductor canal block for PO Pain)  intravenous induction     Anesthetic plan, all risks, benefits, and alternatives have been provided, discussed and informed consent has been obtained with: patient.

## 2020-06-30 NOTE — ANESTHESIA PROCEDURE NOTES
Peripheral Block      Patient reassessed immediately prior to procedure    Patient location during procedure: holding area  Start time: 6/30/2020 9:08 AM  Stop time: 6/30/2020 9:17 AM  Reason for block: at surgeon's request and post-op pain management  Performed by  Anesthesiologist: Malcolm Aranda MD  Preanesthetic Checklist  Completed: patient identified, site marked, surgical consent, pre-op evaluation, timeout performed, IV checked, risks and benefits discussed and monitors and equipment checked  Prep:  Pt Position: supine  Sterile barriers:cap, gloves and mask  Prep: ChloraPrep  Patient monitoring: blood pressure monitoring, continuous pulse oximetry and EKG  Procedure  Sedation:yes  Performed under: PNB  Guidance:ultrasound guided  ULTRASOUND INTERPRETATION.  Using ultrasound guidance a 21 G gauge needle was placed in close proximity to the femoral nerve, at which point, under ultrasound guidance anesthetic was injected in the area of the nerve and spread of the anesthesia was seen on ultrasound in close proximity thereto.  There were no abnormalities seen on ultrasound; a digital image was taken; and the patient tolerated the procedure with no complications. Images:still images obtained    Laterality:right  Block Type:adductor canal block  Injection Technique:single-shotNeedle Gauge:21 G      Medications Used: ropivacaine (NAROPIN) 0.5 % injection, 30 mL  Med admintered at 6/30/2020 9:15 AM      Post Assessment  Injection Assessment: negative aspiration for heme, no paresthesia on injection and incremental injection  Patient Tolerance:comfortable throughout block  Complications:no

## 2020-06-30 NOTE — ANESTHESIA PROCEDURE NOTES
Airway  Urgency: elective    Date/Time: 6/30/2020 10:48 AM  Airway not difficult    General Information and Staff    Patient location during procedure: OR  Anesthesiologist: Ez Bailey MD  CRNA: Arianna Wharton CRNA    Indications and Patient Condition  Indications for airway management: airway protection    Preoxygenated: yes  MILS not maintained throughout  Mask difficulty assessment: 1 - vent by mask    Final Airway Details  Final airway type: endotracheal airway      Successful airway: ETT  Cuffed: yes   Successful intubation technique: direct laryngoscopy  Facilitating devices/methods: cricoid pressure  Endotracheal tube insertion site: oral  Blade: Venita  ETT size (mm): 7.0  Cormack-Lehane Classification: grade IIa - partial view of glottis  Placement verified by: chest auscultation and capnometry   Measured from: lips  ETT/EBT  to lips (cm): 21  Number of attempts at approach: 1  Assessment: lips, teeth, and gum same as pre-op and atraumatic intubation    Additional Comments  Dentition intact and unchanged. CBEBS.  +ETCO2.

## 2020-06-30 NOTE — ANESTHESIA POSTPROCEDURE EVALUATION
Patient: Tran oSsa    Procedure Summary     Date:  06/30/20 Room / Location:  [facility] OR [address] [place] [address] MAIN OR    Anesthesia Start:  1041 Anesthesia Stop:  1243    Procedure:  TOTAL KNEE ARTHROPLASTY RIGHT (Right Knee) Diagnosis:      Surgeon:  Yaya Reina MD Provider:  Ez Bailey MD    Anesthesia Type:  general, regional ASA Status:  3          Anesthesia Type: general, regional    Vitals  Vitals Value Taken Time   /77 6/30/2020  2:00 PM   Temp 36.7 °C (98 °F) 6/30/2020 12:40 PM   Pulse 79 6/30/2020  2:13 PM   Resp 16 6/30/2020  2:00 PM   SpO2 90 % 6/30/2020  2:13 PM   Vitals shown include unvalidated device data.        Post Anesthesia Care and Evaluation    Patient location during evaluation: bedside  Pain management: adequate  Airway patency: patent  Anesthetic complications: No anesthetic complications    Cardiovascular status: acceptable  Respiratory status: acceptable  Hydration status: acceptable

## 2020-07-01 LAB
ANION GAP SERPL CALCULATED.3IONS-SCNC: 10 MMOL/L (ref 5–15)
BUN SERPL-MCNC: 20 MG/DL (ref 8–23)
BUN/CREAT SERPL: 22.7 (ref 7–25)
CALCIUM SPEC-SCNC: 8 MG/DL (ref 8.6–10.5)
CHLORIDE SERPL-SCNC: 98 MMOL/L (ref 98–107)
CO2 SERPL-SCNC: 21 MMOL/L (ref 22–29)
CREAT SERPL-MCNC: 0.88 MG/DL (ref 0.57–1)
DEPRECATED RDW RBC AUTO: 41.1 FL (ref 37–54)
ERYTHROCYTE [DISTWIDTH] IN BLOOD BY AUTOMATED COUNT: 12.3 % (ref 12.3–15.4)
GFR SERPL CREATININE-BSD FRML MDRD: 64 ML/MIN/1.73
GLUCOSE SERPL-MCNC: 119 MG/DL (ref 65–99)
HCT VFR BLD AUTO: 30.2 % (ref 34–46.6)
HGB BLD-MCNC: 10 G/DL (ref 12–15.9)
MCH RBC QN AUTO: 30.5 PG (ref 26.6–33)
MCHC RBC AUTO-ENTMCNC: 33.1 G/DL (ref 31.5–35.7)
MCV RBC AUTO: 92.1 FL (ref 79–97)
PLATELET # BLD AUTO: 193 10*3/MM3 (ref 140–450)
PMV BLD AUTO: 10.6 FL (ref 6–12)
POTASSIUM SERPL-SCNC: 4.4 MMOL/L (ref 3.5–5.2)
RBC # BLD AUTO: 3.28 10*6/MM3 (ref 3.77–5.28)
SODIUM SERPL-SCNC: 129 MMOL/L (ref 136–145)
WBC # BLD AUTO: 11.67 10*3/MM3 (ref 3.4–10.8)

## 2020-07-01 PROCEDURE — 25010000002 KETOROLAC TROMETHAMINE PER 15 MG: Performed by: ORTHOPAEDIC SURGERY

## 2020-07-01 PROCEDURE — 80048 BASIC METABOLIC PNL TOTAL CA: CPT | Performed by: ORTHOPAEDIC SURGERY

## 2020-07-01 PROCEDURE — 25010000002 ONDANSETRON PER 1 MG: Performed by: ORTHOPAEDIC SURGERY

## 2020-07-01 PROCEDURE — 85027 COMPLETE CBC AUTOMATED: CPT | Performed by: ORTHOPAEDIC SURGERY

## 2020-07-01 PROCEDURE — 97110 THERAPEUTIC EXERCISES: CPT

## 2020-07-01 RX ADMIN — OXYCODONE HYDROCHLORIDE AND ACETAMINOPHEN 2 TABLET: 5; 325 TABLET ORAL at 05:12

## 2020-07-01 RX ADMIN — SODIUM CHLORIDE, PRESERVATIVE FREE 3 ML: 5 INJECTION INTRAVENOUS at 22:26

## 2020-07-01 RX ADMIN — ATORVASTATIN CALCIUM 80 MG: 20 TABLET, FILM COATED ORAL at 18:15

## 2020-07-01 RX ADMIN — OXYCODONE HYDROCHLORIDE AND ACETAMINOPHEN 2 TABLET: 5; 325 TABLET ORAL at 09:00

## 2020-07-01 RX ADMIN — CLINDAMYCIN IN 5 PERCENT DEXTROSE 900 MG: 18 INJECTION, SOLUTION INTRAVENOUS at 05:12

## 2020-07-01 RX ADMIN — ASPIRIN 325 MG: 325 TABLET, COATED ORAL at 18:15

## 2020-07-01 RX ADMIN — KETOROLAC TROMETHAMINE 15 MG: 30 INJECTION, SOLUTION INTRAMUSCULAR at 22:25

## 2020-07-01 RX ADMIN — SODIUM CHLORIDE, PRESERVATIVE FREE 3 ML: 5 INJECTION INTRAVENOUS at 09:01

## 2020-07-01 RX ADMIN — MUPIROCIN 1 APPLICATION: 20 OINTMENT TOPICAL at 09:00

## 2020-07-01 RX ADMIN — OXYCODONE HYDROCHLORIDE AND ACETAMINOPHEN 2 TABLET: 5; 325 TABLET ORAL at 22:25

## 2020-07-01 RX ADMIN — OXYCODONE HYDROCHLORIDE AND ACETAMINOPHEN 2 TABLET: 5; 325 TABLET ORAL at 14:37

## 2020-07-01 RX ADMIN — KETOROLAC TROMETHAMINE 15 MG: 30 INJECTION, SOLUTION INTRAMUSCULAR at 01:29

## 2020-07-01 RX ADMIN — MUPIROCIN 1 APPLICATION: 20 OINTMENT TOPICAL at 22:25

## 2020-07-01 RX ADMIN — ASPIRIN 325 MG: 325 TABLET, COATED ORAL at 09:00

## 2020-07-01 RX ADMIN — FERROUS SULFATE TAB 325 MG (65 MG ELEMENTAL FE) 325 MG: 325 (65 FE) TAB at 09:00

## 2020-07-01 RX ADMIN — TRAZODONE HYDROCHLORIDE 200 MG: 100 TABLET ORAL at 22:26

## 2020-07-01 RX ADMIN — OXYCODONE HYDROCHLORIDE AND ACETAMINOPHEN 2 TABLET: 5; 325 TABLET ORAL at 01:29

## 2020-07-01 RX ADMIN — TRIAMTERENE AND HYDROCHLOROTHIAZIDE 1 TABLET: 37.5; 25 TABLET ORAL at 14:37

## 2020-07-01 RX ADMIN — OXYCODONE HYDROCHLORIDE AND ACETAMINOPHEN 2 TABLET: 5; 325 TABLET ORAL at 18:15

## 2020-07-01 RX ADMIN — ONDANSETRON 4 MG: 2 INJECTION INTRAMUSCULAR; INTRAVENOUS at 01:29

## 2020-07-01 NOTE — PLAN OF CARE
Problem: Patient Care Overview  Goal: Plan of Care Review  Outcome: Ongoing (interventions implemented as appropriate)  Flowsheets  Taken 7/1/2020 1548 by Veronica Singh, RN  Progress: improving  Outcome Summary: pt POD 1 RTK. Dressing changed. CDI.NVI. Pain controlled with PO pain medication. pt ambulated in ulrich. did stairs with PT. Requested to stay 1 more day. to D/C home with HH in am. Educated on the importance of BP monitoring and the use of medications as ordered for HO HTN. Verbalized understanding. will cont to monitor.  Taken 7/1/2020 1159 by Milly Hahn, PT  Plan of Care Reviewed With: patient

## 2020-07-01 NOTE — PROGRESS NOTES
Discharge Planning Assessment  [facility]     Patient Name: Tran Sosa  MRN: [id number]  Today's Date: 7/1/2020    Admit Date: 6/30/2020    Discharge Needs Assessment     Row Name 07/01/20 1127       Living Environment    Lives With  alone    Current Living Arrangements  home/apartment/condo    Primary Care Provided by  self    Provides Primary Care For  no one    Family Caregiver if Needed  child(melissa), adult    Family Caregiver Names  Concetta (daughter)    Quality of Family Relationships  helpful;involved;supportive    Able to Return to Prior Arrangements  yes       Resource/Environmental Concerns    Resource/Environmental Concerns  home accessibility    Home Accessibility Concerns  stairs to enter home;stairs to access bedroom or bathroom    Transportation Concerns  car, none       Transition Planning    Patient/Family Anticipates Transition to  home with help/services    Patient/Family Anticipated Services at Transition  rehabilitation services    Transportation Anticipated  family or friend will provide       Discharge Needs Assessment    Readmission Within the Last 30 Days  no previous admission in last 30 days    Concerns to be Addressed  discharge planning    Equipment Currently Used at Home  commode;walker, standard;bipap/cpap;cane, straight    Anticipated Changes Related to Illness  none    Equipment Needed After Discharge  none        Discharge Plan     Row Name 07/01/20 1128       Plan    Plan  Home with [facility]    Patient/Family in Agreement with Plan  yes    Plan Comments  Per care plan from LOC, patient will recieve services with [facility] at [place]. Patient has all equipment needed at home from prior surgeries. She has family and friends that will be staying with her because she lives alone and has numerous stairs to navigate. Patient is enrolled in meds to bed. Denies having any needs at this time. Referall in [facility] for [facility]. Family will provide transportation home. Ayiana Hammond RN              Home  Medical Care - Selection Complete      Service Provider Request Status Selected Services Address Phone Number Fax Number    [facility] Selected Home Health Services [address] [address] [phone] --          Demographic Summary     Row Name 07/01/20 1126       General Information    Admission Type  inpatient    Referral Source  admission list    Reason for Consult  discharge planning    Preferred Language  English       Contact Information    Permission Granted to Share Info With  family/designee        Functional Status     Row Name 07/01/20 1126       Functional Status    Usual Activity Tolerance  good    Current Activity Tolerance  good       Functional Status, IADL    Medications  independent    Meal Preparation  independent    Housekeeping  independent    Laundry  independent    Shopping  independent       Mental Status    General Appearance WDL  WDL       Mental Status Summary    Recent Changes in Mental Status/Cognitive Functioning  no changes        Psychosocial     Row Name 07/01/20 1126       Emotion Mood WDL    Emotion/Mood/Affect WDL  WDL       Speech WDL    Speech WDL  WDL       Perceptual State WDL    Perceptual State WDL  WDL       Thought Process WDL    Thought Process WDL  WDL       Intellectual Performance WDL    Intellectual Performance WDL  WDL    Level of Consciousness  Alert       Coping/Stress    Major Change/Loss/Stressor  none    Patient Personal Strengths  able to adapt    Sources of Support  adult child(melissa);friend(s)    Techniques to [place] with Loss/Stress/Change  counseling    Reaction to Health Status  accepting    Understanding of Condition and Treatment  adequate understanding of medical condition;adequate understanding of treatment       Developmental Stage (Eriksson's)    Developmental Stage  Stage 8 (65 years-death/Late Adulthood) Integrity vs. Despair        Abuse/Neglect     Row Name 07/01/20 1127       Personal Safety    Feels Unsafe at Home or  Work/School  no    Feels Threatened by Someone  no    Does Anyone Try to Keep You From Having Contact with Others or Doing Things Outside Your Home?  no    Physical Signs of Abuse Present  no        Aiyana Hammond RN

## 2020-07-01 NOTE — PROGRESS NOTES
Orthopaedic Surgery  Progress Note  7/1/2020    Patients Name:  Tran Sosa  YOB: 1950  Age: 69 y.o.  Medical Records Number:  [id number]  Date of Admission: 6/30/2020    No complaints except pain    Vitals:  Vitals:    06/30/20 1620 06/30/20 1957 06/30/20 2318 07/01/20 0312   BP: 118/65 109/84 111/59 111/58   BP Location: Left arm Left arm Left arm Left arm   Patient Position: Lying Lying Lying Lying   Pulse: 72 78 84 72   Resp: 16 16 16 16   Temp:  97.8 °F (36.6 °C) 97.5 °F (36.4 °C) 96.8 °F (36 °C)   TempSrc:  Skin Skin Skin   SpO2: 95% 92% 96% 93%   Weight:       Height:           RLE:  NVI, calf nontender, sensation intact  No signs of DVT    Incision: clean, no signs of infection    Lab Results (last 24 hours)     Procedure Component Value Units Date/Time    CBC (No Diff) [[id number]]  (Abnormal) Collected:  07/01/20 0444    Specimen:  Blood Updated:  07/01/20 0544     WBC 11.67 10*3/mm3      RBC 3.28 10*6/mm3      Hemoglobin 10.0 g/dL      Hematocrit 30.2 %      MCV 92.1 fL      MCH 30.5 pg      MCHC 33.1 g/dL      RDW 12.3 %      RDW-SD 41.1 fl      MPV 10.6 fL      Platelets 193 10*3/mm3     Basic Metabolic Panel [[id number]] Collected:  07/01/20 0444    Specimen:  Blood Updated:  07/01/20 0534          Xr Chest 2 View    Result Date: 6/23/2020  Narrative: XR CHEST 2 VW-, XR KNEE 1 OR 2 VW RIGHT-  HISTORY: 69-year-old female. Preoperative evaluation.  FINDINGS:  CHEST: There are no new airspace opacities. There are no effusions or evidence for CHF. No acute abnormality is seen.  RIGHT KNEE: There are moderately advanced osteoarthritic changes predominantly at the lateral tibiofemoral compartment. There is no joint effusion or acute abnormality.  This report was finalized on 6/23/2020 12:51 PM by Dr. Sailaja Hammer M.D.      Xr Knee 1 Or 2 View Right    Result Date: 6/30/2020  Narrative: XR KNEE 1 OR 2 VW RIGHT-  INDICATIONS: Postoperative evaluation.  TECHNIQUE: Frontal and lateral views of  the right knee  COMPARISON: 06/23/2020  FINDINGS:   Intact appearing knee arthroplasty hardware is seen with adjacent surgical soft tissue gas, drain, overlying skin staples. No acute fracture is identified.       Impression:  Postsurgical changes.    This report was finalized on 6/30/2020 1:07 PM by Dr. Rigoberto Ford M.D.      Xr Knee 1 Or 2 View Right    Result Date: 6/23/2020  Narrative: XR CHEST 2 VW-, XR KNEE 1 OR 2 VW RIGHT-  HISTORY: 69-year-old female. Preoperative evaluation.  FINDINGS:  CHEST: There are no new airspace opacities. There are no effusions or evidence for CHF. No acute abnormality is seen.  RIGHT KNEE: There are moderately advanced osteoarthritic changes predominantly at the lateral tibiofemoral compartment. There is no joint effusion or acute abnormality.  This report was finalized on 6/23/2020 12:51 PM by Dr. Sailaja Hammer M.D.        Assesment/Plan:    Procedures:  Right TKA  Postoperative Day: 1  Weightbearing Status:  WBAT with walker  DVT Prophylaxis:  ASA for DVT prophylaxis    Disposition:  Home with home health after PT tomorrow, if comfortable and mobilizing safely    Yaya Tapia  [facility]  [address] [place] [address]  [phone]    7/1/2020

## 2020-07-01 NOTE — THERAPY TREATMENT NOTE
Patient Name: Tran Sosa  : 1950    MRN: [id number]                              Today's Date: 2020       Admit Date: 2020    Visit Dx:     ICD-10-CM ICD-9-CM   1. Primary osteoarthritis of right knee M17.11 715.16     Patient Active Problem List   Diagnosis   • Atopic rhinitis   • Arthralgia of multiple joints   • Hyperlipidemia   • Hypertension   • Osteopenia   • PAT (paroxysmal atrial tachycardia) (CMS/HCC)   • Sinus tachycardia   • SHAI (obstructive sleep apnea)   • OA (osteoarthritis) of hip   • Primary osteoarthritis of right knee     Past Medical History:   Diagnosis Date   • Arthralgia of multiple sites    • Arthritis     OSTEO   • Cancer (CMS/HCC)     RIGHT BREAST CANCER. WITH CHEMO   • Diverticulosis    • Elevated TSH    • GERD (gastroesophageal reflux disease)    • History of breast cancer 1984    RIGHT    • History of bronchitis    • History of pneumonia    • Hyperlipidemia    • Hypertension    • Insomnia    • Junctional rhythm     ONE EPISODE   • Lung nodule     BIOPSY BENIGN   • Mitral regurgitation     Mild per 2-D echocardiogram 2017   • Osteopenia    • Osteoporosis    • PAC (premature atrial contraction)     WITH SOME SVT IN THE PAST. REASON FOR METOPROLOL   • Postmenopausal status    • Reactive airway disease    • Recurrent urinary tract infection     RESOLVED   • Rib fracture    • Right knee pain     TORN MENSICUS   • Seasonal allergies    • Sleep apnea    • Uterine leiomyoma     HYST     Past Surgical History:   Procedure Laterality Date   • CARDIAC CATHETERIZATION       at [facility]; told normal coronary arteries    •  SECTION     • COLONOSCOPY     • HAMMER TOE REPAIR Right 2016    Procedure: MULTIPLE RT CLAW TOE REPAIR WITH WEIL OSTEOTOMY; RIGHT GREAT TOE AKIN OSTEOTOMY.;  Surgeon: Rigoberto Ba MD;  Location: [facility] OR [facility];  Service:    • HYSTERECTOMY      W/BSO   • JOINT REPLACEMENT     • LUNG SURGERY      WEDGE RESECTION? RIGHT ?LOBE.  BENIGN PULM NODULE   • MASTECTOMY Left     PROPHYLACTICALLY   • MASTECTOMY PARTIAL WITH AXILLARY LYMPH NODE EXCISION Right 1984    MALIGNANT   • ORIF WRIST FRACTURE Right     HARDWARE SINCE REMOVED   • ORIF WRIST FRACTURE Left     WITH HARDWARE   • TONSILLECTOMY     • TOTAL HIP ARTHROPLASTY Left 8/29/2017    Procedure: LT TOTAL HIP ARTHROPLASTY;  Surgeon: Yaya Reina MD;  Location: [facility];  Service:    • TOTAL KNEE ARTHROPLASTY Right 6/30/2020    Procedure: TOTAL KNEE ARTHROPLASTY RIGHT;  Surgeon: Yaya Reina MD;  Location: [facility] OR;  Service: Orthopedics;  Laterality: Right;   • TRAM FLAP DELAYED     • UTERINE ARTERY EMBOLIZATION       General Information     Row Name 07/01/20 1155          PT Evaluation Time/Intention    Document Type  therapy note (daily note)  -     Mode of Treatment  physical therapy;individual therapy  -     Row Name 07/01/20 1155          General Information    Existing Precautions/Restrictions  fall  -     Row Name 07/01/20 1155          Cognitive Assessment/Intervention- PT/OT    Orientation Status (Cognition)  oriented x 3  -     Row Name 07/01/20 1155          Safety Issues, Functional Mobility    Impairments Affecting Function (Mobility)  pain;range of motion (ROM);strength  -       User Key  (r) = Recorded By, (t) = Taken By, (c) = Cosigned By    Initials Name Provider Type     Milly Hahn, PT Physical Therapist        Mobility     Row Name 07/01/20 1156          Bed Mobility Assessment/Treatment    Bed Mobility Assessment/Treatment  supine-sit;sit-supine  -     Supine-Sit [place] (Bed Mobility)  not tested  -     Sit-Supine [place] (Bed Mobility)  supervision  -     Row Name 07/01/20 1156          Sit-Stand Transfer    Sit-Stand [place] (Transfers)  stand by assist  -     Assistive Device (Sit-Stand Transfers)  walker, front-wheeled  -     Row Name 07/01/20 1156          Gait/Stairs Assessment/Training    [place]  Level (Gait)  stand by assist  -     Assistive Device (Gait)  walker, front-wheeled  -     Distance in Feet (Gait)  200  -CH     Pattern (Gait)  step-to  -     Deviations/Abnormal Patterns (Gait)  antalgic;elvira decreased  -     [place] Level (Stairs)  verbal cues;nonverbal cues (demo/gesture);contact guard  -     Handrail Location (Stairs)  right side (ascending)  -     Number of Steps (Stairs)  4 steps x2  -     Ascending Technique (Stairs)  step-to-step  -     Descending Technique (Stairs)  step-to-step  -       User Key  (r) = Recorded By, (t) = Taken By, (c) = Cosigned By    Initials Name Provider Type     Milly Hahn PT Physical Therapist        Obj/Interventions     [facility] Name 07/01/20 1159          General ROM    GENERAL ROM COMMENTS  R knee 4-90 degrees  -[facility] Name 07/01/20 1159          Therapeutic Exercise    Comment (Therapeutic Exercise)  20 rpes R TKA protocol  -       User Key  (r) = Recorded By, (t) = Taken By, (c) = Cosigned By    Initials Name Provider Type     Milly Hahn, PT Physical Therapist        Goals/Plan    No documentation.       Clinical Impression     [facility] Name 07/01/20 1159          Pain Assessment    Additional Documentation  Pain Scale: Numbers Pre/Post-Treatment (Group)  -CH     Row Name 07/01/20 7525          Pain Scale: Numbers Pre/Post-Treatment    Pain Scale: Numbers, Pretreatment  7/10  -     Pain Scale: Numbers, Post-Treatment  8/10  -CH     Pain Location - Side  Left  -     Pain Location  knee  -CH     Row Name 07/01/20 2993          Plan of Care Review    Plan of Care Reviewed With  patient  -     Outcome Summary  Pt demonstrates increased activity tolerance and functional strength as she was able to increase her gait distance and required less assistance. Pt was able to navigate stairs safely and demonstrated understanding of exercises. Pt states she is not discharging until tomorrow. Pt encouraged to walk with nsg this  PM. PT will follow up tomorrow to address any further discharge needs.  -     Row Name 07/01/20 1159          Positioning and Restraints    Pre-Treatment Position  sitting in chair/recliner  -CH     Post Treatment Position  bed  -CH     In Bed  supine;notified nsg;call light within reach;encouraged to call for assist;exit alarm on  -       User Key  (r) = Recorded By, (t) = Taken By, (c) = Cosigned By    Initials Name Provider Type    Milly Steve PT Physical Therapist        Outcome Measures     Row Name 07/01/20 1203          How much help from another person do you currently need...    Turning from your back to your side while in flat bed without using bedrails?  4  -CH     Moving from lying on back to sitting on the side of a flat bed without bedrails?  4  -CH     Moving to and from a bed to a chair (including a wheelchair)?  3  -CH     Standing up from a chair using your arms (e.g., wheelchair, bedside chair)?  3  -CH     Climbing 3-5 steps with a railing?  3  -CH     To walk in hospital room?  3  -CH     AM-PAC 6 Clicks Score (PT)  20  -CH       User Key  (r) = Recorded By, (t) = Taken By, (c) = Cosigned By    Initials Name Provider Type    Milly Steve PT Physical Therapist        Physical Therapy Education                 Title: PT OT SLP Therapies (Done)     Topic: Physical Therapy (Done)     Point: Mobility training (Done)     Description:   Instruct learner(s) on safety and technique for assisting patient out of bed, chair or wheelchair.  Instruct in the proper use of assistive devices, such as walker, crutches, cane or brace.              Patient Friendly Description:   It's important to get you on your feet again, but we need to do so in a way that is safe for you. Falling has serious consequences, and your personal safety is the most important thing of all.        When it's time to get out of bed, one of us or a family member will sit next to you on the bed to give you support.      If your doctor or nurse tells you to use a walker, crutches, a cane, or a brace, be sure you use it every time you get out of bed, even if you think you don't need it.    Learning Progress Summary           Patient Acceptance, E,TB,D, VU,NR by  at 7/1/2020 1203    Acceptance, E,D, VU,NR by MS at 6/30/2020 1732                   Point: Home exercise program (Done)     Description:   Instruct learner(s) on appropriate technique for monitoring, assisting and/or progressing patient with therapeutic exercises and activities.              Learning Progress Summary           Patient Acceptance, E,TB,D, VU,NR by  at 7/1/2020 1203    Acceptance, E,D, VU,NR by MS at 6/30/2020 1732                   Point: Body mechanics (Done)     Description:   Instruct learner(s) on proper positioning and spine alignment for patient and/or caregiver during mobility tasks and/or exercises.              Learning Progress Summary           Patient Acceptance, E,TB,D, VU,NR by  at 7/1/2020 1203    Acceptance, E,D, VU,NR by MS at 6/30/2020 1732                   Point: Precautions (Done)     Description:   Instruct learner(s) on prescribed precautions during mobility and gait tasks              Learning Progress Summary           Patient Acceptance, E,TB,D, VU,NR by  at 7/1/2020 1203    Acceptance, E,D, VU,NR by MS at 6/30/2020 1732                               User Key     Initials Effective Dates Name Provider Type Discipline     04/03/18 -  Milly Hahn, PT Physical Therapist PT    MS 04/03/18 -  Vishal Mcgarry, PT Physical Therapist PT              PT Recommendation and Plan     Outcome Summary/Treatment Plan (PT)  Anticipated Discharge Disposition (PT): home with assist, home with home health  Plan of Care Reviewed With: patient  Outcome Summary: Pt demonstrates increased activity tolerance and functional strength as she was able to increase her gait distance and required less assistance. Pt was able to navigate  stairs safely and demonstrated understanding of exercises. Pt states she is not discharging until tomorrow. Pt encouraged to walk with nsg this PM. PT will follow up tomorrow to address any further discharge needs.     Time Calculation:   PT Charges     Row Name 07/01/20 1132             Time Calculation    Start Time  0933  -      Stop Time  0952  -      Time Calculation (min)  19 min  -      PT Received On  07/01/20  -      PT - Next Appointment  07/02/20  -         Time Calculation- PT    Total Timed Code Minutes- PT  17 minute(s)  -        User Key  (r) = Recorded By, (t) = Taken By, (c) = Cosigned By    Initials Name Provider Type     Milly Hahn, PT Physical Therapist        Therapy Charges for Today     Code Description Service Date Service Provider Modifiers Qty    68843088074 HC PT THER PROC EA 15 MIN 7/1/2020 Milly Hahn, PT GP 1          PT G-Codes  Outcome Measure Options: AM-PAC 6 Clicks Basic Mobility (PT)  AM-PAC 6 Clicks Score (PT): 20    Milly Hahn PT  7/1/2020

## 2020-07-01 NOTE — PLAN OF CARE
Problem: Patient Care Overview  Goal: Plan of Care Review  Outcome: Ongoing (interventions implemented as appropriate)  Flowsheets (Taken 7/1/2020 0225)  Progress: improving  Plan of Care Reviewed With: patient  Outcome Summary: POD 0 RTKA. VSS, pain well controlled with prn percocet and toradol, postop nausea alleviated with prn zofran - no emesis reported. ambulates well with walker, assist x1, and gait belt. d/c plans for home when ready, client states that she has multiple stairs to navigate at home. discussed BP monitoring r/t hx HTN. will continue to monitor.  Goal: Individualization and Mutuality  Outcome: Ongoing (interventions implemented as appropriate)  Goal: Discharge Needs Assessment  Outcome: Ongoing (interventions implemented as appropriate)  Goal: Interprofessional Rounds/Family Conf  Outcome: Ongoing (interventions implemented as appropriate)     Problem: Pain, Chronic (Adult)  Goal: Acceptable Pain/Comfort Level and Functional Ability  Outcome: Ongoing (interventions implemented as appropriate)  Flowsheets (Taken 7/1/2020 0225)  Acceptable Pain/Comfort Level and Functional Ability: making progress toward outcome     Problem: Fall Risk (Adult)  Goal: Absence of Fall  Outcome: Ongoing (interventions implemented as appropriate)  Flowsheets (Taken 7/1/2020 0225)  Absence of Fall: achieves outcome     Problem: Knee Arthroplasty (Total, Partial) (Adult)  Goal: Signs and Symptoms of Listed Potential Problems Will be Absent, Minimized or Managed (Knee Arthroplasty)  Outcome: Ongoing (interventions implemented as appropriate)  Flowsheets (Taken 7/1/2020 0225)  Problems Assessed (Knee Arthroplasty): all  Problems Present (Knee Arthroplasty): pain; range of motion decreased  Goal: Anesthesia/Sedation Recovery  Outcome: Ongoing (interventions implemented as appropriate)  Flowsheets (Taken 7/1/2020 0225)  Anesthesia/Sedation Recovery: recovered to baseline; criteria met for discharge

## 2020-07-01 NOTE — PLAN OF CARE
Problem: Patient Care Overview  Goal: Plan of Care Review  Flowsheets (Taken 7/1/2020 3789)  Plan of Care Reviewed With: patient  Outcome Summary: Pt demonstrates increased activity tolerance and functional strength as she was able to increase her gait distance and required less assistance. Pt was able to navigate stairs safely and demonstrated understanding of exercises. Pt states she is not discharging until tomorrow. Pt encouraged to walk with nsg this PM. PT will follow up tomorrow to address any further discharge needs.  Note:   Patient was wearing a face mask during this therapy encounter. Therapist used appropriate personal protective equipment including mask and gloves.  Mask used was standard procedure mask. Appropriate PPE was worn during the entire therapy session. Hand hygiene was completed before and after therapy session. Patient is not in enhanced droplet precautions.

## 2020-07-02 ENCOUNTER — READMISSION MANAGEMENT (OUTPATIENT)
Dept: CALL CENTER | Facility: HOSPITAL | Age: 70
End: 2020-07-02

## 2020-07-02 VITALS
DIASTOLIC BLOOD PRESSURE: 78 MMHG | WEIGHT: 221.7 LBS | SYSTOLIC BLOOD PRESSURE: 136 MMHG | HEART RATE: 74 BPM | BODY MASS INDEX: 30.03 KG/M2 | OXYGEN SATURATION: 97 % | RESPIRATION RATE: 18 BRPM | TEMPERATURE: 98.2 F | HEIGHT: 72 IN

## 2020-07-02 PROCEDURE — 97110 THERAPEUTIC EXERCISES: CPT

## 2020-07-02 RX ORDER — HYDROCODONE BITARTRATE AND ACETAMINOPHEN 5; 325 MG/1; MG/1
2 TABLET ORAL EVERY 4 HOURS PRN
Status: DISCONTINUED | OUTPATIENT
Start: 2020-07-02 | End: 2020-07-02 | Stop reason: HOSPADM

## 2020-07-02 RX ORDER — ONDANSETRON 4 MG/1
4 TABLET, FILM COATED ORAL EVERY 6 HOURS PRN
Qty: 40 TABLET | Refills: 1 | Status: SHIPPED | OUTPATIENT
Start: 2020-07-02 | End: 2020-09-29

## 2020-07-02 RX ORDER — HYDROCODONE BITARTRATE AND ACETAMINOPHEN 5; 325 MG/1; MG/1
1-2 TABLET ORAL EVERY 4 HOURS PRN
Qty: 84 TABLET | Refills: 0 | Status: ON HOLD | OUTPATIENT
Start: 2020-07-02 | End: 2020-07-14

## 2020-07-02 RX ORDER — PSEUDOEPHEDRINE HCL 30 MG
1 TABLET ORAL 2 TIMES DAILY PRN
Qty: 40 EACH | Refills: 1 | Status: SHIPPED | OUTPATIENT
Start: 2020-07-02

## 2020-07-02 RX ORDER — HYDROCODONE BITARTRATE AND ACETAMINOPHEN 5; 325 MG/1; MG/1
1 TABLET ORAL EVERY 4 HOURS PRN
Status: DISCONTINUED | OUTPATIENT
Start: 2020-07-02 | End: 2020-07-02 | Stop reason: HOSPADM

## 2020-07-02 RX ADMIN — ASPIRIN 325 MG: 325 TABLET, COATED ORAL at 08:27

## 2020-07-02 RX ADMIN — TRIAMTERENE AND HYDROCHLOROTHIAZIDE 1 TABLET: 37.5; 25 TABLET ORAL at 11:03

## 2020-07-02 RX ADMIN — FERROUS SULFATE TAB 325 MG (65 MG ELEMENTAL FE) 325 MG: 325 (65 FE) TAB at 08:27

## 2020-07-02 RX ADMIN — HYDROCODONE BITARTRATE AND ACETAMINOPHEN 2 TABLET: 5; 325 TABLET ORAL at 11:03

## 2020-07-02 RX ADMIN — OXYCODONE HYDROCHLORIDE AND ACETAMINOPHEN 2 TABLET: 5; 325 TABLET ORAL at 06:09

## 2020-07-02 RX ADMIN — OXYCODONE HYDROCHLORIDE AND ACETAMINOPHEN 2 TABLET: 5; 325 TABLET ORAL at 02:16

## 2020-07-02 RX ADMIN — MUPIROCIN 1 APPLICATION: 20 OINTMENT TOPICAL at 08:27

## 2020-07-02 NOTE — DISCHARGE SUMMARY
Orthopaedic Surgery Discharge Summary    Patient Name:  Tran Sosa  YOB: 1950  Age: 69 y.o.  Medical Records Number:  [id number]    Date of Admission:  6/30/2020  Date of Discharge:  7/2/20    Primary Discharge Diagnosis:  Primary osteoarthritis of right knee [M17.11]    Secondary Discharge Diagnosis:    Problem List Items Addressed This Visit        Musculoskeletal and Integument    Primary osteoarthritis of right knee - Primary    Relevant Medications    oxyCODONE-acetaminophen (PERCOCET) 5-325 MG per tablet          Procedures Performed:  Right Total Knee Arthroplasty      Hospital Course:    Tran Sosa is a 69 y.o.  female who underwent successful right tka on 6/30/2020.  Tran Sosa was started on Aspirin 325 mg po twice daily immediately post-operatively for DVT prophylaxis.  On post-op day 1 the patients dressing was changed and their incision was clean, with no signs of infection and their calf was soft, with no signs of DVT.  The patient progressed well with physical therapy and the patients hemoglobin remained stable. On post-operative day 2 the patient was felt ready for discharge.     Vitals:  Vitals:    07/01/20 1822 07/01/20 2228 07/02/20 0219 07/02/20 0700   BP: 95/58 90/46 99/55 90/52   BP Location: Left arm Left arm Left arm Left arm   Patient Position: Lying Lying Lying Lying   Pulse: 68 74 76 75   Resp: 16 16 16 18   Temp: 97.3 °F (36.3 °C) 97.7 °F (36.5 °C) 97.8 °F (36.6 °C) 98 °F (36.7 °C)   TempSrc: Skin Skin Skin Skin   SpO2: 95% 94% 95% 91%   Weight:       Height:           Discharge Medications:      Discharge Medications      New Medications      Instructions Start Date   oxyCODONE-acetaminophen 5-325 MG per tablet  Commonly known as:  PERCOCET   1-2 tablets, Oral, Every 4 Hours PRN         ASK your doctor about these medications      Instructions Start Date   atorvastatin 80 MG tablet  Commonly known as:  LIPITOR   80 mg, Oral, Daily      Chlorhexidine  Gluconate 2 % pads   Apply externally, AS DIRECTED       meloxicam 15 MG tablet  Commonly known as:  MOBIC   15 mg, Oral, Daily, OK TO TAKE PER MD.NOT NEEDED TO STOP      metoprolol succinate XL 50 MG 24 hr tablet  Commonly known as:  TOPROL-XL   50 mg, Oral, Daily      mupirocin 2 % ointment  Commonly known as:  BACTROBAN   Topical, 2 Times Daily, TO USE AS DIRECTED PREOP       traZODone 100 MG tablet  Commonly known as:  DESYREL   200 mg, Oral, Nightly      triamterene-hydrochlorothiazide 37.5-25 MG per tablet  Commonly known as:  MAXZIDE-25   1 tablet, Oral, Every Morning             Pain Medications:  Norco 5/325mg 1-2 po q 4-6 hours prn pain    DVT Prophylaxis:  Enteric Coated Aspirin 325 mg po twice daily for 2 weeks, then one daily for 4 weeks      Total Knee Joint Replacement Discharge Instructions:    I. ACTIVITIES:  1. Exercises:  ? Complete exercise program as taught by the hospital physical therapist 2 times per day  ? Exercise program will be advanced by the physical therapist  ? During the day be up ambulating every 2 hours (while awake) for short distances  ? Complete the ankle pump exercises at least 10 times per hour (while awake)  ? Elevate legs most of the day the first week post operatively and thereafter elevate legs when in bed and for at least 30 minutes during the day. Caution must be taken to avoid pillow placement under the bend of the knee as this can led to flexion contractures of the knee.  ? Use cold packs 20-30 minutes approximately 5 times per day. This should be done before and after completing your exercises and at any time you are experiencing pain/ stiffness in your operative extremity.      2. Activities of Daily Living:  ? No tub baths, hot tubs, or swimming pools for 4 weeks  ? May shower and let water run over the incision on post-operative day #5 if no drainage. Do not scrub or rub the incision. Simply let the water run over the incision and pat dry.    II. Restrictions  ? Do  not cross legs or kneel  ? Your surgeon will discuss with you when you will be able to drive again.  ? Weight bearing is as tolerated  ? First week stay inside on even terrain. May go up and down stairs one stair at a time utilizing the hand rail  ? After one week, you may venture outside.    III. Precautions:  ? Everyone that comes near you should wash their hands  ? No elective dental, genital-urinary, or colon procedures or surgical procedures for 12 weeks after surgery unless absolutely necessary.  ?  If dental work or surgical procedure is deemed absolutely necessary, you will need to contact your surgeon as you will need to take antibiotics 1 hour prior to any dental work (including teeth cleanings).  ? Please discuss with your surgeon prophylactic antibiotics as the length of time this intervention will be necessary for you varies with each patient’s health history and situation.  ? Avoid sick people. If you must be around someone who is ill, they should wear a mask.  ? Avoid visits to the Emergency Room or Urgent Care unless you are having a life threatening event.   ? If ordered stockings are to be placed on in the morning and removed at night. Monitor the stockings to ensure that any swelling is not causing the stockings to become too tight. In this case, remove stockings immediately.    IV. INCISION CARE:  ? Wash your hands prior to dressing changes  ? Change the dressing as needed to keep incision clean and dry. Utilize dry gauze and paper tape. Avoid touching the side of the gauze that goes against the incision with your hands.  ? No creams or ointments to the incision  ? May remove dressing once the incision is free of drainage  ? Do not touch or pick at the incision  ? Check incision every day and notify surgeon immediately if any of the following signs or symptoms are noted:  o Increase in redness  o Increase in swelling around the incision and of the entire extremity  o Increase in pain  o Drainage  oozing from the incision  o Pulling apart of the edges of the incision  o Increase in overall body temperature (greater than 100.5 degrees)  ? Your surgeon will instruct you regarding suture or staple removal    V. Medications:   1. Anticoagulants: You will be discharged on an anticoagulant. This is a prophylactic medication that helps prevent blood clots during your post-operative period. The type and length of dosage varies based on your individual needs, procedure performed, and surgeon’s preference.  ? While taking the anticoagulant, you should avoid taking any additional aspirin, ibuprofen (Advil or Motrin), Aleve (Naprosyn) or other non-steroidal anti-inflammatory medications.   ? Notify surgeon immediately if any jnoah bleeding is noted in the urine, stool, emesis, or from the nose or the incision. Blood in the stool will often appear as black rather than red. Blood in urine may appear as pink. Blood in emesis may appear as brown/black like coffee grounds.  ? You will need to apply pressure for longer periods of time to any cuts or abrasions to stop bleeding  ? Avoid alcohol while taking anticoagulants    2. Stool Softeners: You will be at greater risk of constipation after surgery due to being less mobile and the pain medications.   ? Take stool softeners as instructed by your surgeon while on pain medications. Over the counter Colace 100 mg 1-2 capsules twice daily.   ? If stools become too loose or too frequent, please decreases the dosage or stop the stool softener.  ? If constipation occurs despite use of stool softeners, you are to continue the stool softeners and add a laxative (Milk of Magnesia 1 ounce daily as needed)  ? Drink plenty of fluids, and eat fruits and vegetables during your recovery time    3. Pain Medications utilized after surgery are narcotics and the law requires that the following information be given to all patients that are prescribed narcotics:  ? CLASSIFICATION: Pain medications  are called Opioids and are narcotics  ? LEGALITIES: It is illegal to share narcotics with others and to drive within 24 hours of taking narcotics  ? POTENTIAL SIDE EFFECTS: Potential side effects of opioids include: nausea, vomiting, itching, dizziness, drowsiness, dry mouth, constipation, and difficulty urinating.  ? POTENTIAL ADVERSE EFFECTS:   o Opioid tolerance can develop with use of pain medications and this simply means that it requires more and more of the medication to control pain; however, this is seen more in patients that use opioids for longer periods of time.  o Opioid dependence can develop with use of Opioids and this simply means that to stop the medication can cause withdrawal symptoms; however, this is seen with patients that use Opioids for longer periods of time.  o Opioid addiction can develop with use of Opioids and the incidence of this is very unlikely in patients who take the medications as ordered and stop the medications as instructed.  o Opioid overdose can be dangerous, but is unlikely when the medication is taken as ordered and stopped when ordered. It is important not to mix opioids with alcohol or with and type of sedative such as Benadryl as this can lead to over sedation and respiratory difficulty.  ? DOSAGE:   o Pain medications will need to be taken consistently for the first week to decrease pain and promote adequate pain relief and participation in physical therapy.  o After the initial surgical pain begins to resolve, you may begin to decrease the pain medication. By the end of 6-8 weeks, you should be off of pain medications.  o Refills will not be given by the office during evening hours, on weekends, or after 6-8 weeks post-op.  o To seek refills on pain medications during the initial 6 week post-operative period, you must call the office 48 hours in advance to request the refill. The office will then notify you when to  the prescription. DO NOT wait until you are  out of the medication to request a refill.    V. FOLLOW-UP VISITS:  ? You will need to follow up in the office with your surgeon in 3 weeks. Please call this number [phone] to schedule this appointment.  If you have any concerns or suspected complications prior to your follow up visit, please call your surgeons office. Do not wait until your appointment time if you suspect complications. These will need to be addressed in the office promptly.    Yaya Tapia PA-C  [facility]  [address] [place] [address]  [phone]    7/2/2020    CC:Diane Macdonald MD:LEONELA Steven

## 2020-07-02 NOTE — THERAPY TREATMENT NOTE
Patient Name: Tran Sosa  : 1950    MRN: [id number]                              Today's Date: 2020       Admit Date: 2020    Visit Dx:     ICD-10-CM ICD-9-CM   1. Primary osteoarthritis of right knee M17.11 715.16     Patient Active Problem List   Diagnosis   • Atopic rhinitis   • Arthralgia of multiple joints   • Hyperlipidemia   • Hypertension   • Osteopenia   • PAT (paroxysmal atrial tachycardia) (CMS/HCC)   • Sinus tachycardia   • SHAI (obstructive sleep apnea)   • OA (osteoarthritis) of hip   • Primary osteoarthritis of right knee     Past Medical History:   Diagnosis Date   • Arthralgia of multiple sites    • Arthritis     OSTEO   • Cancer (CMS/HCC)     RIGHT BREAST CANCER. WITH CHEMO   • Diverticulosis    • Elevated TSH    • GERD (gastroesophageal reflux disease)    • History of breast cancer 1984    RIGHT    • History of bronchitis    • History of pneumonia    • Hyperlipidemia    • Hypertension    • Insomnia    • Junctional rhythm     ONE EPISODE   • Lung nodule     BIOPSY BENIGN   • Mitral regurgitation     Mild per 2-D echocardiogram 2017   • Osteopenia    • Osteoporosis    • PAC (premature atrial contraction)     WITH SOME SVT IN THE PAST. REASON FOR METOPROLOL   • Postmenopausal status    • Reactive airway disease    • Recurrent urinary tract infection     RESOLVED   • Rib fracture    • Right knee pain     TORN MENSICUS   • Seasonal allergies    • Sleep apnea    • Uterine leiomyoma     HYST     Past Surgical History:   Procedure Laterality Date   • CARDIAC CATHETERIZATION       at [facility]; told normal coronary arteries    •  SECTION     • COLONOSCOPY     • HAMMER TOE REPAIR Right 2016    Procedure: MULTIPLE RT CLAW TOE REPAIR WITH WEIL OSTEOTOMY; RIGHT GREAT TOE AKIN OSTEOTOMY.;  Surgeon: Rigoberto Ba MD;  Location: [facility] OR [facility];  Service:    • HYSTERECTOMY      W/BSO   • JOINT REPLACEMENT     • LUNG SURGERY      WEDGE RESECTION? RIGHT ?LOBE.  BENIGN PULM NODULE   • MASTECTOMY Left     PROPHYLACTICALLY   • MASTECTOMY PARTIAL WITH AXILLARY LYMPH NODE EXCISION Right 1984    MALIGNANT   • ORIF WRIST FRACTURE Right     HARDWARE SINCE REMOVED   • ORIF WRIST FRACTURE Left     WITH HARDWARE   • TONSILLECTOMY     • TOTAL HIP ARTHROPLASTY Left 8/29/2017    Procedure: LT TOTAL HIP ARTHROPLASTY;  Surgeon: Yaya Reina MD;  Location: [facility];  Service:    • TOTAL KNEE ARTHROPLASTY Right 6/30/2020    Procedure: TOTAL KNEE ARTHROPLASTY RIGHT;  Surgeon: Yaya Reina MD;  Location: [facility] OR;  Service: Orthopedics;  Laterality: Right;   • TRAM FLAP DELAYED     • UTERINE ARTERY EMBOLIZATION       General Information     Row Name 07/02/20 1114          PT Evaluation Time/Intention    Document Type  therapy note (daily note)  -     Mode of Treatment  physical therapy;individual therapy  -     Row Name 07/02/20 1114          General Information    Existing Precautions/Restrictions  fall  -     Row Name 07/02/20 1114          Cognitive Assessment/Intervention- PT/OT    Orientation Status (Cognition)  oriented x 3  -     Row Name 07/02/20 1114          Safety Issues, Functional Mobility    Impairments Affecting Function (Mobility)  pain;range of motion (ROM);strength  -       User Key  (r) = Recorded By, (t) = Taken By, (c) = Cosigned By    Initials Name Provider Type     Milly Hahn, PT Physical Therapist        Mobility     Row Name 07/02/20 1114          Bed Mobility Assessment/Treatment    Bed Mobility Assessment/Treatment  supine-sit;sit-supine  -     Supine-Sit [place] (Bed Mobility)  supervision  -     Sit-Supine [place] (Bed Mobility)  supervision  -     Row Name 07/02/20 1114          Sit-Stand Transfer    Sit-Stand [place] (Transfers)  supervision  -     Assistive Device (Sit-Stand Transfers)  walker, front-wheeled  -     Row Name 07/02/20 1114          Gait/Stairs Assessment/Training    [place]  Level (Gait)  supervision  -     Assistive Device (Gait)  walker, front-wheeled  -     Distance in Feet (Gait)  300  -CH     Pattern (Gait)  step-through  -     Deviations/Abnormal Patterns (Gait)  antalgic;elvira decreased  -     [place] Level (Stairs)  verbal cues;nonverbal cues (demo/gesture);contact guard  -     Handrail Location (Stairs)  right side (ascending)  -     Number of Steps (Stairs)  4  -     Ascending Technique (Stairs)  step-to-step  -     Descending Technique (Stairs)  step-to-step  -       User Key  (r) = Recorded By, (t) = Taken By, (c) = Cosigned By    Initials Name Provider Type     Milly Hahn, PREMA Physical Therapist        Obj/Interventions     Row Name 07/02/20 1115          General ROM    GENERAL ROM COMMENTS  R knee 2-96 degrees  -     Row Name 07/02/20 1115          Therapeutic Exercise    Comment (Therapeutic Exercise)  20 reps R TKA protocol  -       User Key  (r) = Recorded By, (t) = Taken By, (c) = Cosigned By    Initials Name Provider Type     Milly Hahn, PT Physical Therapist        Goals/Plan    No documentation.       Clinical Impression     Row Name 07/02/20 1116          Pain Assessment    Additional Documentation  Pain Scale: Numbers Pre/Post-Treatment (Group)  -CH     Row Name 07/02/20 1116          Pain Scale: Numbers Pre/Post-Treatment    Pain Scale: Numbers, Pretreatment  5/10  -     Pain Scale: Numbers, Post-Treatment  5/10  -     Pain Location - Side  Right  -     Pain Location  knee  -     Pain Intervention(s)  Repositioned;Cold applied  -     Row Name 07/02/20 1116          Plan of Care Review    Plan of Care Reviewed With  patient  -     Progress  improving  -     Outcome Summary  Pt demonstrates increased activity tolerance and functional strength as she was able to increase her gait distance and required less assistance. Pt has safely navigated stairs and demonstrates understanding of exercises. Pt plans to  return home today and to follow up with HHPT. Acute care PT will follow up tomorrow if discharge is delayed.  -     Row Name 07/02/20 1116          Physical Therapy Clinical Impression    Patient/Family Goals Statement (PT Clinical Impression)  to return to [facility]  -     Row Name 07/02/20 1116          Positioning and Restraints    Pre-Treatment Position  in bed  -CH     Post Treatment Position  bed  -CH     In Bed  supine;call light within reach;encouraged to call for assist  -CH       User Key  (r) = Recorded By, (t) = Taken By, (c) = Cosigned By    Initials Name Provider Type    Milly Steve PT Physical Therapist        Outcome Measures     Row Name 07/02/20 1118          How much help from another person do you currently need...    Turning from your back to your side while in flat bed without using bedrails?  4  -CH     Moving from lying on back to sitting on the side of a flat bed without bedrails?  4  -CH     Moving to and from a bed to a chair (including a wheelchair)?  4  -CH     Standing up from a chair using your arms (e.g., wheelchair, bedside chair)?  4  -CH     Climbing 3-5 steps with a railing?  3  -CH     To walk in hospital room?  4  -CH     AM-PAC 6 Clicks Score (PT)  23  -     Row Name 07/02/20 1118          Functional Assessment    Outcome Measure Options  AM-PAC 6 Clicks Basic Mobility (PT)  -       User Key  (r) = Recorded By, (t) = Taken By, (c) = Cosigned By    Initials Name Provider Type    Milly Steve PT Physical Therapist        Physical Therapy Education                 Title: PT OT SLP Therapies (Done)     Topic: Physical Therapy (Done)     Point: Mobility training (Done)     Description:   Instruct learner(s) on safety and technique for assisting patient out of bed, chair or wheelchair.  Instruct in the proper use of assistive devices, such as walker, crutches, cane or brace.              Patient Friendly Description:   It's important to get you on your feet  again, but we need to do so in a way that is safe for you. Falling has serious consequences, and your personal safety is the most important thing of all.        When it's time to get out of bed, one of us or a family member will sit next to you on the bed to give you support.     If your doctor or nurse tells you to use a walker, crutches, a cane, or a brace, be sure you use it every time you get out of bed, even if you think you don't need it.    Learning Progress Summary           Patient Acceptance, E,TB,D, VU,NR by  at 7/2/2020 1119    Acceptance, E,TB,D, VU,NR by  at 7/1/2020 1203    Acceptance, E,D, VU,NR by MS at 6/30/2020 1732                   Point: Home exercise program (Done)     Description:   Instruct learner(s) on appropriate technique for monitoring, assisting and/or progressing patient with therapeutic exercises and activities.              Learning Progress Summary           Patient Acceptance, E,TB,D, VU,NR by  at 7/2/2020 1119    Acceptance, E,TB,D, VU,NR by  at 7/1/2020 1203    Acceptance, E,D, VU,NR by MS at 6/30/2020 1732                   Point: Body mechanics (Done)     Description:   Instruct learner(s) on proper positioning and spine alignment for patient and/or caregiver during mobility tasks and/or exercises.              Learning Progress Summary           Patient Acceptance, E,TB,D, VU,NR by  at 7/2/2020 1119    Acceptance, E,TB,D, VU,NR by  at 7/1/2020 1203    Acceptance, E,D, VU,NR by MS at 6/30/2020 1732                   Point: Precautions (Done)     Description:   Instruct learner(s) on prescribed precautions during mobility and gait tasks              Learning Progress Summary           Patient Acceptance, E,TB,D, VU,NR by  at 7/2/2020 1119    Acceptance, E,TB,D, VU,NR by  at 7/1/2020 1203    Acceptance, E,D, VU,NR by MS at 6/30/2020 1732                               User Key     Initials Effective Dates Name Provider Type Discipline     04/03/18 -  [place]  Milly RANGEL PT Physical Therapist PT    MS 04/03/18 -  Vishal Mcgarry PT Physical Therapist PT              PT Recommendation and Plan     Outcome Summary/Treatment Plan (PT)  Anticipated Discharge Disposition (PT): home with assist, home with home health  Plan of Care Reviewed With: patient  Progress: improving  Outcome Summary: Pt demonstrates increased activity tolerance and functional strength as she was able to increase her gait distance and required less assistance. Pt has safely navigated stairs and demonstrates understanding of exercises. Pt plans to return home today and to follow up with HHPT. Acute care PT will follow up tomorrow if discharge is delayed.     Time Calculation:   PT Charges     Row Name 07/02/20 1119             Time Calculation    Start Time  0834  -      Stop Time  0854  -      Time Calculation (min)  20 min  -      PT Received On  07/02/20  -      PT - Next Appointment  07/03/20  -         Time Calculation- PT    Total Timed Code Minutes- PT  18 minute(s)  -        User Key  (r) = Recorded By, (t) = Taken By, (c) = Cosigned By    Initials Name Provider Type     Milly Hahn, PT Physical Therapist        Therapy Charges for Today     Code Description Service Date Service Provider Modifiers Qty    21458170481  PT THER PROC EA 15 MIN 7/1/2020 Milly Hahn, PT GP 1    93118711945 HC PT THER PROC EA 15 MIN 7/2/2020 Milly Hahn, PT GP 1          PT G-Codes  Outcome Measure Options: AM-PAC 6 Clicks Basic Mobility (PT)  AM-PAC 6 Clicks Score (PT): 23    Milly Hahn PT  7/2/2020

## 2020-07-02 NOTE — PROGRESS NOTES
Continued Stay Note  [facility]     Patient Name: Tran Sosa  MRN: [id number]  Today's Date: 7/2/2020    Admit Date: 6/30/2020    Discharge Plan     Row Name 07/02/20 1211       Plan    Final Discharge Disposition Code  01 - home or self-care    Final Note  Pt d/c'ed home with [facility].        Discharge Codes    No documentation.       Expected Discharge Date and Time     Expected Discharge Date Expected Discharge Time    Jul 2, 2020             Patricia Monroe RN

## 2020-07-02 NOTE — PLAN OF CARE
Problem: Patient Care Overview  Goal: Plan of Care Review  Outcome: Ongoing (interventions implemented as appropriate)  Flowsheets  Taken 7/2/2020 1110 by Veronica Singh, RN  Progress: improving  Outcome Summary: pt POD 2 RTK. VSS. Dressing in place. CDI. NVI. pain controlled with PO pain medication. ambulates with walker and stand by assist. plans to D/C home with HH. Will monitor.  Taken 7/2/2020 0230 by Nick Calderon, RN  Plan of Care Reviewed With: patient

## 2020-07-02 NOTE — OUTREACH NOTE
Prep Survey      Responses   [facility] facility patient discharged from?  [place]   Is LACE score < 7 ?  Yes   Eligibility  [facility]   Date of Admission  06/30/20   Date of Discharge  07/02/20   Discharge Disposition  Home-Health Care Sv   Discharge diagnosis  Right Total Knee Arthroplasty   COVID-19 Test Status  Negative   Does the patient have one of the following disease processes/diagnoses(primary or secondary)?  Total Joint Replacement   Does the patient have Home health ordered?  Yes   What is the Home health agency?   [facility]   Is there a DME ordered?  No   Medication alerts for this patient  Meds to Beds   Prep survey completed?  Yes          Lauren Leon RN

## 2020-07-02 NOTE — PROGRESS NOTES
Orthopaedic Surgery  Progress Note  7/2/2020    Patient Name:  Tran Sosa  YOB: 1950  Age: 69 y.o.  Medical Records Number:  [id number]  Date of Admission: 6/30/2020    No complaints except pain    Vitals:  Vitals:    07/01/20 1822 07/01/20 2228 07/02/20 0219 07/02/20 0700   BP: 95/58 90/46 99/55 90/52   BP Location: Left arm Left arm Left arm Left arm   Patient Position: Lying Lying Lying Lying   Pulse: 68 74 76 75   Resp: 16 16 16 18   Temp: 97.3 °F (36.3 °C) 97.7 °F (36.5 °C) 97.8 °F (36.6 °C) 98 °F (36.7 °C)   TempSrc: Skin Skin Skin Skin   SpO2: 95% 94% 95% 91%   Weight:       Height:           RLE:  NVI, calf nontender, sensation intact  No signs of DVT    Incision: clean, no signs of infection    Lab Results (last 24 hours)     ** No results found for the last 24 hours. **          Assesment/Plan:    Procedures:  Right TKA  Postoperative Day: 2  Weightbearing Status:  WBAT with walker  DVT Prophylaxis:  ASA for DVT prophylaxis    Reason for Inpatient Admission:  I certify that this patient requires inpatient admission for greater than 2 midnights due to fall risk with history of frequent falls    Disposition:  Home with home health after PT today, if comfortable and mobilizing safely    Yaya Tapia  [facility]  [address] [place], [place] [id number] [phone]    7/2/2020

## 2020-07-02 NOTE — PLAN OF CARE
Problem: Patient Care Overview  Goal: Plan of Care Review  Outcome: Ongoing (interventions implemented as appropriate)  Flowsheets (Taken 7/2/2020 0230)  Progress: improving  Plan of Care Reviewed With: patient  Outcome Summary: POD 1 RTKA. VSS, pain controlled with prn percocet and toradol. ambulates very well with walker and stand by assist x1. plans are for d/c home with  7/2. discussed continued BP monitoring and VS r/t hx HTN.  Goal: Individualization and Mutuality  Outcome: Ongoing (interventions implemented as appropriate)  Goal: Discharge Needs Assessment  Outcome: Ongoing (interventions implemented as appropriate)  Goal: Interprofessional Rounds/Family Conf  Outcome: Ongoing (interventions implemented as appropriate)     Problem: Pain, Chronic (Adult)  Goal: Acceptable Pain/Comfort Level and Functional Ability  Outcome: Ongoing (interventions implemented as appropriate)  Flowsheets (Taken 7/2/2020 0230)  Acceptable Pain/Comfort Level and Functional Ability: making progress toward outcome     Problem: Fall Risk (Adult)  Goal: Absence of Fall  Outcome: Ongoing (interventions implemented as appropriate)  Flowsheets (Taken 7/2/2020 0230)  Absence of Fall: achieves outcome     Problem: Knee Arthroplasty (Total, Partial) (Adult)  Goal: Signs and Symptoms of Listed Potential Problems Will be Absent, Minimized or Managed (Knee Arthroplasty)  Outcome: Ongoing (interventions implemented as appropriate)  Flowsheets (Taken 7/2/2020 0230)  Problems Assessed (Knee Arthroplasty): all  Problems Present (Knee Arthroplasty): pain; range of motion decreased  Goal: Anesthesia/Sedation Recovery  Outcome: Ongoing (interventions implemented as appropriate)  Flowsheets (Taken 7/2/2020 0230)  Anesthesia/Sedation Recovery: recovered to baseline; criteria met for discharge

## 2020-07-02 NOTE — PLAN OF CARE
Problem: Patient Care Overview  Goal: Plan of Care Review  Flowsheets (Taken 7/2/2020 1116)  Plan of Care Reviewed With: patient  Outcome Summary: Pt demonstrates increased activity tolerance and functional strength as she was able to increase her gait distance and required less assistance. Pt has safely navigated stairs and demonstrates understanding of exercises. Pt plans to return home today and to follow up with HHPT. Acute care PT will follow up tomorrow if discharge is delayed.  Note:   Patient was wearing a face mask during this therapy encounter. Therapist used appropriate personal protective equipment including mask and gloves.  Mask used was standard procedure mask. Appropriate PPE was worn during the entire therapy session. Hand hygiene was completed before and after therapy session. Patient is not in enhanced droplet precautions.

## 2020-07-03 ENCOUNTER — TRANSITIONAL CARE MANAGEMENT TELEPHONE ENCOUNTER (OUTPATIENT)
Dept: CALL CENTER | Facility: HOSPITAL | Age: 70
End: 2020-07-03

## 2020-07-03 NOTE — OUTREACH NOTE
Call Center TCM Note      Responses   [facility] patient discharged from?  [place]   Does the patient have one of the following disease processes/diagnoses(primary or secondary)?  Total Joint Replacement   TCM attempt successful?  Yes   Call end time  1135   Discharge diagnosis  Right Total Knee Arthroplasty   Does the patient have all medications related to this admission filled (includes all antibiotics, pain medications, etc.)  Yes   Is the patient able to teach back alternate methods of pain control?  Knee-elevation/no pillow under knee, Correct alignment, Reposition, Short, frequent activity, Ice   Comments regarding appointments  Dr Macdonald -Patient declined followup appt at this time.    Does the patient have a follow up appointment with their surgeon?  Yes   Has the patient kept scheduled appointments due by today?  N/A   Comments  Patient will schedule an appt with Dr Reina (surgeon) office when they are open   What is the Home health agency?   [facility]   Has home health visited the patient within 72 hours of discharge?  No   Home health interventions  Called Home Health agency   Home health comments  I called [facility] and left message to please return patient's call.    Psychosocial issues?  No   When is the first therapy visit scheduled (PO Day) including how many days per week   Dr Macdonald _ Patient declined followup appt at this time.    Has the patient began therapy sessions (either in the home or as an out patient)?  No   If the patient has started attending therapy, what post op day did they begin to attend (either in home or as an out patient)?    I called [facility] and left message,    Does the patient have a wound vac in place?  N/A   Did the patient receive a copy of their discharge instructions?  Yes   Nursing interventions  Reviewed instructions with patient   What is the patient's perception of their functional status since discharge?  Improving   Is the patient able to teach back signs and  symptoms of infection?  Temp >100.4 for 24h or longer, Incisional drainage, Blisters around incision, Increased swelling or redness around incision (not associated with surgical edema), Severe discomfort or pain   Is the patient able to teach back how to prevent infection?  Check incision daily, Wash hands before and after touching incision, Keep incision covered if pets in house, No tub baths, hot tub or swimming, Shower only as directed by surgeon, Keep incision covered if drainage   Is the patient/caregiver able to teach back the hierarchy of who to call/visit for symptoms/problems? PCP, Specialist, Home health nurse, Urgent Care, ED, 911  Yes   TCM call completed?  Yes          Rajan Zavala RN    7/3/2020, 11:35

## 2020-07-14 ENCOUNTER — APPOINTMENT (OUTPATIENT)
Dept: CT IMAGING | Facility: HOSPITAL | Age: 70
End: 2020-07-14

## 2020-07-14 ENCOUNTER — HOSPITAL ENCOUNTER (INPATIENT)
Facility: HOSPITAL | Age: 70
LOS: 2 days | Discharge: HOME HEALTH | End: 2020-07-16
Attending: EMERGENCY MEDICINE | Admitting: INTERNAL MEDICINE

## 2020-07-14 ENCOUNTER — APPOINTMENT (OUTPATIENT)
Dept: GENERAL RADIOLOGY | Facility: HOSPITAL | Age: 70
End: 2020-07-14

## 2020-07-14 DIAGNOSIS — I26.94 MULTIPLE SUBSEGMENTAL PULMONARY EMBOLI WITHOUT ACUTE COR PULMONALE (HCC): ICD-10-CM

## 2020-07-14 DIAGNOSIS — Z96.651 S/P TKR (TOTAL KNEE REPLACEMENT), RIGHT: ICD-10-CM

## 2020-07-14 DIAGNOSIS — R06.02 SHORTNESS OF BREATH: Primary | ICD-10-CM

## 2020-07-14 DIAGNOSIS — U07.1 COVID-19: ICD-10-CM

## 2020-07-14 PROBLEM — J96.01 ACUTE RESPIRATORY FAILURE WITH HYPOXEMIA (HCC): Status: ACTIVE | Noted: 2020-07-14

## 2020-07-14 PROBLEM — I26.99 PULMONARY EMBOLISM WITHOUT ACUTE COR PULMONALE: Status: ACTIVE | Noted: 2020-07-14

## 2020-07-14 LAB
ALBUMIN SERPL-MCNC: 3.8 G/DL (ref 3.5–5.2)
ALBUMIN/GLOB SERPL: 1.2 G/DL
ALP SERPL-CCNC: 69 U/L (ref 39–117)
ALT SERPL W P-5'-P-CCNC: 16 U/L (ref 1–33)
ANION GAP SERPL CALCULATED.3IONS-SCNC: 11.7 MMOL/L (ref 5–15)
AST SERPL-CCNC: 18 U/L (ref 1–32)
B PARAPERT DNA SPEC QL NAA+PROBE: NOT DETECTED
B PERT DNA SPEC QL NAA+PROBE: NOT DETECTED
BACTERIA UR QL AUTO: NORMAL /HPF
BASOPHILS # BLD AUTO: 0.02 10*3/MM3 (ref 0–0.2)
BASOPHILS # BLD AUTO: 0.02 10*3/MM3 (ref 0–0.2)
BASOPHILS NFR BLD AUTO: 0.3 % (ref 0–1.5)
BASOPHILS NFR BLD AUTO: 0.3 % (ref 0–1.5)
BILIRUB SERPL-MCNC: 0.5 MG/DL (ref 0–1.2)
BILIRUB UR QL STRIP: NEGATIVE
BUN SERPL-MCNC: 18 MG/DL (ref 8–23)
BUN/CREAT SERPL: 22.2 (ref 7–25)
C PNEUM DNA NPH QL NAA+NON-PROBE: NOT DETECTED
CALCIUM SPEC-SCNC: 8.9 MG/DL (ref 8.6–10.5)
CHLORIDE SERPL-SCNC: 98 MMOL/L (ref 98–107)
CLARITY UR: CLEAR
CO2 SERPL-SCNC: 25.3 MMOL/L (ref 22–29)
COLOR UR: YELLOW
CREAT SERPL-MCNC: 0.81 MG/DL (ref 0.57–1)
CRP SERPL-MCNC: 3.67 MG/DL (ref 0–0.5)
D-LACTATE SERPL-SCNC: 0.8 MMOL/L (ref 0.5–2)
DEPRECATED RDW RBC AUTO: 42.3 FL (ref 37–54)
DEPRECATED RDW RBC AUTO: 43 FL (ref 37–54)
EOSINOPHIL # BLD AUTO: 0.05 10*3/MM3 (ref 0–0.4)
EOSINOPHIL # BLD AUTO: 0.06 10*3/MM3 (ref 0–0.4)
EOSINOPHIL NFR BLD AUTO: 0.6 % (ref 0.3–6.2)
EOSINOPHIL NFR BLD AUTO: 0.9 % (ref 0.3–6.2)
ERYTHROCYTE [DISTWIDTH] IN BLOOD BY AUTOMATED COUNT: 12.6 % (ref 12.3–15.4)
ERYTHROCYTE [DISTWIDTH] IN BLOOD BY AUTOMATED COUNT: 13 % (ref 12.3–15.4)
FERRITIN SERPL-MCNC: 489 NG/ML (ref 13–150)
FLUAV H1 2009 PAND RNA NPH QL NAA+PROBE: NOT DETECTED
FLUAV H1 HA GENE NPH QL NAA+PROBE: NOT DETECTED
FLUAV H3 RNA NPH QL NAA+PROBE: NOT DETECTED
FLUAV SUBTYP SPEC NAA+PROBE: NOT DETECTED
FLUBV RNA ISLT QL NAA+PROBE: NOT DETECTED
GFR SERPL CREATININE-BSD FRML MDRD: 70 ML/MIN/1.73
GLOBULIN UR ELPH-MCNC: 3.1 GM/DL
GLUCOSE SERPL-MCNC: 104 MG/DL (ref 65–99)
GLUCOSE UR STRIP-MCNC: NEGATIVE MG/DL
HADV DNA SPEC NAA+PROBE: NOT DETECTED
HCOV 229E RNA SPEC QL NAA+PROBE: NOT DETECTED
HCOV HKU1 RNA SPEC QL NAA+PROBE: NOT DETECTED
HCOV NL63 RNA SPEC QL NAA+PROBE: NOT DETECTED
HCOV OC43 RNA SPEC QL NAA+PROBE: NOT DETECTED
HCT VFR BLD AUTO: 30 % (ref 34–46.6)
HCT VFR BLD AUTO: 31.4 % (ref 34–46.6)
HGB BLD-MCNC: 10.3 G/DL (ref 12–15.9)
HGB BLD-MCNC: 9.8 G/DL (ref 12–15.9)
HGB UR QL STRIP.AUTO: NEGATIVE
HMPV RNA NPH QL NAA+NON-PROBE: NOT DETECTED
HPIV1 RNA SPEC QL NAA+PROBE: NOT DETECTED
HPIV2 RNA SPEC QL NAA+PROBE: NOT DETECTED
HPIV3 RNA NPH QL NAA+PROBE: NOT DETECTED
HPIV4 P GENE NPH QL NAA+PROBE: NOT DETECTED
HYALINE CASTS UR QL AUTO: NORMAL /LPF
IMM GRANULOCYTES # BLD AUTO: 0.11 10*3/MM3 (ref 0–0.05)
IMM GRANULOCYTES # BLD AUTO: 0.15 10*3/MM3 (ref 0–0.05)
IMM GRANULOCYTES NFR BLD AUTO: 1.4 % (ref 0–0.5)
IMM GRANULOCYTES NFR BLD AUTO: 2.2 % (ref 0–0.5)
INR PPP: 1.04 (ref 0.9–1.1)
INR PPP: 1.19 (ref 0.9–1.1)
KETONES UR QL STRIP: NEGATIVE
LDH SERPL-CCNC: 264 U/L (ref 135–214)
LEUKOCYTE ESTERASE UR QL STRIP.AUTO: ABNORMAL
LYMPHOCYTES # BLD AUTO: 0.98 10*3/MM3 (ref 0.7–3.1)
LYMPHOCYTES # BLD AUTO: 1.63 10*3/MM3 (ref 0.7–3.1)
LYMPHOCYTES NFR BLD AUTO: 12.4 % (ref 19.6–45.3)
LYMPHOCYTES NFR BLD AUTO: 24.3 % (ref 19.6–45.3)
M PNEUMO IGG SER IA-ACNC: NOT DETECTED
MCH RBC QN AUTO: 29.9 PG (ref 26.6–33)
MCH RBC QN AUTO: 30.1 PG (ref 26.6–33)
MCHC RBC AUTO-ENTMCNC: 32.7 G/DL (ref 31.5–35.7)
MCHC RBC AUTO-ENTMCNC: 32.8 G/DL (ref 31.5–35.7)
MCV RBC AUTO: 91 FL (ref 79–97)
MCV RBC AUTO: 92 FL (ref 79–97)
MONOCYTES # BLD AUTO: 0.78 10*3/MM3 (ref 0.1–0.9)
MONOCYTES # BLD AUTO: 0.79 10*3/MM3 (ref 0.1–0.9)
MONOCYTES NFR BLD AUTO: 11.8 % (ref 5–12)
MONOCYTES NFR BLD AUTO: 9.8 % (ref 5–12)
NEUTROPHILS NFR BLD AUTO: 4.05 10*3/MM3 (ref 1.7–7)
NEUTROPHILS NFR BLD AUTO: 5.99 10*3/MM3 (ref 1.7–7)
NEUTROPHILS NFR BLD AUTO: 60.5 % (ref 42.7–76)
NEUTROPHILS NFR BLD AUTO: 75.5 % (ref 42.7–76)
NITRITE UR QL STRIP: NEGATIVE
NRBC BLD AUTO-RTO: 0 /100 WBC (ref 0–0.2)
NRBC BLD AUTO-RTO: 0 /100 WBC (ref 0–0.2)
NT-PROBNP SERPL-MCNC: 66.2 PG/ML (ref 0–900)
PH UR STRIP.AUTO: 6.5 [PH] (ref 5–8)
PLATELET # BLD AUTO: 343 10*3/MM3 (ref 140–450)
PLATELET # BLD AUTO: 374 10*3/MM3 (ref 140–450)
PMV BLD AUTO: 10 FL (ref 6–12)
PMV BLD AUTO: 9.4 FL (ref 6–12)
POTASSIUM SERPL-SCNC: 3.9 MMOL/L (ref 3.5–5.2)
PROCALCITONIN SERPL-MCNC: 0.07 NG/ML (ref 0–0.25)
PROT SERPL-MCNC: 6.9 G/DL (ref 6–8.5)
PROT UR QL STRIP: NEGATIVE
PROTHROMBIN TIME: 13.5 SECONDS (ref 11.7–14.2)
PROTHROMBIN TIME: 14.9 SECONDS (ref 11.7–14.2)
RBC # BLD AUTO: 3.26 10*6/MM3 (ref 3.77–5.28)
RBC # BLD AUTO: 3.45 10*6/MM3 (ref 3.77–5.28)
RBC # UR: NORMAL /HPF
REF LAB TEST METHOD: NORMAL
RHINOVIRUS RNA SPEC NAA+PROBE: NOT DETECTED
RSV RNA NPH QL NAA+NON-PROBE: NOT DETECTED
SARS-COV-2 RNA NPH QL NAA+NON-PROBE: DETECTED
SODIUM SERPL-SCNC: 135 MMOL/L (ref 136–145)
SP GR UR STRIP: 1.02 (ref 1–1.03)
SQUAMOUS #/AREA URNS HPF: NORMAL /HPF
TROPONIN T SERPL-MCNC: <0.01 NG/ML (ref 0–0.03)
UROBILINOGEN UR QL STRIP: ABNORMAL
WBC # BLD AUTO: 6.7 10*3/MM3 (ref 3.4–10.8)
WBC # BLD AUTO: 7.93 10*3/MM3 (ref 3.4–10.8)
WBC UR QL AUTO: NORMAL /HPF

## 2020-07-14 PROCEDURE — 99285 EMERGENCY DEPT VISIT HI MDM: CPT

## 2020-07-14 PROCEDURE — 71045 X-RAY EXAM CHEST 1 VIEW: CPT

## 2020-07-14 PROCEDURE — 84484 ASSAY OF TROPONIN QUANT: CPT | Performed by: EMERGENCY MEDICINE

## 2020-07-14 PROCEDURE — 36415 COLL VENOUS BLD VENIPUNCTURE: CPT | Performed by: EMERGENCY MEDICINE

## 2020-07-14 PROCEDURE — 71275 CT ANGIOGRAPHY CHEST: CPT

## 2020-07-14 PROCEDURE — 93010 ELECTROCARDIOGRAM REPORT: CPT | Performed by: INTERNAL MEDICINE

## 2020-07-14 PROCEDURE — 25010000002 HEPARIN (PORCINE) PER 1000 UNITS: Performed by: EMERGENCY MEDICINE

## 2020-07-14 PROCEDURE — 84145 PROCALCITONIN (PCT): CPT | Performed by: EMERGENCY MEDICINE

## 2020-07-14 PROCEDURE — 85610 PROTHROMBIN TIME: CPT | Performed by: EMERGENCY MEDICINE

## 2020-07-14 PROCEDURE — 83880 ASSAY OF NATRIURETIC PEPTIDE: CPT | Performed by: EMERGENCY MEDICINE

## 2020-07-14 PROCEDURE — 83605 ASSAY OF LACTIC ACID: CPT | Performed by: EMERGENCY MEDICINE

## 2020-07-14 PROCEDURE — 86140 C-REACTIVE PROTEIN: CPT | Performed by: EMERGENCY MEDICINE

## 2020-07-14 PROCEDURE — 0202U NFCT DS 22 TRGT SARS-COV-2: CPT | Performed by: EMERGENCY MEDICINE

## 2020-07-14 PROCEDURE — 80053 COMPREHEN METABOLIC PANEL: CPT | Performed by: EMERGENCY MEDICINE

## 2020-07-14 PROCEDURE — 87040 BLOOD CULTURE FOR BACTERIA: CPT | Performed by: EMERGENCY MEDICINE

## 2020-07-14 PROCEDURE — 85025 COMPLETE CBC W/AUTO DIFF WBC: CPT | Performed by: EMERGENCY MEDICINE

## 2020-07-14 PROCEDURE — 83615 LACTATE (LD) (LDH) ENZYME: CPT | Performed by: EMERGENCY MEDICINE

## 2020-07-14 PROCEDURE — 93005 ELECTROCARDIOGRAM TRACING: CPT | Performed by: EMERGENCY MEDICINE

## 2020-07-14 PROCEDURE — 82728 ASSAY OF FERRITIN: CPT | Performed by: EMERGENCY MEDICINE

## 2020-07-14 PROCEDURE — 0 IOPAMIDOL PER 1 ML: Performed by: EMERGENCY MEDICINE

## 2020-07-14 PROCEDURE — 81001 URINALYSIS AUTO W/SCOPE: CPT | Performed by: EMERGENCY MEDICINE

## 2020-07-14 RX ORDER — TRAZODONE HYDROCHLORIDE 100 MG/1
200 TABLET ORAL NIGHTLY
Status: DISCONTINUED | OUTPATIENT
Start: 2020-07-14 | End: 2020-07-16 | Stop reason: HOSPADM

## 2020-07-14 RX ORDER — HEPARIN SODIUM 5000 [USP'U]/ML
80 INJECTION, SOLUTION INTRAVENOUS; SUBCUTANEOUS ONCE
Status: COMPLETED | OUTPATIENT
Start: 2020-07-14 | End: 2020-07-14

## 2020-07-14 RX ORDER — SODIUM CHLORIDE 9 MG/ML
125 INJECTION, SOLUTION INTRAVENOUS CONTINUOUS
Status: DISCONTINUED | OUTPATIENT
Start: 2020-07-14 | End: 2020-07-15

## 2020-07-14 RX ORDER — ACETAMINOPHEN 325 MG/1
650 TABLET ORAL EVERY 6 HOURS PRN
Status: DISCONTINUED | OUTPATIENT
Start: 2020-07-14 | End: 2020-07-16 | Stop reason: HOSPADM

## 2020-07-14 RX ORDER — HEPARIN SODIUM 10000 [USP'U]/100ML
14.9 INJECTION, SOLUTION INTRAVENOUS
Status: DISCONTINUED | OUTPATIENT
Start: 2020-07-14 | End: 2020-07-15

## 2020-07-14 RX ORDER — DOCUSATE SODIUM 100 MG/1
100 CAPSULE, LIQUID FILLED ORAL 2 TIMES DAILY PRN
Status: DISCONTINUED | OUTPATIENT
Start: 2020-07-14 | End: 2020-07-16 | Stop reason: HOSPADM

## 2020-07-14 RX ORDER — METOPROLOL SUCCINATE 50 MG/1
50 TABLET, EXTENDED RELEASE ORAL DAILY
Status: DISCONTINUED | OUTPATIENT
Start: 2020-07-14 | End: 2020-07-15

## 2020-07-14 RX ORDER — HYDROCODONE BITARTRATE AND ACETAMINOPHEN 10; 325 MG/1; MG/1
1 TABLET ORAL EVERY 6 HOURS PRN
Status: DISCONTINUED | OUTPATIENT
Start: 2020-07-14 | End: 2020-07-16 | Stop reason: HOSPADM

## 2020-07-14 RX ORDER — ONDANSETRON 4 MG/1
4 TABLET, FILM COATED ORAL EVERY 6 HOURS PRN
Status: DISCONTINUED | OUTPATIENT
Start: 2020-07-14 | End: 2020-07-16 | Stop reason: HOSPADM

## 2020-07-14 RX ORDER — HEPARIN SODIUM 5000 [USP'U]/ML
40-80 INJECTION, SOLUTION INTRAVENOUS; SUBCUTANEOUS EVERY 6 HOURS PRN
Status: DISCONTINUED | OUTPATIENT
Start: 2020-07-14 | End: 2020-07-15

## 2020-07-14 RX ORDER — HYDROCODONE BITARTRATE AND ACETAMINOPHEN 5; 325 MG/1; MG/1
1 TABLET ORAL EVERY 6 HOURS PRN
Status: DISCONTINUED | OUTPATIENT
Start: 2020-07-14 | End: 2020-07-16 | Stop reason: HOSPADM

## 2020-07-14 RX ADMIN — SODIUM CHLORIDE 125 ML/HR: 9 INJECTION, SOLUTION INTRAVENOUS at 17:15

## 2020-07-14 RX ADMIN — SODIUM CHLORIDE 1000 ML: 9 INJECTION, SOLUTION INTRAVENOUS at 14:15

## 2020-07-14 RX ADMIN — IOPAMIDOL 95 ML: 755 INJECTION, SOLUTION INTRAVENOUS at 17:02

## 2020-07-14 RX ADMIN — SODIUM CHLORIDE 125 ML/HR: 9 INJECTION, SOLUTION INTRAVENOUS at 17:14

## 2020-07-14 RX ADMIN — HEPARIN SODIUM 14.9 UNITS/KG/HR: 10000 INJECTION, SOLUTION INTRAVENOUS at 18:54

## 2020-07-14 RX ADMIN — HEPARIN SODIUM 8100 UNITS: 5000 INJECTION INTRAVENOUS; SUBCUTANEOUS at 18:54

## 2020-07-14 NOTE — ED PROVIDER NOTES
EMERGENCY DEPARTMENT ENCOUNTER    Room Number:  23/23  Date of encounter:  7/14/2020  PCP: Diane Macdonald MD  Historian: Patient      HPI:  Chief Complaint: Fever and shortness of breath      Context: Tran Sosa is a 70 y.o. female who presents to the ED c/o fever, cough, shortness of breath and weakness for the past week.  The patient had a right knee replacement on June 30.  Her caregivers since that time have tested positive for COVID yesterday.  The patient states she has felt bad for the past week with fevers, chills, cough, body aches, dehydration and constipation.  Patient went to the [facility] and was referred to the emergency department for further evaluation and care.      PAST MEDICAL HISTORY  Active Ambulatory Problems     Diagnosis Date Noted   • Atopic rhinitis 05/20/2016   • Arthralgia of multiple joints 05/20/2016   • Hyperlipidemia 05/20/2016   • Hypertension 05/20/2016   • Osteopenia 05/20/2016   • PAT (paroxysmal atrial tachycardia) (CMS/[facility]) 02/10/2017   • Sinus tachycardia 04/28/2017   • SHAI (obstructive sleep apnea) 08/10/2017   • OA (osteoarthritis) of hip 08/29/2017   • Primary osteoarthritis of right knee 06/30/2020     Resolved Ambulatory Problems     Diagnosis Date Noted   • Dysphagia 05/20/2016   • Increased thyroid stimulating hormone level 05/20/2016   • Insomnia 05/20/2016   • Cobalamin deficiency 05/20/2016   • Vitamin D deficiency 05/20/2016   • Sciatica 05/20/2016   • Pain of left hip joint 08/10/2017     Past Medical History:   Diagnosis Date   • Arthralgia of multiple sites    • Arthritis    • Cancer (CMS/[facility])    • Diverticulosis    • Elevated TSH    • GERD (gastroesophageal reflux disease)    • History of breast cancer 1984   • History of bronchitis    • History of pneumonia    • Junctional rhythm 1990   • Lung nodule    • Mitral regurgitation    • Osteoporosis    • PAC (premature atrial contraction)    • Postmenopausal status    • Reactive airway disease    • Recurrent urinary  tract infection    • Rib fracture    • Right knee pain    • Seasonal allergies    • Sleep apnea    • Uterine leiomyoma          PAST SURGICAL HISTORY  Past Surgical History:   Procedure Laterality Date   • CARDIAC CATHETERIZATION       at [facility]; told normal coronary arteries    •  SECTION     • COLONOSCOPY     • HAMMER TOE REPAIR Right 2016    Procedure: MULTIPLE RT CLAW TOE REPAIR WITH WEIL OSTEOTOMY; RIGHT GREAT TOE AKIN OSTEOTOMY.;  Surgeon: Rigoberto Ba MD;  Location: [facility];  Service:    • HYSTERECTOMY      W/BSO   • JOINT REPLACEMENT     • LUNG SURGERY      WEDGE RESECTION? RIGHT ?LOBE. BENIGN PULM NODULE   • MASTECTOMY Left     PROPHYLACTICALLY   • MASTECTOMY PARTIAL WITH AXILLARY LYMPH NODE EXCISION Right     MALIGNANT   • ORIF WRIST FRACTURE Right     HARDWARE SINCE REMOVED   • ORIF WRIST FRACTURE Left     WITH HARDWARE   • TONSILLECTOMY     • TOTAL HIP ARTHROPLASTY Left 2017    Procedure: LT TOTAL HIP ARTHROPLASTY;  Surgeon: Yaya Reina MD;  Location: [facility];  Service:    • TOTAL KNEE ARTHROPLASTY Right 2020    Procedure: TOTAL KNEE ARTHROPLASTY RIGHT;  Surgeon: Yaya Reina MD;  Location: [facility];  Service: Orthopedics;  Laterality: Right;   • TRAM FLAP DELAYED     • UTERINE ARTERY EMBOLIZATION           FAMILY HISTORY  Family History   Problem Relation Age of Onset   • Ovarian cancer Mother    • Lung cancer Father    • Hypertension Sister         benign essential hypertension   • Hyperlipidemia Sister    • Other Sister         RENAL DISEASE   • Hypertension Brother         benign essential hypertension   • Hyperlipidemia Brother    • Malig Hyperthermia Neg Hx          SOCIAL HISTORY  Social History     Socioeconomic History   • Marital status:      Spouse name: Not on file   • Number of children: 1   • Years of education: Not on file   • Highest education level: Not on file   Occupational History   • Occupation: RN AT  [employer]   Tobacco Use   • Smoking status: Former Smoker     Packs/day: 1.00     Years: 10.00     Pack years: 10.00     Types: Cigarettes     Last attempt to quit: 1980     Years since quittin.5   • Smokeless tobacco: Never Used   • Tobacco comment: QUIT AT AGE 30 YRS. OLD   Substance and Sexual Activity   • Alcohol use: Yes     Comment: TWICE MONTHLY 1-2 DRINKS   • Sexual activity: Defer         ALLERGIES  Sulfa antibiotics and Morphine and related        REVIEW OF SYSTEMS  Review of Systems     Patient denies headache, neck pain, abdominal pain, vomiting, calf swelling, calf tenderness or focal neuro deficit    All systems reviewed and negative except for those discussed in HPI.     PHYSICAL EXAM    I have reviewed the triage vital signs and nursing notes.    ED Triage Vitals [20 1322]   Temp Heart Rate Resp BP SpO2   99 °F (37.2 °C) 95 -- -- 99 %      Temp src Heart Rate Source Patient Position BP Location FiO2 (%)   Tympanic -- -- -- --       GENERAL: 70-year-old well developed, well nourished in mild distress  HENT: NCAT, neck supple, trachea midline  EYES: no scleral icterus, PERRL, normal conjunctiva  CV: regular rhythm, regular rate, no murmur  RESPIRATORY: Mild respiratory distress with wheezing in the bases bilaterally.  ABDOMEN: soft, non-tender, non-distended, bowel sounds present  MUSCULOSKELETAL: no gross deformity, no pedal edema, no calf tenderness: Patient has staples in her right knee that are clean dry and intact without erythema  NEURO: alert,  sensory and motor function of extremities grossly intact, speech clear, mental status normal/baseline  SKIN: warm, dry, no rash  PSYCH:  Appropriate mood and affect      PPE  Patient was placed in face mask in first look. Patient was wearing facemask when I entered the room and throughout our encounter. I wore full protective equipment throughout this patient encounter including a face mask, eye shield, gown and gloves. Hand hygiene was performed  before donning protective equipment and after removal when leaving the room.      Vital signs and nursing notes reviewed.      LAB RESULTS  Recent Results (from the past 24 hour(s))   Respiratory Panel PCR w/COVID-19(SARS-CoV-2) [facility] In-House, NP swab in UTM/VTM - Swab, Nasopharynx    Collection Time: 07/14/20  2:12 PM   Result Value Ref Range    ADENOVIRUS, PCR Not Detected Not Detected    Coronavirus 229E Not Detected Not Detected    Coronavirus HKU1 Not Detected Not Detected    Coronavirus NL63 Not Detected Not Detected    Coronavirus OC43 Not Detected Not Detected    COVID19 Detected (C) Not Detected - Ref. Range    Human Metapneumovirus Not Detected Not Detected    Human Rhinovirus/Enterovirus Not Detected Not Detected    Influenza A PCR Not Detected Not Detected    Influenza A H1 Not Detected Not Detected    Influenza A H1 2009 PCR Not Detected Not Detected    Influenza A H3 Not Detected Not Detected    Influenza B PCR Not Detected Not Detected    Parainfluenza Virus 1 Not Detected Not Detected    Parainfluenza Virus 2 Not Detected Not Detected    Parainfluenza Virus 3 Not Detected Not Detected    Parainfluenza Virus 4 Not Detected Not Detected    RSV, PCR Not Detected Not Detected    Bordetella pertussis pcr Not Detected Not Detected    Bordetella parapertussis PCR Not Detected Not Detected    Chlamydophila pneumoniae PCR Not Detected Not Detected    Mycoplasma pneumo by PCR Not Detected Not Detected   Comprehensive Metabolic Panel    Collection Time: 07/14/20  2:54 PM   Result Value Ref Range    Glucose 104 (H) 65 - 99 mg/dL    BUN 18 8 - 23 mg/dL    Creatinine 0.81 0.57 - 1.00 mg/dL    Sodium 135 (L) 136 - 145 mmol/L    Potassium 3.9 3.5 - 5.2 mmol/L    Chloride 98 98 - 107 mmol/L    CO2 25.3 22.0 - 29.0 mmol/L    Calcium 8.9 8.6 - 10.5 mg/dL    Total Protein 6.9 6.0 - 8.5 g/dL    Albumin 3.80 3.50 - 5.20 g/dL    ALT (SGPT) 16 1 - 33 U/L    AST (SGOT) 18 1 - 32 U/L    Alkaline Phosphatase 69 39 - 117 U/L     Total Bilirubin 0.5 0.0 - 1.2 mg/dL    eGFR Non African Amer 70 >60 mL/min/1.73    Globulin 3.1 gm/dL    A/G Ratio 1.2 g/dL    BUN/Creatinine Ratio 22.2 7.0 - 25.0    Anion Gap 11.7 5.0 - 15.0 mmol/L   Protime-INR    Collection Time: 07/14/20  2:54 PM   Result Value Ref Range    Protime 13.5 11.7 - 14.2 Seconds    INR 1.04 0.90 - 1.10   BNP    Collection Time: 07/14/20  2:54 PM   Result Value Ref Range    proBNP 66.2 0.0 - 900.0 pg/mL   Troponin    Collection Time: 07/14/20  2:54 PM   Result Value Ref Range    Troponin T <0.010 0.000 - 0.030 ng/mL   Procalcitonin    Collection Time: 07/14/20  2:54 PM   Result Value Ref Range    Procalcitonin 0.07 0.00 - 0.25 ng/mL   Lactic Acid, Plasma    Collection Time: 07/14/20  2:54 PM   Result Value Ref Range    Lactate 0.8 0.5 - 2.0 mmol/L   Lactate Dehydrogenase    Collection Time: 07/14/20  2:54 PM   Result Value Ref Range     (H) 135 - 214 U/L   C-reactive Protein    Collection Time: 07/14/20  2:54 PM   Result Value Ref Range    C-Reactive Protein 3.67 (H) 0.00 - 0.50 mg/dL   Ferritin    Collection Time: 07/14/20  2:54 PM   Result Value Ref Range    Ferritin 489.00 (H) 13.00 - 150.00 ng/mL   CBC Auto Differential    Collection Time: 07/14/20  2:54 PM   Result Value Ref Range    WBC 7.93 3.40 - 10.80 10*3/mm3    RBC 3.45 (L) 3.77 - 5.28 10*6/mm3    Hemoglobin 10.3 (L) 12.0 - 15.9 g/dL    Hematocrit 31.4 (L) 34.0 - 46.6 %    MCV 91.0 79.0 - 97.0 fL    MCH 29.9 26.6 - 33.0 pg    MCHC 32.8 31.5 - 35.7 g/dL    RDW 13.0 12.3 - 15.4 %    RDW-SD 43.0 37.0 - 54.0 fl    MPV 9.4 6.0 - 12.0 fL    Platelets 343 140 - 450 10*3/mm3    Neutrophil % 75.5 42.7 - 76.0 %    Lymphocyte % 12.4 (L) 19.6 - 45.3 %    Monocyte % 9.8 5.0 - 12.0 %    Eosinophil % 0.6 0.3 - 6.2 %    Basophil % 0.3 0.0 - 1.5 %    Immature Grans % 1.4 (H) 0.0 - 0.5 %    Neutrophils, Absolute 5.99 1.70 - 7.00 10*3/mm3    Lymphocytes, Absolute 0.98 0.70 - 3.10 10*3/mm3    Monocytes, Absolute 0.78 0.10 - 0.90  10*3/mm3    Eosinophils, Absolute 0.05 0.00 - 0.40 10*3/mm3    Basophils, Absolute 0.02 0.00 - 0.20 10*3/mm3    Immature Grans, Absolute 0.11 (H) 0.00 - 0.05 10*3/mm3    nRBC 0.0 0.0 - 0.2 /100 WBC   Urinalysis With Microscopic If Indicated (No Culture) - Urine, Clean Catch    Collection Time: 07/14/20  3:28 PM   Result Value Ref Range    Color, UA Yellow Yellow, Straw    Appearance, UA Clear Clear    pH, UA 6.5 5.0 - 8.0    Specific Gravity, UA 1.021 1.005 - 1.030    Glucose, UA Negative Negative    Ketones, UA Negative Negative    Bilirubin, UA Negative Negative    Blood, UA Negative Negative    Protein, UA Negative Negative    Leuk Esterase, UA Small (1+) (A) Negative    Nitrite, UA Negative Negative    Urobilinogen, UA 0.2 E.U./dL 0.2 - 1.0 E.U./dL   Urinalysis, Microscopic Only - Urine, Clean Catch    Collection Time: 07/14/20  3:28 PM   Result Value Ref Range    RBC, UA 0-2 None Seen, 0-2 /HPF    WBC, UA 0-2 None Seen, 0-2 /HPF    Bacteria, UA None Seen None Seen /HPF    Squamous Epithelial Cells, UA 0-2 None Seen, 0-2 /HPF    Hyaline Casts, UA 0-2 None Seen /LPF    Methodology Automated Microscopy        Ordered the above labs and independently reviewed the results.        RADIOLOGY  Xr Chest 1 View    Result Date: 7/14/2020  XR CHEST 1 VW-  HISTORY: Female who is 70 years-old,  short of breath  TECHNIQUE: Frontal view of the chest  COMPARISON: 06/23/2020  FINDINGS: Heart, mediastinum and pulmonary vasculature are unremarkable. No focal pulmonary consolidation, pleural effusion, or pneumothorax. Surgical change in the left lung is apparent. No acute osseous process.      No evidence for acute pulmonary process. Follow-up as clinical indications persist.  This report was finalized on 7/14/2020 2:44 PM by Dr. Rigoberto Ford M.D.      Ct Angiogram Chest    Result Date: 7/14/2020  CT ANGIOGRAM CHEST-  Radiation dose reduction techniques were utilized, including automated exposure control and exposure  modulation based on body size.  CLINICAL: Shortness of breath, 70-year-old female.  COMPARISON: 05/23/2018.  CT SCAN OF THE CHEST WITH INTRAVENOUS CONTRAST, PULMONARY EMBOLUS PROTOCOL TO INCLUDE CT ANGIOGRAM AND THREE-DIMENSIONAL RECONSTRUCTION.  FINDINGS: Cardiac size within normal limits. No aortic aneurysm or dissection. The esophagus is satisfactory in course and caliber.  Small Pulmonary emboli demonstrated within the left upper lobe, right upper lobe and right lower lobe. The RV/LV ratio is 0.8. Small sliding-type hiatal hernia is suggested.  There are again demonstrated several circumscribed pulmonary nodules, all of these remain stable within the interim. No new pulmonary nodule has developed. No pleural effusion seen.  Very subtle infiltrate is demonstrated along the medial right lower lobe. No gross consolidation. Limited images through the upper abdomen have a satisfactory appearance.  CONCLUSION: Small pulmonary emboli demonstrated within the left upper lobe, right upper lobe and right lower lobe. Subtle infiltrate medial right lower lobe. All the circumscribed pulmonary nodules seen on the 2018 examination remain stable.  Findings of this report called Dr. Renee in the emergency room at the time of completion, 5:22 PM.   This report was finalized on 7/14/2020 6:32 PM by Dr. Nikita Jasso M.D.        I ordered the above noted radiological studies. Independently reviewed by me and discussed with radiologist.  See dictation above for official radiology interpretation.      PROCEDURES    Critical Care  Performed by: Clyde Renee MD  Authorized by: Clyde Renee MD     Critical care provider statement:     Critical care time (minutes):  35    Critical care was necessary to treat or prevent imminent or life-threatening deterioration of the following conditions:  Circulatory failure and respiratory failure    Critical care was time spent personally by me on the following activities:  Development of  treatment plan with patient or surrogate, discussions with consultants, discussions with primary provider, evaluation of patient's response to treatment, examination of patient, obtaining history from patient or surrogate, ordering and performing treatments and interventions, ordering and review of laboratory studies, ordering and review of radiographic studies, pulse oximetry, re-evaluation of patient's condition and review of old charts    I assumed direction of critical care for this patient from another provider in my specialty: no              MEDICATIONS GIVEN IN ER    Medications   sodium chloride 0.9 % infusion (125 mL/hr Intravenous New Bag 7/14/20 1715)   heparin 79939 units/250 mL (100 units/mL) in 0.45 % NaCl infusion (14.9 Units/kg/hr × 101 kg Intravenous New Bag 7/14/20 1854)   heparin (porcine) 5000 UNIT/ML injection 4,000-8,100 Units (has no administration in time range)   sodium chloride 0.9 % bolus 1,000 mL (0 mL Intravenous Stopped 7/14/20 1757)   iopamidol (ISOVUE-370) 76 % injection 100 mL (95 mL Intravenous Given by Other 7/14/20 1702)   heparin (porcine) 5000 UNIT/ML injection 8,100 Units (8,100 Units Intravenous Given 7/14/20 1854)         PROGRESS, DATA ANALYSIS, CONSULTS, AND MEDICAL DECISION MAKING    All labs have been independently reviewed by me.  All radiology studies have been reviewed by me and discussed with radiologist dictating report.   EKG's independently reviewed by me.  Discussion below represents my analysis of pertinent findings related to patient's condition, differential diagnosis, treatment plan and final disposition.      ED Course as of Jul 14 1921 Tue Jul 14, 2020   1344 Patient presents to the emergency room with shortness of breath and a history concerning for COVID with positive exposures.  She also has had recent knee replacement surgery.  I will check her labs, covid swab and CTA of the chest for further evaluation.    [GP]   7833 EKG    EKG time:  1535  Rhythm/Rate: Sinus rhythm at 90  No Acute Ischemia  Non-Specific ST-T changes    Unchanged compared to prior on 6/23/2020    Interpreted Contemporaneously by me.  Independently viewed by me        [GP]   1735 On repeat examination the patient's vital signs are stable with room air saturations in the mid 90s.  The patient is mildly tachypneic and thus I will place her on oxygen.  I advised her that she does have covid and that she has several small pulmonary emboli in her CTA chest however the infiltrates on her chest are minimal.  I advised her we will need to admit her to the hospital and will consult pulmonology.    [GP]   4549 I discussed the case with Dr. Hull from pulmonary.  We will start the patient on a heparin drip and admit her to a telemetry bed.    [GP]      ED Course User Index  [GP] Clyde Renee MD           The differential diagnosis includes but is not limited to covid, pulmonary embolism, pneumonia, acute coronary syndrome or electrolyte abnormality        AS OF 19:21 VITALS:    BP - 138/85  HR - 86  TEMP - 99 °F (37.2 °C) (Tympanic)  02 SATS - 100%        DIAGNOSIS  Final diagnoses:   COVID-19   Multiple subsegmental pulmonary emboli without acute cor pulmonale (CMS/HCC)   Shortness of breath         DISPOSITION  Insert full admission        EMR Dragon/Transcription disclaimer:   Much of this encounter note is an electronic transcription/translation of spoken language to printed text. The electronic translation of spoken language may permit erroneous, or at times, nonsensical words or phrases to be inadvertently transcribed; Although I have reviewed the note for such errors, some may still exist.       No scribe was used     Cldye Renee MD  07/14/20 1923

## 2020-07-14 NOTE — ED NOTES
Patient's IV line infiltrated. IV line removed and primary RN notified.     Kate Lam, RN  07/14/20 3724

## 2020-07-14 NOTE — ED NOTES
Attempted to call report to floor. Nurse busy and will call back.      Mabel Guzman RN  07/14/20 1955

## 2020-07-14 NOTE — H&P
Patient Care Team:  Diane Macdonald MD as PCP - General  Diane Macdonald MD as PCP - Family Medicine    Chief complaint:  Shortness of breath and cough    History of present illness:  Subjective     This is a 70 y.o. female patient, retired nurse, former smoker (10 packs year, stopped 40 years ago) who recently had total right knee replacement on June 30, 2020.    She reported symptoms of shortness of breath, fever and cough which started about a week ago.  She initially started with fever reaching 100.3 degrees Fahrenheit.  This was followed by shortness of breath, worse with activities and alleviated by rest and cough productive of mild yellowish phlegm which she initially attributed to being intubated for her knee surgery.  Her fever responded to Tylenol and she has not had fever for the last 2 days.  Cough improved as well but she continues to have difficulty breathing on activities.      She also reported contact with her daughter who is a nurse practitioner and son-in-law who is a neurologist at [place] in both tested positive for COVID.    CTA chest in the ED showed PE and subtle GG infiltrates.  Patient was discharged on aspirin 325 twice a day and she was supposed to switch to once a day today.  She stated that she has been physically active after discharge.    Labs reviewed:  ; sodium 135; ferritin 489; CRP 3.65; hemoglobin 10      Review of Systems:  Constitutional: Fever.  No chills  ENMT: Had some sinus congestion initially during her illness a week ago but does not have postnasal drip or runny nose at the present time  Cardiovascular: No chest pain, palpitation or legs swelling.    Respiratory: Shortness of breath on activities.  Cough, now predominantly dry.  Gastrointestinal: No constipation, diarrhea or abdominal pain.  No nausea or abdominal pain  Neurology: No headache, weakness, numbness or dizziness.   Musculoskeletal: Right knee pain where she had surgery, improving.  Otherwise no  joints pain or swelling.  Psychiatry: No depression.  Genitourinary: No dysuria or frequent urination  Endo: No weight changes. No cold or warm intolerance.  Lymphatic: No swollen glands.  Integumentary: No rash.    History  Past Medical History:   Diagnosis Date   • Arthralgia of multiple sites    • Arthritis     OSTEO   • Cancer (CMS/HCC)     RIGHT BREAST CANCER. WITH CHEMO   • Diverticulosis    • Elevated TSH    • GERD (gastroesophageal reflux disease)    • History of breast cancer     RIGHT    • History of bronchitis    • History of pneumonia    • Hyperlipidemia    • Hypertension    • Insomnia    • Junctional rhythm     ONE EPISODE   • Lung nodule     BIOPSY BENIGN   • Mitral regurgitation     Mild per 2-D echocardiogram 2017   • Osteopenia    • Osteoporosis    • PAC (premature atrial contraction)     WITH SOME SVT IN THE PAST. REASON FOR METOPROLOL   • Postmenopausal status    • Reactive airway disease    • Recurrent urinary tract infection     RESOLVED   • Rib fracture    • Right knee pain     TORN MENSICUS   • Seasonal allergies    • Sleep apnea    • Uterine leiomyoma     HYST     Past Surgical History:   Procedure Laterality Date   • CARDIAC CATHETERIZATION       at [facility]; told normal coronary arteries    •  SECTION     • COLONOSCOPY     • HAMMER TOE REPAIR Right 2016    Procedure: MULTIPLE RT CLAW TOE REPAIR WITH WEIL OSTEOTOMY; RIGHT GREAT TOE AKIN OSTEOTOMY.;  Surgeon: Rigoberto Ba MD;  [place] [facility] [place] [facility] [place]  Service:    • HYSTERECTOMY      W/BSO   • JOINT REPLACEMENT     • LUNG SURGERY      WEDGE RESECTION? RIGHT ?LOBE. BENIGN PULM NODULE   • MASTECTOMY Left     PROPHYLACTICALLY   • MASTECTOMY PARTIAL WITH AXILLARY LYMPH NODE EXCISION Right     MALIGNANT   • ORIF WRIST FRACTURE Right     HARDWARE SINCE REMOVED   • ORIF WRIST FRACTURE Left     WITH HARDWARE   • TONSILLECTOMY     • TOTAL HIP ARTHROPLASTY Left 2017    Procedure: LT TOTAL HIP  ARTHROPLASTY;  Surgeon: Yaya Reina MD;  Location: [facility] MAIN OR;  Service:    • TOTAL KNEE ARTHROPLASTY Right 2020    Procedure: TOTAL KNEE ARTHROPLASTY RIGHT;  Surgeon: Yaay Reina MD;  Location: [facility] MAIN OR;  Service: Orthopedics;  Laterality: Right;   • TRAM FLAP DELAYED     • UTERINE ARTERY EMBOLIZATION       Family History   Problem Relation Age of Onset   • Ovarian cancer Mother    • Lung cancer Father    • Hypertension Sister         benign essential hypertension   • Hyperlipidemia Sister    • Other Sister         RENAL DISEASE   • Hypertension Brother         benign essential hypertension   • Hyperlipidemia Brother    • Malig Hyperthermia Neg Hx      Social History     Tobacco Use   • Smoking status: Former Smoker     Packs/day: 1.00     Years: 10.00     Pack years: 10.00     Types: Cigarettes     Last attempt to quit: 1980     Years since quittin.5   • Smokeless tobacco: Never Used   • Tobacco comment: QUIT AT AGE 30 YRS. OLD   Substance Use Topics   • Alcohol use: Yes     Comment: TWICE MONTHLY 1-2 DRINKS   • Drug use: Not on file     Medications Prior to Admission   Medication Sig Dispense Refill Last Dose   • aspirin 325 MG EC tablet Take 1 tablet by mouth 2 (Two) Times a Day With Meals. 90 tablet 0    • atorvastatin (LIPITOR) 80 MG tablet Take 1 tablet by mouth Daily. (Patient taking differently: Take 80 mg by mouth Every Evening.) 90 tablet 3 2020 at 2200   • docusate sodium 100 MG capsule Take 1 capsule by mouth 2 (Two) Times a Day As Needed for Constipation. 40 each 1    • HYDROcodone-acetaminophen (NORCO) 5-325 MG per tablet Take 1-2 tablets by mouth Every 4 (Four) Hours As Needed (Pain). 84 tablet 0    • metoprolol succinate XL (TOPROL-XL) 50 MG 24 hr tablet Take 1 tablet by mouth Daily. (Patient taking differently: Take 50 mg by mouth Every Evening.) 90 tablet 3 2020 at 2200   • ondansetron (ZOFRAN) 4 MG tablet Take 1 tablet by mouth Every 6 (Six) Hours As  Needed for Nausea or Vomiting. 40 tablet 1    • oxyCODONE-acetaminophen (PERCOCET) 5-325 MG per tablet Take 1-2 tablets by mouth Every 4 (Four) Hours As Needed (Pain). 84 tablet 0    • traZODone (DESYREL) 100 MG tablet TAKE 2 TABLETS BY MOUTH EVERY NIGHT. 60 tablet 5 6/29/2020 at 2200   • triamterene-hydrochlorothiazide (MAXZIDE-25) 37.5-25 MG per tablet Take 1 tablet by mouth Every Morning. 90 tablet 3 6/29/2020 at 1000     Allergies:  Sulfa antibiotics and Morphine and related    Objective   Vital Signs  Temp:  [99 °F (37.2 °C)] 99 °F (37.2 °C)  Heart Rate:  [86-95] 88  Resp:  [16-18] 18  BP: (132-164)/(62-87) 132/86    Physical Exam:  Constitutional: Not in acute distress.  Eyes: Injected conjunctivae, EOMI. Pupils equal and reactive to light.   ENMT: [place] 3. No oral thrush.  Neck: No thyromegaly.  Trachea midline  Heart: RRR, no murmur.  No pitting edema  Lungs: Equal but slightly diminished air entry bilaterally.  No crackles or wheezing..  Nonlabored breathing  Abdomen: Overweight. Soft. No tenderness or dullness.  Positive bowel sounds  Extremities: No cyanosis, clubbing. Moves all extremities.  Warm extremities and well-perfused.  Right knee slightly swollen with presence of staples.  No redness blood at slightly warmer than the left side on palpation.  Neuro: Conscious, alert, oriented x3.  Strength 5/5 overall  Psych: Appropriate mood and affect.    Integumentary: No rash or ecchymosis  Lymphatic: No palpable cervical or supraclavicular lymph nodes.      Diagnostic imaging:  I personally and independently reviewed the following images:   CT chest 7/14/2020: Subsegmental PE in the left upper lobe.  Subtle GG infiltrates.  Chronic stable pulmonary nodules.                      Assessment     1. Acute pulmonary embolism, subsegmental located in the left upper lobe  2. Bilateral COVID 19 Pneumonia, mild  3. Mild hyponatremia  4. Anemia, acute but stable since 7/1/2020  5. Elevated inflammatory markers  (ferritin, CRP and LDH)  6. Multiple pulmonary nodule, stable since 2018  7. SHAI, ODALYS 15 on 5/11/2017, on auto CPAP       Pertinent medical problems:  · Essential hypertension  · Paroxysmal atrial fibrillation  · Obstructive sleep apnea  · Osteoarthritis      Plan:  · Admit to telemetry  · Heparin drip.  Can probably be transitioned to oral anticoagulant tomorrow if stable and no bleeding with heparin drip  · Conservative management for COVID-19 infection.  I took her off oxygen to see if she developed desaturation in this instance we may have to place her on steroids.  Off note, she may get slightly hypoxic at night due to sleep apnea when not using her CPAP.  We will not consider this as desaturation secondary to COVID-19.  At this point, she does not qualify for experimental therapy either.  · When COVID-19 status clears up then will consider Doppler of the right leg to evaluate the extent of the DVT if present.  This will not  at the present time therefore we will hold off to minimize staff/equipment exposure  · Pain management with Lortab  · Resume trazodone and metoprolol for hypertension.  I held hydrochlorothiazide/triamterene for now but if her blood pressure goes up then will restart it  · Remove right knee staples tomorrow      I discussed the patient's findings and my recommendations with patient.     Ross Lamar MD  07/14/20  21:14          This note was dictated utilizing Dragon dictation

## 2020-07-14 NOTE — ED NOTES
Patient to er from urgent care with c/o weakness and fever. Patient stated she has family that is positive with covid-19. Patient reported fever all last week. Patient reported knee surgery two weeks ago today. Patient has mask on in triage along with triage staff.      Degonda, Janet, RN  07/14/20 9114

## 2020-07-15 LAB
ANION GAP SERPL CALCULATED.3IONS-SCNC: 8.3 MMOL/L (ref 5–15)
APTT PPP: 107.4 SECONDS (ref 22.7–35.4)
APTT PPP: 117.6 SECONDS (ref 22.7–35.4)
BASOPHILS # BLD AUTO: 0.02 10*3/MM3 (ref 0–0.2)
BASOPHILS NFR BLD AUTO: 0.3 % (ref 0–1.5)
BUN SERPL-MCNC: 16 MG/DL (ref 8–23)
BUN/CREAT SERPL: 25 (ref 7–25)
CALCIUM SPEC-SCNC: 8 MG/DL (ref 8.6–10.5)
CHLORIDE SERPL-SCNC: 103 MMOL/L (ref 98–107)
CO2 SERPL-SCNC: 23.7 MMOL/L (ref 22–29)
CREAT SERPL-MCNC: 0.64 MG/DL (ref 0.57–1)
DEPRECATED RDW RBC AUTO: 43.8 FL (ref 37–54)
EOSINOPHIL # BLD AUTO: 0.1 10*3/MM3 (ref 0–0.4)
EOSINOPHIL NFR BLD AUTO: 1.4 % (ref 0.3–6.2)
ERYTHROCYTE [DISTWIDTH] IN BLOOD BY AUTOMATED COUNT: 12.8 % (ref 12.3–15.4)
GFR SERPL CREATININE-BSD FRML MDRD: 92 ML/MIN/1.73
GLUCOSE SERPL-MCNC: 111 MG/DL (ref 65–99)
HCT VFR BLD AUTO: 28.8 % (ref 34–46.6)
HGB BLD-MCNC: 9.4 G/DL (ref 12–15.9)
IMM GRANULOCYTES # BLD AUTO: 0.15 10*3/MM3 (ref 0–0.05)
IMM GRANULOCYTES NFR BLD AUTO: 2.1 % (ref 0–0.5)
LYMPHOCYTES # BLD AUTO: 1.62 10*3/MM3 (ref 0.7–3.1)
LYMPHOCYTES NFR BLD AUTO: 22.4 % (ref 19.6–45.3)
MCH RBC QN AUTO: 30.2 PG (ref 26.6–33)
MCHC RBC AUTO-ENTMCNC: 32.6 G/DL (ref 31.5–35.7)
MCV RBC AUTO: 92.6 FL (ref 79–97)
MONOCYTES # BLD AUTO: 0.97 10*3/MM3 (ref 0.1–0.9)
MONOCYTES NFR BLD AUTO: 13.4 % (ref 5–12)
NEUTROPHILS NFR BLD AUTO: 4.37 10*3/MM3 (ref 1.7–7)
NEUTROPHILS NFR BLD AUTO: 60.4 % (ref 42.7–76)
NRBC BLD AUTO-RTO: 0 /100 WBC (ref 0–0.2)
PLATELET # BLD AUTO: 331 10*3/MM3 (ref 140–450)
PMV BLD AUTO: 10 FL (ref 6–12)
POTASSIUM SERPL-SCNC: 3.5 MMOL/L (ref 3.5–5.2)
RBC # BLD AUTO: 3.11 10*6/MM3 (ref 3.77–5.28)
SODIUM SERPL-SCNC: 135 MMOL/L (ref 136–145)
WBC # BLD AUTO: 7.23 10*3/MM3 (ref 3.4–10.8)

## 2020-07-15 PROCEDURE — 85025 COMPLETE CBC W/AUTO DIFF WBC: CPT | Performed by: EMERGENCY MEDICINE

## 2020-07-15 PROCEDURE — 25010000002 HEPARIN (PORCINE) PER 1000 UNITS: Performed by: EMERGENCY MEDICINE

## 2020-07-15 PROCEDURE — 80048 BASIC METABOLIC PNL TOTAL CA: CPT | Performed by: INTERNAL MEDICINE

## 2020-07-15 PROCEDURE — 85730 THROMBOPLASTIN TIME PARTIAL: CPT | Performed by: INTERNAL MEDICINE

## 2020-07-15 RX ORDER — METOPROLOL SUCCINATE 50 MG/1
50 TABLET, EXTENDED RELEASE ORAL NIGHTLY
Status: DISCONTINUED | OUTPATIENT
Start: 2020-07-15 | End: 2020-07-16 | Stop reason: HOSPADM

## 2020-07-15 RX ADMIN — SODIUM CHLORIDE 125 ML/HR: 9 INJECTION, SOLUTION INTRAVENOUS at 08:51

## 2020-07-15 RX ADMIN — HYDROCODONE BITARTRATE AND ACETAMINOPHEN 1 TABLET: 5; 325 TABLET ORAL at 21:13

## 2020-07-15 RX ADMIN — APIXABAN 10 MG: 5 TABLET, FILM COATED ORAL at 17:24

## 2020-07-15 RX ADMIN — METOPROLOL SUCCINATE 50 MG: 50 TABLET, EXTENDED RELEASE ORAL at 21:13

## 2020-07-15 RX ADMIN — DOCUSATE SODIUM 100 MG: 100 CAPSULE, LIQUID FILLED ORAL at 21:13

## 2020-07-15 RX ADMIN — ACETAMINOPHEN 650 MG: 325 TABLET, FILM COATED ORAL at 08:56

## 2020-07-15 RX ADMIN — METOPROLOL SUCCINATE 50 MG: 50 TABLET, EXTENDED RELEASE ORAL at 01:45

## 2020-07-15 RX ADMIN — HEPARIN SODIUM 12.9 UNITS/KG/HR: 10000 INJECTION, SOLUTION INTRAVENOUS at 13:18

## 2020-07-15 RX ADMIN — ACETAMINOPHEN 650 MG: 325 TABLET, FILM COATED ORAL at 17:24

## 2020-07-15 RX ADMIN — ACETAMINOPHEN 650 MG: 325 TABLET, FILM COATED ORAL at 01:47

## 2020-07-15 NOTE — PROGRESS NOTES
Adult Nutrition  Assessment/PES    Patient Name:  Tran Sosa  YOB: 1950  MRN: [id number]  Admit Date:  7/14/2020    Assessment Date:  7/15/2020    Comments:  Nutri. Screen: MST 2. Wt index notes # w/CBW of 221#, possibly inaccurate. Intake 50-75% on regular diet. On room air. No recommendations at present, will continue to follow.     Reason for Assessment     Row Name 07/15/20 1557          Reason for Assessment    Reason For Assessment  identified at risk by screening criteria     Diagnosis  pulmonary disease Acute Pulmonary Embolism with COVID PNA, mild hyponatremia, anemia, and SHAI.     Identified At Risk by Screening Criteria  MST SCORE 2+         Nutrition/Diet History     Row Name 07/15/20 1557          Nutrition/Diet History    Typical Food/Fluid Intake  MST 2. Wt index notes # w/CBW of 221#, possibly inaccurate. Intake 50-75% on regular diet. On room air.      Factors Affecting Nutritional Intake  taste altered           Labs/Tests/Procedures/Meds     Row Name 07/15/20 3851          Labs/Procedures/Meds    Lab Results Reviewed  reviewed     Lab Results Comments  Glu 111, Na 135, Ca 8. Hgb 9.4/Hct 28.8        Diagnostic Tests/Procedures    Diagnostic Test/Procedure Reviewed  reviewed        Medications    Pertinent Medications Reviewed  reviewed     Pertinent Medications Comments  heparin drip, 125ml/hr IVF         Physical Findings     Row Name 07/15/20 8531          Physical Findings    Overall Physical Appearance  obese     Gastrointestinal  constipation     Skin  other (see comments) B=22           Nutrition Prescription Ordered     Row Name 07/15/20 7894          Nutrition Prescription PO    Current PO Diet  Regular     Fluid Consistency  Thin         Problem/Interventions:  Problem 1     Row Name 07/15/20 1933          Nutrition Diagnoses Problem 1    Problem 1  Nutrition Appropriate for Condition at this Time     Etiology (related to)  Medical Diagnosis      Pulmonary/Critical Care  Pulmonary emboli;Pneumonia;Obstructive sleep apnea COVID     Signs/Symptoms (evidenced by)  PO Intake         Intervention Goal     Row Name 07/15/20 1557          Intervention Goal    General  Maintain nutrition;Meet nutritional needs for age/condition;Disease management/therapy     PO  Maintain intake;Meet estimated needs     Weight  Maintain weight         Nutrition Intervention     Row Name 07/15/20 1557          Nutrition Intervention    RD/Tech Action  Care plan reviewd;Follow Tx progress           Education/Evaluation     Row Name 07/15/20 1558          Education    Education  Will Instruct as appropriate        Monitor/Evaluation    Monitor  Per protocol;PO intake;Pertinent labs;Weight;Symptoms     Education Follow-up  Reinforce PRN           Electronically signed by:  Tasha Walker, MS,RD,LD  07/15/20 15:59

## 2020-07-15 NOTE — PROGRESS NOTES
Discharge Planning Assessment  [facility]     Patient Name: Tran Sosa  MRN: [id number]  Today's Date: 7/15/2020    Admit Date: 7/14/2020    Discharge Needs Assessment     Row Name 07/15/20 1145       Living Environment    Lives With  alone    Current Living Arrangements  home/apartment/condo    Primary Care Provided by  self    Provides Primary Care For  no one    Family Caregiver if Needed  child(melissa), adult    Family Caregiver Names  daughter, Concetta Fishman, [phone]    Quality of Family Relationships  helpful;involved;supportive    Able to Return to Prior Arrangements  yes       Resource/Environmental Concerns    Resource/Environmental Concerns  none       Transition Planning    Patient/Family Anticipates Transition to  home    Patient/Family Anticipated Services at Transition  none    Transportation Anticipated  family or friend will provide       Discharge Needs Assessment    Concerns to be Addressed  no discharge needs identified;denies needs/concerns at this time    Equipment Currently Used at Home  none    Discharge Coordination/Progress  Home        Discharge Plan     Row Name 07/15/20 1147       Plan    Plan  Plans home, follow for needs    Patient/Family in Agreement with Plan  yes    Plan Comments  IMM letter checked. CCP spoke with pt via [facility] room phone due to isolation precautions. Pt resides alone in a three level home and is typically IADLs with no DME use, but has been using a walker and bedside commode at home following her 6/30/20 knee replacement surgery. Pt recently completed [facility] in home PT and had initiated [facility] outpatient PT prior to current admission. Pt uses [facility]. Pt uncertain of post hospital needs at this time. CCP to follow to assist should needs arise. Jenny Pina LCSW        Destination      Coordination has not been started for this encounter.      Durable Medical Equipment      Coordination has not been started for this encounter.       Dialysis/Infusion      Coordination has not been started for this encounter.      Home Medical Care      Coordination has not been started for this encounter.      Therapy      Coordination has not been started for this encounter.      Community Resources      Coordination has not been started for this encounter.          Demographic Summary     Row Name 07/15/20 1144       General Information    Admission Type  inpatient    Arrived From  home    Required Notices Provided  Important Message from Medicare    Referral Source  admission list    Reason for Consult  discharge planning    Preferred Language  English        Functional Status     Row Name 07/15/20 1145       Functional Status    Usual Activity Tolerance  good    Current Activity Tolerance  good       Functional Status, IADL    Medications  independent    Meal Preparation  independent    Housekeeping  independent    Laundry  independent    Shopping  independent       Mental Status Summary    Recent Changes in Mental Status/Cognitive Functioning  no changes        Psychosocial    No documentation.       Abuse/Neglect    No documentation.       Legal    No documentation.       Substance Abuse    No documentation.       Patient Forms    No documentation.           Chastity Pina LCSW

## 2020-07-15 NOTE — PROGRESS NOTES
[facility]  [phone]  Lavon Walker MD    Subjective:  LOS: 1    She has a mild cough.  She does not feel short of breath.  Currently on room air.  Saturation 96 to 98% on room air at rest.  Denies nausea vomiting diarrhea.    Objective   Vital Signs past 24hrs    Temp range: Temp (24hrs), Av.4 °F (37.4 °C), Min:98.8 °F (37.1 °C), Max:100.2 °F (37.9 °C)    BP range: BP: (116-164)/(62-86) 137/71  Pulse range: Heart Rate:  [] 78  Resp rate range: Resp:  [16-18] 18    Device (Oxygen Therapy): room airFlow (L/min):  [2] 2  Oxygen range:SpO2:  [94 %-100 %] 98 %       ; Body mass index is 30.92 kg/m².    Intake/Output Summary (Last 24 hours) at 7/15/2020 1555  Last data filed at 7/15/2020 1313  Gross per 24 hour   Intake 2600 ml   Output --   Net 2600 ml       Physical Exam   Constitutional: She appears well-developed.   HENT:   Head: Normocephalic.   Eyes: Pupils are equal, round, and reactive to light.   Cardiovascular: Normal rate and regular rhythm.   No murmur heard.  Pulmonary/Chest: Effort normal. No respiratory distress. She has no wheezes. She has no rales.   Abdominal: Soft. Bowel sounds are normal. She exhibits no mass. There is no tenderness.   Musculoskeletal: She exhibits no edema.   Recent right TKR   Skin: No rash noted.     Results Review:    I have reviewed the laboratory and imaging data since the last note by [facility] physician.  My annotations are noted in assessment and plan.    Medication Review:  I have reviewed the current MAR.  My annotations are noted in assessment and plan.      heparin (porcine) 14.9 Units/kg/hr Last Rate: 9.9 Units/kg/hr (07/15/20 1435)   sodium chloride 125 mL/hr Last Rate: 125 mL/hr (07/15/20 0851)     Plan   PCCM Problems  Acute pulmonary embolism  Covid-19 infection  Pulmonary infiltrates  Mild hyponatremia  Multiple pulmonary nodules, stable from 2018  SHAI on CPAP at home  Anemia  Hx breast ca        THESE ARE NEW MEDICAL PROBLEMS TO ME.    Plan of  Treatment  Switch to Eliquis tonight.  If not covered by insurance will use Xarelto instead.  Needs 3 months of treatment as provoked PE.    Covid-19 infection but without hypoxia at this time.  Very mild pulmonary infiltrates which could also be atelectasis.  If remains without need for oxygen then potentially home tomorrow with close monitoring.    Multiple pulmonary nodules of uncertain etiology.  This is thankfully stable since 2018.  Can continue follow-up with outside physician.    SHAI on CPAP at home.  Cannot use in the hospital due to high aerosolization risk.    Mild hyponatremia noted.  Likely related to acute lung infection.    Anemia requires follow-up outpatient by primary care physician.  Will check iron profile in the morning.    To clarify patient has a history of paroxysmal atrial tachycardia but I could find no history of paroxysmal atrial fibrillation.      Lavon Walker MD  07/15/20  15:55    Communication:  Please EPIC MESSAGE ME on In-Patient Care of this patient.     BETWEEN THE HOURS OF 7 AM AND 4:30 PM ONLY AND NOT AFTER 7/17/20.    Part of this note may be an electronic transcription/translation of spoken language to printed text using the Dragon Dictation System.

## 2020-07-16 ENCOUNTER — READMISSION MANAGEMENT (OUTPATIENT)
Dept: CALL CENTER | Facility: HOSPITAL | Age: 70
End: 2020-07-16

## 2020-07-16 VITALS
RESPIRATION RATE: 18 BRPM | HEART RATE: 80 BPM | TEMPERATURE: 97.9 F | HEIGHT: 71 IN | OXYGEN SATURATION: 94 % | SYSTOLIC BLOOD PRESSURE: 146 MMHG | BODY MASS INDEX: 30.92 KG/M2 | DIASTOLIC BLOOD PRESSURE: 78 MMHG

## 2020-07-16 PROBLEM — U07.1 COVID-19: Status: ACTIVE | Noted: 2020-07-16

## 2020-07-16 LAB
ALBUMIN SERPL-MCNC: 3.3 G/DL (ref 3.5–5.2)
ALBUMIN/GLOB SERPL: 1.1 G/DL
ALP SERPL-CCNC: 62 U/L (ref 39–117)
ALT SERPL W P-5'-P-CCNC: 13 U/L (ref 1–33)
ANION GAP SERPL CALCULATED.3IONS-SCNC: 9.7 MMOL/L (ref 5–15)
AST SERPL-CCNC: 13 U/L (ref 1–32)
BASOPHILS # BLD AUTO: 0.02 10*3/MM3 (ref 0–0.2)
BASOPHILS NFR BLD AUTO: 0.5 % (ref 0–1.5)
BILIRUB SERPL-MCNC: 0.3 MG/DL (ref 0–1.2)
BUN SERPL-MCNC: 9 MG/DL (ref 8–23)
BUN/CREAT SERPL: 14.1 (ref 7–25)
CALCIUM SPEC-SCNC: 8.7 MG/DL (ref 8.6–10.5)
CHLORIDE SERPL-SCNC: 106 MMOL/L (ref 98–107)
CK SERPL-CCNC: 46 U/L (ref 20–180)
CO2 SERPL-SCNC: 24.3 MMOL/L (ref 22–29)
CREAT SERPL-MCNC: 0.64 MG/DL (ref 0.57–1)
CRP SERPL-MCNC: 2.2 MG/DL (ref 0–0.5)
D DIMER PPP FEU-MCNC: 4.65 MCGFEU/ML (ref 0–0.49)
DEPRECATED RDW RBC AUTO: 42.1 FL (ref 37–54)
EOSINOPHIL # BLD AUTO: 0.14 10*3/MM3 (ref 0–0.4)
EOSINOPHIL NFR BLD AUTO: 3.5 % (ref 0.3–6.2)
ERYTHROCYTE [DISTWIDTH] IN BLOOD BY AUTOMATED COUNT: 12.9 % (ref 12.3–15.4)
FERRITIN SERPL-MCNC: 474 NG/ML (ref 13–150)
FIBRINOGEN PPP-MCNC: 471 MG/DL (ref 219–464)
GFR SERPL CREATININE-BSD FRML MDRD: 92 ML/MIN/1.73
GLOBULIN UR ELPH-MCNC: 2.9 GM/DL
GLUCOSE SERPL-MCNC: 99 MG/DL (ref 65–99)
HCT VFR BLD AUTO: 28 % (ref 34–46.6)
HGB BLD-MCNC: 9.4 G/DL (ref 12–15.9)
IMM GRANULOCYTES # BLD AUTO: 0.08 10*3/MM3 (ref 0–0.05)
IMM GRANULOCYTES NFR BLD AUTO: 2 % (ref 0–0.5)
IRON 24H UR-MRATE: 30 MCG/DL (ref 37–145)
IRON SATN MFR SERPL: 12 % (ref 20–50)
LDH SERPL-CCNC: 274 U/L (ref 135–214)
LYMPHOCYTES # BLD AUTO: 1.2 10*3/MM3 (ref 0.7–3.1)
LYMPHOCYTES NFR BLD AUTO: 29.9 % (ref 19.6–45.3)
MCH RBC QN AUTO: 29.9 PG (ref 26.6–33)
MCHC RBC AUTO-ENTMCNC: 33.6 G/DL (ref 31.5–35.7)
MCV RBC AUTO: 89.2 FL (ref 79–97)
MONOCYTES # BLD AUTO: 0.64 10*3/MM3 (ref 0.1–0.9)
MONOCYTES NFR BLD AUTO: 15.9 % (ref 5–12)
NEUTROPHILS NFR BLD AUTO: 1.94 10*3/MM3 (ref 1.7–7)
NEUTROPHILS NFR BLD AUTO: 48.2 % (ref 42.7–76)
NRBC BLD AUTO-RTO: 0 /100 WBC (ref 0–0.2)
PLATELET # BLD AUTO: 383 10*3/MM3 (ref 140–450)
PMV BLD AUTO: 9.6 FL (ref 6–12)
POTASSIUM SERPL-SCNC: 3.7 MMOL/L (ref 3.5–5.2)
PROT SERPL-MCNC: 6.2 G/DL (ref 6–8.5)
RBC # BLD AUTO: 3.14 10*6/MM3 (ref 3.77–5.28)
SODIUM SERPL-SCNC: 140 MMOL/L (ref 136–145)
TIBC SERPL-MCNC: 256 MCG/DL (ref 298–536)
TRANSFERRIN SERPL-MCNC: 172 MG/DL (ref 200–360)
WBC # BLD AUTO: 4.02 10*3/MM3 (ref 3.4–10.8)

## 2020-07-16 PROCEDURE — 85379 FIBRIN DEGRADATION QUANT: CPT | Performed by: INTERNAL MEDICINE

## 2020-07-16 PROCEDURE — 85025 COMPLETE CBC W/AUTO DIFF WBC: CPT | Performed by: INTERNAL MEDICINE

## 2020-07-16 PROCEDURE — 82550 ASSAY OF CK (CPK): CPT | Performed by: INTERNAL MEDICINE

## 2020-07-16 PROCEDURE — 83540 ASSAY OF IRON: CPT | Performed by: INTERNAL MEDICINE

## 2020-07-16 PROCEDURE — 86140 C-REACTIVE PROTEIN: CPT | Performed by: INTERNAL MEDICINE

## 2020-07-16 PROCEDURE — 80053 COMPREHEN METABOLIC PANEL: CPT | Performed by: INTERNAL MEDICINE

## 2020-07-16 PROCEDURE — 84466 ASSAY OF TRANSFERRIN: CPT | Performed by: INTERNAL MEDICINE

## 2020-07-16 PROCEDURE — 85384 FIBRINOGEN ACTIVITY: CPT | Performed by: INTERNAL MEDICINE

## 2020-07-16 PROCEDURE — 82728 ASSAY OF FERRITIN: CPT | Performed by: INTERNAL MEDICINE

## 2020-07-16 PROCEDURE — 83615 LACTATE (LD) (LDH) ENZYME: CPT | Performed by: INTERNAL MEDICINE

## 2020-07-16 RX ADMIN — TRAZODONE HYDROCHLORIDE 100 MG: 100 TABLET ORAL at 01:49

## 2020-07-16 RX ADMIN — APIXABAN 10 MG: 5 TABLET, FILM COATED ORAL at 08:42

## 2020-07-16 NOTE — DISCHARGE SUMMARY
After Visit Summary   1/16/2017    Ofelia Huddleston    MRN: [id number]           Patient Information     Date Of Birth          1987        Visit Information        Provider Department      1/16/2017 12:30 PM Elmo Paris MD [facility]        Today's Diagnoses     Heterozygous protein C deficiency (H)    -  1       Care Instructions    Your visit the [facility] was to discuss Protein C deficinecy.  You recent blood testing does confirm that you are heterozygous (meaning inherited a Protein C defect from 1 parent) for Protein C deficiency.   This means about half of the Protein C is functioning correctly in your blood.  This increases your risk for a blood clot and/or miscarriage but only to a small degree so we generally do not recommend blood thinners for this.  If you have inherited another tendency to form blood clots inherited from your father, then we would recommend considering a low-dose blood thinner during pregnancy.  We will call you the results of today's lab tests.        Follow-ups after your visit        Future tests that were ordered for you today     Open Future Orders        Priority Expected Expires Ordered    Antithrombin III Routine  1/16/2018 1/16/2017    Factor 2 prothrombin 49471B Mut Anal Routine  1/16/2018 1/16/2017            Who to contact     If you have questions or need follow up information about today's clinic visit or your schedule please contact [facility] directly at [phone].  Normal or non-critical lab and imaging results will be communicated to you by MyChart, letter or phone within 4 business days after the clinic has received the results. If you do not hear from us within 7 days, please contact the clinic through MyChart or phone. If you have a critical or abnormal lab result, we will notify you by phone as soon as possible.  Submit refill requests through  Date of Admission: 7/14/2020  Date of Discharge:  7/16/2020    Discharge Diagnosis:    Acute pulmonary embolism  Covid-19 infection  Pulmonary infiltrates  Mild hyponatremia  Multiple pulmonary nodules, stable from 2018  SHAI on CPAP at home  Anemia  Hx breast ca  Recent Right TKR    Hospital Course    Presenting Problem/History of Present Illness    This is a 70 y.o. female patient, retired nurse, former smoker (10 packs year, stopped 40 years ago) who recently had total right knee replacement on June 30, 2020.     She reported symptoms of shortness of breath, fever and cough which started about a week ago.  She initially started with fever reaching 100.3 degrees Fahrenheit.  This was followed by shortness of breath, worse with activities and alleviated by rest and cough productive of mild yellowish phlegm which she initially attributed to being intubated for her knee surgery.  Her fever responded to Tylenol and she has not had fever for the last 2 days.  Cough improved as well but she continues to have difficulty breathing on activities.       She also reported contact with her daughter who is a nurse practitioner and son-in-law who is a neurologist at [place] in both tested positive for COVID.     CTA chest in the ED showed PE and subtle GG infiltrates.  Patient was discharged on aspirin 325 twice a day and she was supposed to switch to once a day today.  She stated that she has been physically active after discharge.     Labs reviewed:  ; sodium 135; ferritin 489; CRP 3.65; hemoglobin 10    Subsequent Course of Management    70-year-old female admitted with shortness of breath.  Also low-grade fever.  Recent total knee replacement.  CTA showed evidence for pulmonary embolism.  Also positive Covid-19 infection but without hypoxia or other significant symptoms.  Additional therapies for Covid-19 infection was not required.  Patient was started on anticoagulation and transition to Eliquis.  She has tolerated  "MyChart or call your pharmacy and they will forward the refill request to us. Please allow 3 business days for your refill to be completed.          Additional Information About Your Visit        MyChart Information     [facility] gives you secure access to your electronic health record. If you see a primary care provider, you can also send messages to your care team and make appointments. If you have questions, please call your primary care clinic.  If you do not have a primary care provider, please call [phone] and they will assist you.        Care EveryWhere ID     This is your Care EveryWhere ID. This could be used by other organizations to access your [facility] medical records  [id number]        Your Vitals Were     Pulse Temperature Height BMI (Body Mass Index)          96 98.3  F (36.8  C) (Oral) 1.702 m (5' 7\") 19.46 kg/m2         Blood Pressure from Last 3 Encounters:   01/16/17 115/75   11/10/16 100/58   06/23/16 108/68    Weight from Last 3 Encounters:   01/16/17 56.382 kg (124 lb 4.8 oz)   11/10/16 58.968 kg (130 lb)   06/23/16 60.782 kg (134 lb)              We Performed the Following     HCG qualitative     Protein S Antigen Free          Today's Medication Changes          These changes are accurate as of: 1/16/17  1:28 PM.  If you have any questions, ask your nurse or doctor.               Stop taking these medicines if you haven't already. Please contact your care team if you have questions.     CHEWABLE MULTIVITE/FL OR   Stopped by:  Elmo Paris MD           magnesium sulfate 500 mg/mL Soln   Stopped by:  Elmo Paris MD           SUMAtriptan 25 MG tablet   Commonly known as:  IMITREX   Stopped by:  Elmo Paris MD                    Primary Care Provider Office Phone # Fax #    Halina Luciano [phone]       [facility] [address]  [facility] [address]        Thank you!     Thank you for choosing [facility]  " "this medication.  She denies cough, fever, phlegm.  Her shortness of breath is improved.  She is eager to go home.  She would like to continue physical therapy for her right TKR.  This has been arranged with [facility].  We have confirmed cost of medications for her and she is agreeable to continuing the same.  She will need 3 months treatment for the provoked PE related to TKR and possibly Covid-19 infection.  She has multiple pulmonary nodules of uncertain etiology but these have been stable for many many years.  Apparently benign on previous biopsy.  She is on a CPAP at home and can resume use of this on discharge.    Examination on Date of Discharge    /78 (BP Location: Left arm, Patient Position: Lying)   Pulse 80   Temp 97.9 °F (36.6 °C) (Oral)   Resp 18   Ht 180.3 cm (71\")   LMP  (LMP Unknown) Comment: HYSTERECTOMY W/BSO  SpO2 94%   BMI 30.92 kg/m²     Physical Exam   Constitutional: She appears well-developed.   HENT:   Head: Normocephalic.   Eyes: Pupils are equal, round, and reactive to light.   Cardiovascular: Normal rate and regular rhythm.   No murmur heard.  Pulmonary/Chest: Effort normal. No respiratory distress. She has no wheezes. She has no rales.   Abdominal: Soft. Bowel sounds are normal. She exhibits no mass. There is no tenderness.   Musculoskeletal: She exhibits no edema.   Right knee mildly swollen and with sutures   Skin: No rash noted.       Test Results    Results for orders placed during the hospital encounter of 01/13/17   Adult Stress Echo With Color & Doppler    Narrative · Normal Baseline echo  · Mild mitral valve regurgitation is present.  · STRESS ECHO  · Arrhythmias during recovery: rare PACs, rare PVCs.  · Normal stress echo with no significant echocardiographic evidence for   myocardial ischemia.                Results from last 7 days   Lab Units 07/16/20  0559 07/15/20  0311 07/14/20  2047 07/14/20  1454   WBC 10*3/mm3 4.02 7.23 6.70 7.93   HEMOGLOBIN g/dL 9.4* " 9.4* 9.8* 10.3*   HEMATOCRIT % 28.0* 28.8* 30.0* 31.4*   PLATELETS 10*3/mm3 383 331 374 343       Results from last 7 days   Lab Units 07/16/20  0559 07/15/20  0311 07/14/20  1454   SODIUM mmol/L 140 135* 135*   POTASSIUM mmol/L 3.7 3.5 3.9   CHLORIDE mmol/L 106 103 98   CO2 mmol/L 24.3 23.7 25.3   BUN mg/dL 9 16 18   CREATININE mg/dL 0.64 0.64 0.81       Results from last 7 days   Lab Units 07/16/20  0559 07/14/20  1454   TROPONIN T ng/mL  --  <0.010   CK TOTAL U/L 46  --        Microbiology Results (last 10 days)     Procedure Component Value - Date/Time    Blood Culture - Blood, Arm, Left [[id number]] Collected:  07/14/20 1613    Lab Status:  Preliminary result Specimen:  Blood from Arm, Left Updated:  07/15/20 1630     Blood Culture No growth at 24 hours    Blood Culture - Blood, Arm, Left [[id number]] Collected:  07/14/20 1454    Lab Status:  Preliminary result Specimen:  Blood from Arm, Left Updated:  07/16/20 1515     Blood Culture No growth at 2 days    Respiratory Panel PCR w/COVID-19(SARS-CoV-2) [facility] In-House, NP swab in [facility]/VTM - Swab, Nasopharynx [[id number]]  (Abnormal) Collected:  07/14/20 1412    Lab Status:  Final result Specimen:  Swab from Nasopharynx Updated:  07/14/20 1540     ADENOVIRUS, PCR Not Detected     Coronavirus 229E Not Detected     Coronavirus HKU1 Not Detected     Coronavirus NL63 Not Detected     Coronavirus OC43 Not Detected     COVID19 Detected     Human Metapneumovirus Not Detected     Human Rhinovirus/Enterovirus Not Detected     Influenza A PCR Not Detected     Influenza A H1 Not Detected     Influenza A H1 2009 PCR Not Detected     Influenza A H3 Not Detected     Influenza B PCR Not Detected     Parainfluenza Virus 1 Not Detected     Parainfluenza Virus 2 Not Detected     Parainfluenza Virus 3 Not Detected     Parainfluenza Virus 4 Not Detected     RSV, PCR Not Detected     Bordetella pertussis pcr Not Detected     Bordetella parapertussis PCR Not Detected     Chlamydophila  for your care. Our goal is always to provide you with excellent care. Hearing back from our patients is one way we can continue to improve our services. Please take a few minutes to complete the written survey that you may receive in the mail after your visit with us. Thank you!             Your Updated Medication List - Protect others around you: Learn how to safely use, store and throw away your medicines at www.disposemymeds.org.          This list is accurate as of: 1/16/17  1:28 PM.  Always use your most recent med list.                   Brand Name Dispense Instructions for use    calcium-magnesium 500-250 MG Tabs per tablet    CALMAG     Take 1 tablet by mouth daily       loratadine 10 MG tablet    CLARITIN     Take 10 mg by mouth daily       mometasone 50 MCG/ACT spray    NASONEX    3 Box    Spray 2 sprays into both nostrils daily       prenatal multivitamin  plus iron 27-0.8 MG Tabs per tablet      Take 1 tablet by mouth daily       PROBIOTIC ACIDOPHILUS Tabs          UNABLE TO FIND      MEDICATION NAME: Chinese herbal supplements       VITAMIN B-6 PO             pneumoniae PCR Not Detected     Mycoplasma pneumo by PCR Not Detected    Narrative:       Fact sheet for providers: https://docs.[employer]/wp-content/uploads/XHL2264-0035-PV9.1-EUA-Provider-Fact-Sheet-3.pdf    Fact sheet for patients: https://docs.[employer]/wp-content/uploads/HZX7805-8581-PN1.1-EUA-Patient-Fact-Sheet-1.pdf          Xr Chest 2 View    Result Date: 6/23/2020  XR CHEST 2 VW-, XR KNEE 1 OR 2 VW RIGHT-  HISTORY: 69-year-old female. Preoperative evaluation.  FINDINGS:  CHEST: There are no new airspace opacities. There are no effusions or evidence for CHF. No acute abnormality is seen.  RIGHT KNEE: There are moderately advanced osteoarthritic changes predominantly at the lateral tibiofemoral compartment. There is no joint effusion or acute abnormality.  This report was finalized on 6/23/2020 12:51 PM by Dr. Sailaja Hammer M.D.      Xr Knee 1 Or 2 View Right    Result Date: 6/30/2020  XR KNEE 1 OR 2 VW RIGHT-  INDICATIONS: Postoperative evaluation.  TECHNIQUE: Frontal and lateral views of the right knee  COMPARISON: 06/23/2020  FINDINGS:   Intact appearing knee arthroplasty hardware is seen with adjacent surgical soft tissue gas, drain, overlying skin staples. No acute fracture is identified.        Postsurgical changes.    This report was finalized on 6/30/2020 1:07 PM by Dr. Rigoberto Ford M.D.      Xr Knee 1 Or 2 View Right    Result Date: 6/23/2020  XR CHEST 2 VW-, XR KNEE 1 OR 2 VW RIGHT-  HISTORY: 69-year-old female. Preoperative evaluation.  FINDINGS:  CHEST: There are no new airspace opacities. There are no effusions or evidence for CHF. No acute abnormality is seen.  RIGHT KNEE: There are moderately advanced osteoarthritic changes predominantly at the lateral tibiofemoral compartment. There is no joint effusion or acute abnormality.  This report was finalized on 6/23/2020 12:51 PM by Dr. Sailaja Hammer M.D.      Xr Chest 1 View    Result Date: 7/14/2020  XR CHEST 1 VW-  HISTORY: Female who is  70 years-old,  short of breath  TECHNIQUE: Frontal view of the chest  COMPARISON: 06/23/2020  FINDINGS: Heart, mediastinum and pulmonary vasculature are unremarkable. No focal pulmonary consolidation, pleural effusion, or pneumothorax. Surgical change in the left lung is apparent. No acute osseous process.      No evidence for acute pulmonary process. Follow-up as clinical indications persist.  This report was finalized on 7/14/2020 2:44 PM by Dr. Rigoberto Ford M.D.      Ct Angiogram Chest    Result Date: 7/14/2020  CT ANGIOGRAM CHEST-  Radiation dose reduction techniques were utilized, including automated exposure control and exposure modulation based on body size.  CLINICAL: Shortness of breath, 70-year-old female.  COMPARISON: 05/23/2018.  CT SCAN OF THE CHEST WITH INTRAVENOUS CONTRAST, PULMONARY EMBOLUS PROTOCOL TO INCLUDE CT ANGIOGRAM AND THREE-DIMENSIONAL RECONSTRUCTION.  FINDINGS: Cardiac size within normal limits. No aortic aneurysm or dissection. The esophagus is satisfactory in course and caliber.  Small Pulmonary emboli demonstrated within the left upper lobe, right upper lobe and right lower lobe. The RV/LV ratio is 0.8. Small sliding-type hiatal hernia is suggested.  There are again demonstrated several circumscribed pulmonary nodules, all of these remain stable within the interim. No new pulmonary nodule has developed. No pleural effusion seen.  Very subtle infiltrate is demonstrated along the medial right lower lobe. No gross consolidation. Limited images through the upper abdomen have a satisfactory appearance.  CONCLUSION: Small pulmonary emboli demonstrated within the left upper lobe, right upper lobe and right lower lobe. Subtle infiltrate medial right lower lobe. All the circumscribed pulmonary nodules seen on the 2018 examination remain stable.  Findings of this report called Dr. Renee in the emergency room at the time of completion, 5:22 PM.   This report was finalized on 7/14/2020 6:32  PM by Dr. Nikita Jasso M.D.        Consulting Physician(s)     Provider Relationship Specialty    Roberto Hull MD Consulting Physician Pulmonary Disease                 Discharge Instructions      Dietary Orders (From admission, onward)     Start     Ordered    07/14/20 2210  Diet Regular  Diet Effective Now     Question:  Diet Texture / Consistency  Answer:  Regular    07/14/20 2209                        Your medication list      START taking these medications      Instructions Last Dose Given Next Dose Due   Eliquis 5 MG tablet tablet  Generic drug:  apixaban      Take 2 tablets by mouth Every 12 (Twelve) Hours for 14 doses, then 1 tablet by mouth every 12 hours thereafter       apixaban 5 MG tablet tablet  Commonly known as:  ELIQUIS  Start taking on:  July 22, 2020      Take 1 tablet by mouth Every 12 (Twelve) Hours for 180 days. Indications: DVT/PE (active thrombosis)          CHANGE how you take these medications      Instructions Last Dose Given Next Dose Due   atorvastatin 80 MG tablet  Commonly known as:  LIPITOR  What changed:  when to take this      Take 1 tablet by mouth Daily.       metoprolol succinate XL 50 MG 24 hr tablet  Commonly known as:  TOPROL-XL  What changed:  when to take this      Take 1 tablet by mouth Daily.          CONTINUE taking these medications      Instructions Last Dose Given Next Dose Due   docusate sodium 100 MG capsule      Take 1 capsule by mouth 2 (Two) Times a Day As Needed for Constipation.       ondansetron 4 MG tablet  Commonly known as:  ZOFRAN      Take 1 tablet by mouth Every 6 (Six) Hours As Needed for Nausea or Vomiting.       traZODone 100 MG tablet  Commonly known as:  DESYREL      TAKE 2 TABLETS BY MOUTH EVERY NIGHT.       triamterene-hydrochlorothiazide 37.5-25 MG per tablet  Commonly known as:  MAXZIDE-25      Take 1 tablet by mouth Every Morning.          STOP taking these medications    aspirin 325 MG EC tablet              Where to Get Your Medications       These medications were sent to [facility]  [address], [facility] [address]    Hours:  7:00AM-6PM Mon-Fri Phone:  [phone]   · apixaban 5 MG tablet tablet  · Eliquis 5 MG tablet tablet         Follow-up Information     [facility] .    Specialty:  Home Health Services  Contact information:  [address]   [facility] [address]           Diane Burgos MD .    Specialty:  Internal Medicine  Contact information:  [address]  [address]  [facility] [id number] [phone]                   Additional Instructions for the Follow-ups that You Need to Schedule     Ambulatory Referral to Home Health   As directed      Face to Face Visit Date:  7/16/2020    Follow-up provider for Plan of Care?:  I treated the patient in an acute care facility and will not continue treatment after discharge.    Follow-up provider:  DIANE BURGOS [[id number]]    Reason/Clinical Findings:  Right TKR    Describe mobility limitations that make leaving home difficult:  TKR, Covid-19 infection    Nursing/Therapeutic Services Requested:  Physical Therapy    PT orders:  Total joint pathway    Frequency:  1 Week 1               Condition on Discharge:  Stable     Lavon Walker MD  07/16/20  16:30    Time: I spent over 30mins in the discharge planning of this patient.    Some of this encounter note is an electronic transcription/translation of spoken language to printed text.

## 2020-07-16 NOTE — DISCHARGE PLACEMENT REQUEST
"Tran Joyce (70 y.o. Female)     Date of Birth Social Security Number Address Home Phone MRN    [id number]  [id number]  [facility] [address] [phone] [id number]    [employer] Marital Status          Yazidism        Admission Date Admission Type Admitting Provider Attending Provider Department, Room/Bed    7/14/20 Emergency Roberto Hull MD Saad, Lebnan S, MD [address], [address]1    Discharge Date Discharge Disposition Discharge Destination                       Attending Provider:  Roberto Hull MD    Allergies:  Sulfa Antibiotics, Morphine And Related    Isolation:  Enh Drop/Con   Infection:  COVID (confirmed) (07/14/20)   Code Status:  CPR    Ht:  180.3 cm (71\")   Wt:  101 kg (221 lb 11.2 oz)    Admission Cmt:  None   Principal Problem:  None                Active Insurance as of 7/14/2020     Primary Coverage     Payor Plan Insurance Group Employer/Plan Group    MEDICARE MEDICARE A & B      Payor Plan Address Payor Plan Phone Number Payor Plan Fax Number Effective Dates    [address] [phone]  7/1/2017 - None Entered    [facility] [id number]       Subscriber Name Subscriber Birth Date Member ID       TRAN JOYCE 1950 [id number]           Secondary Coverage     Payor Plan Insurance Group Employer/Plan Group    HUMANA HUMANA MC SUP              [id number]     Payor Plan Address Payor Plan Phone Number Payor Plan Fax Number Effective Dates    [address] [address]   5/1/2019 - None Entered    [facility] [id number]       Subscriber Name Subscriber Birth Date Member ID       TRAN JOYCE 1950 [id number]                 Emergency Contacts      (Rel.) Home Phone Work Phone Mobile Phone    SHAYLEE KUMAR (Daughter) [phone] -- [phone]    Katherin Saavedra (Friend) -- -- [phone]              "

## 2020-07-16 NOTE — PLAN OF CARE
Problem: Patient Care Overview  Goal: Plan of Care Review  Outcome: Ongoing (interventions implemented as appropriate)  Flowsheets (Taken 7/16/2020 7203)  Progress: improving  Plan of Care Reviewed With: patient  Outcome Summary: pt heparin gtt stopped yesterday , will start eliquis today around 0900 , pt up ad ita , 2 l nc during night ( due to no cpap avail) , pain in R knee - norco given prn , will cont to monitor, nsr ,   Goal: Individualization and Mutuality  Outcome: Ongoing (interventions implemented as appropriate)  Goal: Discharge Needs Assessment  Outcome: Ongoing (interventions implemented as appropriate)  Goal: Interprofessional Rounds/Family Conf  Outcome: Ongoing (interventions implemented as appropriate)     Problem: Fall Risk (Adult)  Goal: Identify Related Risk Factors and Signs and Symptoms  Outcome: Ongoing (interventions implemented as appropriate)  Goal: Absence of Fall  Outcome: Ongoing (interventions implemented as appropriate)     Problem: Pain, Chronic (Adult)  Goal: Identify Related Risk Factors and Signs and Symptoms  Outcome: Ongoing (interventions implemented as appropriate)  Goal: Acceptable Pain/Comfort Level and Functional Ability  Outcome: Ongoing (interventions implemented as appropriate)

## 2020-07-16 NOTE — PROGRESS NOTES
Case Management Discharge Note      Final Note: Home with [facility]/BALDEMARM left at 8058 to inform of d/c this evening         Destination      No service has been selected for the patient.      Durable Medical Equipment      No service has been selected for the patient.      Dialysis/Infusion      No service has been selected for the patient.      Home Medical Care - Selection Complete      Service Provider Request Status Selected Services Address Phone Number Fax Number    [facility] Selected Home Health Services [address] [address] [phone]      Therapy      No service has been selected for the patient.      Community Resources      No service has been selected for the patient.        Transportation Services  Private: Car    Final Discharge Disposition Code: 06 - home with home health care

## 2020-07-16 NOTE — PROGRESS NOTES
Continued Stay Note  [facility]     Patient Name: Tran Sosa  MRN: [id number]  Today's Date: 7/16/2020    Admit Date: 7/14/2020    Discharge Plan     Row Name 07/16/20 1015       Plan    Plan  Home with [facility]    Patient/Family in Agreement with Plan  yes    Plan Comments  Per [facility] pharmacy ([facility]), pt's Eliquis is free for first month with applied 30 day trial card, but will cost $463 for the second month because pt has not yet met her deductible (this cost would apply to any medication brand, including Xarelto), after pt's deductible is met, her copay will be $47/month for Eliquis. CCP updated pt via room phone, she is agreeable. Pt requests referrals for in home PT until she is cleared to return to her outpatient PT at [facility]. Per Sydney, [facility] requires an initial week of telehealth with covid positive patients. Pt prefers in person therapy and selects [facility] for referral. Per Cathy, [facility] can provide in person PT at home and will follow. Jenny Pina LCSW        Discharge Codes    No documentation.             Chastity Pina LCSW

## 2020-07-16 NOTE — PROGRESS NOTES
Patient was counseled extensively on Eliquis including the followin) Eliquis's indication, mechanism of action, and dosing  2) Enforced the importance of taking Eliquis as instructed every day and that it is a twice a day medication.  3) Explained possible side effects of Eliquis therapy, including increased risk of bleeding, s/sx of bleeding, and s/sx of any additional clots/PE/CVA.   4) Emphasized the importance of going to the emergency room if any of the following occur: Falling and hitting your head; noticing bright red blood in urine or dark/tarry stools; vomiting up blood or vomit has a coffee-ground like texture; coughing up blood  5) Discussed the importance of speaking with physician/pharmacist when starting any new medications to check for drug interactions with Eliquis  6) Discussed the importance of informing any surgeon or proceduralist that you are on Eliquis and may need to go off prior to the procedure (including spinal/epidural procedures)  7) Discussed what to do about a missed dose (Take as soon as you remember and if it is closer to the time for your next dose, skip the missed dose and go back to your normal time; DO NOT double up doses)  8) Instructed the pt not to take or discontinue any medications without informing his physician/pharmacist     I also explained to the patient that this medication may have a high copay associated with it and to let the physician/pulmonologist know if it is unaffordable.    Spoke with patient over the phone. Nurse is delivering Eliquis handout as well as a calendar print out to indicate which day patient switches form 10mg BID to 5mg BID. See below.    Patient expressed understanding and had no further questions.    Erica Leos, PharmD       14 15       10 mg 16  10 mg  10 mg   17    10 mg  10 mg   18   10 mg  10 mg     19   10 mg  10 mg   20   10 mg  10 mg   21   10 mg  10 mg   22    10 mg  5 mg   23   5 mg  5 mg   24   5 mg  5 mg   25   5 mg  5 mg     26   5 mg  5 mg   27   5 mg  5 mg   28   5 mg  5 mg   29   5 mg  5 mg   30   5 mg  5 mg   31   5 mg  5 mg   1 AUGUST  5 mg  5 mg

## 2020-07-16 NOTE — PLAN OF CARE
Problem: Patient Care Overview  Goal: Plan of Care Review  Outcome: Outcome(s) achieved  Goal: Individualization and Mutuality  Outcome: Outcome(s) achieved  Goal: Discharge Needs Assessment  Outcome: Outcome(s) achieved  Goal: Interprofessional Rounds/Family Conf  Outcome: Outcome(s) achieved     Problem: Fall Risk (Adult)  Goal: Identify Related Risk Factors and Signs and Symptoms  Outcome: Outcome(s) achieved  Goal: Absence of Fall  Outcome: Outcome(s) achieved     Problem: Pain, Chronic (Adult)  Goal: Identify Related Risk Factors and Signs and Symptoms  Outcome: Outcome(s) achieved  Goal: Acceptable Pain/Comfort Level and Functional Ability  Outcome: Outcome(s) achieved

## 2020-07-17 ENCOUNTER — TRANSITIONAL CARE MANAGEMENT TELEPHONE ENCOUNTER (OUTPATIENT)
Dept: CALL CENTER | Facility: HOSPITAL | Age: 70
End: 2020-07-17

## 2020-07-17 NOTE — OUTREACH NOTE
Prep Survey      Responses   [facility] facility patient discharged from?  [facility]   Is LACE score < 7 ?  No   Eligibility  [facility]   Date of Admission  07/14/20   Date of Discharge  07/16/20   Discharge Disposition  Home-Health Care Svc   Discharge diagnosis  PE, Covid Infection, pulmonary infiltrates   COVID-19 Test Status  Confirmed   Does the patient have one of the following disease processes/diagnoses(primary or secondary)?  COPD/Pneumonia   Does the patient have Home health ordered?  Yes   What is the Home health agency?   [facility]   Is there a DME ordered?  No   Medication alerts for this patient  Meds to Beds   Prep survey completed?  Yes          Lauren Leon RN

## 2020-07-17 NOTE — OUTREACH NOTE
Call Center TCM Note      Responses   [facility] [place]   COVID-19 Test Status  Confirmed   Does the patient have one of the following disease processes/diagnoses(primary or secondary)?  COPD/Pneumonia   Was the primary reason for admission:  Pneumonia [Pulmonary embolism. COVID+]   TCM attempt successful?  Yes   Call start time  0928   Call end time  0938   Discharge diagnosis  PE, Covid Infection, pulmonary infiltrates   Is patient permission given to speak with other caregiver?  Yes   List who call center can speak with  daughterConcetta reviewed with patient/caregiver?  Yes   Is the patient having any side effects they believe may be caused by any medication additions or changes?  No   Does the patient have all medications ordered at discharge?  Yes   Is the patient taking all medications as directed (includes completed medication regime)?  Yes   Does the patient have a primary care provider?   Yes   Does the patient have an appointment with their PCP or pulmonologist within 7 days of discharge?  No   Comments regarding PCP  PCP Diane Macdonald. No hospital follow up in place. Encouraged telehealth follow up appt.    Nursing Interventions  Educated patient on importance of making appointment, Advised patient to make appointment [Per telehealth]   Has the patient kept scheduled appointments due by today?  N/A   What is the Home health agency?   [facility]   Has home health visited the patient within 72 hours of discharge?  Call prior to 72 hours   Home health interventions  Called Home Health agency   Home health comments  [facility], states that they do not have orders this morning.    Pulse Ox monitoring  None [Patient states that she is going to order one today. ]   Psychosocial issues?  No   Notified Case Management  Home Health   Did the patient receive a copy of their discharge instructions?  Yes   Nursing interventions  Reviewed instructions with patient   What is  the patient's perception of their health status since discharge?  Improving   Is the patient/caregiver able to teach back the hierarchy of who to call/visit for symptoms/problems? PCP, Specialist, Home health nurse, Urgent Care, ED, 911  Yes   Is the patient/caregiver able to teach back signs and symptoms of worsening condition:  Fever/chills, Shortness of breath, Chest pain   Is the patient/caregiver able to teach back importance of completing antibiotic course of treatment?  -- [Patient did not go home on antibiotics. ]   TCM call completed?  Yes   Wrap up additional comments  Patient on home isolation. No other family members in the home. Patient is retired nurse. Patient has friends, family who can drop things off at the porch for her.           Philly Johns RN    7/17/2020, 09:38

## 2020-07-17 NOTE — OUTREACH NOTE
"Case Management Call Center Follow-up      Responses   [facility] Call Center Tracking Reason?  Other (specify in comments)   Other Tracking Comments  question about HH   Has the Call Center Case Management Follow-up issue been resolved?  Yes   Follow-up Comments  Notes reviewed.  Call placed to [facility] and spoke with Cathy.  She states that Amber has spoken with pt this am and has explained that they are waiting for the final orders from the surgeon's office since this service will be in the \"bundle\".  They will continue to update Ms. Sosa as they get a call back.  Call placed to pt who confirms that she has spoken with Amber and that since the earlier call, has spoken with Dr. Reina's office as well and they are notifying the appropriate people of the delay.  Encouraged her to call back with any other concerns.            Nolvia Franklin RN    7/17/2020, 11:37        "

## 2020-07-18 ENCOUNTER — READMISSION MANAGEMENT (OUTPATIENT)
Dept: CALL CENTER | Facility: HOSPITAL | Age: 70
End: 2020-07-18

## 2020-07-18 NOTE — OUTREACH NOTE
COPD/PN Week 1 Survey      Responses   [facility], [place], operated by [facility] patient discharged from?  [place]   COVID-19 Test Status  Confirmed   Does the patient have one of the following disease processes/diagnoses(primary or secondary)?  COPD/Pneumonia   Is there a successful TCM telephone encounter documented?  No   Was the primary reason for admission:  Pneumonia   Week 1 attempt successful?  Yes   Call start time  1130   Call end time  1137   Discharge diagnosis  PE, Covid Infection, pulmonary infiltrates   List who call center can speak with  daughterConcetta    Medication alerts for this patient  Meds to Beds   Meds reviewed with patient/caregiver?  Yes   Is the patient having any side effects they believe may be caused by any medication additions or changes?  No   Does the patient have all medications ordered at discharge?  Yes   Is the patient taking all medications as directed (includes completed medication regime)?  Yes   Comments regarding appointments  Dr Macdonald -Patient declined followup appt at this time.    Does the patient have a primary care provider?   Yes   Does the patient have an appointment with their PCP or pulmonologist within 7 days of discharge?  No   What is preventing the patient from scheduling follow up appointments within 7 days of discharge?  Haven't had time   Nursing Interventions  Educated patient on importance of making appointment, Advised patient to make appointment   Has the patient kept scheduled appointments due by today?  N/A   What is the Home health agency?   [facility]   Has home health visited the patient within 72 hours of discharge?  Yes   Home health comments  Pt reports HH visited today.    Pulse Ox monitoring  -- [Pt reports she ordered a pulse oximeter due to arrive today]   Psychosocial issues?  No   Did the patient receive a copy of their discharge instructions?  Yes   Nursing interventions  Reviewed instructions with patient   What is the patient's perception of their  health status since discharge?  Improving   Is the patient/caregiver able to teach back the hierarchy of who to call/visit for symptoms/problems? PCP, Specialist, Home health nurse, Urgent Care, ED, 911  Yes   Is the patient/caregiver able to teach back signs and symptoms of worsening condition:  Fever/chills, Shortness of breath, Chest pain   Week 1 call completed?  Yes          Hoang Saunders, RN

## 2020-07-19 ENCOUNTER — READMISSION MANAGEMENT (OUTPATIENT)
Dept: CALL CENTER | Facility: HOSPITAL | Age: 70
End: 2020-07-19

## 2020-07-19 LAB
BACTERIA SPEC AEROBE CULT: NORMAL
BACTERIA SPEC AEROBE CULT: NORMAL

## 2020-07-19 NOTE — OUTREACH NOTE
COPD/PN Week 1 Survey      Responses   [facility] patient discharged from?  [place]   COVID-19 Test Status  Confirmed   Does the patient have one of the following disease processes/diagnoses(primary or secondary)?  COPD/Pneumonia   Is there a successful TCM telephone encounter documented?  No   Was the primary reason for admission:  Pneumonia   Week 1 attempt successful?  No          Hoang Saunders RN

## 2020-07-20 ENCOUNTER — READMISSION MANAGEMENT (OUTPATIENT)
Dept: CALL CENTER | Facility: HOSPITAL | Age: 70
End: 2020-07-20

## 2020-07-20 NOTE — OUTREACH NOTE
COPD/PN Week 1 Survey      Responses   [facility] patient discharged from?  [facility]   COVID-19 Test Status  Confirmed   Does the patient have one of the following disease processes/diagnoses(primary or secondary)?  COPD/Pneumonia   Is there a successful TCM telephone encounter documented?  No   Was the primary reason for admission:  Pneumonia   Week 1 attempt successful?  Yes   Call start time  1004   Call end time  1007   Meds reviewed with patient/caregiver?  Yes   Is the patient having any side effects they believe may be caused by any medication additions or changes?  No   Does the patient have all medications ordered at discharge?  Yes   Is the patient taking all medications as directed (includes completed medication regime)?  Yes   Does the patient have a primary care provider?   Yes   Does the patient have an appointment with their PCP or pulmonologist within 7 days of discharge?  No   What is preventing the patient from scheduling follow up appointments within 7 days of discharge?  Haven't had time   Nursing Interventions  Advised patient to make appointment, Educated patient on importance of making appointment   Has the patient kept scheduled appointments due by today?  N/A   Has home health visited the patient within 72 hours of discharge?  Yes   Pulse Ox monitoring  Intermittent   Pulse Ox device source  Patient   O2 Sat comments  Pulse ox running 92% at rest, increases upon activity.   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   Psychosocial issues?  No   Did the patient receive a copy of their discharge instructions?  Yes   What is the patient's perception of their health status since discharge?  Improving   Week 1 call completed?  Yes   Wrap up additional comments  Brief call-states is slightly better. Denies any problems today.           Yenifer Olmedo,  RN

## 2020-07-21 DIAGNOSIS — I47.1 PAT (PAROXYSMAL ATRIAL TACHYCARDIA) (HCC): ICD-10-CM

## 2020-07-21 RX ORDER — ATORVASTATIN CALCIUM 80 MG/1
80 TABLET, FILM COATED ORAL EVERY EVENING
Qty: 90 TABLET | Refills: 1 | Status: SHIPPED | OUTPATIENT
Start: 2020-07-21 | End: 2021-03-22

## 2020-07-21 RX ORDER — METOPROLOL SUCCINATE 50 MG/1
TABLET, EXTENDED RELEASE ORAL
Qty: 90 TABLET | Refills: 2 | Status: SHIPPED | OUTPATIENT
Start: 2020-07-21 | End: 2021-05-04

## 2020-07-21 RX ORDER — TRAZODONE HYDROCHLORIDE 100 MG/1
200 TABLET ORAL NIGHTLY
Qty: 180 TABLET | Refills: 1 | Status: SHIPPED | OUTPATIENT
Start: 2020-07-21 | End: 2021-03-22

## 2020-07-23 ENCOUNTER — READMISSION MANAGEMENT (OUTPATIENT)
Dept: CALL CENTER | Facility: HOSPITAL | Age: 70
End: 2020-07-23

## 2020-07-23 NOTE — OUTREACH NOTE
"COPD/PN Week 1 Survey      Responses   [facility] patient discharged from?  [place]   COVID-19 Test Status  Confirmed   Does the patient have one of the following disease processes/diagnoses(primary or secondary)?  COPD/Pneumonia   Is there a successful TCM telephone encounter documented?  No   Was the primary reason for admission:  Pneumonia   Week 1 attempt successful?  Yes   Call start time  0814   Call end time  0820   Meds reviewed with patient/caregiver?  Yes   Is the patient having any side effects they believe may be caused by any medication additions or changes?  No   Does the patient have all medications ordered at discharge?  Yes   Is the patient taking all medications as directed (includes completed medication regime)?  Yes   Does the patient have a primary care provider?   Yes   Does the patient have an appointment with their PCP or pulmonologist within 7 days of discharge?  No   What is preventing the patient from scheduling follow up appointments within 7 days of discharge?  Earlier appointment not available   Nursing Interventions  Verified appointment date/time/provider   Has the patient kept scheduled appointments due by today?  N/A   Comments  PCP appt 08/04/20-will start outpatient PT next week for knee replacement. States will call for follow up orthopedic appt.   Has home health visited the patient within 72 hours of discharge?  Yes   Pulse Ox monitoring  Intermittent   Pulse Ox device source  Patient   O2 Sat comments  States O2 sats are \"good\" on room air.   Did the patient receive a copy of their discharge instructions?  Yes   What is the patient's perception of their health status since discharge?  Improving   Is the patient/caregiver able to teach back signs and symptoms of worsening condition:  Shortness of breath, Fever/chills, Chest pain   Week 1 call completed?  Yes   Wrap up additional comments  Brief call-states is improving, and walked outside yesterday without difficulty. " States will be released from quarantine today by health dept., and will resume outpatient PT for knee next week. Denies any needs today.          Yenifer Olmedo RN

## 2020-07-26 ENCOUNTER — READMISSION MANAGEMENT (OUTPATIENT)
Dept: CALL CENTER | Facility: HOSPITAL | Age: 70
End: 2020-07-26

## 2020-07-26 NOTE — OUTREACH NOTE
COPD/PN Week 2 Survey      Responses   [facility] [place]   COVID-19 Test Status  Confirmed   Does the patient have one of the following disease processes/diagnoses(primary or secondary)?  COPD/Pneumonia   Was the primary reason for admission:  Pneumonia   Week 2 attempt successful?  Yes   Call start time  1252   Call end time  1257   Discharge diagnosis  PE, Covid Infection, pulmonary infiltrates   Meds reviewed with patient/caregiver?  Yes   Is the patient having any side effects they believe may be caused by any medication additions or changes?  No   Does the patient have all medications ordered at discharge?  Yes   Is the patient taking all medications as directed (includes completed medication regime)?  Yes   Does the patient have a primary care provider?   Yes   Does the patient have an appointment with their PCP or pulmonologist within 7 days of discharge?  Greater than 7 days   Comments regarding PCP  PCP appt on 8/4/2020   What is preventing the patient from scheduling follow up appointments within 7 days of discharge?  Earlier appointment not available   Nursing Interventions  Verified appointment date/time/provider   Has the patient kept scheduled appointments due by today?  N/A   What is the Home health agency?   [facility]   Has home health visited the patient within 72 hours of discharge?  Yes   Home health comments  Pt reports she is discharged from  and is resuming outpatient PT.   Pulse Ox monitoring  Intermittent   Pulse Ox device source  Patient   O2 Sat comments  reports oxygen saturations are 92-93% but is not SOA when exerting herself.    O2 Sat: education provided  Sat levels, Monitoring frequency, When to seek care   Psychosocial issues?  No   Notified Case Management  Home Health   Did the patient receive a copy of their discharge instructions?  Yes   Nursing interventions  Reviewed instructions with patient   What is the patient's perception of their health  status since discharge?  Improving   Is the patient/caregiver able to teach back the hierarchy of who to call/visit for symptoms/problems? PCP, Specialist, Home health nurse, Urgent Care, ED, 911  Yes   Is the patient/caregiver able to teach back signs and symptoms of worsening condition:  Shortness of breath, Fever/chills, Chest pain   Week 2 call completed?  Yes   Wrap up additional comments  Pt reports she has resumed her ADL's and is resuming outpatient PT. Denies an SOA when exerting herself. Reports her appetite has returned.           Hoang Saunders, RN

## 2020-07-29 ENCOUNTER — READMISSION MANAGEMENT (OUTPATIENT)
Dept: CALL CENTER | Facility: HOSPITAL | Age: 70
End: 2020-07-29

## 2020-07-29 NOTE — OUTREACH NOTE
COPD/PN Week 2 Survey      Responses   [facility]   COVID-19 Test Status  Confirmed   Does the patient have one of the following disease processes/diagnoses(primary or secondary)?  COPD/Pneumonia   Was the primary reason for admission:  Pneumonia   Week 2 attempt successful?  Yes   Call start time  1441   Call end time  1453   Discharge diagnosis  PE, Covid Infection, pulmonary infiltrates   Meds reviewed with patient/caregiver?  Yes   Is the patient having any side effects they believe may be caused by any medication additions or changes?  No   Does the patient have all medications ordered at discharge?  Yes   Is the patient taking all medications as directed (includes completed medication regime)?  Yes   Does the patient have a primary care provider?   Yes   Has the patient kept scheduled appointments due by today?  Yes   Comments  outpt PT appt today.   What is the Home health agency?   [facility]   Has home health visited the patient within 72 hours of discharge?  Yes   Home health interventions  Called Home Health agency   Pulse Ox monitoring  Intermittent   Pulse Ox device source  Patient   O2 Sat comments  Breathing is good she says, walked a mile yesterday, Sats are 92-94% when sedentary, no gasping for air.   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   Psychosocial issues?  No   Notified Case Management  Home Health   Did the patient receive a copy of their discharge instructions?  Yes   Nursing interventions  Reviewed instructions with patient   What is the patient's perception of their health status since discharge?  Improving   Nursing Interventions  Nurse provided patient education   Are the patient's immunizations up to date?   Yes   Nursing interventions  Advised patient to discuss with provider at next visit   Is the  patient/caregiver able to teach back the hierarchy of who to call/visit for symptoms/problems? PCP, Specialist, Home health nurse, Urgent Care, ED, 911  Yes   Additional teach back comments  Doing more everyday.  Fatigues late in the day. She does feel better and she is off quarantine.   Is the patient/caregiver able to teach back signs and symptoms of worsening condition:  Fever/chills, Shortness of breath, Chest pain   Is the patient/caregiver able to teach back importance of completing antibiotic course of treatment?  Yes   Week 2 call completed?  Yes   Wrap up additional comments  She is doing better and she rests when she is tired.          Mariza Hamlin RN

## 2020-08-04 ENCOUNTER — OFFICE VISIT (OUTPATIENT)
Dept: INTERNAL MEDICINE | Facility: CLINIC | Age: 70
End: 2020-08-04

## 2020-08-04 ENCOUNTER — READMISSION MANAGEMENT (OUTPATIENT)
Dept: CALL CENTER | Facility: HOSPITAL | Age: 70
End: 2020-08-04

## 2020-08-04 VITALS
SYSTOLIC BLOOD PRESSURE: 126 MMHG | OXYGEN SATURATION: 98 % | DIASTOLIC BLOOD PRESSURE: 72 MMHG | BODY MASS INDEX: 29.18 KG/M2 | HEIGHT: 71 IN | RESPIRATION RATE: 18 BRPM | HEART RATE: 78 BPM | TEMPERATURE: 98.8 F | WEIGHT: 208.4 LBS

## 2020-08-04 DIAGNOSIS — U07.1 COVID-19: ICD-10-CM

## 2020-08-04 DIAGNOSIS — I26.99 PULMONARY EMBOLISM WITHOUT ACUTE COR PULMONALE, UNSPECIFIED CHRONICITY, UNSPECIFIED PULMONARY EMBOLISM TYPE (HCC): Primary | ICD-10-CM

## 2020-08-04 DIAGNOSIS — F51.01 PRIMARY INSOMNIA: ICD-10-CM

## 2020-08-04 PROCEDURE — 99214 OFFICE O/P EST MOD 30 MIN: CPT | Performed by: INTERNAL MEDICINE

## 2020-08-04 RX ORDER — ZOLPIDEM TARTRATE 10 MG/1
10 TABLET ORAL NIGHTLY PRN
Qty: 20 TABLET | Refills: 1 | Status: SHIPPED | OUTPATIENT
Start: 2020-08-04 | End: 2021-01-05

## 2020-08-04 NOTE — PROGRESS NOTES
Subjective   Tran Sosa is a 70 y.o. female.     History of Present Illness   She is s/p bilat PE and covid-19   She was SOB and had a fever.  sshe was aching all over with a high fever.  She was with her son in law and daughter who also tested positive   She says the most prodominant sx was extreme fatigue  No sig SOB now but she does have some RENTERIA      The following portions of the patient's history were reviewed and updated as appropriate: allergies, current medications, past medical history, past social history and problem list.    Review of Systems   All other systems reviewed and are negative.      Objective   Physical Exam   Constitutional: She is oriented to person, place, and time. She appears well-developed and well-nourished.   HENT:   Head: Normocephalic and atraumatic.   Right Ear: External ear normal.   Left Ear: External ear normal.   Mouth/Throat: Oropharynx is clear and moist.   Eyes: Pupils are equal, round, and reactive to light. Conjunctivae and EOM are normal.   Neck: Normal range of motion. No tracheal deviation present. No thyromegaly present.   Cardiovascular: Normal rate, regular rhythm, normal heart sounds and intact distal pulses.   Pulmonary/Chest: Effort normal and breath sounds normal.   Abdominal: Soft. Bowel sounds are normal. She exhibits no distension. There is no tenderness.   Musculoskeletal: Normal range of motion. She exhibits no edema or deformity.   Neurological: She is alert and oriented to person, place, and time.   Skin: Skin is warm and dry.   Psychiatric: She has a normal mood and affect. Her behavior is normal. Judgment and thought content normal.   Vitals reviewed.      Vitals:    08/04/20 0922   BP: 126/72   Pulse: 78   Resp: 18   Temp: 98.8 °F (37.1 °C)   SpO2: 98%     Body mass index is 29.08 kg/m².         Assessment/Plan   Tran was seen today for hospital follow-up.    Diagnoses and all orders for this visit:    Pulmonary embolism without acute cor pulmonale,  unspecified chronicity, unspecified pulmonary embolism type (CMS/HCC)    COVID-19      1.  PE-  S/p knee replacement also likely related to Covid  2.  Covid- 19 infection she is now much better but still fatigued  3.  Insomnia-  She cannot sleep and she is exhausted

## 2020-08-04 NOTE — OUTREACH NOTE
COPD/PN Week 3 Survey      Responses   [facility] patient discharged from?  [place]   COVID-19 Test Status  Confirmed   Does the patient have one of the following disease processes/diagnoses(primary or secondary)?  COPD/Pneumonia   Was the primary reason for admission:  Pneumonia   Week 3 attempt successful?  No   Unsuccessful attempts  Attempt 1          Cindy Mckeon RN

## 2020-08-05 LAB
ALBUMIN SERPL-MCNC: 4.3 G/DL (ref 3.5–5.2)
ALBUMIN/GLOB SERPL: 2 G/DL
ALP SERPL-CCNC: 81 U/L (ref 39–117)
ALT SERPL-CCNC: 16 U/L (ref 1–33)
AST SERPL-CCNC: 15 U/L (ref 1–32)
BASOPHILS # BLD AUTO: 0.03 10*3/MM3 (ref 0–0.2)
BASOPHILS NFR BLD AUTO: 0.6 % (ref 0–1.5)
BILIRUB SERPL-MCNC: 0.3 MG/DL (ref 0–1.2)
BUN SERPL-MCNC: 14 MG/DL (ref 8–23)
BUN/CREAT SERPL: 16.7 (ref 7–25)
CALCIUM SERPL-MCNC: 9.9 MG/DL (ref 8.6–10.5)
CHLORIDE SERPL-SCNC: 100 MMOL/L (ref 98–107)
CO2 SERPL-SCNC: 27.4 MMOL/L (ref 22–29)
CREAT SERPL-MCNC: 0.84 MG/DL (ref 0.57–1)
EOSINOPHIL # BLD AUTO: 0.12 10*3/MM3 (ref 0–0.4)
EOSINOPHIL NFR BLD AUTO: 2.4 % (ref 0.3–6.2)
ERYTHROCYTE [DISTWIDTH] IN BLOOD BY AUTOMATED COUNT: 13.6 % (ref 12.3–15.4)
GLOBULIN SER CALC-MCNC: 2.2 GM/DL
GLUCOSE SERPL-MCNC: 97 MG/DL (ref 65–99)
HCT VFR BLD AUTO: 35 % (ref 34–46.6)
HGB BLD-MCNC: 11.2 G/DL (ref 12–15.9)
IMM GRANULOCYTES # BLD AUTO: 0.02 10*3/MM3 (ref 0–0.05)
IMM GRANULOCYTES NFR BLD AUTO: 0.4 % (ref 0–0.5)
LYMPHOCYTES # BLD AUTO: 1.28 10*3/MM3 (ref 0.7–3.1)
LYMPHOCYTES NFR BLD AUTO: 25.8 % (ref 19.6–45.3)
MCH RBC QN AUTO: 29.8 PG (ref 26.6–33)
MCHC RBC AUTO-ENTMCNC: 32 G/DL (ref 31.5–35.7)
MCV RBC AUTO: 93.1 FL (ref 79–97)
MONOCYTES # BLD AUTO: 0.54 10*3/MM3 (ref 0.1–0.9)
MONOCYTES NFR BLD AUTO: 10.9 % (ref 5–12)
NEUTROPHILS # BLD AUTO: 2.97 10*3/MM3 (ref 1.7–7)
NEUTROPHILS NFR BLD AUTO: 59.9 % (ref 42.7–76)
NRBC BLD AUTO-RTO: 0 /100 WBC (ref 0–0.2)
PLATELET # BLD AUTO: 310 10*3/MM3 (ref 140–450)
POTASSIUM SERPL-SCNC: 4.2 MMOL/L (ref 3.5–5.2)
PROT SERPL-MCNC: 6.5 G/DL (ref 6–8.5)
RBC # BLD AUTO: 3.76 10*6/MM3 (ref 3.77–5.28)
SODIUM SERPL-SCNC: 138 MMOL/L (ref 136–145)
WBC # BLD AUTO: 4.96 10*3/MM3 (ref 3.4–10.8)

## 2020-08-11 ENCOUNTER — READMISSION MANAGEMENT (OUTPATIENT)
Dept: CALL CENTER | Facility: HOSPITAL | Age: 70
End: 2020-08-11

## 2020-08-11 NOTE — OUTREACH NOTE
COPD/PN Week 4 Survey      Responses   [facility], [place], [facility] by [facility]   COVID-19 Test Status  Confirmed   Does the patient have one of the following disease processes/diagnoses(primary or secondary)?  COPD/Pneumonia   Was the primary reason for admission:  Pneumonia   Week 4 attempt successful?  Yes   Call start time  0859   Call end time  0912   Discharge diagnosis  PE, Covid Infection, pulmonary infiltrates   Is patient permission given to speak with other caregiver?  Yes   List who call center can speak with  daughter, Concetta Soto reviewed with patient/caregiver?  Yes   Is the patient having any side effects they believe may be caused by any medication additions or changes?  No   Is the patient taking all medications as directed (includes completed medication regime)?  Yes   Has the patient kept scheduled appointments due by today?  Yes   Is the patient still receiving Home Health Services?  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   Psychosocial issues?  No   What is the patient's perception of their health status since discharge?  Improving   Nursing Interventions  Nurse provided patient education   Are the patient's immunizations up to date?   No   Nursing interventions  Advised patient to discuss with provider at next visit   Is the patient/caregiver able to teach back the hierarchy of who to call/visit for symptoms/problems? PCP, Specialist, Home health nurse, Urgent Care, ED, 911  Yes   Is the patient/caregiver able to teach back signs and symptoms of worsening condition:  Fever/chills, Shortness of breath, Chest pain   Is the patient/caregiver able to teach back importance  of completing antibiotic course of treatment?  Yes   Week 4 call completed?  Yes   Would the patient like one additional call?  No   Graduated  Yes   Did the patient feel the follow up calls were helpful during their recovery period?  Yes   Was the number of calls appropriate?  Yes   Wrap up additional comments  She is doing so much better!          Rajan Zavala RN

## 2020-08-18 ENCOUNTER — OFFICE VISIT (OUTPATIENT)
Dept: SLEEP MEDICINE | Facility: HOSPITAL | Age: 70
End: 2020-08-18

## 2020-08-18 VITALS
OXYGEN SATURATION: 96 % | HEIGHT: 71 IN | DIASTOLIC BLOOD PRESSURE: 65 MMHG | BODY MASS INDEX: 29.54 KG/M2 | WEIGHT: 211 LBS | SYSTOLIC BLOOD PRESSURE: 109 MMHG | HEART RATE: 72 BPM

## 2020-08-18 DIAGNOSIS — G47.33 OSA (OBSTRUCTIVE SLEEP APNEA): Primary | ICD-10-CM

## 2020-08-18 PROCEDURE — G0463 HOSPITAL OUTPT CLINIC VISIT: HCPCS

## 2020-08-18 NOTE — PROGRESS NOTES
"Tran Sosa  1950  70 y.o. female     Date of service 8/18/2020     SLEEP MEDICINE FOLLOW UP    HISTORY OF PRESENT ILLNESS: The patient is a 70 y.o.  female has a history of hypertension, hyperlipidemia, paroxysmal atrial tachycardia, sinus tachycardia, atopic rhinitis, vitamin D deficiency, DJD.     The patient had knee surgery on June 30, 2020 she developed high fever, severe shortness of breath, cough, generalized weakness, severe malaise and fatigue, diarrhea was admitted to [facility] on July 14 and found to have COVID-19 with pneumonia and pulmonary embolism and she is on Apixaban.    The patient has moderately severe obstructive sleep apnea syndrome.  Her home sleep study in May 2017 revealed moderately severe obstructive sleep apnea syndrome with AHI of 15.0 per sleep hour (normal less than 5 per sleep hour) and minimum SPO2 84 %.  Patient's [place] Sleepiness Scale score was 5 prior to CPAP therapy.    The patient is on auto CPAP therapy. I have reviewed the compliance data and it indicates excellent compliance with 100% usage for more than 4 hours with an average usage of 8 hours and 16 minutes and AHI down to 3.0 with CPAP therapy and auto CPAP mean pressure 5.7 cm of water. The patient is compliant and benefiting from it.    Sleep schedule: Bedtime 9 p.m. to 5 a.m., gets about 8 hours of sleep, and sleep latency is 5 minutes. When she wakes up she does feel refreshed.    Her [place] Sleepiness Scale score was 5 before CPAP therapy and is down to 1 now.     10 REVIEW OF SYSTEMS: A 10-system review significant for abdominal bloating, ear pain, acid reflux, and pain in joints and muscles, irregular heartbeat, and shortness of breath.     PMH, PSH, Medications, allergies, FH, SH are reviewed and updated in the chart.     PHYSICAL EXAMINATION:  Vitals:    08/18/20 0827   BP: 109/65   Pulse: 72   SpO2: 96%   Weight: 95.7 kg (211 lb)   Height: 180.3 cm (71\")   Body mass index is " 29.43 kg/m².   HEENT: Normal.  NECK:  Supple. No bruits.  CARDIAC: Normal.   LUNGS: Clear to auscultation.   EXTREMITIES: No edema.     IMPRESSION: Moderately severe obstructive sleep apnea syndrome with AHI of 15.0 per sleep hour (normal less than 5 per sleep hour) and minimum SPO2 84 %. The patient is on auto CPAP therapy and is compliant and benefiting from it.    RECOMMENDATIONS: Continue auto CPAP therapy and follow-up one year.    Adam Ruiz M.D.  8/18/2020

## 2020-09-21 DIAGNOSIS — B34.9 VIRAL ILLNESS: ICD-10-CM

## 2020-09-21 DIAGNOSIS — I10 ESSENTIAL HYPERTENSION: ICD-10-CM

## 2020-09-21 DIAGNOSIS — E78.5 HYPERLIPIDEMIA, UNSPECIFIED HYPERLIPIDEMIA TYPE: ICD-10-CM

## 2020-09-25 LAB
ALBUMIN SERPL-MCNC: 4.4 G/DL (ref 3.5–5.2)
ALBUMIN/GLOB SERPL: 1.9 G/DL
ALP SERPL-CCNC: 87 U/L (ref 39–117)
ALT SERPL-CCNC: 24 U/L (ref 1–33)
AST SERPL-CCNC: 18 U/L (ref 1–32)
BASOPHILS # BLD AUTO: 0.04 10*3/MM3 (ref 0–0.2)
BASOPHILS NFR BLD AUTO: 0.9 % (ref 0–1.5)
BILIRUB SERPL-MCNC: 0.3 MG/DL (ref 0–1.2)
BUN SERPL-MCNC: 22 MG/DL (ref 8–23)
BUN/CREAT SERPL: 27.5 (ref 7–25)
CALCIUM SERPL-MCNC: 9.1 MG/DL (ref 8.6–10.5)
CHLORIDE SERPL-SCNC: 102 MMOL/L (ref 98–107)
CHOLEST SERPL-MCNC: 167 MG/DL (ref 0–200)
CO2 SERPL-SCNC: 25.5 MMOL/L (ref 22–29)
CREAT SERPL-MCNC: 0.8 MG/DL (ref 0.57–1)
EOSINOPHIL # BLD AUTO: 0.13 10*3/MM3 (ref 0–0.4)
EOSINOPHIL NFR BLD AUTO: 2.8 % (ref 0.3–6.2)
ERYTHROCYTE [DISTWIDTH] IN BLOOD BY AUTOMATED COUNT: 13.3 % (ref 12.3–15.4)
GLOBULIN SER CALC-MCNC: 2.3 GM/DL
GLUCOSE SERPL-MCNC: 106 MG/DL (ref 65–99)
HCT VFR BLD AUTO: 35.7 % (ref 34–46.6)
HDLC SERPL-MCNC: 46 MG/DL (ref 40–60)
HGB BLD-MCNC: 11.9 G/DL (ref 12–15.9)
IMM GRANULOCYTES # BLD AUTO: 0.02 10*3/MM3 (ref 0–0.05)
IMM GRANULOCYTES NFR BLD AUTO: 0.4 % (ref 0–0.5)
LDLC SERPL CALC-MCNC: 92 MG/DL (ref 0–100)
LDLC/HDLC SERPL: 2 {RATIO}
LYMPHOCYTES # BLD AUTO: 1.19 10*3/MM3 (ref 0.7–3.1)
LYMPHOCYTES NFR BLD AUTO: 25.4 % (ref 19.6–45.3)
MCH RBC QN AUTO: 29.3 PG (ref 26.6–33)
MCHC RBC AUTO-ENTMCNC: 33.3 G/DL (ref 31.5–35.7)
MCV RBC AUTO: 87.9 FL (ref 79–97)
MONOCYTES # BLD AUTO: 0.54 10*3/MM3 (ref 0.1–0.9)
MONOCYTES NFR BLD AUTO: 11.5 % (ref 5–12)
NEUTROPHILS # BLD AUTO: 2.77 10*3/MM3 (ref 1.7–7)
NEUTROPHILS NFR BLD AUTO: 59 % (ref 42.7–76)
NRBC BLD AUTO-RTO: 0 /100 WBC (ref 0–0.2)
PLATELET # BLD AUTO: 284 10*3/MM3 (ref 140–450)
POTASSIUM SERPL-SCNC: 3.9 MMOL/L (ref 3.5–5.2)
PROT SERPL-MCNC: 6.7 G/DL (ref 6–8.5)
RBC # BLD AUTO: 4.06 10*6/MM3 (ref 3.77–5.28)
SODIUM SERPL-SCNC: 139 MMOL/L (ref 136–145)
TRIGL SERPL-MCNC: 146 MG/DL (ref 0–150)
TSH SERPL DL<=0.005 MIU/L-ACNC: 1.7 UIU/ML (ref 0.27–4.2)
VLDLC SERPL CALC-MCNC: 29.2 MG/DL
WBC # BLD AUTO: 4.69 10*3/MM3 (ref 3.4–10.8)

## 2020-09-29 ENCOUNTER — OFFICE VISIT (OUTPATIENT)
Dept: INTERNAL MEDICINE | Facility: CLINIC | Age: 70
End: 2020-09-29

## 2020-09-29 VITALS
DIASTOLIC BLOOD PRESSURE: 64 MMHG | OXYGEN SATURATION: 95 % | WEIGHT: 217.7 LBS | BODY MASS INDEX: 30.48 KG/M2 | HEIGHT: 71 IN | SYSTOLIC BLOOD PRESSURE: 112 MMHG | HEART RATE: 65 BPM

## 2020-09-29 DIAGNOSIS — E78.5 HYPERLIPIDEMIA, UNSPECIFIED HYPERLIPIDEMIA TYPE: ICD-10-CM

## 2020-09-29 DIAGNOSIS — I10 ESSENTIAL HYPERTENSION: Primary | ICD-10-CM

## 2020-09-29 PROCEDURE — G0439 PPPS, SUBSEQ VISIT: HCPCS | Performed by: INTERNAL MEDICINE

## 2020-09-29 PROCEDURE — 99213 OFFICE O/P EST LOW 20 MIN: CPT | Performed by: INTERNAL MEDICINE

## 2020-09-29 NOTE — PROGRESS NOTES
Subjective   Tran Sosa is a 70 y.o. female here today to f/u on hyperlipidemia, HTN and insomnia    History of Present Illness   She feels like she is gradually recovering from her Bilat PE and covid  She does feel like the trazodone does help with sleep    The following portions of the patient's history were reviewed and updated as appropriate: allergies, current medications, past medical history, past social history and problem list.  She is eating ok and starting to exercise reg    Review of Systems   All other systems reviewed and are negative.      Objective   Physical Exam  Vitals signs reviewed.   Constitutional:       Appearance: She is well-developed.   HENT:      Head: Normocephalic and atraumatic.      Right Ear: External ear normal.      Left Ear: External ear normal.   Eyes:      Conjunctiva/sclera: Conjunctivae normal.      Pupils: Pupils are equal, round, and reactive to light.   Neck:      Musculoskeletal: Normal range of motion.      Thyroid: No thyromegaly.      Trachea: No tracheal deviation.   Cardiovascular:      Rate and Rhythm: Normal rate and regular rhythm.      Heart sounds: Normal heart sounds.   Pulmonary:      Effort: Pulmonary effort is normal.      Breath sounds: Normal breath sounds.   Abdominal:      General: Bowel sounds are normal. There is no distension.      Palpations: Abdomen is soft.      Tenderness: There is no abdominal tenderness.   Musculoskeletal: Normal range of motion.         General: No deformity.   Skin:     General: Skin is warm and dry.   Neurological:      Mental Status: She is alert and oriented to person, place, and time.   Psychiatric:         Behavior: Behavior normal.         Thought Content: Thought content normal.         Judgment: Judgment normal.         Vitals:    09/29/20 0947   BP: 112/64   Pulse: 65   SpO2: 95%     Body mass index is 30.36 kg/m².       Orders Only on 09/21/2020   Component Date Value Ref Range Status   • WBC 09/24/2020 4.69   3.40 - 10.80 10*3/mm3 Final   • RBC 09/24/2020 4.06  3.77 - 5.28 10*6/mm3 Final   • Hemoglobin 09/24/2020 11.9* 12.0 - 15.9 g/dL Final   • Hematocrit 09/24/2020 35.7  34.0 - 46.6 % Final   • MCV 09/24/2020 87.9  79.0 - 97.0 fL Final   • MCH 09/24/2020 29.3  26.6 - 33.0 pg Final   • MCHC 09/24/2020 33.3  31.5 - 35.7 g/dL Final   • RDW 09/24/2020 13.3  12.3 - 15.4 % Final   • Platelets 09/24/2020 284  140 - 450 10*3/mm3 Final   • Neutrophil Rel % 09/24/2020 59.0  42.7 - 76.0 % Final   • Lymphocyte Rel % 09/24/2020 25.4  19.6 - 45.3 % Final   • Monocyte Rel % 09/24/2020 11.5  5.0 - 12.0 % Final   • Eosinophil Rel % 09/24/2020 2.8  0.3 - 6.2 % Final   • Basophil Rel % 09/24/2020 0.9  0.0 - 1.5 % Final   • Neutrophils Absolute 09/24/2020 2.77  1.70 - 7.00 10*3/mm3 Final   • Lymphocytes Absolute 09/24/2020 1.19  0.70 - 3.10 10*3/mm3 Final   • Monocytes Absolute 09/24/2020 0.54  0.10 - 0.90 10*3/mm3 Final   • Eosinophils Absolute 09/24/2020 0.13  0.00 - 0.40 10*3/mm3 Final   • Basophils Absolute 09/24/2020 0.04  0.00 - 0.20 10*3/mm3 Final   • Immature Granulocyte Rel % 09/24/2020 0.4  0.0 - 0.5 % Final   • Immature Grans Absolute 09/24/2020 0.02  0.00 - 0.05 10*3/mm3 Final   • nRBC 09/24/2020 0.0  0.0 - 0.2 /100 WBC Final   • TSH 09/24/2020 1.700  0.270 - 4.200 uIU/mL Final   • Total Cholesterol 09/24/2020 167  0 - 200 mg/dL Final   • Triglycerides 09/24/2020 146  0 - 150 mg/dL Final   • HDL Cholesterol 09/24/2020 46  40 - 60 mg/dL Final   • VLDL Cholesterol Yoan 09/24/2020 29.2  mg/dL Final   • LDL Chol Calc ([facility]) 09/24/2020 92  0 - 100 mg/dL Final   • LDL/HDL RATIO 09/24/2020 2.00   Final   • Glucose 09/24/2020 106* 65 - 99 mg/dL Final   • BUN 09/24/2020 22  8 - 23 mg/dL Final   • Creatinine 09/24/2020 0.80  0.57 - 1.00 mg/dL Final   • eGFR Non  Am 09/24/2020 71  >60 mL/min/1.73 Final   • eGFR African Am 09/24/2020 86  >60 mL/min/1.73 Final   • BUN/Creatinine Ratio 09/24/2020 27.5* 7.0 - 25.0 Final   • Sodium  09/24/2020 139  136 - 145 mmol/L Final   • Potassium 09/24/2020 3.9  3.5 - 5.2 mmol/L Final   • Chloride 09/24/2020 102  98 - 107 mmol/L Final   • Total CO2 09/24/2020 25.5  22.0 - 29.0 mmol/L Final   • Calcium 09/24/2020 9.1  8.6 - 10.5 mg/dL Final   • Total Protein 09/24/2020 6.7  6.0 - 8.5 g/dL Final   • Albumin 09/24/2020 4.40  3.50 - 5.20 g/dL Final   • Globulin 09/24/2020 2.3  gm/dL Final   • A/G Ratio 09/24/2020 1.9  g/dL Final   • Total Bilirubin 09/24/2020 0.3  0.0 - 1.2 mg/dL Final   • Alkaline Phosphatase 09/24/2020 87  39 - 117 U/L Final   • AST (SGOT) 09/24/2020 18  1 - 32 U/L Final   • ALT (SGPT) 09/24/2020 24  1 - 33 U/L Final       Current Outpatient Medications:   •  apixaban (ELIQUIS) 5 MG tablet tablet, Take 1 tablet by mouth Every 12 (Twelve) Hours for 180 days. Indications: DVT/PE (active thrombosis), Disp: 60 tablet, Rfl: 4  •  atorvastatin (LIPITOR) 80 MG tablet, Take 1 tablet by mouth Every Evening., Disp: 90 tablet, Rfl: 1  •  docusate sodium 100 MG capsule, Take 1 capsule by mouth 2 (Two) Times a Day As Needed for Constipation., Disp: 40 each, Rfl: 1  •  metoprolol succinate XL (TOPROL-XL) 50 MG 24 hr tablet, TAKE 1 TABLET BY MOUTH EVERY DAY, Disp: 90 tablet, Rfl: 2  •  traZODone (DESYREL) 100 MG tablet, TAKE 2 TABLETS BY MOUTH EVERY NIGHT., Disp: 180 tablet, Rfl: 1  •  triamterene-hydrochlorothiazide (MAXZIDE-25) 37.5-25 MG per tablet, Take 1 tablet by mouth Every Morning., Disp: 90 tablet, Rfl: 3  •  zolpidem (AMBIEN) 10 MG tablet, Take 1 tablet by mouth At Night As Needed for Sleep. Start with 1/2 tab, Disp: 20 tablet, Rfl: 1      Assessment/Plan   Diagnoses and all orders for this visit:    Essential hypertension    Hyperlipidemia, unspecified hyperlipidemia type        1.  Insomnia- she is doing ok with insomnia  2. HPL- ok with current meds  3. HTN- ok with current meds

## 2020-09-29 NOTE — PROGRESS NOTES
The ABCs of the Annual Wellness Visit  Subsequent Medicare Wellness Visit    No chief complaint on file.      Subjective   History of Present Illness:  Tran Sosa is a 70 y.o. female who presents for a Subsequent Medicare Wellness Visit.    HEALTH RISK ASSESSMENT    Recent Hospitalizations:  Recently treated at the following:  [facility]    Current Medical Providers:  Patient Care Team:  Diane Macdonald MD as PCP - General  Diane Macdonald MD as PCP - Family Medicine  Yaya Reina MD as PCP - Claims Attributed    Smoking Status:  Social History     Tobacco Use   Smoking Status Former Smoker   • Packs/day: 1.00   • Years: 10.00   • Pack years: 10.00   • Types: Cigarettes   • Quit date:    • Years since quittin.7   Smokeless Tobacco Never Used   Tobacco Comment    QUIT AT AGE 30 YRS. OLD       Alcohol Consumption:  Social History     Substance and Sexual Activity   Alcohol Use Yes    Comment: TWICE MONTHLY 1-2 DRINKS       Depression Screen:   PHQ-2/PHQ-9 Depression Screening 2020   Little interest or pleasure in doing things 0   Feeling down, depressed, or hopeless 0   Trouble falling or staying asleep, or sleeping too much 0   Feeling tired or having little energy 0   Poor appetite or overeating 0   Feeling bad about yourself - or that you are a failure or have let yourself or your family down 0   Trouble concentrating on things, such as reading the newspaper or watching television 0   Moving or speaking so slowly that other people could have noticed. Or the opposite - being so fidgety or restless that you have been moving around a lot more than usual 0   Thoughts that you would be better off dead, or of hurting yourself in some way 0   Total Score 0   If you checked off any problems, how difficult have these problems made it for you to do your work, take care of things at home, or get along with other people? -       Fall Risk Screen:  JAQUELINE Fall Risk Assessment has not been  completed.    Health Habits and Functional and Cognitive Screening:  Functional & Cognitive Status 9/29/2020   Do you have difficulty preparing food and eating? No   Do you have difficulty bathing yourself, getting dressed or grooming yourself? No   Do you have difficulty using the toilet? No   Do you have difficulty moving around from place to place? No   Do you have trouble with steps or getting out of a bed or a chair? No   Current Diet Well Balanced Diet   Dental Exam Up to date   Eye Exam Up to date   Exercise (times per week) 5 times per week   Current Exercises Include Walking   Current Exercise Activities Include -   Do you need help using the phone?  No   Are you deaf or do you have serious difficulty hearing?  Yes   Do you need help with transportation? No   Do you need help shopping? No   Do you need help preparing meals?  No   Do you need help with housework?  No   Do you need help with laundry? No   Do you need help taking your medications? No   Do you need help managing money? No   Do you ever drive or ride in a car without wearing a seat belt? No   Have you felt unusual stress, anger or loneliness in the last month? No   Who do you live with? Alone   If you need help, do you have trouble finding someone available to you? No   Have you been bothered in the last four weeks by sexual problems? No   Do you have difficulty concentrating, remembering or making decisions? No         Does the patient have evidence of cognitive impairment? No    Asprin use counseling:Does not need ASA (and currently is not on it)    Age-appropriate Screening Schedule:  Refer to the list below for future screening recommendations based on patient's age, sex and/or medical conditions. Orders for these recommended tests are listed in the plan section. The patient has been provided with a written plan.    Health Maintenance   Topic Date Due   • MAMMOGRAM  08/10/2019   • DXA SCAN  08/13/2021   • LIPID PANEL  09/24/2021   •  COLONOSCOPY  10/09/2023   • TDAP/TD VACCINES (2 - Td) 07/18/2028   • INFLUENZA VACCINE  Completed   • ZOSTER VACCINE  Completed          The following portions of the patient's history were reviewed and updated as appropriate: allergies, current medications, past medical history, past social history and problem list.    Outpatient Medications Prior to Visit   Medication Sig Dispense Refill   • apixaban (ELIQUIS) 5 MG tablet tablet Take 1 tablet by mouth Every 12 (Twelve) Hours for 180 days. Indications: DVT/PE (active thrombosis) 60 tablet 4   • atorvastatin (LIPITOR) 80 MG tablet Take 1 tablet by mouth Every Evening. 90 tablet 1   • docusate sodium 100 MG capsule Take 1 capsule by mouth 2 (Two) Times a Day As Needed for Constipation. 40 each 1   • metoprolol succinate XL (TOPROL-XL) 50 MG 24 hr tablet TAKE 1 TABLET BY MOUTH EVERY DAY 90 tablet 2   • traZODone (DESYREL) 100 MG tablet TAKE 2 TABLETS BY MOUTH EVERY NIGHT. 180 tablet 1   • triamterene-hydrochlorothiazide (MAXZIDE-25) 37.5-25 MG per tablet Take 1 tablet by mouth Every Morning. 90 tablet 3   • zolpidem (AMBIEN) 10 MG tablet Take 1 tablet by mouth At Night As Needed for Sleep. Start with 1/2 tab 20 tablet 1   • ondansetron (ZOFRAN) 4 MG tablet Take 1 tablet by mouth Every 6 (Six) Hours As Needed for Nausea or Vomiting. 40 tablet 1     No facility-administered medications prior to visit.        Patient Active Problem List   Diagnosis   • Atopic rhinitis   • Arthralgia of multiple joints   • Hyperlipidemia   • Hypertension   • Osteopenia   • PAT (paroxysmal atrial tachycardia) (CMS/HCC)   • Sinus tachycardia   • SHAI (obstructive sleep apnea)   • OA (osteoarthritis) of hip   • Primary osteoarthritis of right knee   • Shortness of breath   • Pulmonary embolism without acute cor pulmonale (CMS/HCC)   • Acute respiratory failure with hypoxemia (CMS/HCC)   • COVID-19       Advanced Care Planning:  ACP discussion was held with the patient during this visit.  "Patient has an advance directive (not in EMR), copy requested.    Review of Systems    Compared to one year ago, the patient feels her physical health is the same.up and own with all the surgeriues  Compared to one year ago, the patient feels her mental health is better.    Reviewed chart for potential of high risk medication in the elderly: yes  Reviewed chart for potential of harmful drug interactions in the elderly:yes    Objective         Vitals:    09/29/20 0947   BP: 112/64   BP Location: Left arm   Patient Position: Sitting   Pulse: 65   SpO2: 95%   Weight: 98.7 kg (217 lb 11.2 oz)   Height: 180.3 cm (71\")   PainSc:   5       Body mass index is 30.36 kg/m².  Discussed the patient's BMI with her. The BMI is above average; BMI management plan is completed.    Physical Exam    Lab Results   Component Value Date     (H) 09/24/2020    CHLPL 167 09/24/2020    TRIG 146 09/24/2020    HDL 46 09/24/2020    LDL 92 09/24/2020    VLDL 29.2 09/24/2020        Assessment/Plan   Medicare Risks and Personalized Health Plan  CMS Preventative Services Quick Reference  Fall Risk    The above risks/problems have been discussed with the patient.  Pertinent information has been shared with the patient in the After Visit Summary.  Follow up plans and orders are seen below in the Assessment/Plan Section.    Diagnoses and all orders for this visit:    1. Essential hypertension (Primary)    2. Hyperlipidemia, unspecified hyperlipidemia type      Follow Up:  No follow-ups on file.     An After Visit Summary and PPPS were given to the patient.             "

## 2020-10-21 RX ORDER — TRIAMTERENE AND HYDROCHLOROTHIAZIDE 37.5; 25 MG/1; MG/1
TABLET ORAL
Qty: 90 TABLET | Refills: 1 | Status: SHIPPED | OUTPATIENT
Start: 2020-10-21 | End: 2021-06-14

## 2020-12-30 ENCOUNTER — TRANSCRIBE ORDERS (OUTPATIENT)
Dept: PREADMISSION TESTING | Facility: HOSPITAL | Age: 70
End: 2020-12-30

## 2020-12-30 DIAGNOSIS — Z01.818 OTHER SPECIFIED PRE-OPERATIVE EXAMINATION: Primary | ICD-10-CM

## 2021-01-05 ENCOUNTER — APPOINTMENT (OUTPATIENT)
Dept: PREADMISSION TESTING | Facility: HOSPITAL | Age: 71
End: 2021-01-05

## 2021-01-05 ENCOUNTER — HOSPITAL ENCOUNTER (OUTPATIENT)
Dept: GENERAL RADIOLOGY | Facility: HOSPITAL | Age: 71
Discharge: HOME | End: 2021-01-05

## 2021-01-05 VITALS
HEART RATE: 76 BPM | RESPIRATION RATE: 16 BRPM | HEIGHT: 71 IN | BODY MASS INDEX: 31.68 KG/M2 | DIASTOLIC BLOOD PRESSURE: 84 MMHG | SYSTOLIC BLOOD PRESSURE: 132 MMHG | OXYGEN SATURATION: 97 % | WEIGHT: 226.3 LBS | TEMPERATURE: 97.4 F

## 2021-01-05 LAB
ALBUMIN SERPL-MCNC: 4.2 G/DL (ref 3.5–5.2)
ALBUMIN/GLOB SERPL: 1.6 G/DL
ALP SERPL-CCNC: 87 U/L (ref 39–117)
ALT SERPL W P-5'-P-CCNC: 15 U/L (ref 1–33)
ANION GAP SERPL CALCULATED.3IONS-SCNC: 11.4 MMOL/L (ref 5–15)
AST SERPL-CCNC: 18 U/L (ref 1–32)
BACTERIA UR QL AUTO: ABNORMAL /HPF
BILIRUB SERPL-MCNC: 0.3 MG/DL (ref 0–1.2)
BILIRUB UR QL STRIP: NEGATIVE
BUN SERPL-MCNC: 22 MG/DL (ref 8–23)
BUN/CREAT SERPL: 22.9 (ref 7–25)
CALCIUM SPEC-SCNC: 9 MG/DL (ref 8.6–10.5)
CHLORIDE SERPL-SCNC: 99 MMOL/L (ref 98–107)
CLARITY UR: CLEAR
CO2 SERPL-SCNC: 25.6 MMOL/L (ref 22–29)
COLOR UR: YELLOW
CREAT SERPL-MCNC: 0.96 MG/DL (ref 0.57–1)
DEPRECATED RDW RBC AUTO: 44.5 FL (ref 37–54)
ERYTHROCYTE [DISTWIDTH] IN BLOOD BY AUTOMATED COUNT: 13.6 % (ref 12.3–15.4)
GFR SERPL CREATININE-BSD FRML MDRD: 58 ML/MIN/1.73
GLOBULIN UR ELPH-MCNC: 2.6 GM/DL
GLUCOSE SERPL-MCNC: 124 MG/DL (ref 65–99)
GLUCOSE UR STRIP-MCNC: NEGATIVE MG/DL
HCT VFR BLD AUTO: 37.9 % (ref 34–46.6)
HGB BLD-MCNC: 12.6 G/DL (ref 12–15.9)
HGB UR QL STRIP.AUTO: NEGATIVE
HYALINE CASTS UR QL AUTO: ABNORMAL /LPF
INR PPP: 0.96 (ref 0.9–1.1)
KETONES UR QL STRIP: NEGATIVE
LEUKOCYTE ESTERASE UR QL STRIP.AUTO: NEGATIVE
MCH RBC QN AUTO: 30.1 PG (ref 26.6–33)
MCHC RBC AUTO-ENTMCNC: 33.2 G/DL (ref 31.5–35.7)
MCV RBC AUTO: 90.5 FL (ref 79–97)
NITRITE UR QL STRIP: NEGATIVE
PH UR STRIP.AUTO: 7.5 [PH] (ref 5–8)
PLATELET # BLD AUTO: 272 10*3/MM3 (ref 140–450)
PMV BLD AUTO: 10.3 FL (ref 6–12)
POTASSIUM SERPL-SCNC: 3.9 MMOL/L (ref 3.5–5.2)
PROT SERPL-MCNC: 6.8 G/DL (ref 6–8.5)
PROT UR QL STRIP: NEGATIVE
PROTHROMBIN TIME: 12.5 SECONDS (ref 11.7–14.2)
QT INTERVAL: 408 MS
RBC # BLD AUTO: 4.19 10*6/MM3 (ref 3.77–5.28)
RBC # UR: ABNORMAL /HPF
REF LAB TEST METHOD: ABNORMAL
SODIUM SERPL-SCNC: 136 MMOL/L (ref 136–145)
SP GR UR STRIP: 1.01 (ref 1–1.03)
SQUAMOUS #/AREA URNS HPF: ABNORMAL /HPF
UROBILINOGEN UR QL STRIP: NORMAL
WBC # BLD AUTO: 6.43 10*3/MM3 (ref 3.4–10.8)
WBC UR QL AUTO: ABNORMAL /HPF

## 2021-01-05 PROCEDURE — 87086 URINE CULTURE/COLONY COUNT: CPT

## 2021-01-05 PROCEDURE — 87077 CULTURE AEROBIC IDENTIFY: CPT

## 2021-01-05 PROCEDURE — 87186 SC STD MICRODIL/AGAR DIL: CPT

## 2021-01-05 PROCEDURE — 85610 PROTHROMBIN TIME: CPT

## 2021-01-05 PROCEDURE — 81001 URINALYSIS AUTO W/SCOPE: CPT

## 2021-01-05 PROCEDURE — 93010 ELECTROCARDIOGRAM REPORT: CPT | Performed by: INTERNAL MEDICINE

## 2021-01-05 PROCEDURE — 73560 X-RAY EXAM OF KNEE 1 OR 2: CPT

## 2021-01-05 PROCEDURE — 80053 COMPREHEN METABOLIC PANEL: CPT

## 2021-01-05 PROCEDURE — 85027 COMPLETE CBC AUTOMATED: CPT

## 2021-01-05 PROCEDURE — 71046 X-RAY EXAM CHEST 2 VIEWS: CPT

## 2021-01-05 PROCEDURE — 36415 COLL VENOUS BLD VENIPUNCTURE: CPT

## 2021-01-05 PROCEDURE — 93005 ELECTROCARDIOGRAM TRACING: CPT

## 2021-01-05 RX ORDER — MELOXICAM 15 MG/1
15 TABLET ORAL DAILY
COMMUNITY
End: 2021-01-20 | Stop reason: HOSPADM

## 2021-01-05 ASSESSMENT — KOOS JR
KOOS JR SCORE: 47.487
KOOS JR SCORE: 16

## 2021-01-07 LAB — BACTERIA SPEC AEROBE CULT: ABNORMAL

## 2021-01-11 RX ORDER — LEVOFLOXACIN 250 MG/1
250 TABLET ORAL DAILY
Qty: 5 TABLET | Refills: 0 | Status: SHIPPED | OUTPATIENT
Start: 2021-01-11 | End: 2021-01-20 | Stop reason: HOSPADM

## 2021-01-11 NOTE — TELEPHONE ENCOUNTER
Please send in RX      ----- Message from Tran Sosa sent at 1/11/2021 10:17 AM EST -----  Regarding: RE: Non-Urgent Medical Question  Contact: [phone]  Thank you, that would be great:)    ----- Message -----  From: WINTER RODRÍGUEZ  Sent: 1/11/21, 10:13 AM  To: Tran Sosa  Subject: RE: Non-Urgent Medical Question    Dr Macdonald said that she can send in levaquin. Want it to go to the [facility]? Chastity      ----- Message -----       From:Tran Sosa       Sent:1/10/2021  9:41 AM EST         To:Diane Macdonald MD    Subject:Non-Urgent Medical Question    I'm having a LTK 1/19 and my preop UA showed a UTI. I just got the culture back 1/9 and it showed Morganella, however I got no sensitivity report. The last UTI 4 years ago showed my urine was resistant to virtually everything po except Macrobid, IV antibiotics none withstanding.  I starting taking Macrobid but it appears I need to be on a Quinolone. I have Cirpro but it's not been effective in the past. Will it work now with this particular infection?  I getting concerned about my surgery.  Thanks so much,  Tran Sosa

## 2021-01-15 ENCOUNTER — IMMUNIZATION (OUTPATIENT)
Dept: VACCINE CLINIC | Facility: HOSPITAL | Age: 71
End: 2021-01-15

## 2021-01-15 PROCEDURE — 0001A: CPT | Performed by: THORACIC SURGERY (CARDIOTHORACIC VASCULAR SURGERY)

## 2021-01-15 PROCEDURE — 91300 HC SARSCOV02 VAC 30MCG/0.3ML IM: CPT | Performed by: THORACIC SURGERY (CARDIOTHORACIC VASCULAR SURGERY)

## 2021-01-15 PROCEDURE — 0002A: CPT | Performed by: THORACIC SURGERY (CARDIOTHORACIC VASCULAR SURGERY)

## 2021-01-16 ENCOUNTER — LAB (OUTPATIENT)
Dept: LAB | Facility: HOSPITAL | Age: 71
End: 2021-01-16

## 2021-01-16 DIAGNOSIS — Z01.818 OTHER SPECIFIED PRE-OPERATIVE EXAMINATION: ICD-10-CM

## 2021-01-16 PROCEDURE — U0004 COV-19 TEST NON-CDC HGH THRU: HCPCS

## 2021-01-16 PROCEDURE — C9803 HOPD COVID-19 SPEC COLLECT: HCPCS

## 2021-01-18 LAB — SARS-COV-2 RNA RESP QL NAA+PROBE: NOT DETECTED

## 2021-01-19 ENCOUNTER — ANESTHESIA (OUTPATIENT)
Dept: PERIOP | Facility: HOSPITAL | Age: 71
End: 2021-01-19

## 2021-01-19 ENCOUNTER — HOSPITAL ENCOUNTER (OUTPATIENT)
Facility: HOSPITAL | Age: 71
Discharge: HOME | End: 2021-01-20
Attending: ORTHOPAEDIC SURGERY | Admitting: ORTHOPAEDIC SURGERY

## 2021-01-19 ENCOUNTER — ANESTHESIA EVENT (OUTPATIENT)
Dept: PERIOP | Facility: HOSPITAL | Age: 71
End: 2021-01-19

## 2021-01-19 ENCOUNTER — APPOINTMENT (OUTPATIENT)
Dept: GENERAL RADIOLOGY | Facility: HOSPITAL | Age: 71
End: 2021-01-19

## 2021-01-19 DIAGNOSIS — M17.12 PRIMARY OSTEOARTHRITIS OF LEFT KNEE: Primary | ICD-10-CM

## 2021-01-19 PROCEDURE — C1713 ANCHOR/SCREW BN/BN,TIS/BN: HCPCS | Performed by: ORTHOPAEDIC SURGERY

## 2021-01-19 PROCEDURE — 25010000003 CEFAZOLIN IN DEXTROSE 2-4 GM/100ML-% SOLUTION: Performed by: ORTHOPAEDIC SURGERY

## 2021-01-19 PROCEDURE — 97162 PT EVAL MOD COMPLEX 30 MIN: CPT

## 2021-01-19 PROCEDURE — 25010000002 PROPOFOL 10 MG/ML EMULSION: Performed by: NURSE ANESTHETIST, CERTIFIED REGISTERED

## 2021-01-19 PROCEDURE — G0378 HOSPITAL OBSERVATION PER HR: HCPCS

## 2021-01-19 PROCEDURE — C1776 JOINT DEVICE (IMPLANTABLE): HCPCS | Performed by: ORTHOPAEDIC SURGERY

## 2021-01-19 PROCEDURE — 76942 ECHO GUIDE FOR BIOPSY: CPT | Performed by: ORTHOPAEDIC SURGERY

## 2021-01-19 PROCEDURE — 25010000002 FENTANYL CITRATE (PF) 100 MCG/2ML SOLUTION: Performed by: NURSE ANESTHETIST, CERTIFIED REGISTERED

## 2021-01-19 PROCEDURE — 25010000002 MORPHINE PER 10 MG: Performed by: ORTHOPAEDIC SURGERY

## 2021-01-19 PROCEDURE — 25010000002 ROPIVACAINE PER 1 MG: Performed by: ORTHOPAEDIC SURGERY

## 2021-01-19 PROCEDURE — 97110 THERAPEUTIC EXERCISES: CPT

## 2021-01-19 PROCEDURE — 25010000002 MIDAZOLAM PER 1 MG: Performed by: ANESTHESIOLOGY

## 2021-01-19 PROCEDURE — 73560 X-RAY EXAM OF KNEE 1 OR 2: CPT

## 2021-01-19 PROCEDURE — 25010000002 MIDAZOLAM PER 1 MG: Performed by: NURSE ANESTHETIST, CERTIFIED REGISTERED

## 2021-01-19 PROCEDURE — 25010000002 KETOROLAC TROMETHAMINE PER 15 MG: Performed by: ORTHOPAEDIC SURGERY

## 2021-01-19 PROCEDURE — 25010000002 FENTANYL CITRATE (PF) 100 MCG/2ML SOLUTION: Performed by: ANESTHESIOLOGY

## 2021-01-19 PROCEDURE — 25010000002 EPINEPHRINE 30 MG/30ML SOLUTION: Performed by: ORTHOPAEDIC SURGERY

## 2021-01-19 PROCEDURE — 25010000002 DEXAMETHASONE PER 1 MG: Performed by: NURSE ANESTHETIST, CERTIFIED REGISTERED

## 2021-01-19 PROCEDURE — 25010000002 ONDANSETRON PER 1 MG: Performed by: NURSE ANESTHETIST, CERTIFIED REGISTERED

## 2021-01-19 DEVICE — CMT BONE R 1X40: Type: IMPLANTABLE DEVICE | Site: KNEE | Status: FUNCTIONAL

## 2021-01-19 DEVICE — IMPLANTABLE DEVICE
Type: IMPLANTABLE DEVICE | Site: KNEE | Status: FUNCTIONAL
Brand: VANGUARD® KNEE SYSTEM

## 2021-01-19 DEVICE — IMPLANTABLE DEVICE
Type: IMPLANTABLE DEVICE | Site: KNEE | Status: FUNCTIONAL
Brand: BIOMET KNEE SYSTEM

## 2021-01-19 DEVICE — IMPLANTABLE DEVICE
Type: IMPLANTABLE DEVICE | Site: KNEE | Status: FUNCTIONAL
Brand: BIOMET® KNEE SYSTEM

## 2021-01-19 DEVICE — CAP TOTL KN CMT PRIMARY: Type: IMPLANTABLE DEVICE | Site: KNEE | Status: FUNCTIONAL

## 2021-01-19 RX ORDER — FAMOTIDINE 10 MG/ML
20 INJECTION, SOLUTION INTRAVENOUS ONCE
Status: COMPLETED | OUTPATIENT
Start: 2021-01-19 | End: 2021-01-19

## 2021-01-19 RX ORDER — SODIUM CHLORIDE 0.9 % (FLUSH) 0.9 %
3 SYRINGE (ML) INJECTION EVERY 12 HOURS SCHEDULED
Status: DISCONTINUED | OUTPATIENT
Start: 2021-01-19 | End: 2021-01-20 | Stop reason: HOSPADM

## 2021-01-19 RX ORDER — CELECOXIB 200 MG/1
200 CAPSULE ORAL ONCE
Status: COMPLETED | OUTPATIENT
Start: 2021-01-19 | End: 2021-01-19

## 2021-01-19 RX ORDER — FENTANYL CITRATE 50 UG/ML
50 INJECTION, SOLUTION INTRAMUSCULAR; INTRAVENOUS
Status: DISCONTINUED | OUTPATIENT
Start: 2021-01-19 | End: 2021-01-19 | Stop reason: HOSPADM

## 2021-01-19 RX ORDER — OXYCODONE HYDROCHLORIDE AND ACETAMINOPHEN 5; 325 MG/1; MG/1
1-2 TABLET ORAL EVERY 4 HOURS PRN
Qty: 84 TABLET | Refills: 0 | Status: SHIPPED | OUTPATIENT
Start: 2021-01-19 | End: 2021-04-08

## 2021-01-19 RX ORDER — DIPHENHYDRAMINE HCL 25 MG
25 CAPSULE ORAL
Status: DISCONTINUED | OUTPATIENT
Start: 2021-01-19 | End: 2021-01-19 | Stop reason: HOSPADM

## 2021-01-19 RX ORDER — PROMETHAZINE HYDROCHLORIDE 25 MG/1
25 SUPPOSITORY RECTAL ONCE AS NEEDED
Status: DISCONTINUED | OUTPATIENT
Start: 2021-01-19 | End: 2021-01-19 | Stop reason: HOSPADM

## 2021-01-19 RX ORDER — METOPROLOL SUCCINATE 50 MG/1
50 TABLET, EXTENDED RELEASE ORAL NIGHTLY
Status: DISCONTINUED | OUTPATIENT
Start: 2021-01-19 | End: 2021-01-20 | Stop reason: HOSPADM

## 2021-01-19 RX ORDER — MAGNESIUM HYDROXIDE 1200 MG/15ML
LIQUID ORAL AS NEEDED
Status: DISCONTINUED | OUTPATIENT
Start: 2021-01-19 | End: 2021-01-19 | Stop reason: HOSPADM

## 2021-01-19 RX ORDER — ACETAMINOPHEN 500 MG
1000 TABLET ORAL ONCE
Status: COMPLETED | OUTPATIENT
Start: 2021-01-19 | End: 2021-01-19

## 2021-01-19 RX ORDER — SODIUM CHLORIDE, SODIUM LACTATE, POTASSIUM CHLORIDE, CALCIUM CHLORIDE 600; 310; 30; 20 MG/100ML; MG/100ML; MG/100ML; MG/100ML
9 INJECTION, SOLUTION INTRAVENOUS CONTINUOUS
Status: DISCONTINUED | OUTPATIENT
Start: 2021-01-19 | End: 2021-01-20 | Stop reason: HOSPADM

## 2021-01-19 RX ORDER — SODIUM CHLORIDE 0.9 % (FLUSH) 0.9 %
10 SYRINGE (ML) INJECTION AS NEEDED
Status: DISCONTINUED | OUTPATIENT
Start: 2021-01-19 | End: 2021-01-19 | Stop reason: HOSPADM

## 2021-01-19 RX ORDER — ONDANSETRON 2 MG/ML
4 INJECTION INTRAMUSCULAR; INTRAVENOUS EVERY 6 HOURS PRN
Status: DISCONTINUED | OUTPATIENT
Start: 2021-01-19 | End: 2021-01-20 | Stop reason: HOSPADM

## 2021-01-19 RX ORDER — BUPIVACAINE HYDROCHLORIDE 7.5 MG/ML
INJECTION, SOLUTION EPIDURAL; RETROBULBAR AS NEEDED
Status: DISCONTINUED | OUTPATIENT
Start: 2021-01-19 | End: 2021-01-19 | Stop reason: SURG

## 2021-01-19 RX ORDER — PROMETHAZINE HYDROCHLORIDE 25 MG/1
12.5 TABLET ORAL EVERY 6 HOURS PRN
Status: DISCONTINUED | OUTPATIENT
Start: 2021-01-19 | End: 2021-01-20 | Stop reason: HOSPADM

## 2021-01-19 RX ORDER — CEFAZOLIN SODIUM 2 G/100ML
2 INJECTION, SOLUTION INTRAVENOUS ONCE
Status: COMPLETED | OUTPATIENT
Start: 2021-01-19 | End: 2021-01-19

## 2021-01-19 RX ORDER — ONDANSETRON 2 MG/ML
INJECTION INTRAMUSCULAR; INTRAVENOUS AS NEEDED
Status: DISCONTINUED | OUTPATIENT
Start: 2021-01-19 | End: 2021-01-19 | Stop reason: SURG

## 2021-01-19 RX ORDER — OXYCODONE AND ACETAMINOPHEN 7.5; 325 MG/1; MG/1
1 TABLET ORAL ONCE AS NEEDED
Status: DISCONTINUED | OUTPATIENT
Start: 2021-01-19 | End: 2021-01-19 | Stop reason: HOSPADM

## 2021-01-19 RX ORDER — NALOXONE HCL 0.4 MG/ML
0.2 VIAL (ML) INJECTION AS NEEDED
Status: DISCONTINUED | OUTPATIENT
Start: 2021-01-19 | End: 2021-01-19 | Stop reason: HOSPADM

## 2021-01-19 RX ORDER — PROMETHAZINE HYDROCHLORIDE 25 MG/1
25 TABLET ORAL ONCE AS NEEDED
Status: DISCONTINUED | OUTPATIENT
Start: 2021-01-19 | End: 2021-01-19 | Stop reason: HOSPADM

## 2021-01-19 RX ORDER — DIAZEPAM 5 MG/1
5 TABLET ORAL 2 TIMES DAILY PRN
Status: DISCONTINUED | OUTPATIENT
Start: 2021-01-19 | End: 2021-01-20 | Stop reason: HOSPADM

## 2021-01-19 RX ORDER — MIDAZOLAM HYDROCHLORIDE 1 MG/ML
1 INJECTION INTRAMUSCULAR; INTRAVENOUS
Status: DISCONTINUED | OUTPATIENT
Start: 2021-01-19 | End: 2021-01-19 | Stop reason: HOSPADM

## 2021-01-19 RX ORDER — OXYCODONE HYDROCHLORIDE AND ACETAMINOPHEN 5; 325 MG/1; MG/1
2 TABLET ORAL EVERY 4 HOURS PRN
Status: DISCONTINUED | OUTPATIENT
Start: 2021-01-19 | End: 2021-01-20 | Stop reason: HOSPADM

## 2021-01-19 RX ORDER — ASPIRIN 325 MG
325 TABLET, DELAYED RELEASE (ENTERIC COATED) ORAL 2 TIMES DAILY WITH MEALS
Qty: 60 TABLET | Refills: 0 | Status: SHIPPED | OUTPATIENT
Start: 2021-01-20 | End: 2021-01-20

## 2021-01-19 RX ORDER — LIDOCAINE HYDROCHLORIDE 10 MG/ML
0.5 INJECTION, SOLUTION EPIDURAL; INFILTRATION; INTRACAUDAL; PERINEURAL ONCE AS NEEDED
Status: DISCONTINUED | OUTPATIENT
Start: 2021-01-19 | End: 2021-01-19 | Stop reason: HOSPADM

## 2021-01-19 RX ORDER — TRAZODONE HYDROCHLORIDE 100 MG/1
200 TABLET ORAL NIGHTLY
Status: DISCONTINUED | OUTPATIENT
Start: 2021-01-19 | End: 2021-01-20 | Stop reason: HOSPADM

## 2021-01-19 RX ORDER — CLINDAMYCIN PHOSPHATE 900 MG/50ML
900 INJECTION INTRAVENOUS EVERY 8 HOURS
Status: COMPLETED | OUTPATIENT
Start: 2021-01-19 | End: 2021-01-20

## 2021-01-19 RX ORDER — HYDROCODONE BITARTRATE AND ACETAMINOPHEN 7.5; 325 MG/1; MG/1
1 TABLET ORAL ONCE AS NEEDED
Status: DISCONTINUED | OUTPATIENT
Start: 2021-01-19 | End: 2021-01-19 | Stop reason: HOSPADM

## 2021-01-19 RX ORDER — SODIUM CHLORIDE 450 MG/100ML
100 INJECTION, SOLUTION INTRAVENOUS CONTINUOUS
Status: DISCONTINUED | OUTPATIENT
Start: 2021-01-19 | End: 2021-01-20 | Stop reason: HOSPADM

## 2021-01-19 RX ORDER — KETOROLAC TROMETHAMINE 30 MG/ML
15 INJECTION, SOLUTION INTRAMUSCULAR; INTRAVENOUS EVERY 8 HOURS PRN
Status: DISCONTINUED | OUTPATIENT
Start: 2021-01-19 | End: 2021-01-20 | Stop reason: HOSPADM

## 2021-01-19 RX ORDER — HYDRALAZINE HYDROCHLORIDE 20 MG/ML
5 INJECTION INTRAMUSCULAR; INTRAVENOUS
Status: DISCONTINUED | OUTPATIENT
Start: 2021-01-19 | End: 2021-01-19 | Stop reason: HOSPADM

## 2021-01-19 RX ORDER — OXYCODONE HYDROCHLORIDE AND ACETAMINOPHEN 5; 325 MG/1; MG/1
1 TABLET ORAL EVERY 4 HOURS PRN
Status: DISCONTINUED | OUTPATIENT
Start: 2021-01-19 | End: 2021-01-20 | Stop reason: HOSPADM

## 2021-01-19 RX ORDER — SODIUM CHLORIDE 0.9 % (FLUSH) 0.9 %
1-10 SYRINGE (ML) INJECTION AS NEEDED
Status: DISCONTINUED | OUTPATIENT
Start: 2021-01-19 | End: 2021-01-20 | Stop reason: HOSPADM

## 2021-01-19 RX ORDER — CHOLECALCIFEROL (VITAMIN D3) 125 MCG
5 CAPSULE ORAL NIGHTLY PRN
Status: DISCONTINUED | OUTPATIENT
Start: 2021-01-19 | End: 2021-01-20 | Stop reason: HOSPADM

## 2021-01-19 RX ORDER — DOCUSATE SODIUM 100 MG/1
100 CAPSULE, LIQUID FILLED ORAL 2 TIMES DAILY PRN
Status: DISCONTINUED | OUTPATIENT
Start: 2021-01-19 | End: 2021-01-20 | Stop reason: HOSPADM

## 2021-01-19 RX ORDER — HYDROMORPHONE HYDROCHLORIDE 1 MG/ML
0.5 INJECTION, SOLUTION INTRAMUSCULAR; INTRAVENOUS; SUBCUTANEOUS
Status: DISCONTINUED | OUTPATIENT
Start: 2021-01-19 | End: 2021-01-19 | Stop reason: HOSPADM

## 2021-01-19 RX ORDER — ASPIRIN 325 MG
325 TABLET, DELAYED RELEASE (ENTERIC COATED) ORAL 2 TIMES DAILY WITH MEALS
Status: DISCONTINUED | OUTPATIENT
Start: 2021-01-20 | End: 2021-01-20 | Stop reason: HOSPADM

## 2021-01-19 RX ORDER — ONDANSETRON 2 MG/ML
4 INJECTION INTRAMUSCULAR; INTRAVENOUS ONCE AS NEEDED
Status: DISCONTINUED | OUTPATIENT
Start: 2021-01-19 | End: 2021-01-19 | Stop reason: HOSPADM

## 2021-01-19 RX ORDER — FLUMAZENIL 0.1 MG/ML
0.2 INJECTION INTRAVENOUS AS NEEDED
Status: DISCONTINUED | OUTPATIENT
Start: 2021-01-19 | End: 2021-01-19 | Stop reason: HOSPADM

## 2021-01-19 RX ORDER — ACETAMINOPHEN 325 MG/1
325 TABLET ORAL EVERY 4 HOURS PRN
Status: DISCONTINUED | OUTPATIENT
Start: 2021-01-19 | End: 2021-01-20 | Stop reason: HOSPADM

## 2021-01-19 RX ORDER — ATORVASTATIN CALCIUM 20 MG/1
80 TABLET, FILM COATED ORAL NIGHTLY
Status: DISCONTINUED | OUTPATIENT
Start: 2021-01-19 | End: 2021-01-20 | Stop reason: HOSPADM

## 2021-01-19 RX ORDER — SODIUM CHLORIDE 0.9 % (FLUSH) 0.9 %
10 SYRINGE (ML) INJECTION EVERY 12 HOURS SCHEDULED
Status: DISCONTINUED | OUTPATIENT
Start: 2021-01-19 | End: 2021-01-19 | Stop reason: HOSPADM

## 2021-01-19 RX ORDER — LABETALOL HYDROCHLORIDE 5 MG/ML
5 INJECTION, SOLUTION INTRAVENOUS
Status: DISCONTINUED | OUTPATIENT
Start: 2021-01-19 | End: 2021-01-19 | Stop reason: HOSPADM

## 2021-01-19 RX ORDER — NALOXONE HCL 0.4 MG/ML
0.4 VIAL (ML) INJECTION
Status: DISCONTINUED | OUTPATIENT
Start: 2021-01-19 | End: 2021-01-20 | Stop reason: HOSPADM

## 2021-01-19 RX ORDER — MORPHINE SULFATE 2 MG/ML
4 INJECTION, SOLUTION INTRAMUSCULAR; INTRAVENOUS
Status: DISCONTINUED | OUTPATIENT
Start: 2021-01-19 | End: 2021-01-20 | Stop reason: HOSPADM

## 2021-01-19 RX ORDER — ATORVASTATIN CALCIUM 80 MG/1
80 TABLET, FILM COATED ORAL EVERY EVENING
Status: DISCONTINUED | OUTPATIENT
Start: 2021-01-19 | End: 2021-01-19

## 2021-01-19 RX ORDER — TRIAMTERENE AND HYDROCHLOROTHIAZIDE 37.5; 25 MG/1; MG/1
1 TABLET ORAL DAILY
Status: DISCONTINUED | OUTPATIENT
Start: 2021-01-19 | End: 2021-01-20 | Stop reason: HOSPADM

## 2021-01-19 RX ORDER — MIDAZOLAM HYDROCHLORIDE 1 MG/ML
INJECTION INTRAMUSCULAR; INTRAVENOUS AS NEEDED
Status: DISCONTINUED | OUTPATIENT
Start: 2021-01-19 | End: 2021-01-19 | Stop reason: SURG

## 2021-01-19 RX ORDER — ONDANSETRON 4 MG/1
4 TABLET, FILM COATED ORAL EVERY 6 HOURS PRN
Status: DISCONTINUED | OUTPATIENT
Start: 2021-01-19 | End: 2021-01-20 | Stop reason: HOSPADM

## 2021-01-19 RX ORDER — PROPOFOL 10 MG/ML
VIAL (ML) INTRAVENOUS CONTINUOUS PRN
Status: DISCONTINUED | OUTPATIENT
Start: 2021-01-19 | End: 2021-01-19 | Stop reason: SURG

## 2021-01-19 RX ORDER — DIPHENHYDRAMINE HYDROCHLORIDE 50 MG/ML
12.5 INJECTION INTRAMUSCULAR; INTRAVENOUS
Status: DISCONTINUED | OUTPATIENT
Start: 2021-01-19 | End: 2021-01-19 | Stop reason: HOSPADM

## 2021-01-19 RX ORDER — DEXAMETHASONE SODIUM PHOSPHATE 4 MG/ML
INJECTION, SOLUTION INTRA-ARTICULAR; INTRALESIONAL; INTRAMUSCULAR; INTRAVENOUS; SOFT TISSUE AS NEEDED
Status: DISCONTINUED | OUTPATIENT
Start: 2021-01-19 | End: 2021-01-19 | Stop reason: SURG

## 2021-01-19 RX ORDER — EPHEDRINE SULFATE 50 MG/ML
5 INJECTION, SOLUTION INTRAVENOUS ONCE AS NEEDED
Status: DISCONTINUED | OUTPATIENT
Start: 2021-01-19 | End: 2021-01-19 | Stop reason: HOSPADM

## 2021-01-19 RX ORDER — FERROUS SULFATE 325(65) MG
325 TABLET ORAL
Status: DISCONTINUED | OUTPATIENT
Start: 2021-01-19 | End: 2021-01-20 | Stop reason: HOSPADM

## 2021-01-19 RX ORDER — TRANEXAMIC ACID 100 MG/ML
INJECTION, SOLUTION INTRAVENOUS AS NEEDED
Status: DISCONTINUED | OUTPATIENT
Start: 2021-01-19 | End: 2021-01-19 | Stop reason: SURG

## 2021-01-19 RX ORDER — BUPIVACAINE HYDROCHLORIDE 5 MG/ML
INJECTION, SOLUTION EPIDURAL; INTRACAUDAL
Status: COMPLETED | OUTPATIENT
Start: 2021-01-19 | End: 2021-01-19

## 2021-01-19 RX ADMIN — OXYCODONE HYDROCHLORIDE AND ACETAMINOPHEN 1 TABLET: 5; 325 TABLET ORAL at 12:55

## 2021-01-19 RX ADMIN — MIDAZOLAM 2 MG: 1 INJECTION INTRAMUSCULAR; INTRAVENOUS at 06:39

## 2021-01-19 RX ADMIN — BUPIVACAINE HYDROCHLORIDE 20 ML: 5 INJECTION, SOLUTION EPIDURAL; INTRACAUDAL; PERINEURAL at 06:41

## 2021-01-19 RX ADMIN — MIDAZOLAM 2 MG: 1 INJECTION INTRAMUSCULAR; INTRAVENOUS at 07:10

## 2021-01-19 RX ADMIN — FERROUS SULFATE TAB 325 MG (65 MG ELEMENTAL FE) 325 MG: 325 (65 FE) TAB at 12:55

## 2021-01-19 RX ADMIN — OXYCODONE HYDROCHLORIDE AND ACETAMINOPHEN 2 TABLET: 5; 325 TABLET ORAL at 21:24

## 2021-01-19 RX ADMIN — METOPROLOL SUCCINATE 50 MG: 50 TABLET, EXTENDED RELEASE ORAL at 21:24

## 2021-01-19 RX ADMIN — CELECOXIB 200 MG: 200 CAPSULE ORAL at 05:53

## 2021-01-19 RX ADMIN — PROPOFOL 100 MCG/KG/MIN: 10 INJECTION, EMULSION INTRAVENOUS at 07:15

## 2021-01-19 RX ADMIN — TRAZODONE HYDROCHLORIDE 200 MG: 100 TABLET ORAL at 21:24

## 2021-01-19 RX ADMIN — FAMOTIDINE 20 MG: 10 INJECTION INTRAVENOUS at 06:37

## 2021-01-19 RX ADMIN — CLINDAMYCIN IN 5 PERCENT DEXTROSE 900 MG: 18 INJECTION, SOLUTION INTRAVENOUS at 16:05

## 2021-01-19 RX ADMIN — TRANEXAMIC ACID 600 MG: 100 INJECTION, SOLUTION INTRAVENOUS at 07:14

## 2021-01-19 RX ADMIN — DEXAMETHASONE SODIUM PHOSPHATE 8 MG: 4 INJECTION INTRA-ARTICULAR; INTRALESIONAL; INTRAMUSCULAR; INTRAVENOUS; SOFT TISSUE at 07:37

## 2021-01-19 RX ADMIN — ACETAMINOPHEN 1000 MG: 500 TABLET ORAL at 05:53

## 2021-01-19 RX ADMIN — FENTANYL CITRATE 50 MCG: 50 INJECTION, SOLUTION INTRAMUSCULAR; INTRAVENOUS at 09:04

## 2021-01-19 RX ADMIN — MUPIROCIN 1 APPLICATION: 20 OINTMENT TOPICAL at 21:25

## 2021-01-19 RX ADMIN — BUPIVACAINE HYDROCHLORIDE 1.8 MG: 7.5 INJECTION, SOLUTION EPIDURAL; RETROBULBAR at 07:06

## 2021-01-19 RX ADMIN — Medication 3 ML: at 21:26

## 2021-01-19 RX ADMIN — SODIUM CHLORIDE, POTASSIUM CHLORIDE, SODIUM LACTATE AND CALCIUM CHLORIDE: 600; 310; 30; 20 INJECTION, SOLUTION INTRAVENOUS at 06:41

## 2021-01-19 RX ADMIN — FENTANYL CITRATE 50 MCG: 50 INJECTION, SOLUTION INTRAMUSCULAR; INTRAVENOUS at 06:40

## 2021-01-19 RX ADMIN — OXYCODONE HYDROCHLORIDE AND ACETAMINOPHEN 2 TABLET: 5; 325 TABLET ORAL at 17:07

## 2021-01-19 RX ADMIN — CEFAZOLIN SODIUM 2 G: 2 INJECTION, SOLUTION INTRAVENOUS at 07:07

## 2021-01-19 RX ADMIN — SODIUM CHLORIDE, POTASSIUM CHLORIDE, SODIUM LACTATE AND CALCIUM CHLORIDE: 600; 310; 30; 20 INJECTION, SOLUTION INTRAVENOUS at 08:11

## 2021-01-19 RX ADMIN — TRIAMTERENE AND HYDROCHLOROTHIAZIDE 1 TABLET: 37.5; 25 TABLET ORAL at 12:55

## 2021-01-19 RX ADMIN — ATORVASTATIN CALCIUM 80 MG: 80 TABLET, FILM COATED ORAL at 21:24

## 2021-01-19 RX ADMIN — SODIUM CHLORIDE 100 ML/HR: 4.5 INJECTION, SOLUTION INTRAVENOUS at 12:55

## 2021-01-19 RX ADMIN — CEFAZOLIN SODIUM 2 G: 2 INJECTION, SOLUTION INTRAVENOUS at 08:28

## 2021-01-19 RX ADMIN — ONDANSETRON HYDROCHLORIDE 4 MG: 2 SOLUTION INTRAMUSCULAR; INTRAVENOUS at 08:35

## 2021-01-19 NOTE — THERAPY EVALUATION
Patient Name: Tran Sosa  : 1950    MRN: [id number]                              Today's Date: 2021       Admit Date: 2021    Visit Dx:     ICD-10-CM ICD-9-CM   1. Primary osteoarthritis of left knee  M17.12 715.16     Patient Active Problem List   Diagnosis   • Atopic rhinitis   • Arthralgia of multiple joints   • Hyperlipidemia   • Hypertension   • Osteopenia   • PAT (paroxysmal atrial tachycardia) (CMS/HCC)   • Sinus tachycardia   • SHAI (obstructive sleep apnea)   • OA (osteoarthritis) of hip   • Primary osteoarthritis of right knee   • Shortness of breath   • Pulmonary embolism without acute cor pulmonale (CMS/HCC)   • Acute respiratory failure with hypoxemia (CMS/HCC)   • COVID-19   • Primary osteoarthritis of left knee     Past Medical History:   Diagnosis Date   • Arthralgia of multiple sites    • Arthritis     OSTEO   • Cancer (CMS/HCC)     RIGHT BREAST CANCER. WITH CHEMO   • COPD (chronic obstructive pulmonary disease) (CMS/HCC)     MILD   • Diverticulosis    • Elevated cholesterol    • Elevated TSH    • GERD (gastroesophageal reflux disease)    • Hiatal hernia    • History of 2019 novel coronavirus disease (COVID-19)     2020   • History of breast cancer 1984    RIGHT    • History of bronchitis    • History of pneumonia    • Hyperlipidemia    • Hypertension    • Junctional rhythm 1990    ONE EPISODE   • Lung nodule     BIOPSY BENIGN   • Mild shortness of breath     LASTING EFFECT FROM PREVIOUS COVID INFECTION   • Mitral regurgitation     Mild per 2-D echocardiogram 2017   • Osteopenia    • Osteoporosis    • PAC (premature atrial contraction)     WITH SOME SVT IN THE PAST. REASON FOR METOPROLOL   • Postmenopausal status    • Reactive airway disease    • Recurrent urinary tract infection     RESOLVED   • Rib fracture     YEARS AGO   • Right knee pain     TORN MENSICUS   • Seasonal allergies    • Sleep apnea     CPAP USED   • Uterine leiomyoma     HYST     Past Surgical History:    Procedure Laterality Date   • APPENDECTOMY     • BREAST SURGERY     • CARDIAC CATHETERIZATION       at [facility]; told normal coronary arteries    •  SECTION     • COLONOSCOPY     • FRACTURE SURGERY     • HAMMER TOE REPAIR Right 2016    Procedure: MULTIPLE RT CLAW TOE REPAIR WITH WEIL OSTEOTOMY; RIGHT GREAT TOE AKIN OSTEOTOMY.;  Surgeon: Rigoberto Ba MD;  Location: [facility];  Service:    • HYSTERECTOMY      W/BSO   • JOINT REPLACEMENT     • LUNG SURGERY      WEDGE RESECTION? RIGHT ?LOBE. BENIGN PULM NODULE   • MASTECTOMY Left     PROPHYLACTICALLY   • MASTECTOMY PARTIAL WITH AXILLARY LYMPH NODE EXCISION Right 1984    MALIGNANT   • ORIF WRIST FRACTURE Right     HARDWARE SINCE REMOVED   • ORIF WRIST FRACTURE Left     WITH HARDWARE   • TONSILLECTOMY     • TOTAL HIP ARTHROPLASTY Left 2017    Procedure: LT TOTAL HIP ARTHROPLASTY;  Surgeon: Yaya Reina MD;  Location: [facility] OR;  Service:    • TOTAL KNEE ARTHROPLASTY Right 2020    Procedure: TOTAL KNEE ARTHROPLASTY RIGHT;  Surgeon: Yaya Reina MD;  Location: [facility] OR;  Service: Orthopedics;  Laterality: Right;   • TRAM FLAP DELAYED     • UTERINE ARTERY EMBOLIZATION       General Information     Row Name 21 1501          Physical Therapy Time and Intention    Document Type  evaluation  -EJ     Mode of Treatment  physical therapy  -EJ     Row Name 21 1501          General Information    Patient Profile Reviewed  yes  -EJ     Prior Level of Function  independent:;ADL's;all household mobility;community mobility  -EJ     Existing Precautions/Restrictions  no known precautions/restrictions  -EJ     Barriers to Rehab  none identified  -EJ     Row Name 21 1501          Living Environment    Lives With  alone has friends and family who will be assisting as needed  -EJ     Row Name 21 1501          Home Main Entrance    Number of Stairs, Main Entrance  three  -EJ     Stair Railings, Main Entrance   railings safe and in good condition  -     Row Name 01/19/21 1501          Stairs Within Home, Primary    Number of Stairs, Within Home, Primary  none  -     Row Name 01/19/21 1501          Cognition    Orientation Status (Cognition)  oriented x 4  -     Row Name 01/19/21 1501          Safety Issues, Functional Mobility    Impairments Affecting Function (Mobility)  strength;range of motion (ROM);pain  -EJ       User Key  (r) = Recorded By, (t) = Taken By, (c) = Cosigned By    Initials Name Provider Type     Earnestine Plasencia, PT Physical Therapist        Mobility     Row Name 01/19/21 1502          Bed Mobility    Bed Mobility  supine-sit  -EJ     Supine-Sit [place] (Bed Mobility)  verbal cues;standby assist  -     Assistive Device (Bed Mobility)  head of bed elevated  -[facility] Name 01/19/21 1502          Sit-Stand Transfer    Sit-Stand [place] (Transfers)  verbal cues;minimum assist (75% patient effort)  -     Assistive Device (Sit-Stand Transfers)  walker, front-wheeled  -[facility] Name 01/19/21 1502          Gait/Stairs (Locomotion)    [place] Level (Gait)  verbal cues;contact guard;standby assist  -     Assistive Device (Gait)  walker, front-wheeled  -     Distance in Feet (Gait)  80  -EJ     Comment (Gait/Stairs)  cues for upright posture  -[facility] Name 01/19/21 1502          Mobility    Extremity Weight-bearing Status  left lower extremity  -     Left Lower Extremity (Weight-bearing Status)  weight-bearing as tolerated (WBAT)  -       User Key  (r) = Recorded By, (t) = Taken By, (c) = Cosigned By    Initials Name Provider Type     Earnestine Plasencia, PT Physical Therapist        Obj/Interventions     Row Name 01/19/21 1503          Range of Motion Comprehensive    General Range of Motion  no range of motion deficits identified  -     Comment, General Range of Motion  x L knee  -     Row Name 01/19/21 1503          Strength Comprehensive (MMT)    Comment,  General Manual Muscle Testing (MMT) Assessment  post op weakness  -EJ     [facility] Name 01/19/21 1503          Motor Skills    Therapeutic Exercise  -- L TKR protocol x 10 reps  -EJ       User Key  (r) = Recorded By, (t) = Taken By, (c) = Cosigned By    Initials Name Provider Type    EJ Earnestine Plasencia, PT Physical Therapist        Goals/Plan     Row Name 01/19/21 1505          Transfer Goal 1 (PT)    Activity/Assistive Device (Transfer Goal 1, PT)  transfers, all;walker, rolling  -EJ     [place] Level/Cues Needed (Transfer Goal 1, PT)  standby assist  -EJ     Time Frame (Transfer Goal 1, PT)  3 days  -EJ     [facility] Name 01/19/21 1505          Gait Training Goal 1 (PT)    Activity/Assistive Device (Gait Training Goal 1, PT)  gait (walking locomotion);walker, rolling  -EJ     [place] Level (Gait Training Goal 1, PT)  standby assist  -EJ     Distance (Gait Training Goal 1, PT)  120  -EJ     Time Frame (Gait Training Goal 1, PT)  3 days  -EJ     Row Name 01/19/21 1505          ROM Goal 1 (PT)    ROM Goal 1 (PT)  L knee 5-90  -EJ     Time Frame (ROM Goal 1, PT)  3 days  -EJ     Row Name 01/19/21 1505          Stairs Goal 1 (PT)    Activity/Assistive Device (Stairs Goal 1, PT)  stairs, all skills  -EJ     [place] Level/Cues Needed (Stairs Goal 1, PT)  contact guard assist  -EJ     Number of Stairs (Stairs Goal 1, PT)  3  -EJ     Time Frame (Stairs Goal 1, PT)  3 days  -EJ       User Key  (r) = Recorded By, (t) = Taken By, (c) = Cosigned By    Initials Name Provider Type    EJ Earnestine Plasencia, PT Physical Therapist        Clinical Impression     Row Name 01/19/21 1503          Pain    Additional Documentation  Pain Scale: Numbers Pre/Post-Treatment (Group)  -EJ     Row Name 01/19/21 1503          Pain Scale: Numbers Pre/Post-Treatment    Pretreatment Pain Rating  8/10  -EJ     Posttreatment Pain Rating  8/10  -EJ     Pain Location - Side  Left  -EJ     Pain Location  knee  -EJ     Pain Intervention(s)   Repositioned;Ambulation/increased activity  -EJ     Row Name 01/19/21 1504          Plan of Care Review    Plan of Care Reviewed With  patient  -EJ     Progress  improving  -EJ     Outcome Summary  Pt agreeable to PT this afternoon. She is s/p L TKR and now presents with expected post op pain, weakness, decreased ROM, and overall decreased functional mobility. Pt is independent at baseline, lives alone, and plans to return home at ID. Pt does have family and friends will assist her at ID. Today, pt  is moving well. She was able to ambulate approx 100 ft in hallway with Rwx. She also tolerated all knee exercises well. She will continue to benefit from skilled PT to maximize safety, strength, ROM, and independence with mobility.  -EJ     Row Name 01/19/21 1503          Therapy Assessment/Plan (PT)    Patient/Family Therapy Goals Statement (PT)  home  -EJ     Rehab Potential (PT)  good, to achieve stated therapy goals  -EJ     Criteria for Skilled Interventions Met (PT)  yes  -EJ     Row Name 01/19/21 1503          Positioning and Restraints    Pre-Treatment Position  in bed  -EJ     Post Treatment Position  chair  -EJ     In Chair  notified nsg;reclined;call light within reach;encouraged to call for assist;exit alarm on  -EJ       User Key  (r) = Recorded By, (t) = Taken By, (c) = Cosigned By    Initials Name Provider Type    Earnestine Reagan, PT Physical Therapist        Outcome Measures     Row Name 01/19/21 1508          How much help from another person do you currently need...    Turning from your back to your side while in flat bed without using bedrails?  4  -EJ     Moving from lying on back to sitting on the side of a flat bed without bedrails?  4  -EJ     Moving to and from a bed to a chair (including a wheelchair)?  3  -EJ     Standing up from a chair using your arms (e.g., wheelchair, bedside chair)?  3  -EJ     Climbing 3-5 steps with a railing?  3  -EJ     To walk in hospital room?  3  -EJ     AM-PAC  6 Clicks Score (PT)  20  -EJ     Row Name 01/19/21 1506          Functional Assessment    Outcome Measure Options  AM-PAC 6 Clicks Basic Mobility (PT)  -EJ       User Key  (r) = Recorded By, (t) = Taken By, (c) = Cosigned By    Initials Name Provider Type    EJ Earnestine Plasencia, PT Physical Therapist        Physical Therapy Education                 Title: PT OT SLP Therapies (Done)     Topic: Physical Therapy (Done)     Point: Mobility training (Done)     Learning Progress Summary           Patient Acceptance, E,TB,D, VU,NR by  at 1/19/2021 1506                   Point: Home exercise program (Done)     Learning Progress Summary           Patient Acceptance, E,TB,D, VU,NR by  at 1/19/2021 1506                   Point: Body mechanics (Done)     Learning Progress Summary           Patient Acceptance, E,TB,D, VU,NR by  at 1/19/2021 1506                   Point: Precautions (Done)     Learning Progress Summary           Patient Acceptance, E,TB,D, VU,NR by  at 1/19/2021 1506                               User Key     Initials Effective Dates Name Provider Type [facility] 04/03/18 -  Earnestine Plasencia PT Physical Therapist PT              PT Recommendation and Plan  Planned Therapy Interventions (PT): balance training, bed mobility training, gait training, home exercise program, patient/family education, stretching, strengthening, stair training, ROM (range of motion), transfer training  Plan of Care Reviewed With: patient  Progress: improving  Outcome Summary: Pt agreeable to PT this afternoon. She is s/p L TKR and now presents with expected post op pain, weakness, decreased ROM, and overall decreased functional mobility. Pt is independent at baseline, lives alone, and plans to return home at [place]. Pt does have family and friends will assist her at [place]. Today, pt  is moving well. She was able to ambulate approx 100 ft in hallway with Rwx. She also tolerated all knee exercises well. She will continue to  benefit from skilled PT to maximize safety, strength, ROM, and independence with mobility.     Time Calculation:   PT Charges     Row Name 01/19/21 1507             Time Calculation    Start Time  1318  -EJ      Stop Time  1405  -EJ      Time Calculation (min)  47 min  -EJ      PT Received On  01/19/21  -EJ      PT - Next Appointment  01/20/21  -EJ      PT Goal Re-Cert Due Date  01/22/21  -EJ         Time Calculation- PT    Total Timed Code Minutes- PT  30 minute(s)  -EJ        User Key  (r) = Recorded By, (t) = Taken By, (c) = Cosigned By    Initials Name Provider Type     Earnestine Plasencia, PT Physical Therapist        Therapy Charges for Today     Code Description Service Date Service Provider Modifiers Qty    55302432801 HC PT EVAL MOD COMPLEXITY 2 1/19/2021 Earnestine Plasencia, PT GP 1    98591610120 HC PT THER PROC EA 15 MIN 1/19/2021 Earnestine Plasencia, PT GP 2          PT G-Codes  Outcome Measure Options: AM-PAC 6 Clicks Basic Mobility (PT)  AM-PAC 6 Clicks Score (PT): 20    Earnestine Plasencia, PT  1/19/2021

## 2021-01-19 NOTE — DISCHARGE SUMMARY
"  Orthopaedic Surgery Discharge Summary    Patient Name:  Tran Sosa  YOB: 1950  Age: 70 y.o.  Medical Records Number:  [id number]    Date of Admission:  1/19/2021  Date of Discharge:  1/20/2021    Primary Discharge Diagnosis:  Primary osteoarthritis of left knee [M17.12]    Secondary Discharge Diagnosis:    Problems Addressed this Visit        Other    Primary osteoarthritis of left knee - Primary    Relevant Medications    oxyCODONE-acetaminophen (PERCOCET) 5-325 MG per tablet      Diagnoses       Codes Comments    Primary osteoarthritis of left knee    -  Primary ICD-10-CM: M17.12  ICD-9-CM: 715.16           Procedures Performed:  Left Total Knee Arthroplasty      Hospital Course:    Tran Sosa is a 70 y.o.  female who underwent successful left tka on 1/19/2021.  Tran Sosa was started on Aspirin 325 mg po twice daily post-operatively for DVT prophylaxis.  On post-op day 1 the patients dressing was changed and their incision was clean, with no signs of infection and their calf was soft, with no signs of DVT.  The patient progressed well with physical therapy and the patients hemoglobin remained stable. On post-operative day 1 the patient was felt ready for discharge.     Vitals:  Vitals:    01/19/21 0945 01/19/21 1000 01/19/21 1013 01/19/21 1113   BP: 135/73 132/69 127/75 134/77   BP Location: Left arm  Left arm Left arm   Patient Position: Lying  Lying Lying   Pulse: 71 72 70 66   Resp: 10 12 16 16   Temp: 97.6 °F (36.4 °C)  97.6 °F (36.4 °C) 97.4 °F (36.3 °C)   TempSrc: Oral  Oral Oral   SpO2: 95% 96% 97% 98%   Weight:   102 kg (225 lb 15.5 oz)    Height:   180.3 cm (71\")        Discharge Medications:      Discharge Medications      New Medications      Instructions Start Date   aspirin  MG tablet   325 mg, Oral, 2 Times Daily With Meals   Start Date: January 20, 2021     oxyCODONE-acetaminophen 5-325 MG per tablet  Commonly known as: PERCOCET   1-2 tablets, Oral, Every 4 " Hours PRN         Changes to Medications      Instructions Start Date   metoprolol succinate XL 50 MG 24 hr tablet  Commonly known as: TOPROL-XL  What changed: when to take this   TAKE 1 TABLET BY MOUTH EVERY DAY      triamterene-hydrochlorothiazide 37.5-25 MG per tablet  Commonly known as: MAXZIDE-25  What changed: when to take this   TAKE 1 TABLET BY MOUTH EVERY DAY IN THE MORNING         Continue These Medications      Instructions Start Date   atorvastatin 80 MG tablet  Commonly known as: LIPITOR   80 mg, Oral, Every Evening      docusate sodium 100 MG capsule   1 capsule, Oral, 2 Times Daily PRN      traZODone 100 MG tablet  Commonly known as: DESYREL   200 mg, Oral, Nightly         Stop These Medications    HIBICLENS EX     levoFLOXacin 250 MG tablet  Commonly known as: Levaquin     meloxicam 15 MG tablet  Commonly known as: MOBIC     mupirocin 2 % ointment  Commonly known as: BACTROBAN            Pain Medications:  Percocet 5/325 mg 1-2 po q 4-6 hours prn pain    DVT Prophylaxis:  Enteric Coated Aspirin 325 mg po twice daily for 2 weeks, then one daily for 4 weeks      Total Knee Joint Replacement Discharge Instructions:    I. ACTIVITIES:  1. Exercises:  ? Complete exercise program as taught by the hospital physical therapist 2 times per day  ? Exercise program will be advanced by the physical therapist  ? During the day be up ambulating every 2 hours (while awake) for short distances  ? Complete the ankle pump exercises at least 10 times per hour (while awake)  ? Elevate legs most of the day the first week post operatively and thereafter elevate legs when in bed and for at least 30 minutes during the day. Caution must be taken to avoid pillow placement under the bend of the knee as this can led to flexion contractures of the knee.  ? Use cold packs 20-30 minutes approximately 5 times per day. This should be done before and after completing your exercises and at any time you are experiencing pain/ stiffness  in your operative extremity.      2. Activities of Daily Living:  ? No tub baths, hot tubs, or swimming pools for 4 weeks  ? May shower and let water run over the incision on post-operative day #5 if no drainage. Do not scrub or rub the incision. Simply let the water run over the incision and pat dry.    II. Restrictions  ? Do not cross legs or kneel  ? Your surgeon will discuss with you when you will be able to drive again.  ? Weight bearing is as tolerated  ? First week stay inside on even terrain. May go up and down stairs one stair at a time utilizing the hand rail  ? After one week, you may venture outside.    III. Precautions:  ? Everyone that comes near you should wash their hands  ? No elective dental, genital-urinary, or colon procedures or surgical procedures for 12 weeks after surgery unless absolutely necessary.  ?  If dental work or surgical procedure is deemed absolutely necessary, you will need to contact your surgeon as you will need to take antibiotics 1 hour prior to any dental work (including teeth cleanings).  ? Please discuss with your surgeon prophylactic antibiotics as the length of time this intervention will be necessary for you varies with each patient’s health history and situation.  ? Avoid sick people. If you must be around someone who is ill, they should wear a mask.  ? Avoid visits to the Emergency Room or Urgent Care unless you are having a life threatening event.   ? If ordered stockings are to be placed on in the morning and removed at night. Monitor the stockings to ensure that any swelling is not causing the stockings to become too tight. In this case, remove stockings immediately.    IV. INCISION CARE:  ? Wash your hands prior to dressing changes  ? Change the dressing as needed to keep incision clean and dry. Utilize dry gauze and paper tape. Avoid touching the side of the gauze that goes against the incision with your hands.  ? No creams or ointments to the incision  ? May  remove dressing once the incision is free of drainage  ? Do not touch or pick at the incision  ? Check incision every day and notify surgeon immediately if any of the following signs or symptoms are noted:  o Increase in redness  o Increase in swelling around the incision and of the entire extremity  o Increase in pain  o Drainage oozing from the incision  o Pulling apart of the edges of the incision  o Increase in overall body temperature (greater than 100.5 degrees)  ? Your surgeon will instruct you regarding suture or staple removal    V. Medications:   1. Anticoagulants: You will be discharged on an anticoagulant. This is a prophylactic medication that helps prevent blood clots during your post-operative period. The type and length of dosage varies based on your individual needs, procedure performed, and surgeon’s preference.  ? While taking the anticoagulant, you should avoid taking any additional aspirin, ibuprofen (Advil or Motrin), Aleve (Naprosyn) or other non-steroidal anti-inflammatory medications.   ? Notify surgeon immediately if any jonah bleeding is noted in the urine, stool, emesis, or from the nose or the incision. Blood in the stool will often appear as black rather than red. Blood in urine may appear as pink. Blood in emesis may appear as brown/black like coffee grounds.  ? You will need to apply pressure for longer periods of time to any cuts or abrasions to stop bleeding  ? Avoid alcohol while taking anticoagulants    2. Stool Softeners: You will be at greater risk of constipation after surgery due to being less mobile and the pain medications.   ? Take stool softeners as instructed by your surgeon while on pain medications. Over the counter Colace 100 mg 1-2 capsules twice daily.   ? If stools become too loose or too frequent, please decreases the dosage or stop the stool softener.  ? If constipation occurs despite use of stool softeners, you are to continue the stool softeners and add a  laxative (Milk of Magnesia 1 ounce daily as needed)  ? Drink plenty of fluids, and eat fruits and vegetables during your recovery time    3. Pain Medications utilized after surgery are narcotics and the law requires that the following information be given to all patients that are prescribed narcotics:  ? CLASSIFICATION: Pain medications are called Opioids and are narcotics  ? LEGALITIES: It is illegal to share narcotics with others and to drive within 24 hours of taking narcotics  ? POTENTIAL SIDE EFFECTS: Potential side effects of opioids include: nausea, vomiting, itching, dizziness, drowsiness, dry mouth, constipation, and difficulty urinating.  ? POTENTIAL ADVERSE EFFECTS:   o Opioid tolerance can develop with use of pain medications and this simply means that it requires more and more of the medication to control pain; however, this is seen more in patients that use opioids for longer periods of time.  o Opioid dependence can develop with use of Opioids and this simply means that to stop the medication can cause withdrawal symptoms; however, this is seen with patients that use Opioids for longer periods of time.  o Opioid addiction can develop with use of Opioids and the incidence of this is very unlikely in patients who take the medications as ordered and stop the medications as instructed.  o Opioid overdose can be dangerous, but is unlikely when the medication is taken as ordered and stopped when ordered. It is important not to mix opioids with alcohol or with and type of sedative such as Benadryl as this can lead to over sedation and respiratory difficulty.  ? DOSAGE:   o Pain medications will need to be taken consistently for the first week to decrease pain and promote adequate pain relief and participation in physical therapy.  o After the initial surgical pain begins to resolve, you may begin to decrease the pain medication. By the end of 6-8 weeks, you should be off of pain medications.  o Refills will  not be given by the office during evening hours, on weekends, or after 6-8 weeks post-op.  o To seek refills on pain medications during the initial 6 week post-operative period, you must call the office 48 hours in advance to request the refill. The office will then notify you when to  the prescription. DO NOT wait until you are out of the medication to request a refill.    V. FOLLOW-UP VISITS:  ? You will need to follow up in the office with your surgeon in 3 weeks. Please call this number [phone] to schedule this appointment.  If you have any concerns or suspected complications prior to your follow up visit, please call your surgeons office. Do not wait until your appointment time if you suspect complications. These will need to be addressed in the office promptly.    Yaya Tapia PA-C  [facility]  [address] [place] [address]  [phone]    1/19/2021    CC:Diane Macdonald MD:Yaya Reina MD

## 2021-01-19 NOTE — ANESTHESIA POSTPROCEDURE EVALUATION
"Patient: Tran Sosa    Procedure Summary     Date: 01/19/21 Room / Location: [facility] OR [address] [place] [address] MAIN OR    Anesthesia Start: 0657 Anesthesia Stop: 0857    Procedure: TOTAL KNEE ARTHROPLASTY LEFT (Left Knee) Diagnosis:     Surgeon: Yaya Reina MD Provider: Marcos Phoenix MD    Anesthesia Type: spinal ASA Status: 3          Anesthesia Type: spinal    Vitals  Vitals Value Taken Time   /73 01/19/21 0945   Temp 36.4 °C (97.6 °F) 01/19/21 0945   Pulse 72 01/19/21 0955   Resp 10 01/19/21 0945   SpO2 96 % 01/19/21 0955   Vitals shown include unvalidated device data.        Post Anesthesia Care and Evaluation    Patient location during evaluation: PACU  Patient participation: complete - patient participated  Level of consciousness: awake and alert  Pain management: adequate  Airway patency: patent  Anesthetic complications: No anesthetic complications    Cardiovascular status: acceptable  Respiratory status: acceptable  Hydration status: acceptable    Comments: /73 (BP Location: Left arm, Patient Position: Lying)   Pulse 71   Temp 36.4 °C (97.6 °F) (Oral)   Resp 10   Ht 180.3 cm (71\")   Wt 102 kg (225 lb)   LMP  (LMP Unknown) Comment: HYSTERECTOMY W/BSO  SpO2 95%   BMI 31.38 kg/m²         "

## 2021-01-19 NOTE — PLAN OF CARE
Goal Outcome Evaluation:  Plan of Care Reviewed With: patient  Progress: improving  Outcome Summary: Pt agreeable to PT this afternoon. She is s/p L TKR and now presents with expected post op pain, weakness, decreased ROM, and overall decreased functional mobility. Pt is independent at baseline, lives alone, and plans to return home at [place]. Pt does have family and friends will assist her at [place]. Today, pt  is moving well. She was able to ambulate approx 100 ft in hallway with Rwx. She also tolerated all knee exercises well. She will continue to benefit from skilled PT to maximize safety, strength, ROM, and independence with mobility.  Patient was intermittently wearing a face mask during this therapy encounter. Therapist used appropriate personal protective equipment including eye protection, mask, and gloves.  Mask used was standard procedure mask. Appropriate PPE was worn during the entire therapy session. Hand hygiene was completed before and after therapy session. Patient is not in enhanced droplet precautions.

## 2021-01-19 NOTE — H&P
"  Orthopaedic Surgery History and Physical    Patient Name:  Tran Sosa  YOB: 1950   Age: 70 y.o.  Medical Records Number:  [id number]    Date of Admission:  1/19/2021  5:17 AM    Chief Complaint:  Primary osteoarthritis of left knee [M17.12]    Tran Sosa is a 70 y.o. female who presents c/o severe left knee pain.  The pain has been on and off for many years, worsening to the point where the pain is becoming disabling.  The pain is a constant dull ache with occasional sharp, stabbing pain.  The patient has failed conservative treatment and would like to proceed with left total knee arthroplasty.    /76 (BP Location: Left arm, Patient Position: Lying)   Pulse 74   Temp 98.6 °F (37 °C) (Oral)   Resp 18   Ht 180.3 cm (71\")   Wt 102 kg (225 lb)   LMP  (LMP Unknown) Comment: HYSTERECTOMY W/BSO  SpO2 96%   BMI 31.38 kg/m²     Past Medical History:    Past Medical History:   Diagnosis Date   • Arthralgia of multiple sites    • Arthritis     OSTEO   • Cancer (CMS/HCC)     RIGHT BREAST CANCER. WITH CHEMO   • COPD (chronic obstructive pulmonary disease) (CMS/HCC)     MILD   • Diverticulosis    • Elevated cholesterol    • Elevated TSH    • GERD (gastroesophageal reflux disease)    • Hiatal hernia    • History of 2019 novel coronavirus disease (COVID-19)     7/2020   • History of breast cancer 1984    RIGHT    • History of bronchitis    • History of pneumonia    • Hyperlipidemia    • Hypertension    • Junctional rhythm 1990    ONE EPISODE   • Lung nodule     BIOPSY BENIGN   • Mild shortness of breath     LASTING EFFECT FROM PREVIOUS COVID INFECTION   • Mitral regurgitation     Mild per 2-D echocardiogram 1/13/2017   • Osteopenia    • Osteoporosis    • PAC (premature atrial contraction)     WITH SOME SVT IN THE PAST. REASON FOR METOPROLOL   • Postmenopausal status    • Reactive airway disease    • Recurrent urinary tract infection     RESOLVED   • Rib fracture     YEARS AGO   • Right knee " pain     TORN MENSICUS   • Seasonal allergies    • Sleep apnea     CPAP USED   • Uterine leiomyoma     HYST       Past Surgical History:   Past Surgical History:   Procedure Laterality Date   • APPENDECTOMY     • BREAST SURGERY     • CARDIAC CATHETERIZATION       at [facility]; told normal coronary arteries    •  SECTION     • COLONOSCOPY     • FRACTURE SURGERY     • HAMMER TOE REPAIR Right 2016    Procedure: MULTIPLE RT CLAW TOE REPAIR WITH WEIL OSTEOTOMY; RIGHT GREAT TOE AKIN OSTEOTOMY.;  Surgeon: Rigoberto Ba MD;  Location: [facility];  Service:    • HYSTERECTOMY      W/BSO   • JOINT REPLACEMENT     • LUNG SURGERY      WEDGE RESECTION? RIGHT ?LOBE. BENIGN PULM NODULE   • MASTECTOMY Left     PROPHYLACTICALLY   • MASTECTOMY PARTIAL WITH AXILLARY LYMPH NODE EXCISION Right     MALIGNANT   • ORIF WRIST FRACTURE Right     HARDWARE SINCE REMOVED   • ORIF WRIST FRACTURE Left     WITH HARDWARE   • TONSILLECTOMY     • TOTAL HIP ARTHROPLASTY Left 2017    Procedure: LT TOTAL HIP ARTHROPLASTY;  Surgeon: Yaya Reina MD;  Location: [facility] OR;  Service:    • TOTAL KNEE ARTHROPLASTY Right 2020    Procedure: TOTAL KNEE ARTHROPLASTY RIGHT;  Surgeon: Yaya Reina MD;  Location: [facility] OR;  Service: Orthopedics;  Laterality: Right;   • TRAM FLAP DELAYED     • UTERINE ARTERY EMBOLIZATION         Social History:    Social History     Socioeconomic History   • Marital status:      Spouse name: Not on file   • Number of children: 1   • Years of education: Not on file   • Highest education level: Not on file   Occupational History   • Occupation: RN AT [employer]   Tobacco Use   • Smoking status: Former Smoker     Packs/day: 1.00     Years: 10.00     Pack years: 10.00     Types: Cigarettes     Quit date:      Years since quittin.0   • Smokeless tobacco: Never Used   • Tobacco comment: QUIT AT AGE 30 YRS. OLD   Substance and Sexual Activity   • Alcohol use: Yes      Comment: TWICE MONTHLY 1-2 DRINKS   • Drug use: Never       Family History:    Family History   Problem Relation Age of Onset   • Ovarian cancer Mother    • Lung cancer Father    • Hypertension Sister         benign essential hypertension   • Hyperlipidemia Sister    • Other Sister         RENAL DISEASE   • Hypertension Brother         benign essential hypertension   • Hyperlipidemia Brother    • Malig Hyperthermia Neg Hx        Current Medications:  Scheduled Meds:ceFAZolin, 2 g, Intravenous, Once  sodium chloride, 10 mL, Intravenous, Q12H      Continuous Infusions:lactated ringers, 9 mL/hr      PRN Meds:.fentanyl  •  lidocaine PF 1%  •  midazolam  •  sodium chloride    Current Facility-Administered Medications:   •  ceFAZolin in dextrose (ANCEF) IVPB solution 2 g, 2 g, Intravenous, Once, Yaya Reina MD  •  fentaNYL citrate (PF) (SUBLIMAZE) injection 50 mcg, 50 mcg, Intravenous, Q5 Min PRN, Marcos Phoenix MD, 50 mcg at 01/19/21 0640  •  lactated ringers infusion, 9 mL/hr, Intravenous, Continuous, Marcos Phoenix MD  •  lidocaine PF 1% (XYLOCAINE) injection 0.5 mL, 0.5 mL, Injection, Once PRN, Marcos Phoenix MD  •  midazolam (VERSED) injection 1 mg, 1 mg, Intravenous, Q10 Min PRN, Marcos Phoenix MD, 2 mg at 01/19/21 0639  •  sodium chloride 0.9 % flush 10 mL, 10 mL, Intravenous, Q12H, Marcos Phoenix MD  •  sodium chloride 0.9 % flush 10 mL, 10 mL, Intravenous, PRN, Marcos Phoenix MD    Allergies:    Allergies   Allergen Reactions   • Morphine And Related Rash   • Sulfa Antibiotics Hives       Review of Systems:   HEENT: Patient denies any headaches, vision changes, change in hearing, or tinnitus, Patient denies any rhinorrhea,epistaxis, sinus pain, mouth or dental problems, sore throat or hoarseness, or dysphagia  Pulmonary: Patient denies any cough, congestion, SOA, or wheezing  Cardiovascular: Patient denies any chest pain, dyspnea, palpitations, weakness, intolerance of exercise,  varicosities, swelling of extremities, known murmur  Gastrointestinal:  Patient denies nausea, vomiting, diarrhea, constipation, loss  of appetite, change in appetite, dysphagia, gas, heartburn, melena, change in bowel habits, use of laxatives or other drugs to alter the function of the gastrointestinal tract.  Genital/Urinary: Patient denies dysuria, change in color of urine, change in frequency of urination, pain with urgency, incontinence, retention, or nocturia.  Musculoskeletal: Patient denies increased warmth; redness; or swelling of joints; limitation of function; deformity; crepitation: pain in a joint or an extremity, the neck, or the back, especially with movement.  Neurological: Patient denies dizziness, tremor, ataxia, difficulty in speaking, change in speech, paresthesia, loss of sensation, seizures, syncope, changes in memory.  Endocrine system: Patient denies tremors, palpitations, intolerance of heat or cold, polyuria, polydipsia, polyphagia, diaphoresis, exophthalmos, or goiter.  Psychological: Patient denies thoughts/plans or harming self or other; depression,  insomnia, night terrors, calin, memory loss, disorientation.  Skin: Patient denies any bruising, rashes, discoloration, pruritus, wounds, ulcers, decubiti, changes in the hair or nails  Hematopoietic: Patient denies history of spontaneous or excessive bleeding, epistaxis, hematuria, melena, fatigue, enlarged or tender lymph nodes, pallor, history of anemia.        Physical Exam:   Awake, A&O x3, affect normal, no acute distress  Ambulating with a limp due to knee pain  Knee ROM is limited due to pain (5-115)  Strength is 4/5 in the quad, hamstring and calf  Cap refill is normal, Sensation intact    Card:  RR, HD Stable  Pulm:  Regular breathing, no S.O.A  Abd:  Soft, NT, ND    Lab Results (last 24 hours)     Procedure Component Value Units Date/Time    SCANNED - LABS [[id number]] Resulted: 01/19/21      Updated: 01/18/21 0833    SCANNED -  LABS [[id number]] Resulted: 01/19/21      Updated: 01/18/21 0832          Xr Knee 1 Or 2 View Left    Result Date: 1/5/2021  Narrative: TWO-VIEW LEFT KNEE  HISTORY: Knee pain. Preop for knee surgery.  There is severe joint space narrowing and slight articular irregularity predominantly involving the medial compartment. There is no joint effusion.  TWO-VIEW CHEST  HISTORY: Preop for knee surgery. Hypertension. Previous breast cancer.  The lungs are well-expanded with some minimal scattered linear fibrotic scarring similar to the chest x-ray dated 07/14/2020. A pleural-based nodular lesion located in the left lower lobe posteromedially noted on the CT scan dated 07/14/2020 is faintly visualized in the PA projection behind the left heart margin and can also be seen in the lateral view. This appears unchanged. No acute abnormality is seen.  This report was finalized on 1/5/2021 3:29 PM by Dr. Myles Ngo M.D.      Peripheral Block    Result Date: 1/19/2021  Narrative: Marcos Phoenix MD     1/19/2021  6:42 AM Peripheral Block Pre-sedation assessment completed: 1/19/2021 6:35 AM Patient reassessed immediately prior to procedure Patient location during procedure: pre-op Start time: 1/19/2021 6:35 AM Stop time: 1/19/2021 6:41 AM Reason for block: at surgeon's request and post-op pain management Performed by Anesthesiologist: Marcos Phoenix MD Preanesthetic Checklist Completed: patient identified, site marked, surgical consent, pre-op evaluation, timeout performed, IV checked, risks and benefits discussed and monitors and equipment checked Prep: Sterile barriers:gloves Prep: ChloraPrep Patient monitoring: blood pressure monitoring, continuous pulse oximetry and EKG Procedure Sedation:yes Guidance:ultrasound guided ULTRASOUND INTERPRETATION.  Using ultrasound guidance a 22 G gauge needle was placed in close proximity to the femoral nerve, at which point, under ultrasound guidance anesthetic was injected in the area  of the nerve and spread of the anesthesia was seen on ultrasound in close proximity thereto.  There were no abnormalities seen on ultrasound; a digital image was taken; and the patient tolerated the procedure with no complications. Images:still images obtained Laterality:left Block Type:adductor canal block Injection Technique:single-shot Needle Type:short-bevel Needle Gauge:22 G Medications Used: bupivacaine (PF) (MARCAINE) 0.5 % injection, 20 mL Medications Comment:. Post Assessment Injection Assessment: negative aspiration for heme, no paresthesia on injection and incremental injection Patient Tolerance:comfortable throughout block Complications:no     Xr Chest Pa & Lateral    Result Date: 1/5/2021  Narrative: TWO-VIEW LEFT KNEE  HISTORY: Knee pain. Preop for knee surgery.  There is severe joint space narrowing and slight articular irregularity predominantly involving the medial compartment. There is no joint effusion.  TWO-VIEW CHEST  HISTORY: Preop for knee surgery. Hypertension. Previous breast cancer.  The lungs are well-expanded with some minimal scattered linear fibrotic scarring similar to the chest x-ray dated 07/14/2020. A pleural-based nodular lesion located in the left lower lobe posteromedially noted on the CT scan dated 07/14/2020 is faintly visualized in the PA projection behind the left heart margin and can also be seen in the lateral view. This appears unchanged. No acute abnormality is seen.  This report was finalized on 1/5/2021 3:29 PM by Dr. Myles Ngo M.D.          Assessment:  End-stage Primary Left Knee Osteoarthritis    Plan:  Patient's pain is becoming disabling, despite extensive conservative treatment.  Radiographs reveal end-stage degenerative changes.  The risks of surgery, including, but not limited to, heart attack, stroke, dying, DVT, nerve injury, vascular injury, arthrofibrosis and infection were discussed.  The alternatives and benefits were also discussed.  All questions  answered and the patient wishes to proceed with left total knee arthroplasty.    Yaya Tapia PA-C  [facility] [place] [address]  [phone]    1/19/2021    CC: Diane Macdonald MD, Yaya Reina MD

## 2021-01-19 NOTE — DISCHARGE PLACEMENT REQUEST
"Tran Joyce (70 y.o. Female)     Date of Birth Social Security Number Address Home Phone MRN    1950  [id number]  [facility] [address]  [facility] [phone] [id number]    Faith Marital Status          Nondenominational        Admission Date Admission Type Admitting Provider Attending Provider Department, Room/Bed    1/19/21 Elective Yaya Reina MD Goodin, Robert A, MD [address], P798/1    Discharge Date Discharge Disposition Discharge Destination                       Attending Provider: Yaya Reina MD    Allergies: Morphine And Related, Sulfa Antibiotics    Isolation: None   Infection: None   Code Status: CPR    Ht: 180.3 cm (71\")   Wt: 102 kg (225 lb 15.5 oz)    Admission Cmt: None   Principal Problem: None                Active Insurance as of 1/19/2021     Primary Coverage     Payor Plan Insurance Group Employer/Plan Group    MEDICARE MEDICARE A & B      Payor Plan Address Payor Plan Phone Number Payor Plan Fax Number Effective Dates    [address] [phone]  7/1/2017 - None Entered    [facility] [id number]       Subscriber Name Subscriber Birth Date Member ID       TRAN JOYCE 1950 [id number]           Secondary Coverage     Payor Plan Insurance Group Employer/Plan Group    HUMANA HUMANA  SUP              [id number]     Payor Plan Address Payor Plan Phone Number Payor Plan Fax Number Effective Dates    [address] [address]   5/1/2019 - None Entered    formerly [facility] [id number]       Subscriber Name Subscriber Birth Date Member ID       TRAN JOYCE 1950 [id number]                 Emergency Contacts      (Rel.) Home Phone Work Phone Mobile Phone    SHAYLEE KUMAR (Daughter) [phone] -- [phone]    Katherin Saavedra (Friend) -- -- [phone]            "

## 2021-01-19 NOTE — ANESTHESIA PROCEDURE NOTES
Peripheral Block    Pre-sedation assessment completed: 1/19/2021 6:35 AM    Patient reassessed immediately prior to procedure    Patient location during procedure: pre-op  Start time: 1/19/2021 6:35 AM  Stop time: 1/19/2021 6:41 AM  Reason for block: at surgeon's request and post-op pain management  Performed by  Anesthesiologist: Marcos Phoenix MD  Preanesthetic Checklist  Completed: patient identified, site marked, surgical consent, pre-op evaluation, timeout performed, IV checked, risks and benefits discussed and monitors and equipment checked  Prep:  Sterile barriers:gloves  Prep: ChloraPrep  Patient monitoring: blood pressure monitoring, continuous pulse oximetry and EKG  Procedure  Sedation:yes    Guidance:ultrasound guided  ULTRASOUND INTERPRETATION.  Using ultrasound guidance a 22 G gauge needle was placed in close proximity to the femoral nerve, at which point, under ultrasound guidance anesthetic was injected in the area of the nerve and spread of the anesthesia was seen on ultrasound in close proximity thereto.  There were no abnormalities seen on ultrasound; a digital image was taken; and the patient tolerated the procedure with no complications. Images:still images obtained    Laterality:left  Block Type:adductor canal block  Injection Technique:single-shot  Needle Type:short-bevel  Needle Gauge:22 G      Medications Used: bupivacaine (PF) (MARCAINE) 0.5 % injection, 20 mL      Medications  Comment:.    Post Assessment  Injection Assessment: negative aspiration for heme, no paresthesia on injection and incremental injection  Patient Tolerance:comfortable throughout block  Complications:no

## 2021-01-19 NOTE — PROGRESS NOTES
Continued Stay Note  [facility]     Patient Name: Tran Sosa  MRN: [id number]  Today's Date: 1/19/2021    Admit Date: 1/19/2021    Discharge Plan     Row Name 01/19/21 1414       Plan    Plan  [facility]    Provided Post Acute Provider List?  Yes    Post Acute Provider List  Home Health    Provided Post Acute Provider Quality & Resource List?  Yes    Post Acute Provider Quality and Resource List  Home Health    Delivered To  Patient    Method of Delivery  Telephone    Patient/Family in Agreement with Plan  yes    Plan Comments  Spoke with pt, verified correct information on facesheet and explained the role of CCP. Pt would like to d/c home with [facility], referral given to Sydney with [facility] who states they are able to accept. Plan will be to d/c home with [facility] and family support.        Discharge Codes    No documentation.             Patricia Monroe RN

## 2021-01-19 NOTE — ANESTHESIA PREPROCEDURE EVALUATION
Anesthesia Evaluation                  Airway   Mallampati: III  TM distance: >3 FB  Neck ROM: full  Possible difficult intubation  Dental - normal exam     Pulmonary - normal exam   (+) pulmonary embolism, COPD, shortness of breath, sleep apnea on CPAP,     ROS comment: Hx of COVID, still SOA and coughing sats 98 on 2l O2  Cardiovascular     ECG reviewed  Rhythm: regular    (+) hypertension, hyperlipidemia,       Neuro/Psych  GI/Hepatic/Renal/Endo    (+)  GERD,      Musculoskeletal     Abdominal    Substance History      OB/GYN          Other                        Anesthesia Plan    ASA 3     spinal     intravenous induction

## 2021-01-19 NOTE — ANESTHESIA PROCEDURE NOTES
Spinal Block      Patient location during procedure: OR  Indication:procedure for pain  Performed By  CRNA: Arianna Wharton CRNA  Preanesthetic Checklist  Completed: patient identified, site marked, surgical consent, pre-op evaluation, timeout performed, IV checked, risks and benefits discussed and monitors and equipment checked  Spinal Block Prep:  Patient Position:sitting  Sterile Tech:cap, gloves, gown, mask and sterile barriers  Prep:Chloraprep  Patient Monitoring:blood pressure monitoring, continuous pulse oximetry and EKG  Spinal Block Procedure  Approach:midline  Guidance:palpation technique  Location:L3-L4  Needle Type:Sprotte  Needle Gauge:24  Placement of Spinal needle event:cerebrospinal fluid aspirated  Paresthesia: no  Fluid Appearance:clear     Post Assessment  Patient Tolerance:patient tolerated the procedure well with no apparent complications  Complications no

## 2021-01-19 NOTE — OP NOTE
Orthopaedic Surgery Operative Note    Patient Name:  Tran Sosa  YOB: 1950  Age: 70 y.o.  Medical Records Number:  [id number]    Date of Procedure:  1/19/2021    Pre-operative Diagnosis:  Primary Osteoarthritis Left Knee    Post-operative Diagnosis:  Primary Osteoarthritis Left Knee    Procedure Performed:  Left Total Knee Arthroplasty    Implants:  Biomet Vanguard TKA, 70 Femoral Component, 75 Tibial Tray with a 40 mm Modular Stem, 34 3-Peg Patella, 16 Posterior Lipped  Polyethylene Insert    Surgeon:  Yaya Reina M.D.    Assistant: Hiral Saldivar (who was present during the critical portions of the case, thereby decreasing operative time and patient morbidity)    Anesthetic Type:  Spinall    Estimated Blood Loss:  250cc's    Specimens: * No orders in the log *    No Complications      Indications for Procedure:  Tran Sosa is a 70 y.o. female suffering from end stage degenerative changes in the left knee.  The patients pain is becoming disabling, despite extensive conservative care, including NSAIDS, therapy and injections. The risks, benefits and alternatives were discussed and the patient wishes to proceed with left total knee arthroplasty.      Procedure Performed:    After informed consent was obtained, the correct patient identified, the correct operative side marked by the operative surgeon, and pre-operative IV Kefzol  given (prior to tourniquet inflation), the patient was taken to the operating room and placed supine on the operating table.  After spinal anesthesia was induced, a surgical time out was performed and 1 gm of Tranxemic Acid given, a well-padded tourniquet was placed and the patient's left lower extremity was prepped with chloraprep and draped in a sterile fashion.    A midline incision was made overlying the left knee and we sharply dissected down to expose the parapatellar retinaculum.  A muscle sparing, mid-vastus, parapatellar arthrotomy was performed and we  elevated the soft tissue both medially and laterally.  The menisci and ACL were excised.  We everted the patella and measured this to be 24 mm and we removed 9 mm of bone down to 15 mm.  We measured the patella to be 34 and drilled our three drill holes.  We protected the patella with the patella protector and turned our attention to the femur and the tibia.    We drilled drill holes into the femoral and the tibial intramedullary canals and proceeded to irrigate the canals to remove the fatty marrow.  We used the intramedullary yamel and the 5 degree valgus cut block to make our distal femoral cut.  Once we had a smooth surface, we measured the femur to be 70.  We assured we had rotational alignment using the epicondylar axis, then we used the four-in-one cut block to make our anterior, posterior, anterior and posterior chamfer cuts.  We placed our femoral trial, had excellent fit, extended the knee and marked Starr's line on the tibia.      We then turned our attention to the tibia, where we irrigated the canal again.  We used the intramedullary yamel and the 3 degree posterior sloped cut block to remove 2 mm of bone from the effected side of the tibia.  Prior to making our cut, we assured we had rotational alignment using the external guide and Starr's line.  Once we had a smooth surface, we measured the tibia to be 75.  We then removed soft tissue and bony debris from the posterior aspect of the knee.    We placed our trial implants and had excellent fit, range of motion, stability and ligament balance, throughout a complete arc of motion with a 16 kipped polyethylene liner.  We removed our trial implants, punched the tibial keel, copiously irrigated the knee, then cemented our implants in place using Biomet cement.  Once the cement cured, we trialed again with the 14, 16 and 18 AS, standard and posterior lipped polyethylene liners.  The 16 lipped gave us the best range of motion, stability and ligament  "balance, throughout a complete arc of motion.  We removed the trials, copiously irrigated the knee, gave IV antibiotics and local anesthetic, then placed our permanent polyethylene liner.  We placed a 1/8\" hemovac drain, then closed the arthrotomy with #1 Vicryl pop-off sutures.  The subcutaneous tissue was closed with 2-0 vicryl pop-off sutures.  The skin was closed with staples.  We placed a sterile dressing of Xeroform, 4x4's, abdominal pads, cast padding and an Ace Wrap.  All sponge and needle counts were correct.  The patient was then awakened from general anesthesia and taken to the recovery room in stable condition.    The patient will be started on Aspirin 325 mg twice daily for DVT prophylaxis.  IV antibiotics will be discontinued within 24 hours of surgery.  Immediately prior to surgery, there were no acute Thromboembolic nor Cardiovascular risk factors.  An updated Medical Reconciliation form is on the chart.    Yaya Tapia PA-C  [facility]  [address] [place] [address]  [phone]    1/19/2021        "

## 2021-01-19 NOTE — PLAN OF CARE
See below.    Problem: Adult Inpatient Plan of Care  Goal: Plan of Care Review  Outcome: Ongoing, Progressing  Flowsheets (Taken 1/19/2021 0549)  Progress: improving  Plan of Care Reviewed With: patient  Outcome Summary: 70/F POD#0 left TKA.  ALOx4, RA, lungs clear but diminished, BS hypoactive but improving, voiding independently.  Up x1 BRP with walker/gait belt.  2+ peal pulses noted bilaterally, no c/o numbness/tingling in operative extremity, dressing CDI.  Pain well-controlled with PO pain meds only.  PIV saline locked.  D/C planning in progress, will CTM.

## 2021-01-20 ENCOUNTER — READMISSION MANAGEMENT (OUTPATIENT)
Dept: CALL CENTER | Facility: HOSPITAL | Age: 71
End: 2021-01-20

## 2021-01-20 VITALS
SYSTOLIC BLOOD PRESSURE: 96 MMHG | RESPIRATION RATE: 16 BRPM | HEART RATE: 61 BPM | TEMPERATURE: 97.3 F | WEIGHT: 225.97 LBS | OXYGEN SATURATION: 95 % | HEIGHT: 71 IN | BODY MASS INDEX: 31.64 KG/M2 | DIASTOLIC BLOOD PRESSURE: 53 MMHG

## 2021-01-20 LAB
ANION GAP SERPL CALCULATED.3IONS-SCNC: 7.9 MMOL/L (ref 5–15)
BUN SERPL-MCNC: 23 MG/DL (ref 8–23)
BUN/CREAT SERPL: 28.8 (ref 7–25)
CALCIUM SPEC-SCNC: 8 MG/DL (ref 8.6–10.5)
CHLORIDE SERPL-SCNC: 102 MMOL/L (ref 98–107)
CO2 SERPL-SCNC: 26.1 MMOL/L (ref 22–29)
CREAT SERPL-MCNC: 0.8 MG/DL (ref 0.57–1)
DEPRECATED RDW RBC AUTO: 43.3 FL (ref 37–54)
ERYTHROCYTE [DISTWIDTH] IN BLOOD BY AUTOMATED COUNT: 13.5 % (ref 12.3–15.4)
GFR SERPL CREATININE-BSD FRML MDRD: 71 ML/MIN/1.73
GLUCOSE SERPL-MCNC: 108 MG/DL (ref 65–99)
HCT VFR BLD AUTO: 31 % (ref 34–46.6)
HGB BLD-MCNC: 10.2 G/DL (ref 12–15.9)
MCH RBC QN AUTO: 29.2 PG (ref 26.6–33)
MCHC RBC AUTO-ENTMCNC: 32.9 G/DL (ref 31.5–35.7)
MCV RBC AUTO: 88.8 FL (ref 79–97)
PLATELET # BLD AUTO: 245 10*3/MM3 (ref 140–450)
PMV BLD AUTO: 10 FL (ref 6–12)
POTASSIUM SERPL-SCNC: 4.2 MMOL/L (ref 3.5–5.2)
RBC # BLD AUTO: 3.49 10*6/MM3 (ref 3.77–5.28)
SODIUM SERPL-SCNC: 136 MMOL/L (ref 136–145)
WBC # BLD AUTO: 9.46 10*3/MM3 (ref 3.4–10.8)

## 2021-01-20 PROCEDURE — 25010000002 MORPHINE PER 10 MG: Performed by: ORTHOPAEDIC SURGERY

## 2021-01-20 PROCEDURE — G0378 HOSPITAL OBSERVATION PER HR: HCPCS

## 2021-01-20 PROCEDURE — 97116 GAIT TRAINING THERAPY: CPT

## 2021-01-20 PROCEDURE — 85027 COMPLETE CBC AUTOMATED: CPT | Performed by: ORTHOPAEDIC SURGERY

## 2021-01-20 PROCEDURE — 97110 THERAPEUTIC EXERCISES: CPT

## 2021-01-20 PROCEDURE — 80048 BASIC METABOLIC PNL TOTAL CA: CPT | Performed by: ORTHOPAEDIC SURGERY

## 2021-01-20 RX ORDER — ASPIRIN 325 MG
325 TABLET, DELAYED RELEASE (ENTERIC COATED) ORAL 2 TIMES DAILY WITH MEALS
Qty: 60 TABLET | Refills: 0 | Status: SHIPPED | OUTPATIENT
Start: 2021-01-20 | End: 2021-04-08

## 2021-01-20 RX ORDER — OXYCODONE HYDROCHLORIDE AND ACETAMINOPHEN 5; 325 MG/1; MG/1
1-2 TABLET ORAL EVERY 4 HOURS PRN
Qty: 84 TABLET | Refills: 0 | Status: SHIPPED | OUTPATIENT
Start: 2021-01-20 | End: 2021-04-08

## 2021-01-20 RX ADMIN — Medication 3 ML: at 09:22

## 2021-01-20 RX ADMIN — MUPIROCIN 1 APPLICATION: 20 OINTMENT TOPICAL at 08:59

## 2021-01-20 RX ADMIN — CLINDAMYCIN IN 5 PERCENT DEXTROSE 900 MG: 18 INJECTION, SOLUTION INTRAVENOUS at 01:19

## 2021-01-20 RX ADMIN — FERROUS SULFATE TAB 325 MG (65 MG ELEMENTAL FE) 325 MG: 325 (65 FE) TAB at 07:51

## 2021-01-20 RX ADMIN — OXYCODONE HYDROCHLORIDE AND ACETAMINOPHEN 2 TABLET: 5; 325 TABLET ORAL at 02:28

## 2021-01-20 RX ADMIN — OXYCODONE HYDROCHLORIDE AND ACETAMINOPHEN 2 TABLET: 5; 325 TABLET ORAL at 06:35

## 2021-01-20 RX ADMIN — MORPHINE SULFATE 4 MG: 2 INJECTION, SOLUTION INTRAMUSCULAR; INTRAVENOUS at 01:16

## 2021-01-20 RX ADMIN — OXYCODONE HYDROCHLORIDE AND ACETAMINOPHEN 2 TABLET: 5; 325 TABLET ORAL at 10:40

## 2021-01-20 RX ADMIN — ASPIRIN 325 MG: 325 TABLET, COATED ORAL at 07:51

## 2021-01-20 RX ADMIN — TRIAMTERENE AND HYDROCHLOROTHIAZIDE 1 TABLET: 37.5; 25 TABLET ORAL at 08:59

## 2021-01-20 NOTE — PROGRESS NOTES
Continued Stay Note  [facility]     Patient Name: Tran Sosa  MRN: [id number]  Today's Date: 1/20/2021    Admit Date: 1/19/2021    Discharge Plan     Row Name 01/20/21 1323       Plan    Final Discharge Disposition Code  01 - home or self-care    Final Note  Pt d/c'ed home with [facility]        Discharge Codes    No documentation.       Expected Discharge Date and Time     Expected Discharge Date Expected Discharge Time    Jan 20, 2021             Patricia Monroe RN

## 2021-01-20 NOTE — PROGRESS NOTES
Orthopaedic Surgery  Progress Note  1/20/2021    Patients Name:  Tran Sosa  YOB: 1950  Age: 70 y.o.  Medical Records Number:  [id number]  Date of Admission: 1/19/2021    No complaints except pain    Vitals:  Vitals:    01/19/21 1855 01/19/21 2124 01/19/21 2305 01/20/21 0235   BP: 125/74 130/70 105/60 100/57   BP Location: Left arm  Left arm Left arm   Patient Position: Sitting  Lying Lying   Pulse: 71 88 79 68   Resp: 16  16 16   Temp: 97.5 °F (36.4 °C)  97.3 °F (36.3 °C) 97.1 °F (36.2 °C)   TempSrc: Oral  Skin Skin   SpO2: 95%  95% 96%   Weight:       Height:           LLE:  NVI, calf nontender, Sensation intact  No signs of DVT    Incision: clean, no signs of infection    Lab Results (last 24 hours)     Procedure Component Value Units Date/Time    CBC (No Diff) [[id number]]  (Abnormal) Collected: 01/20/21 0528    Specimen: Blood from Arm, Left Updated: 01/20/21 0604     WBC 9.46 10*3/mm3      RBC 3.49 10*6/mm3      Hemoglobin 10.2 g/dL      Hematocrit 31.0 %      MCV 88.8 fL      MCH 29.2 pg      MCHC 32.9 g/dL      RDW 13.5 %      RDW-SD 43.3 fl      MPV 10.0 fL      Platelets 245 10*3/mm3     Basic Metabolic Panel [[id number]] Collected: 01/20/21 0528    Specimen: Blood from Arm, Left Updated: 01/20/21 0553          Xr Knee 1 Or 2 View Left    Result Date: 1/19/2021  Narrative: Left knee  HISTORY postop.  2 views of the left knee demonstrates a total knee prosthesis which appears to be in satisfactory position. There is no evidence of fracture.  This report was finalized on 1/19/2021 9:16 AM by Dr. Jim Camacho M.D.      Xr Knee 1 Or 2 View Left    Result Date: 1/5/2021  Narrative: TWO-VIEW LEFT KNEE  HISTORY: Knee pain. Preop for knee surgery.  There is severe joint space narrowing and slight articular irregularity predominantly involving the medial compartment. There is no joint effusion.  TWO-VIEW CHEST  HISTORY: Preop for knee surgery. Hypertension. Previous breast cancer.  The lungs  are well-expanded with some minimal scattered linear fibrotic scarring similar to the chest x-ray dated 07/14/2020. A pleural-based nodular lesion located in the left lower lobe posteromedially noted on the CT scan dated 07/14/2020 is faintly visualized in the PA projection behind the left heart margin and can also be seen in the lateral view. This appears unchanged. No acute abnormality is seen.  This report was finalized on 1/5/2021 3:29 PM by Dr. Myles Ngo M.D.      Peripheral Block    Result Date: 1/19/2021  Narrative: Marcos Phoenix MD     1/19/2021  6:42 AM Peripheral Block Pre-sedation assessment completed: 1/19/2021 6:35 AM Patient reassessed immediately prior to procedure Patient location during procedure: pre-op Start time: 1/19/2021 6:35 AM Stop time: 1/19/2021 6:41 AM Reason for block: at surgeon's request and post-op pain management Performed by Anesthesiologist: Marcos Phoenix MD Preanesthetic Checklist Completed: patient identified, site marked, surgical consent, pre-op evaluation, timeout performed, IV checked, risks and benefits discussed and monitors and equipment checked Prep: Sterile barriers:gloves Prep: ChloraPrep Patient monitoring: blood pressure monitoring, continuous pulse oximetry and EKG Procedure Sedation:yes Guidance:ultrasound guided ULTRASOUND INTERPRETATION.  Using ultrasound guidance a 22 G gauge needle was placed in close proximity to the femoral nerve, at which point, under ultrasound guidance anesthetic was injected in the area of the nerve and spread of the anesthesia was seen on ultrasound in close proximity thereto.  There were no abnormalities seen on ultrasound; a digital image was taken; and the patient tolerated the procedure with no complications. Images:still images obtained Laterality:left Block Type:adductor canal block Injection Technique:single-shot Needle Type:short-bevel Needle Gauge:22 G Medications Used: bupivacaine (PF) (MARCAINE) 0.5 % injection,  20 mL Medications Comment:. Post Assessment Injection Assessment: negative aspiration for heme, no paresthesia on injection and incremental injection Patient Tolerance:comfortable throughout block Complications:no     Xr Chest Pa & Lateral    Result Date: 1/5/2021  Narrative: TWO-VIEW LEFT KNEE  HISTORY: Knee pain. Preop for knee surgery.  There is severe joint space narrowing and slight articular irregularity predominantly involving the medial compartment. There is no joint effusion.  TWO-VIEW CHEST  HISTORY: Preop for knee surgery. Hypertension. Previous breast cancer.  The lungs are well-expanded with some minimal scattered linear fibrotic scarring similar to the chest x-ray dated 07/14/2020. A pleural-based nodular lesion located in the left lower lobe posteromedially noted on the CT scan dated 07/14/2020 is faintly visualized in the PA projection behind the left heart margin and can also be seen in the lateral view. This appears unchanged. No acute abnormality is seen.  This report was finalized on 1/5/2021 3:29 PM by Dr. Myles Ngo M.D.        Assesment/Plan:    Procedures:  Left TKA  Postoperative Day: 1  Weightbearing Status:  WBAT with walker  DVT Prophylaxis:  ASA for DVT prophylaxis    Disposition:  Home with home health after PT today, if comfortable and mobilizing safely    Yaya Tapia PA-C  [facility]  [address] [place], [place] [id number] [phone]    1/20/2021

## 2021-01-20 NOTE — PLAN OF CARE
Goal Outcome Evaluation:  Plan of Care Reviewed With: patient  Progress: improving  Outcome Summary: Pt tolerated treatment with minimal c/o pain. Pt is SBA with bed mobility and sit<->stand CGA. Pt ambulated 400ft with rwx, CGA/SBA. No LOB or unsteadiness noted. Pt ok to d/c home with HHPT.  ..Patient was wearing a face mask during this therapy encounter. Therapist used appropriate personal protective equipment including eye protection, mask, and gloves.  Mask used was standard procedure mask. Appropriate PPE was worn during the entire therapy session. Hand hygiene was completed before and after therapy session. Patient is not in enhanced droplet precautions.

## 2021-01-20 NOTE — PLAN OF CARE
Goal Outcome Evaluation:  Plan of Care Reviewed With: patient  Progress: improving   Pt A&Ox3; vss; nvi; dressing cdi; pt ambulated to bathroom with walker; bathroom with brp; pt has no questions or concerns at this time

## 2021-01-20 NOTE — PLAN OF CARE
Goal Outcome Evaluation:  Plan of Care Reviewed With: patient  Progress: improving  Outcome Summary: VSS during shift. Pain controlled with oral pain medication. NVI. Dressing CDI. Patient pulled drain out around 0200. Amb well with walker and 1 assist. Plan is to d/c home once stable.

## 2021-01-20 NOTE — THERAPY TREATMENT NOTE
Patient Name: Tran Sosa  : 1950    MRN: [id number]                              Today's Date: 2021       Admit Date: 2021    Visit Dx:     ICD-10-CM ICD-9-CM   1. Primary osteoarthritis of left knee  M17.12 715.16     Patient Active Problem List   Diagnosis   • Atopic rhinitis   • Arthralgia of multiple joints   • Hyperlipidemia   • Hypertension   • Osteopenia   • PAT (paroxysmal atrial tachycardia) (CMS/HCC)   • Sinus tachycardia   • SHAI (obstructive sleep apnea)   • OA (osteoarthritis) of hip   • Primary osteoarthritis of right knee   • Shortness of breath   • Pulmonary embolism without acute cor pulmonale (CMS/HCC)   • Acute respiratory failure with hypoxemia (CMS/HCC)   • COVID-19   • Primary osteoarthritis of left knee     Past Medical History:   Diagnosis Date   • Arthralgia of multiple sites    • Arthritis     OSTEO   • Cancer (CMS/HCC)     RIGHT BREAST CANCER. WITH CHEMO   • COPD (chronic obstructive pulmonary disease) (CMS/HCC)     MILD   • Diverticulosis    • Elevated cholesterol    • Elevated TSH    • GERD (gastroesophageal reflux disease)    • Hiatal hernia    • History of 2019 novel coronavirus disease (COVID-19)     2020   • History of breast cancer 1984    RIGHT    • History of bronchitis    • History of pneumonia    • Hyperlipidemia    • Hypertension    • Junctional rhythm 1990    ONE EPISODE   • Lung nodule     BIOPSY BENIGN   • Mild shortness of breath     LASTING EFFECT FROM PREVIOUS COVID INFECTION   • Mitral regurgitation     Mild per 2-D echocardiogram 2017   • Osteopenia    • Osteoporosis    • PAC (premature atrial contraction)     WITH SOME SVT IN THE PAST. REASON FOR METOPROLOL   • Postmenopausal status    • Reactive airway disease    • Recurrent urinary tract infection     RESOLVED   • Rib fracture     YEARS AGO   • Right knee pain     TORN MENSICUS   • Seasonal allergies    • Sleep apnea     CPAP USED   • Uterine leiomyoma     HYST     Past Surgical History:    Procedure Laterality Date   • APPENDECTOMY     • BREAST SURGERY     • CARDIAC CATHETERIZATION       at [facility]; told normal coronary arteries    •  SECTION     • COLONOSCOPY     • FRACTURE SURGERY     • HAMMER TOE REPAIR Right 2016    Procedure: MULTIPLE RT CLAW TOE REPAIR WITH WEIL OSTEOTOMY; RIGHT GREAT TOE AKIN OSTEOTOMY.;  Surgeon: Rigoberto Ba MD;  Location: [facility];  Service:    • HYSTERECTOMY      W/BSO   • JOINT REPLACEMENT     • LUNG SURGERY      WEDGE RESECTION? RIGHT ?LOBE. BENIGN PULM NODULE   • MASTECTOMY Left     PROPHYLACTICALLY   • MASTECTOMY PARTIAL WITH AXILLARY LYMPH NODE EXCISION Right 1984    MALIGNANT   • ORIF WRIST FRACTURE Right     HARDWARE SINCE REMOVED   • ORIF WRIST FRACTURE Left     WITH HARDWARE   • TONSILLECTOMY     • TOTAL HIP ARTHROPLASTY Left 2017    Procedure: LT TOTAL HIP ARTHROPLASTY;  Surgeon: Yaya Reina MD;  Location: [facility];  Service:    • TOTAL KNEE ARTHROPLASTY Right 2020    Procedure: TOTAL KNEE ARTHROPLASTY RIGHT;  Surgeon: Yaya Reina MD;  Location: [facility];  Service: Orthopedics;  Laterality: Right;   • TOTAL KNEE ARTHROPLASTY Left 2021    Procedure: TOTAL KNEE ARTHROPLASTY LEFT;  Surgeon: Yaya Reina MD;  Location: [facility];  Service: Orthopedics;  Laterality: Left;   • TRAM FLAP DELAYED     • UTERINE ARTERY EMBOLIZATION       General Information     Row Name 21 1142          Physical Therapy Time and Intention    Document Type  therapy note (daily note)  -     Mode of Treatment  physical therapy;individual therapy  -     Row Name 21 1142          General Information    Existing Precautions/Restrictions  no known precautions/restrictions  -     Row Name 21 1142          Cognition    Orientation Status (Cognition)  oriented x 4  -     Row Name 21 1142          Safety Issues, Functional Mobility    Impairments Affecting Function (Mobility)   strength;range of motion (ROM);pain  -       User Key  (r) = Recorded By, (t) = Taken By, (c) = Cosigned By    Initials Name Provider Type     Lavern Dumas PTA Physical Therapy Assistant        Mobility     Row Name 01/20/21 1142          Bed Mobility    Supine-Sit Mower (Bed Mobility)  verbal cues;standby assist  -     Assistive Device (Bed Mobility)  head of bed elevated  -     Row Name 01/20/21 1142          Sit-Stand Transfer    Sit-Stand Mower (Transfers)  contact guard;verbal cues;nonverbal cues (demo/gesture)  -     Assistive Device (Sit-Stand Transfers)  walker, front-wheeled  -     Row Name 01/20/21 1142          Gait/Stairs (Locomotion)    Mower Level (Gait)  verbal cues;contact guard;standby assist  -     Assistive Device (Gait)  walker, front-wheeled  -     Distance in Feet (Gait)  400  -     Deviations/Abnormal Patterns (Gait)  antalgic;elvira decreased;stride length decreased  -     Bilateral Gait Deviations  forward flexed posture  -     Comment (Gait/Stairs)  cues for upright posture. Pt felt comfortable with steps as pt has been through a knee replacement before.  -     Row Name 01/20/21 1142          Mobility    Extremity Weight-bearing Status  left lower extremity  -     Left Lower Extremity (Weight-bearing Status)  weight-bearing as tolerated (WBAT)  -       User Key  (r) = Recorded By, (t) = Taken By, (c) = Cosigned By    Initials Name Provider Type     Lavern Dumas PTA Physical Therapy Assistant        Obj/Interventions     Row Name 01/20/21 1149          Motor Skills    Therapeutic Exercise  -- Left TKA protocol exercises X10 reps  -       User Key  (r) = Recorded By, (t) = Taken By, (c) = Cosigned By    Initials Name Provider Type     Lavern Dumas PTA Physical Therapy Assistant        Goals/Plan    No documentation.       Clinical Impression     Row Name 01/20/21 1149          Plan of Care Review    Plan of Care Reviewed With   patient  -EH     Progress  improving  -     Outcome Summary  Pt tolerated treatment with minimal c/o pain. Pt is SBA with bed mobility and sit<->stand CGA. Pt ambulated 400ft with rwx, CGA/SBA. No LOB or unsteadiness noted. Pt ok to d/c home with HHPT.  -     Row Name 01/20/21 1149          Positioning and Restraints    Pre-Treatment Position  in bed  -     Post Treatment Position  chair  -EH     In Chair  reclined;call light within reach;encouraged to call for assist;exit alarm on  -       User Key  (r) = Recorded By, (t) = Taken By, (c) = Cosigned By    Initials Name Provider Type     Lavern Dumas PTA Physical Therapy Assistant        Outcome Measures     Row Name 01/20/21 1151          How much help from another person do you currently need...    Turning from your back to your side while in flat bed without using bedrails?  4  -EH     Moving from lying on back to sitting on the side of a flat bed without bedrails?  4  -EH     Moving to and from a bed to a chair (including a wheelchair)?  3  -EH     Standing up from a chair using your arms (e.g., wheelchair, bedside chair)?  3  -EH     Climbing 3-5 steps with a railing?  3  -EH     To walk in hospital room?  3  -EH     AM-PAC 6 Clicks Score (PT)  20  -EH       User Key  (r) = Recorded By, (t) = Taken By, (c) = Cosigned By    Initials Name Provider Type     Lavern Dumas PTA Physical Therapy Assistant        Physical Therapy Education                 Title: PT OT SLP Therapies (Done)     Topic: Physical Therapy (Done)     Point: Mobility training (Done)     Learning Progress Summary           Patient Acceptance, E,TB,D, VU,DU by  at 1/20/2021 1152    Acceptance, E,TB,D, VU,NR by  at 1/19/2021 1506                   Point: Home exercise program (Done)     Learning Progress Summary           Patient Acceptance, E,TB,D, VU,DU by  at 1/20/2021 1152    Acceptance, E,TB,D, VU,NR by  at 1/19/2021 1506                   Point: Body mechanics (Done)      Learning Progress Summary           Patient Acceptance, E,TB,D, VU,DU by  at 1/20/2021 1152    Acceptance, E,TB,D, VU,NR by  at 1/19/2021 1506                   Point: Precautions (Done)     Learning Progress Summary           Patient Acceptance, E,TB,D, VU,DU by  at 1/20/2021 1152    Acceptance, E,TB,D, VU,NR by  at 1/19/2021 1506                               User Key     Initials Effective Dates Name Provider Type [facility] 04/03/18 -  Earnestine Plasencia, PT Physical Therapist PT     08/19/18 -  Lavern Dumas PTA Physical Therapy Assistant PT              PT Recommendation and Plan     Plan of Care Reviewed With: patient  Progress: improving  Outcome Summary: Pt tolerated treatment with minimal c/o pain. Pt is SBA with bed mobility and sit<->stand CGA. Pt ambulated 400ft with rwx, CGA/SBA. No LOB or unsteadiness noted. Pt ok to d/c home with HHPT.     Time Calculation:   PT Charges     Row Name 01/20/21 1141             Time Calculation    Start Time  0856  -      Stop Time  0919  -      Time Calculation (min)  23 min  -      PT Received On  01/20/21  -      PT - Next Appointment  01/21/21  -         Time Calculation- PT    Total Timed Code Minutes- PT  23 minute(s)  -        User Key  (r) = Recorded By, (t) = Taken By, (c) = Cosigned By    Initials Name Provider Type     Lavern Dumas PTA Physical Therapy Assistant        Therapy Charges for Today     Code Description Service Date Service Provider Modifiers Qty    70753086075 HC GAIT TRAINING EA 15 MIN 1/20/2021 Lavern Dumas PTA GP 1    91813469488 HC PT THER PROC EA 15 MIN 1/20/2021 Lavern Dumas PTA GP 1          PT G-Codes  Outcome Measure Options: AM-PAC 6 Clicks Basic Mobility (PT)  AM-PAC 6 Clicks Score (PT): 20    Lavern Dumas PTA  1/20/2021

## 2021-01-20 NOTE — OUTREACH NOTE
Prep Survey      Responses   [facility] facility patient discharged from?  [place]   Is LACE score < 7 ?  Yes   Emergency Room discharge w/ pulse ox?  No   Eligibility  [facility]   Date of Admission  01/19/21   Date of Discharge  01/20/21   Discharge Disposition  Home or Self Care   Discharge diagnosis  Total knee arthro   Does the patient have one of the following disease processes/diagnoses(primary or secondary)?  Total Joint Replacement   Does the patient have Home health ordered?  Yes   What is the Home health agency?   [facility]   Is there a DME ordered?  No   Prep survey completed?  Yes          Kimberlee Ring RN

## 2021-01-21 ENCOUNTER — TRANSITIONAL CARE MANAGEMENT TELEPHONE ENCOUNTER (OUTPATIENT)
Dept: CALL CENTER | Facility: HOSPITAL | Age: 71
End: 2021-01-21

## 2021-01-21 NOTE — OUTREACH NOTE
Call Center TCM Note      Responses   [facility] patient discharged from?  [place]   Does the patient have one of the following disease processes/diagnoses(primary or secondary)?  Total Joint Replacement   Joint surgery performed?  Knee   TCM attempt successful?  No   Unsuccessful attempts  Attempt 1          Philly Belcher MA    1/21/2021, 11:35 EST

## 2021-01-22 ENCOUNTER — TRANSITIONAL CARE MANAGEMENT TELEPHONE ENCOUNTER (OUTPATIENT)
Dept: CALL CENTER | Facility: HOSPITAL | Age: 71
End: 2021-01-22

## 2021-01-27 ENCOUNTER — OFFICE VISIT (OUTPATIENT)
Dept: INTERNAL MEDICINE | Facility: CLINIC | Age: 71
End: 2021-01-27

## 2021-01-27 VITALS
DIASTOLIC BLOOD PRESSURE: 62 MMHG | OXYGEN SATURATION: 97 % | SYSTOLIC BLOOD PRESSURE: 100 MMHG | TEMPERATURE: 97.5 F | HEART RATE: 92 BPM | WEIGHT: 225 LBS | HEIGHT: 71 IN | BODY MASS INDEX: 31.5 KG/M2

## 2021-01-27 DIAGNOSIS — I47.1 PAT (PAROXYSMAL ATRIAL TACHYCARDIA) (HCC): Primary | ICD-10-CM

## 2021-01-27 DIAGNOSIS — R91.8 PULMONARY NODULES: ICD-10-CM

## 2021-01-27 PROCEDURE — 99213 OFFICE O/P EST LOW 20 MIN: CPT | Performed by: INTERNAL MEDICINE

## 2021-01-27 NOTE — PROGRESS NOTES
Subjective   Tran Sosa is a 70 y.o. female here as a hospital follow up for chest pain, sob.    History of Present Illness   She has been cont to have sob since having covid  She has been haivng some int episaode of Palp dx as pacs and she sees cards  She did recently have a knee sx and this triggered another episode    The following portions of the patient's history were reviewed and updated as appropriate: allergies, current medications, past medical history, past social history and problem list.she is trying to watch diet      Review of Systems   All other systems reviewed and are negative.      Objective   Physical Exam  Vitals signs reviewed.   Constitutional:       Appearance: She is well-developed.   HENT:      Head: Normocephalic and atraumatic.      Right Ear: External ear normal.      Left Ear: External ear normal.   Eyes:      Conjunctiva/sclera: Conjunctivae normal.      Pupils: Pupils are equal, round, and reactive to light.   Neck:      Musculoskeletal: Normal range of motion.      Thyroid: No thyromegaly.      Trachea: No tracheal deviation.   Cardiovascular:      Rate and Rhythm: Normal rate and regular rhythm.      Heart sounds: Normal heart sounds.   Pulmonary:      Effort: Pulmonary effort is normal.      Breath sounds: Normal breath sounds.   Abdominal:      General: Bowel sounds are normal. There is no distension.      Palpations: Abdomen is soft.      Tenderness: There is no abdominal tenderness.   Musculoskeletal: Normal range of motion.         General: No deformity.   Skin:     General: Skin is warm and dry.   Neurological:      Mental Status: She is alert and oriented to person, place, and time.   Psychiatric:         Behavior: Behavior normal.         Thought Content: Thought content normal.         Judgment: Judgment normal.         Vitals:    01/27/21 1001   BP: 100/62   Pulse: 92   Temp: 97.5 °F (36.4 °C)   SpO2: 97%     Current Outpatient Medications:   •  aspirin  MG  tablet, Take 1 tablet by mouth 2 (Two) Times a Day With Meals., Disp: 60 tablet, Rfl: 0  •  atorvastatin (LIPITOR) 80 MG tablet, Take 1 tablet by mouth Every Evening., Disp: 90 tablet, Rfl: 1  •  docusate sodium 100 MG capsule, Take 1 capsule by mouth 2 (Two) Times a Day As Needed for Constipation., Disp: 40 each, Rfl: 1  •  metoprolol succinate XL (TOPROL-XL) 50 MG 24 hr tablet, TAKE 1 TABLET BY MOUTH EVERY DAY (Patient taking differently: Take 50 mg by mouth Every Night.), Disp: 90 tablet, Rfl: 2  •  oxyCODONE-acetaminophen (PERCOCET) 5-325 MG per tablet, Take 1-2 tablets by mouth Every 4 (Four) Hours As Needed (pain)., Disp: 84 tablet, Rfl: 0  •  traZODone (DESYREL) 100 MG tablet, TAKE 2 TABLETS BY MOUTH EVERY NIGHT., Disp: 180 tablet, Rfl: 1  •  triamterene-hydrochlorothiazide (MAXZIDE-25) 37.5-25 MG per tablet, TAKE 1 TABLET BY MOUTH EVERY DAY IN THE MORNING (Patient taking differently: Take 1 tablet by mouth Daily.), Disp: 90 tablet, Rfl: 1  •  oxyCODONE-acetaminophen (PERCOCET) 5-325 MG per tablet, Take 1-2 tablets by mouth Every 4 (Four) Hours As Needed (pain)., Disp: 84 tablet, Rfl: 0    Body mass index is 31.38 kg/m².     CT scan chest 1.7cm nodules    Assessment/Plan   Diagnoses and all orders for this visit:    1. PAT (paroxysmal atrial tachycardia) (CMS/HCC) (Primary)    2. Pulmonary nodules  Comments:  pt to see pulmonar        1. PAT- she sees cards  We will increase toprol to 50mg bid  2.  Pulm nodule  Since 2018-  Appear stable but I want her to see pulm

## 2021-02-05 ENCOUNTER — IMMUNIZATION (OUTPATIENT)
Dept: VACCINE CLINIC | Facility: HOSPITAL | Age: 71
End: 2021-02-05

## 2021-02-05 PROCEDURE — 91300 HC SARSCOV02 VAC 30MCG/0.3ML IM: CPT | Performed by: THORACIC SURGERY (CARDIOTHORACIC VASCULAR SURGERY)

## 2021-02-05 PROCEDURE — 0002A: CPT | Performed by: THORACIC SURGERY (CARDIOTHORACIC VASCULAR SURGERY)

## 2021-03-22 RX ORDER — ATORVASTATIN CALCIUM 80 MG/1
TABLET, FILM COATED ORAL
Qty: 90 TABLET | Refills: 1 | Status: SHIPPED | OUTPATIENT
Start: 2021-03-22 | End: 2021-09-03

## 2021-03-22 RX ORDER — TRAZODONE HYDROCHLORIDE 100 MG/1
200 TABLET ORAL NIGHTLY
Qty: 180 TABLET | Refills: 1 | Status: SHIPPED | OUTPATIENT
Start: 2021-03-22 | End: 2021-09-15

## 2021-03-31 DIAGNOSIS — E78.5 HYPERLIPIDEMIA, UNSPECIFIED HYPERLIPIDEMIA TYPE: ICD-10-CM

## 2021-03-31 DIAGNOSIS — I10 ESSENTIAL HYPERTENSION: Primary | ICD-10-CM

## 2021-04-02 LAB
ALBUMIN SERPL-MCNC: 4.3 G/DL (ref 3.5–5.2)
ALBUMIN/GLOB SERPL: 1.8 G/DL
ALP SERPL-CCNC: 91 U/L (ref 39–117)
ALT SERPL-CCNC: 16 U/L (ref 1–33)
AST SERPL-CCNC: 16 U/L (ref 1–32)
BASOPHILS # BLD AUTO: 0.04 10*3/MM3 (ref 0–0.2)
BASOPHILS NFR BLD AUTO: 0.8 % (ref 0–1.5)
BILIRUB SERPL-MCNC: 0.2 MG/DL (ref 0–1.2)
BUN SERPL-MCNC: 22 MG/DL (ref 8–23)
BUN/CREAT SERPL: 27.5 (ref 7–25)
CALCIUM SERPL-MCNC: 9.1 MG/DL (ref 8.6–10.5)
CHLORIDE SERPL-SCNC: 103 MMOL/L (ref 98–107)
CHOLEST SERPL-MCNC: 155 MG/DL (ref 0–200)
CO2 SERPL-SCNC: 24.1 MMOL/L (ref 22–29)
CREAT SERPL-MCNC: 0.8 MG/DL (ref 0.57–1)
EOSINOPHIL # BLD AUTO: 0.13 10*3/MM3 (ref 0–0.4)
EOSINOPHIL NFR BLD AUTO: 2.5 % (ref 0.3–6.2)
ERYTHROCYTE [DISTWIDTH] IN BLOOD BY AUTOMATED COUNT: 12.9 % (ref 12.3–15.4)
GLOBULIN SER CALC-MCNC: 2.4 GM/DL
GLUCOSE SERPL-MCNC: 108 MG/DL (ref 65–99)
HCT VFR BLD AUTO: 36.2 % (ref 34–46.6)
HDLC SERPL-MCNC: 45 MG/DL (ref 40–60)
HGB BLD-MCNC: 11.7 G/DL (ref 12–15.9)
IMM GRANULOCYTES # BLD AUTO: 0.02 10*3/MM3 (ref 0–0.05)
IMM GRANULOCYTES NFR BLD AUTO: 0.4 % (ref 0–0.5)
LDLC SERPL CALC-MCNC: 88 MG/DL (ref 0–100)
LDLC/HDLC SERPL: 1.9 {RATIO}
LYMPHOCYTES # BLD AUTO: 1.3 10*3/MM3 (ref 0.7–3.1)
LYMPHOCYTES NFR BLD AUTO: 25.4 % (ref 19.6–45.3)
MCH RBC QN AUTO: 29.2 PG (ref 26.6–33)
MCHC RBC AUTO-ENTMCNC: 32.3 G/DL (ref 31.5–35.7)
MCV RBC AUTO: 90.3 FL (ref 79–97)
MONOCYTES # BLD AUTO: 0.55 10*3/MM3 (ref 0.1–0.9)
MONOCYTES NFR BLD AUTO: 10.8 % (ref 5–12)
NEUTROPHILS # BLD AUTO: 3.07 10*3/MM3 (ref 1.7–7)
NEUTROPHILS NFR BLD AUTO: 60.1 % (ref 42.7–76)
NRBC BLD AUTO-RTO: 0 /100 WBC (ref 0–0.2)
PLATELET # BLD AUTO: 299 10*3/MM3 (ref 140–450)
POTASSIUM SERPL-SCNC: 3.8 MMOL/L (ref 3.5–5.2)
PROT SERPL-MCNC: 6.7 G/DL (ref 6–8.5)
RBC # BLD AUTO: 4.01 10*6/MM3 (ref 3.77–5.28)
SODIUM SERPL-SCNC: 141 MMOL/L (ref 136–145)
TRIGL SERPL-MCNC: 122 MG/DL (ref 0–150)
TSH SERPL DL<=0.005 MIU/L-ACNC: 2.26 UIU/ML (ref 0.27–4.2)
VLDLC SERPL CALC-MCNC: 22 MG/DL (ref 5–40)
WBC # BLD AUTO: 5.11 10*3/MM3 (ref 3.4–10.8)

## 2021-04-08 ENCOUNTER — OFFICE VISIT (OUTPATIENT)
Dept: INTERNAL MEDICINE | Facility: CLINIC | Age: 71
End: 2021-04-08

## 2021-04-08 VITALS
HEART RATE: 72 BPM | WEIGHT: 225.1 LBS | OXYGEN SATURATION: 98 % | DIASTOLIC BLOOD PRESSURE: 72 MMHG | SYSTOLIC BLOOD PRESSURE: 96 MMHG | HEIGHT: 71 IN | BODY MASS INDEX: 31.51 KG/M2 | TEMPERATURE: 97.3 F

## 2021-04-08 DIAGNOSIS — I10 ESSENTIAL HYPERTENSION: Primary | ICD-10-CM

## 2021-04-08 DIAGNOSIS — E78.5 HYPERLIPIDEMIA, UNSPECIFIED HYPERLIPIDEMIA TYPE: ICD-10-CM

## 2021-04-08 DIAGNOSIS — G47.33 OSA (OBSTRUCTIVE SLEEP APNEA): ICD-10-CM

## 2021-04-08 PROBLEM — R06.02 SHORTNESS OF BREATH: Status: RESOLVED | Noted: 2020-07-14 | Resolved: 2021-04-08

## 2021-04-08 PROCEDURE — 99214 OFFICE O/P EST MOD 30 MIN: CPT | Performed by: INTERNAL MEDICINE

## 2021-04-08 RX ORDER — ALBUTEROL SULFATE 90 UG/1
2 AEROSOL, METERED RESPIRATORY (INHALATION)
COMMUNITY
Start: 2021-03-24

## 2021-04-08 NOTE — PROGRESS NOTES
Subjective   Tran Sosa is a 70 y.o. female Pt. Here today to follow up on hyperlipidemia , sleep, and hypertension     History of Present Illness   Pt has been taking cholesterol meds as prescribed.  No difficulties with myalgias.   Sp left knee sx  She is doing very well and back to walking everyday  She is breathing better on Breo  She is sleeping ok with the trazodone    The following portions of the patient's history were reviewed and updated as appropriate: allergies, current medications, past medical history, past social history and problem list.  She is eating better and walking daily    Review of Systems   All other systems reviewed and are negative.      Objective   Physical Exam  Vitals reviewed.   Constitutional:       Appearance: She is well-developed.   HENT:      Head: Normocephalic and atraumatic.      Right Ear: External ear normal.      Left Ear: External ear normal.   Eyes:      Conjunctiva/sclera: Conjunctivae normal.      Pupils: Pupils are equal, round, and reactive to light.   Neck:      Thyroid: No thyromegaly.      Trachea: No tracheal deviation.   Cardiovascular:      Rate and Rhythm: Normal rate and regular rhythm.      Heart sounds: Normal heart sounds.   Pulmonary:      Effort: Pulmonary effort is normal.      Breath sounds: Normal breath sounds.   Abdominal:      General: Bowel sounds are normal. There is no distension.      Palpations: Abdomen is soft.      Tenderness: There is no abdominal tenderness.   Musculoskeletal:         General: No deformity. Normal range of motion.      Cervical back: Normal range of motion.   Skin:     General: Skin is warm and dry.   Neurological:      Mental Status: She is alert and oriented to person, place, and time.   Psychiatric:         Behavior: Behavior normal.         Thought Content: Thought content normal.         Judgment: Judgment normal.          Orders Only on 03/31/2021   Component Date Value Ref Range Status   • Glucose 04/01/2021 108*  65 - 99 mg/dL Final   • BUN 04/01/2021 22  8 - 23 mg/dL Final   • Creatinine 04/01/2021 0.80  0.57 - 1.00 mg/dL Final   • eGFR Non  Am 04/01/2021 71  >60 mL/min/1.73 Final    Comment: GFR Normal >60  Chronic Kidney Disease <60  Kidney Failure <15     • eGFR  Am 04/01/2021 86  >60 mL/min/1.73 Final   • BUN/Creatinine Ratio 04/01/2021 27.5* 7.0 - 25.0 Final   • Sodium 04/01/2021 141  136 - 145 mmol/L Final   • Potassium 04/01/2021 3.8  3.5 - 5.2 mmol/L Final   • Chloride 04/01/2021 103  98 - 107 mmol/L Final   • Total CO2 04/01/2021 24.1  22.0 - 29.0 mmol/L Final   • Calcium 04/01/2021 9.1  8.6 - 10.5 mg/dL Final   • Total Protein 04/01/2021 6.7  6.0 - 8.5 g/dL Final   • Albumin 04/01/2021 4.30  3.50 - 5.20 g/dL Final   • Globulin 04/01/2021 2.4  gm/dL Final   • A/G Ratio 04/01/2021 1.8  g/dL Final   • Total Bilirubin 04/01/2021 0.2  0.0 - 1.2 mg/dL Final   • Alkaline Phosphatase 04/01/2021 91  39 - 117 U/L Final   • AST (SGOT) 04/01/2021 16  1 - 32 U/L Final   • ALT (SGPT) 04/01/2021 16  1 - 33 U/L Final   • Total Cholesterol 04/01/2021 155  0 - 200 mg/dL Final    Comment: Cholesterol Reference Ranges  (U.S. Department of Health and Human Services ATP III  Classifications)  Desirable          <200 mg/dL  Borderline High    200-239 mg/dL  High Risk          >240 mg/dL  Triglyceride Reference Ranges  (U.S. Department of Health and Human Services ATP III  Classifications)  Normal           <150 mg/dL  Borderline High  150-199 mg/dL  High             200-499 mg/dL  Very High        >500 mg/dL  HDL Reference Ranges  (U.S. Department of Health and Human Services ATP III  Classifcations)  Low     <40 mg/dl (major risk factor for CHD)  High    >60 mg/dl ('negative' risk factor for CHD)  LDL Reference Ranges  (U.S. Department of Health and Human Services ATP III  Classifcations)  Optimal          <100 mg/dL  Near Optimal     100-129 mg/dL  Borderline High  130-159 mg/dL  High             160-189 mg/dL  Very High         >189 mg/dL     • Triglycerides 04/01/2021 122  0 - 150 mg/dL Final   • HDL Cholesterol 04/01/2021 45  40 - 60 mg/dL Final   • VLDL Cholesterol Yoan 04/01/2021 22  5 - 40 mg/dL Final   • LDL Chol Calc ([facility]) 04/01/2021 88  0 - 100 mg/dL Final   • LDL/HDL RATIO 04/01/2021 1.90   Final   • TSH 04/01/2021 2.260  0.270 - 4.200 uIU/mL Final   • WBC 04/01/2021 5.11  3.40 - 10.80 10*3/mm3 Final   • RBC 04/01/2021 4.01  3.77 - 5.28 10*6/mm3 Final   • Hemoglobin 04/01/2021 11.7* 12.0 - 15.9 g/dL Final   • Hematocrit 04/01/2021 36.2  34.0 - 46.6 % Final   • MCV 04/01/2021 90.3  79.0 - 97.0 fL Final   • MCH 04/01/2021 29.2  26.6 - 33.0 pg Final   • MCHC 04/01/2021 32.3  31.5 - 35.7 g/dL Final   • RDW 04/01/2021 12.9  12.3 - 15.4 % Final   • Platelets 04/01/2021 299  140 - 450 10*3/mm3 Final   • Neutrophil Rel % 04/01/2021 60.1  42.7 - 76.0 % Final   • Lymphocyte Rel % 04/01/2021 25.4  19.6 - 45.3 % Final   • Monocyte Rel % 04/01/2021 10.8  5.0 - 12.0 % Final   • Eosinophil Rel % 04/01/2021 2.5  0.3 - 6.2 % Final   • Basophil Rel % 04/01/2021 0.8  0.0 - 1.5 % Final   • Neutrophils Absolute 04/01/2021 3.07  1.70 - 7.00 10*3/mm3 Final   • Lymphocytes Absolute 04/01/2021 1.30  0.70 - 3.10 10*3/mm3 Final   • Monocytes Absolute 04/01/2021 0.55  0.10 - 0.90 10*3/mm3 Final   • Eosinophils Absolute 04/01/2021 0.13  0.00 - 0.40 10*3/mm3 Final   • Basophils Absolute 04/01/2021 0.04  0.00 - 0.20 10*3/mm3 Final   • Immature Granulocyte Rel % 04/01/2021 0.4  0.0 - 0.5 % Final   • Immature Grans Absolute 04/01/2021 0.02  0.00 - 0.05 10*3/mm3 Final   • nRBC 04/01/2021 0.0  0.0 - 0.2 /100 WBC Final       Current Outpatient Medications:   •  albuterol sulfate  (90 Base) MCG/ACT inhaler, Inhale 2 puffs., Disp: , Rfl:   •  atorvastatin (LIPITOR) 80 MG tablet, TAKE 1 TABLET BY MOUTH EVERY DAY IN THE EVENING, Disp: 90 tablet, Rfl: 1  •  docusate sodium 100 MG capsule, Take 1 capsule by mouth 2 (Two) Times a Day As Needed for  Constipation., Disp: 40 each, Rfl: 1  •  Fluticasone Furoate-Vilanterol (Breo Ellipta) 100-25 MCG/INH inhaler, Inhale 1 puff Daily., Disp: , Rfl:   •  metoprolol succinate XL (TOPROL-XL) 50 MG 24 hr tablet, TAKE 1 TABLET BY MOUTH EVERY DAY (Patient taking differently: Take 50 mg by mouth Every Night.), Disp: 90 tablet, Rfl: 2  •  traZODone (DESYREL) 100 MG tablet, TAKE 2 TABLETS BY MOUTH EVERY NIGHT, Disp: 180 tablet, Rfl: 1  •  triamterene-hydrochlorothiazide (MAXZIDE-25) 37.5-25 MG per tablet, TAKE 1 TABLET BY MOUTH EVERY DAY IN THE MORNING (Patient taking differently: Take 1 tablet by mouth Daily.), Disp: 90 tablet, Rfl: 1      Assessment/Plan   Diagnoses and all orders for this visit:    1. Essential hypertension (Primary)    2. Hyperlipidemia, unspecified hyperlipidemia type    3. SHAI (obstructive sleep apnea)      1. HTN- ok with current meds  2. OSA_  She does well with cpap  3. Asthma- better with breo  4.  HPL- stable with current meds

## 2021-04-13 ENCOUNTER — OFFICE VISIT (OUTPATIENT)
Dept: CARDIOLOGY | Facility: CLINIC | Age: 71
End: 2021-04-13

## 2021-04-13 VITALS
HEART RATE: 71 BPM | DIASTOLIC BLOOD PRESSURE: 90 MMHG | SYSTOLIC BLOOD PRESSURE: 136 MMHG | WEIGHT: 228 LBS | BODY MASS INDEX: 31.92 KG/M2 | HEIGHT: 71 IN

## 2021-04-13 DIAGNOSIS — I10 ESSENTIAL HYPERTENSION: ICD-10-CM

## 2021-04-13 DIAGNOSIS — I47.1 PAT (PAROXYSMAL ATRIAL TACHYCARDIA) (HCC): Primary | ICD-10-CM

## 2021-04-13 PROCEDURE — 93000 ELECTROCARDIOGRAM COMPLETE: CPT | Performed by: NURSE PRACTITIONER

## 2021-04-13 PROCEDURE — 99213 OFFICE O/P EST LOW 20 MIN: CPT | Performed by: NURSE PRACTITIONER

## 2021-04-13 NOTE — PROGRESS NOTES
Date of Office Visit: 2021  Encounter Provider: ENRRIQUE Reddy  Place of Service: [facility] CARDIOLOGY  Patient Name: Tran Sosa  :1950    Chief Complaint   Patient presents with   • Rapid Heart Rate     Hx atrial tachy   :     HPI: Tran Sosa is a 70 y.o. female who is a patient followed by Dr. Choudhury for paroxysmal atrial tachycardia.    She presents today for routine follow-up.    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    She reports that she is doing well.  She did have a rough patch over the last year.  She was admitted in 2020 with Covid pneumonia and had an acute pulmonary embolism, she was on anticoagulation for 3 months but is off of that now.  She got this after she had her right knee replaced in 2020.  She just had her left knee replaced in January of this year.    He has completed her therapy and is doing well.  During both of her knee replacements postop she had some problems with her paroxysmal atrial tach, she said it always happens whenever she is on pain medicines, it resolves when she comes off of those.    She denies chest pain, dyspnea, PND, orthopnea, dizziness or edema.    He has gained weight and is working on weight loss.          Past Medical History:   Diagnosis Date   • Arthralgia of multiple sites    • Arthritis     OSTEO   • Cancer (CMS/HCC)     RIGHT BREAST CANCER. WITH CHEMO   • COPD (chronic obstructive pulmonary disease) (CMS/HCC)     MILD   • Diverticulosis    • Elevated cholesterol    • Elevated TSH    • GERD (gastroesophageal reflux disease)    • Hiatal hernia    • History of 2019 novel coronavirus disease (COVID-19)     2020   • History of breast cancer 1984    RIGHT    • History of bronchitis    • History of pneumonia    • Hyperlipidemia    • Hypertension    • Junctional rhythm     ONE EPISODE   • Lung nodule     BIOPSY BENIGN   • Mild shortness of breath      LASTING EFFECT FROM PREVIOUS COVID INFECTION   • Mitral regurgitation     Mild per 2-D echocardiogram 2017   • Osteopenia    • Osteoporosis    • PAC (premature atrial contraction)     WITH SOME SVT IN THE PAST. REASON FOR METOPROLOL   • PAT (paroxysmal atrial tachycardia) (CMS/HCC)    • Postmenopausal status    • Reactive airway disease    • Recurrent urinary tract infection     RESOLVED   • Rib fracture     YEARS AGO   • Right knee pain     TORN MENSICUS   • Seasonal allergies    • Sleep apnea     CPAP USED   • Uterine leiomyoma     HYST       Past Surgical History:   Procedure Laterality Date   • APPENDECTOMY     • BREAST SURGERY     • CARDIAC CATHETERIZATION       at [facility]; told normal coronary arteries    •  SECTION     • COLONOSCOPY     • FRACTURE SURGERY     • HAMMER TOE REPAIR Right 2016    Procedure: MULTIPLE RT CLAW TOE REPAIR WITH WEIL OSTEOTOMY; RIGHT GREAT TOE AKIN OSTEOTOMY.;  Surgeon: Rigoberto Ba MD;  Location: [facility] OR [facility] [place];  Service:    • HYSTERECTOMY      W/BSO   • JOINT REPLACEMENT     • LUNG SURGERY      WEDGE RESECTION? RIGHT ?LOBE. BENIGN PULM NODULE   • MASTECTOMY Left     PROPHYLACTICALLY   • MASTECTOMY PARTIAL WITH AXILLARY LYMPH NODE EXCISION Right 1984    MALIGNANT   • ORIF WRIST FRACTURE Right     HARDWARE SINCE REMOVED   • ORIF WRIST FRACTURE Left     WITH HARDWARE   • TONSILLECTOMY     • TOTAL HIP ARTHROPLASTY Left 2017    Procedure: LT TOTAL HIP ARTHROPLASTY;  Surgeon: Yaya Reina MD;  Location: [facility] OR;  Service:    • TOTAL KNEE ARTHROPLASTY Right 2020    Procedure: TOTAL KNEE ARTHROPLASTY RIGHT;  Surgeon: Yaya Reina MD;  Location: [facility] OR;  Service: Orthopedics;  Laterality: Right;   • TOTAL KNEE ARTHROPLASTY Left 2021    Procedure: TOTAL KNEE ARTHROPLASTY LEFT;  Surgeon: Yaya Reina MD;  Location: [facility] OR;  Service: Orthopedics;  Laterality: Left;   • TRAM FLAP DELAYED     • UTERINE ARTERY  EMBOLIZATION         Social History     Socioeconomic History   • Marital status:      Spouse name: Not on file   • Number of children: 1   • Years of education: Not on file   • Highest education level: Not on file   Tobacco Use   • Smoking status: Former Smoker     Packs/day: 1.00     Years: 10.00     Pack years: 10.00     Types: Cigarettes     Quit date:      Years since quittin.3   • Smokeless tobacco: Never Used   • Tobacco comment: QUIT AT AGE 30 YRS. OLD   Vaping Use   • Vaping Use: Never used   Substance and Sexual Activity   • Alcohol use: Yes     Comment: TWICE MONTHLY 1-2 DRINKS   • Drug use: Never   • Sexual activity: Defer       Family History   Problem Relation Age of Onset   • Ovarian cancer Mother    • Lung cancer Father    • Hypertension Sister         benign essential hypertension   • Hyperlipidemia Sister    • Other Sister         RENAL DISEASE   • Hypertension Brother         benign essential hypertension   • Hyperlipidemia Brother    • Malig Hyperthermia Neg Hx        Review of Systems   Constitutional: Negative for chills, fever and malaise/fatigue.   Cardiovascular: Negative for chest pain, dyspnea on exertion, leg swelling, near-syncope, orthopnea, palpitations, paroxysmal nocturnal dyspnea and syncope.   Respiratory: Negative for cough and shortness of breath.    Musculoskeletal: Negative for joint pain, joint swelling and myalgias.   Gastrointestinal: Negative for abdominal pain, diarrhea, melena, nausea and vomiting.   Genitourinary: Negative for frequency and hematuria.   Neurological: Negative for light-headedness, numbness, paresthesias and seizures.   Allergic/Immunologic: Negative.    All other systems reviewed and are negative.      Allergies   Allergen Reactions   • Morphine And Related Rash   • Sulfa Antibiotics Hives         Current Outpatient Medications:   •  albuterol sulfate  (90 Base) MCG/ACT inhaler, Inhale 2 puffs., Disp: , Rfl:   •  atorvastatin  "(LIPITOR) 80 MG tablet, TAKE 1 TABLET BY MOUTH EVERY DAY IN THE EVENING, Disp: 90 tablet, Rfl: 1  •  docusate sodium 100 MG capsule, Take 1 capsule by mouth 2 (Two) Times a Day As Needed for Constipation., Disp: 40 each, Rfl: 1  •  Fluticasone Furoate-Vilanterol (Breo Ellipta) 100-25 MCG/INH inhaler, Inhale 1 puff Daily., Disp: , Rfl:   •  metoprolol succinate XL (TOPROL-XL) 50 MG 24 hr tablet, TAKE 1 TABLET BY MOUTH EVERY DAY (Patient taking differently: Take 50 mg by mouth Every Night.), Disp: 90 tablet, Rfl: 2  •  traZODone (DESYREL) 100 MG tablet, TAKE 2 TABLETS BY MOUTH EVERY NIGHT, Disp: 180 tablet, Rfl: 1  •  triamterene-hydrochlorothiazide (MAXZIDE-25) 37.5-25 MG per tablet, TAKE 1 TABLET BY MOUTH EVERY DAY IN THE MORNING (Patient taking differently: Take 1 tablet by mouth Daily.), Disp: 90 tablet, Rfl: 1      Objective:     Vitals:    04/13/21 1000   BP: 136/90   Pulse: 71   Weight: 103 kg (228 lb)   Height: 180.3 cm (71\")     Body mass index is 31.8 kg/m².    PHYSICAL EXAM:    Vitals Reviewed.   General Appearance: No acute distress, well developed and well nourished.   Eyes: Conjunctiva and lids: No erythema, swelling, or discharge. Sclera non-icteric.   HENT: Atraumatic, normocephalic. External eyes, ears, and nose normal.   Respiratory: No signs of respiratory distress. Respiration rhythm and depth normal.   Clear to auscultation. No rales, crackles, rhonchi, or wheezing auscultated.   Cardiovascular:  Jugular Venous Pressure: Normal  Heart Rate and Rhythm: Normal, Heart Sounds: Normal S1 and S2. No S3 or S4 noted.  Murmurs: No murmurs noted. No rubs, thrills, or gallops.   Arterial Pulses:  Posterior tibialis and dorsalis pedis pulses normal.   Lower Extremities: No edema noted.  Gastrointestinal:  Abdomen soft, non-distended, non-tender.   Musculoskeletal: Normal movement of extremities  Skin and Nails: General appearance normal. No pallor, cyanosis, diaphoresis. Skin temperature normal. No clubbing " of fingernails.   Psychiatric: Patient alert and oriented to person, place, and time. Speech and behavior appropriate. Normal mood and affect.       ECG 12 Lead    Date/Time: 4/13/2021 10:11 AM  Performed by: Leslee Guo APRN  Authorized by: Leslee Guo APRN   Comparison: compared with previous ECG   Similar to previous ECG  Rhythm: sinus rhythm  BPM: 71                Assessment:       Diagnosis Plan   1. PAT (paroxysmal atrial tachycardia) (CMS/HCC)  ECG 12 Lead   2. Essential hypertension            Plan:       1.  Paroxysmal atrial tach, had some flares when she had her knee surgery in June of last year in January of this year, it resolved when she stopped taking narcotics.  She manages it very well on low-dose metoprolol.    2.  Hypertension, controlled.    Follow-up with Dr. Choudhury in 1 year or sooner should the need arise.    As always, it has been a pleasure to participate in your patient's care.      Sincerely,         ENRRIQUE Martell

## 2021-05-04 DIAGNOSIS — I47.1 PAT (PAROXYSMAL ATRIAL TACHYCARDIA) (HCC): ICD-10-CM

## 2021-05-04 RX ORDER — METOPROLOL SUCCINATE 50 MG/1
50 TABLET, EXTENDED RELEASE ORAL NIGHTLY
Qty: 90 TABLET | Refills: 1 | Status: SHIPPED | OUTPATIENT
Start: 2021-05-04 | End: 2021-11-29

## 2021-06-14 RX ORDER — TRIAMTERENE AND HYDROCHLOROTHIAZIDE 37.5; 25 MG/1; MG/1
TABLET ORAL
Qty: 90 TABLET | Refills: 1 | Status: SHIPPED | OUTPATIENT
Start: 2021-06-14 | End: 2021-12-23

## 2021-09-03 RX ORDER — ATORVASTATIN CALCIUM 80 MG/1
TABLET, FILM COATED ORAL
Qty: 90 TABLET | Refills: 1 | Status: SHIPPED | OUTPATIENT
Start: 2021-09-03 | End: 2022-03-02

## 2021-09-15 RX ORDER — TRAZODONE HYDROCHLORIDE 100 MG/1
200 TABLET ORAL NIGHTLY
Qty: 180 TABLET | Refills: 1 | Status: SHIPPED | OUTPATIENT
Start: 2021-09-15 | End: 2022-03-15

## 2021-10-04 DIAGNOSIS — E78.5 HYPERLIPIDEMIA, UNSPECIFIED HYPERLIPIDEMIA TYPE: ICD-10-CM

## 2021-10-04 DIAGNOSIS — I10 ESSENTIAL HYPERTENSION: Primary | ICD-10-CM

## 2021-10-05 LAB
ALBUMIN SERPL-MCNC: 4.3 G/DL (ref 3.5–5.2)
ALBUMIN/GLOB SERPL: 2 G/DL
ALP SERPL-CCNC: 73 U/L (ref 39–117)
ALT SERPL-CCNC: 21 U/L (ref 1–33)
AST SERPL-CCNC: 18 U/L (ref 1–32)
BASOPHILS # BLD AUTO: 0.05 10*3/MM3 (ref 0–0.2)
BASOPHILS NFR BLD AUTO: 0.9 % (ref 0–1.5)
BILIRUB SERPL-MCNC: 0.4 MG/DL (ref 0–1.2)
BUN SERPL-MCNC: 21 MG/DL (ref 8–23)
BUN/CREAT SERPL: 22.3 (ref 7–25)
CALCIUM SERPL-MCNC: 9.6 MG/DL (ref 8.6–10.5)
CHLORIDE SERPL-SCNC: 102 MMOL/L (ref 98–107)
CHOLEST SERPL-MCNC: 186 MG/DL (ref 0–200)
CO2 SERPL-SCNC: 24.6 MMOL/L (ref 22–29)
CREAT SERPL-MCNC: 0.94 MG/DL (ref 0.57–1)
EOSINOPHIL # BLD AUTO: 0.21 10*3/MM3 (ref 0–0.4)
EOSINOPHIL NFR BLD AUTO: 3.8 % (ref 0.3–6.2)
ERYTHROCYTE [DISTWIDTH] IN BLOOD BY AUTOMATED COUNT: 12.9 % (ref 12.3–15.4)
GLOBULIN SER CALC-MCNC: 2.1 GM/DL
GLUCOSE SERPL-MCNC: 110 MG/DL (ref 65–99)
HCT VFR BLD AUTO: 39.7 % (ref 34–46.6)
HDLC SERPL-MCNC: 49 MG/DL (ref 40–60)
HGB BLD-MCNC: 13 G/DL (ref 12–15.9)
IMM GRANULOCYTES # BLD AUTO: 0.03 10*3/MM3 (ref 0–0.05)
IMM GRANULOCYTES NFR BLD AUTO: 0.5 % (ref 0–0.5)
LDLC SERPL CALC-MCNC: 106 MG/DL (ref 0–100)
LDLC/HDLC SERPL: 2.06 {RATIO}
LYMPHOCYTES # BLD AUTO: 1.94 10*3/MM3 (ref 0.7–3.1)
LYMPHOCYTES NFR BLD AUTO: 34.8 % (ref 19.6–45.3)
MCH RBC QN AUTO: 29.7 PG (ref 26.6–33)
MCHC RBC AUTO-ENTMCNC: 32.7 G/DL (ref 31.5–35.7)
MCV RBC AUTO: 90.6 FL (ref 79–97)
MONOCYTES # BLD AUTO: 0.58 10*3/MM3 (ref 0.1–0.9)
MONOCYTES NFR BLD AUTO: 10.4 % (ref 5–12)
NEUTROPHILS # BLD AUTO: 2.77 10*3/MM3 (ref 1.7–7)
NEUTROPHILS NFR BLD AUTO: 49.6 % (ref 42.7–76)
NRBC BLD AUTO-RTO: 0 /100 WBC (ref 0–0.2)
PLATELET # BLD AUTO: 275 10*3/MM3 (ref 140–450)
POTASSIUM SERPL-SCNC: 4.3 MMOL/L (ref 3.5–5.2)
PROT SERPL-MCNC: 6.4 G/DL (ref 6–8.5)
RBC # BLD AUTO: 4.38 10*6/MM3 (ref 3.77–5.28)
SODIUM SERPL-SCNC: 137 MMOL/L (ref 136–145)
TRIGL SERPL-MCNC: 180 MG/DL (ref 0–150)
TSH SERPL DL<=0.005 MIU/L-ACNC: 2.9 UIU/ML (ref 0.27–4.2)
VLDLC SERPL CALC-MCNC: 31 MG/DL (ref 5–40)
WBC # BLD AUTO: 5.58 10*3/MM3 (ref 3.4–10.8)

## 2021-10-08 ENCOUNTER — OFFICE VISIT (OUTPATIENT)
Dept: INTERNAL MEDICINE | Facility: CLINIC | Age: 71
End: 2021-10-08

## 2021-10-08 ENCOUNTER — TELEPHONE (OUTPATIENT)
Dept: INTERNAL MEDICINE | Facility: CLINIC | Age: 71
End: 2021-10-08

## 2021-10-08 VITALS
HEART RATE: 69 BPM | DIASTOLIC BLOOD PRESSURE: 70 MMHG | HEIGHT: 71 IN | WEIGHT: 222.6 LBS | TEMPERATURE: 97.7 F | SYSTOLIC BLOOD PRESSURE: 106 MMHG | BODY MASS INDEX: 31.16 KG/M2 | OXYGEN SATURATION: 96 %

## 2021-10-08 DIAGNOSIS — E78.5 HYPERLIPIDEMIA, UNSPECIFIED HYPERLIPIDEMIA TYPE: ICD-10-CM

## 2021-10-08 DIAGNOSIS — Z78.0 POST-MENOPAUSAL: ICD-10-CM

## 2021-10-08 DIAGNOSIS — Z23 NEED FOR INFLUENZA VACCINATION: ICD-10-CM

## 2021-10-08 DIAGNOSIS — I10 PRIMARY HYPERTENSION: Primary | ICD-10-CM

## 2021-10-08 DIAGNOSIS — R73.09 ELEVATED GLUCOSE: ICD-10-CM

## 2021-10-08 PROBLEM — J45.909 ASTHMA: Status: ACTIVE | Noted: 2021-05-28

## 2021-10-08 PROBLEM — R91.1 LUNG NODULE: Status: ACTIVE | Noted: 2021-05-28

## 2021-10-08 LAB
HBA1C MFR BLD: 6.2 % (ref 4.8–5.6)
Lab: NORMAL
WRITTEN AUTHORIZATION: NORMAL

## 2021-10-08 PROCEDURE — G0008 ADMIN INFLUENZA VIRUS VAC: HCPCS | Performed by: INTERNAL MEDICINE

## 2021-10-08 PROCEDURE — 99214 OFFICE O/P EST MOD 30 MIN: CPT | Performed by: INTERNAL MEDICINE

## 2021-10-08 PROCEDURE — 90662 IIV NO PRSV INCREASED AG IM: CPT | Performed by: INTERNAL MEDICINE

## 2021-10-08 RX ORDER — MONTELUKAST SODIUM 10 MG/1
10 TABLET ORAL
COMMUNITY
Start: 2021-08-25

## 2021-10-08 RX ORDER — METFORMIN HYDROCHLORIDE 500 MG/1
500 TABLET, EXTENDED RELEASE ORAL
Qty: 30 TABLET | Refills: 3 | Status: SHIPPED | OUTPATIENT
Start: 2021-10-08 | End: 2022-01-04

## 2021-10-08 RX ORDER — BUDESONIDE AND FORMOTEROL FUMARATE DIHYDRATE 160; 4.5 UG/1; UG/1
2 AEROSOL RESPIRATORY (INHALATION)
COMMUNITY
Start: 2021-05-28 | End: 2022-05-28

## 2021-10-08 NOTE — TELEPHONE ENCOUNTER
Left the detail message on her cell. DEXA order is in system and she can call scheduling at [phone] to get scheduled.

## 2021-10-08 NOTE — TELEPHONE ENCOUNTER
Caller: Tran Sosa    Relationship: Self    Best call back number: [phone]     What is the best time to reach you: ANY    Who are you requesting to speak with (clinical staff, provider,  specific staff member): CLINICAL    What was the call regarding: LABS BEFORE AMPARO APPOINTMENT    Do you require a callback: YES

## 2021-10-08 NOTE — TELEPHONE ENCOUNTER
Caller: Tran Sosa    Relationship: Self    Best call back number: [phone]     What is the best time to reach you: ANY    Who are you requesting to speak with (clinical staff, provider,  specific staff member):CLINICAL    What was the call regarding: BONE DENSITY APPOINTMENT OR REFERRAL    Do you require a callback: YES

## 2021-10-08 NOTE — PROGRESS NOTES
Subjective   Tran Sosa is a 71 y.o. female here to follow up on HTN, HPL with labs.    History of Present Illness   Pt has been taking cholesterol meds as prescribed.  No difficulties with myalgias.   Pt has been taking BP meds as prescribed without any problems.  No HA  No episodes of orthostasis    The following portions of the patient's history were reviewed and updated as appropriate: allergies, current medications, past medical history, past social history and problem list.  She is struggling with diet and exercise     Review of Systems   All other systems reviewed and are negative.      Objective   Physical Exam  Vitals reviewed.   Constitutional:       Appearance: She is well-developed.   HENT:      Head: Normocephalic and atraumatic.      Right Ear: External ear normal.      Left Ear: External ear normal.   Eyes:      Conjunctiva/sclera: Conjunctivae normal.      Pupils: Pupils are equal, round, and reactive to light.   Neck:      Thyroid: No thyromegaly.      Trachea: No tracheal deviation.   Cardiovascular:      Rate and Rhythm: Normal rate and regular rhythm.      Heart sounds: Normal heart sounds.   Pulmonary:      Effort: Pulmonary effort is normal.      Breath sounds: Normal breath sounds.   Abdominal:      General: Bowel sounds are normal. There is no distension.      Palpations: Abdomen is soft.      Tenderness: There is no abdominal tenderness.   Musculoskeletal:         General: No deformity. Normal range of motion.      Cervical back: Normal range of motion.   Skin:     General: Skin is warm and dry.   Neurological:      Mental Status: She is alert and oriented to person, place, and time.   Psychiatric:         Behavior: Behavior normal.         Thought Content: Thought content normal.         Judgment: Judgment normal.         Vitals:    10/08/21 0941   BP: 106/70   Pulse: 69   Temp: 97.7 °F (36.5 °C)   SpO2: 96%     Orders Only on 10/04/2021   Component Date Value Ref Range Status   •  Glucose 10/05/2021 110* 65 - 99 mg/dL Final   • BUN 10/05/2021 21  8 - 23 mg/dL Final   • Creatinine 10/05/2021 0.94  0.57 - 1.00 mg/dL Final   • eGFR Non  Am 10/05/2021 59* >60 mL/min/1.73 Final    Comment: GFR Normal >60  Chronic Kidney Disease <60  Kidney Failure <15     • eGFR  Am 10/05/2021 72  >60 mL/min/1.73 Final   • BUN/Creatinine Ratio 10/05/2021 22.3  7.0 - 25.0 Final   • Sodium 10/05/2021 137  136 - 145 mmol/L Final   • Potassium 10/05/2021 4.3  3.5 - 5.2 mmol/L Final   • Chloride 10/05/2021 102  98 - 107 mmol/L Final   • Total CO2 10/05/2021 24.6  22.0 - 29.0 mmol/L Final   • Calcium 10/05/2021 9.6  8.6 - 10.5 mg/dL Final   • Total Protein 10/05/2021 6.4  6.0 - 8.5 g/dL Final   • Albumin 10/05/2021 4.30  3.50 - 5.20 g/dL Final   • Globulin 10/05/2021 2.1  gm/dL Final   • A/G Ratio 10/05/2021 2.0  g/dL Final   • Total Bilirubin 10/05/2021 0.4  0.0 - 1.2 mg/dL Final   • Alkaline Phosphatase 10/05/2021 73  39 - 117 U/L Final   • AST (SGOT) 10/05/2021 18  1 - 32 U/L Final   • ALT (SGPT) 10/05/2021 21  1 - 33 U/L Final   • Total Cholesterol 10/05/2021 186  0 - 200 mg/dL Final    Comment: Cholesterol Reference Ranges  (U.S. Department of Health and Human Services ATP III  Classifications)  Desirable          <200 mg/dL  Borderline High    200-239 mg/dL  High Risk          >240 mg/dL  Triglyceride Reference Ranges  (U.S. Department of Health and Human Services ATP III  Classifications)  Normal           <150 mg/dL  Borderline High  150-199 mg/dL  High             200-499 mg/dL  Very High        >500 mg/dL  HDL Reference Ranges  (U.S. Department of Health and Human Services ATP III  Classifcations)  Low     <40 mg/dl (major risk factor for CHD)  High    >60 mg/dl ('negative' risk factor for CHD)  LDL Reference Ranges  (U.S. Department of Health and Human Services ATP III  Classifcations)  Optimal          <100 mg/dL  Near Optimal     100-129 mg/dL  Borderline High  130-159 mg/dL  High              160-189 mg/dL  Very High        >189 mg/dL     • Triglycerides 10/05/2021 180* 0 - 150 mg/dL Final   • HDL Cholesterol 10/05/2021 49  40 - 60 mg/dL Final   • VLDL Cholesterol Yoan 10/05/2021 31  5 - 40 mg/dL Final   • LDL Chol Calc ([facility]) 10/05/2021 106* 0 - 100 mg/dL Final   • LDL/HDL RATIO 10/05/2021 2.06   Final   • TSH 10/05/2021 2.900  0.270 - 4.200 uIU/mL Final   • WBC 10/05/2021 5.58  3.40 - 10.80 10*3/mm3 Final   • RBC 10/05/2021 4.38  3.77 - 5.28 10*6/mm3 Final   • Hemoglobin 10/05/2021 13.0  12.0 - 15.9 g/dL Final   • Hematocrit 10/05/2021 39.7  34.0 - 46.6 % Final   • MCV 10/05/2021 90.6  79.0 - 97.0 fL Final   • MCH 10/05/2021 29.7  26.6 - 33.0 pg Final   • MCHC 10/05/2021 32.7  31.5 - 35.7 g/dL Final   • RDW 10/05/2021 12.9  12.3 - 15.4 % Final   • Platelets 10/05/2021 275  140 - 450 10*3/mm3 Final   • Neutrophil Rel % 10/05/2021 49.6  42.7 - 76.0 % Final   • Lymphocyte Rel % 10/05/2021 34.8  19.6 - 45.3 % Final   • Monocyte Rel % 10/05/2021 10.4  5.0 - 12.0 % Final   • Eosinophil Rel % 10/05/2021 3.8  0.3 - 6.2 % Final   • Basophil Rel % 10/05/2021 0.9  0.0 - 1.5 % Final   • Neutrophils Absolute 10/05/2021 2.77  1.70 - 7.00 10*3/mm3 Final   • Lymphocytes Absolute 10/05/2021 1.94  0.70 - 3.10 10*3/mm3 Final   • Monocytes Absolute 10/05/2021 0.58  0.10 - 0.90 10*3/mm3 Final   • Eosinophils Absolute 10/05/2021 0.21  0.00 - 0.40 10*3/mm3 Final   • Basophils Absolute 10/05/2021 0.05  0.00 - 0.20 10*3/mm3 Final   • Immature Granulocyte Rel % 10/05/2021 0.5  0.0 - 0.5 % Final   • Immature Grans Absolute 10/05/2021 0.03  0.00 - 0.05 10*3/mm3 Final   • nRBC 10/05/2021 0.0  0.0 - 0.2 /100 WBC Final     Current Outpatient Medications:   •  albuterol sulfate  (90 Base) MCG/ACT inhaler, Inhale 2 puffs., Disp: , Rfl:   •  atorvastatin (LIPITOR) 80 MG tablet, TAKE 1 TABLET BY MOUTH EVERY DAY IN THE EVENING, Disp: 90 tablet, Rfl: 1  •  budesonide-formoterol (SYMBICORT) 160-4.5 MCG/ACT inhaler, Inhale 2 puffs.,  Disp: , Rfl:   •  docusate sodium 100 MG capsule, Take 1 capsule by mouth 2 (Two) Times a Day As Needed for Constipation., Disp: 40 each, Rfl: 1  •  metoprolol succinate XL (TOPROL-XL) 50 MG 24 hr tablet, Take 1 tablet by mouth Every Night., Disp: 90 tablet, Rfl: 1  •  montelukast (SINGULAIR) 10 MG tablet, Take 10 mg by mouth every night at bedtime., Disp: , Rfl:   •  traZODone (DESYREL) 100 MG tablet, TAKE 2 TABLETS BY MOUTH EVERY NIGHT, Disp: 180 tablet, Rfl: 1  •  triamterene-hydrochlorothiazide (MAXZIDE-25) 37.5-25 MG per tablet, TAKE 1 TABLET BY MOUTH EVERY DAY IN THE MORNING, Disp: 90 tablet, Rfl: 1    Body mass index is 31.05 kg/m².         Assessment/Plan   Diagnoses and all orders for this visit:    1. Primary hypertension (Primary)    2. Hyperlipidemia, unspecified hyperlipidemia type    3. Post-menopausal  -     DEXA Bone Density Axial; Future      1. HTN-- ok with lipitor  2.  HPL-  Ok with current meds  3.  Osteopenia- order dexa  4. Covid-  She still has SOB seeing pulmonary         Answers for HPI/ROS submitted by the patient on 10/7/2021  What is the primary reason for your visit?: Physical

## 2021-11-26 DIAGNOSIS — I47.1 PAT (PAROXYSMAL ATRIAL TACHYCARDIA) (HCC): ICD-10-CM

## 2021-11-29 RX ORDER — METOPROLOL SUCCINATE 50 MG/1
TABLET, EXTENDED RELEASE ORAL
Qty: 90 TABLET | Refills: 1 | Status: SHIPPED | OUTPATIENT
Start: 2021-11-29 | End: 2022-05-26

## 2021-12-21 DIAGNOSIS — I10 PRIMARY HYPERTENSION: ICD-10-CM

## 2021-12-21 DIAGNOSIS — R73.09 ELEVATED GLUCOSE: ICD-10-CM

## 2021-12-21 DIAGNOSIS — E78.5 HYPERLIPIDEMIA, UNSPECIFIED HYPERLIPIDEMIA TYPE: ICD-10-CM

## 2021-12-23 RX ORDER — TRIAMTERENE AND HYDROCHLOROTHIAZIDE 37.5; 25 MG/1; MG/1
TABLET ORAL
Qty: 90 TABLET | Refills: 1 | Status: SHIPPED | OUTPATIENT
Start: 2021-12-23 | End: 2022-06-20

## 2021-12-29 LAB
ALBUMIN SERPL-MCNC: 4.1 G/DL (ref 3.8–4.8)
ALBUMIN/GLOB SERPL: 1.6 {RATIO} (ref 1.2–2.2)
ALP SERPL-CCNC: 107 IU/L (ref 44–121)
ALT SERPL-CCNC: 19 IU/L (ref 0–32)
AST SERPL-CCNC: 17 IU/L (ref 0–40)
BASOPHILS # BLD AUTO: 0.1 X10E3/UL (ref 0–0.2)
BASOPHILS NFR BLD AUTO: 1 %
BILIRUB SERPL-MCNC: <0.2 MG/DL (ref 0–1.2)
BUN SERPL-MCNC: 24 MG/DL (ref 8–27)
BUN/CREAT SERPL: 26 (ref 12–28)
CALCIUM SERPL-MCNC: 9.5 MG/DL (ref 8.7–10.3)
CHLORIDE SERPL-SCNC: 104 MMOL/L (ref 96–106)
CHOLEST SERPL-MCNC: 174 MG/DL (ref 100–199)
CO2 SERPL-SCNC: 21 MMOL/L (ref 20–29)
CREAT SERPL-MCNC: 0.92 MG/DL (ref 0.57–1)
EOSINOPHIL # BLD AUTO: 0.2 X10E3/UL (ref 0–0.4)
EOSINOPHIL NFR BLD AUTO: 3 %
ERYTHROCYTE [DISTWIDTH] IN BLOOD BY AUTOMATED COUNT: 12.8 % (ref 11.7–15.4)
GLOBULIN SER CALC-MCNC: 2.6 G/DL (ref 1.5–4.5)
GLUCOSE SERPL-MCNC: 117 MG/DL (ref 65–99)
HBA1C MFR BLD: 6.2 % (ref 4.8–5.6)
HCT VFR BLD AUTO: 39.5 % (ref 34–46.6)
HDLC SERPL-MCNC: 47 MG/DL
HGB BLD-MCNC: 12.7 G/DL (ref 11.1–15.9)
IMM GRANULOCYTES # BLD AUTO: 0 X10E3/UL (ref 0–0.1)
IMM GRANULOCYTES NFR BLD AUTO: 1 %
LDLC SERPL CALC-MCNC: 103 MG/DL (ref 0–99)
LDLC/HDLC SERPL: 2.2 RATIO (ref 0–3.2)
LYMPHOCYTES # BLD AUTO: 1.8 X10E3/UL (ref 0.7–3.1)
LYMPHOCYTES NFR BLD AUTO: 31 %
MCH RBC QN AUTO: 29.7 PG (ref 26.6–33)
MCHC RBC AUTO-ENTMCNC: 32.2 G/DL (ref 31.5–35.7)
MCV RBC AUTO: 92 FL (ref 79–97)
MONOCYTES # BLD AUTO: 0.6 X10E3/UL (ref 0.1–0.9)
MONOCYTES NFR BLD AUTO: 11 %
NEUTROPHILS # BLD AUTO: 3.2 X10E3/UL (ref 1.4–7)
NEUTROPHILS NFR BLD AUTO: 53 %
PLATELET # BLD AUTO: 306 X10E3/UL (ref 150–450)
POTASSIUM SERPL-SCNC: 3.9 MMOL/L (ref 3.5–5.2)
PROT SERPL-MCNC: 6.7 G/DL (ref 6–8.5)
RBC # BLD AUTO: 4.28 X10E6/UL (ref 3.77–5.28)
SODIUM SERPL-SCNC: 140 MMOL/L (ref 134–144)
TRIGL SERPL-MCNC: 135 MG/DL (ref 0–149)
TSH SERPL DL<=0.005 MIU/L-ACNC: 3.04 UIU/ML (ref 0.45–4.5)
VLDLC SERPL CALC-MCNC: 24 MG/DL (ref 5–40)
WBC # BLD AUTO: 5.9 X10E3/UL (ref 3.4–10.8)

## 2022-01-04 ENCOUNTER — OFFICE VISIT (OUTPATIENT)
Dept: INTERNAL MEDICINE | Facility: CLINIC | Age: 72
End: 2022-01-04

## 2022-01-04 VITALS
HEART RATE: 70 BPM | WEIGHT: 222.7 LBS | OXYGEN SATURATION: 99 % | BODY MASS INDEX: 31.18 KG/M2 | TEMPERATURE: 97.3 F | SYSTOLIC BLOOD PRESSURE: 122 MMHG | HEIGHT: 71 IN | DIASTOLIC BLOOD PRESSURE: 66 MMHG

## 2022-01-04 DIAGNOSIS — E78.5 HYPERLIPIDEMIA, UNSPECIFIED HYPERLIPIDEMIA TYPE: ICD-10-CM

## 2022-01-04 DIAGNOSIS — R35.0 URINARY FREQUENCY: ICD-10-CM

## 2022-01-04 DIAGNOSIS — Z00.00 MEDICARE ANNUAL WELLNESS VISIT, SUBSEQUENT: Primary | ICD-10-CM

## 2022-01-04 DIAGNOSIS — I10 PRIMARY HYPERTENSION: ICD-10-CM

## 2022-01-04 DIAGNOSIS — R73.09 ELEVATED GLUCOSE: ICD-10-CM

## 2022-01-04 LAB
BILIRUB BLD-MCNC: NEGATIVE MG/DL
CLARITY, POC: CLEAR
COLOR UR: YELLOW
EXPIRATION DATE: NORMAL
GLUCOSE UR STRIP-MCNC: NEGATIVE MG/DL
KETONES UR QL: NEGATIVE
LEUKOCYTE EST, POC: NEGATIVE
Lab: NORMAL
NITRITE UR-MCNC: NEGATIVE MG/ML
PH UR: 7 [PH] (ref 5–8)
PROT UR STRIP-MCNC: NEGATIVE MG/DL
RBC # UR STRIP: NEGATIVE /UL
SP GR UR: 1.01 (ref 1–1.03)
UROBILINOGEN UR QL: NORMAL

## 2022-01-04 PROCEDURE — 1170F FXNL STATUS ASSESSED: CPT | Performed by: INTERNAL MEDICINE

## 2022-01-04 PROCEDURE — 99214 OFFICE O/P EST MOD 30 MIN: CPT | Performed by: INTERNAL MEDICINE

## 2022-01-04 PROCEDURE — G0439 PPPS, SUBSEQ VISIT: HCPCS | Performed by: INTERNAL MEDICINE

## 2022-01-04 PROCEDURE — 1159F MED LIST DOCD IN RCRD: CPT | Performed by: INTERNAL MEDICINE

## 2022-01-04 PROCEDURE — 1125F AMNT PAIN NOTED PAIN PRSNT: CPT | Performed by: INTERNAL MEDICINE

## 2022-01-04 PROCEDURE — 81003 URINALYSIS AUTO W/O SCOPE: CPT | Performed by: INTERNAL MEDICINE

## 2022-01-04 RX ORDER — METFORMIN HYDROCHLORIDE 500 MG/1
TABLET, EXTENDED RELEASE ORAL
Qty: 90 TABLET | Refills: 1 | Status: SHIPPED | OUTPATIENT
Start: 2022-01-04 | End: 2022-07-13

## 2022-01-04 NOTE — PROGRESS NOTES
The ABCs of the Annual Wellness Visit  Subsequent Medicare Wellness Visit    Chief Complaint   Patient presents with   • Medicare Wellness-subsequent   • Hypertension   • Hyperlipidemia      Subjective    History of Present Illness:  Tran Sosa is a 71 y.o. female who presents for a Subsequent Medicare Wellness Visit.    The following portions of the patient's history were reviewed and   updated as appropriate: allergies, current medications, past medical history, past social history and problem list.    Compared to one year ago, the patient feels her physical   health is better.    Compared to one year ago, the patient feels her mental   health is the same.    Recent Hospitalizations:  She was not admitted to the hospital during the last year.       Current Medical Providers:  Patient Care Team:  Diane Macdonald MD as PCP - General  Diane Macdonald MD as PCP - Family Medicine  Lavon Walker MD as Consulting Physician (Pulmonary Disease)  Koby Choudhury MD as Consulting Physician (Cardiology)    Outpatient Medications Prior to Visit   Medication Sig Dispense Refill   • albuterol sulfate  (90 Base) MCG/ACT inhaler Inhale 2 puffs.     • atorvastatin (LIPITOR) 80 MG tablet TAKE 1 TABLET BY MOUTH EVERY DAY IN THE EVENING 90 tablet 1   • budesonide-formoterol (SYMBICORT) 160-4.5 MCG/ACT inhaler Inhale 2 puffs.     • docusate sodium 100 MG capsule Take 1 capsule by mouth 2 (Two) Times a Day As Needed for Constipation. 40 each 1   • metFORMIN ER (GLUCOPHAGE-XR) 500 MG 24 hr tablet TAKE 1 TABLET BY MOUTH EVERY DAY WITH BREAKFAST 90 tablet 1   • metoprolol succinate XL (TOPROL-XL) 50 MG 24 hr tablet TAKE 1 TABLET BY MOUTH EVERY DAY AT NIGHT 90 tablet 1   • montelukast (SINGULAIR) 10 MG tablet Take 10 mg by mouth every night at bedtime.     • traZODone (DESYREL) 100 MG tablet TAKE 2 TABLETS BY MOUTH EVERY NIGHT 180 tablet 1   • triamterene-hydrochlorothiazide (MAXZIDE-25) 37.5-25 MG per tablet TAKE 1 TABLET BY MOUTH  "EVERY DAY IN THE MORNING 90 tablet 1     No facility-administered medications prior to visit.       No opioid medication identified on active medication list. I have reviewed chart for other potential  high risk medication/s and harmful drug interactions in the elderly.          Aspirin is not on active medication list.  Aspirin use is not indicated based on review of current medical condition/s. Risk of harm outweighs potential benefits.  .    Patient Active Problem List   Diagnosis   • Atopic rhinitis   • Arthralgia of multiple joints   • Hyperlipidemia   • Hypertension   • Osteopenia   • PAT (paroxysmal atrial tachycardia) (HCC)   • Sinus tachycardia   • SHAI (obstructive sleep apnea)   • OA (osteoarthritis) of hip   • Primary osteoarthritis of right knee   • Pulmonary embolism without acute cor pulmonale (HCC)   • Acute respiratory failure with hypoxemia (HCC)   • COVID-19   • Primary osteoarthritis of left knee   • Asthma   • Lung nodule     Advance Care Planning  Advance Directive is not on file.  ACP discussion was held with the patient during this visit. Patient does not have an advance directive, information provided.          Objective    Vitals:    01/04/22 0954   BP: 122/66   BP Location: Left arm   Patient Position: Sitting   Pulse: 70   Temp: 97.3 °F (36.3 °C)   TempSrc: Infrared   SpO2: 99%   Weight: 101 kg (222 lb 11.2 oz)   Height: 180.3 cm (71\")   PainSc:   5     BMI Readings from Last 1 Encounters:   01/04/22 31.06 kg/m²   BMI is above normal parameters. Recommendations include: exercise counseling and nutrition counseling    Does the patient have evidence of cognitive impairment? No    Physical Exam  Lab Results   Component Value Date    CHLPL 174 12/28/2021    TRIG 135 12/28/2021    HDL 47 12/28/2021     (H) 12/28/2021    VLDL 24 12/28/2021    HGBA1C 6.2 (H) 12/28/2021            HEALTH RISK ASSESSMENT    Smoking Status:  Social History     Tobacco Use   Smoking Status Former Smoker   • " Packs/day: 1.00   • Years: 10.00   • Pack years: 10.00   • Types: Cigarettes   • Quit date:    • Years since quittin.0   Smokeless Tobacco Never Used   Tobacco Comment    QUIT AT AGE 30 YRS. OLD     Alcohol Consumption:  Social History     Substance and Sexual Activity   Alcohol Use Yes    Comment: TWICE MONTHLY 1-2 DRINKS     Fall Risk Screen:    JAQUELINE Fall Risk Assessment was completed, and patient is at HIGH risk for falls. Assessment completed on:2022    Depression Screening:  PHQ-2/PHQ-9 Depression Screening 2022   Little interest or pleasure in doing things 0   Feeling down, depressed, or hopeless 0   Trouble falling or staying asleep, or sleeping too much 0   Feeling tired or having little energy 0   Poor appetite or overeating 0   Feeling bad about yourself - or that you are a failure or have let yourself or your family down 0   Trouble concentrating on things, such as reading the newspaper or watching television 0   Moving or speaking so slowly that other people could have noticed. Or the opposite - being so fidgety or restless that you have been moving around a lot more than usual 0   Thoughts that you would be better off dead, or of hurting yourself in some way 0   Total Score 0   If you checked off any problems, how difficult have these problems made it for you to do your work, take care of things at home, or get along with other people? -       Health Habits and Functional and Cognitive Screening:  Functional & Cognitive Status 2022   Do you have difficulty preparing food and eating? No   Do you have difficulty bathing yourself, getting dressed or grooming yourself? No   Do you have difficulty using the toilet? No   Do you have difficulty moving around from place to place? No   Do you have trouble with steps or getting out of a bed or a chair? No   Current Diet Well Balanced Diet   Dental Exam Up to date   Eye Exam Up to date   Exercise (times per week) 2 times per week   Current  Exercises Include House Cleaning   Current Exercise Activities Include -   Do you need help using the phone?  No   Are you deaf or do you have serious difficulty hearing?  No   Do you need help with transportation? No   Do you need help shopping? No   Do you need help preparing meals?  No   Do you need help with housework?  No   Do you need help with laundry? No   Do you need help taking your medications? No   Do you need help managing money? No   Do you ever drive or ride in a car without wearing a seat belt? No   Have you felt unusual stress, anger or loneliness in the last month? No   Who do you live with? Alone   If you need help, do you have trouble finding someone available to you? No   Have you been bothered in the last four weeks by sexual problems? No   Do you have difficulty concentrating, remembering or making decisions? No       Age-appropriate Screening Schedule:  Refer to the list below for future screening recommendations based on patient's age, sex and/or medical conditions. Orders for these recommended tests are listed in the plan section. The patient has been provided with a written plan.    Health Maintenance   Topic Date Due   • DXA SCAN  08/13/2021   • PAP SMEAR  04/08/2022 (Originally 8/10/2020)   • LIPID PANEL  12/28/2022   • TDAP/TD VACCINES (2 - Td or Tdap) 07/18/2028   • INFLUENZA VACCINE  Completed   • ZOSTER VACCINE  Completed   • MAMMOGRAM  Discontinued              Assessment/Plan   CMS Preventative Services Quick Reference  Risk Factors Identified During Encounter  Fall Risk-High or Moderate-  She tripped over a step and bruised ribs      The above risks/problems have been discussed with the patient.  Follow up actions/plans if indicated are seen below in the Assessment/Plan Section.  Pertinent information has been shared with the patient in the After Visit Summary.    Diagnoses and all orders for this visit:    1. Primary hypertension (Primary)    2. Hyperlipidemia, unspecified  hyperlipidemia type        Follow Up:   No follow-ups on file.     An After Visit Summary and PPPS were made available to the patient.

## 2022-01-04 NOTE — PROGRESS NOTES
Subjective   Tran Sosa is a 71 y.o. female here today to f/u on hyperlipidemia, elevated glcose and insomnia.      History of Present Illness   She had not been doing as well with diet and exercise but has now started  She has had some urinary freq and wants to check a urine burning      The following portions of the patient's history were reviewed and updated as appropriate: allergies, current medications, past medical history, past social history and problem list.  She has been doing better with diet and exercise    Review of Systems    Objective   Physical Exam  Vitals reviewed.   Constitutional:       Appearance: She is well-developed.   HENT:      Head: Normocephalic and atraumatic.      Right Ear: External ear normal.      Left Ear: External ear normal.   Eyes:      Conjunctiva/sclera: Conjunctivae normal.      Pupils: Pupils are equal, round, and reactive to light.   Neck:      Thyroid: No thyromegaly.      Trachea: No tracheal deviation.   Cardiovascular:      Rate and Rhythm: Normal rate and regular rhythm.      Heart sounds: Normal heart sounds.   Pulmonary:      Effort: Pulmonary effort is normal.      Breath sounds: Normal breath sounds.   Abdominal:      General: Bowel sounds are normal. There is no distension.      Palpations: Abdomen is soft.      Tenderness: There is no abdominal tenderness.   Musculoskeletal:         General: No deformity. Normal range of motion.      Cervical back: Normal range of motion.   Skin:     General: Skin is warm and dry.   Neurological:      Mental Status: She is alert and oriented to person, place, and time.   Psychiatric:         Behavior: Behavior normal.         Thought Content: Thought content normal.         Judgment: Judgment normal.         Vitals:    01/04/22 0954   BP: 122/66   Pulse: 70   Temp: 97.3 °F (36.3 °C)   SpO2: 99%     Body mass index is 31.06 kg/m².       Orders Only on 12/21/2021   Component Date Value Ref Range Status   • Hemoglobin A1C  12/28/2021 6.2* 4.8 - 5.6 % Final    Comment:          Prediabetes: 5.7 - 6.4           Diabetes: >6.4           Glycemic control for adults with diabetes: <7.0     • TSH 12/28/2021 3.040  0.450 - 4.500 uIU/mL Final   • Total Cholesterol 12/28/2021 174  100 - 199 mg/dL Final   • Triglycerides 12/28/2021 135  0 - 149 mg/dL Final   • HDL Cholesterol 12/28/2021 47  >39 mg/dL Final   • VLDL Cholesterol Yoan 12/28/2021 24  5 - 40 mg/dL Final   • LDL Chol Calc ([facility]) 12/28/2021 103* 0 - 99 mg/dL Final   • LDL/HDL RATIO 12/28/2021 2.2  0.0 - 3.2 ratio Final    Comment:                                     LDL/HDL Ratio                                              Men  Women                                1/2 Avg.Risk  1.0    1.5                                    Avg.Risk  3.6    3.2                                 2X Avg.Risk  6.2    5.0                                 3X Avg.Risk  8.0    6.1     • Glucose 12/28/2021 117* 65 - 99 mg/dL Final   • BUN 12/28/2021 24  8 - 27 mg/dL Final   • Creatinine 12/28/2021 0.92  0.57 - 1.00 mg/dL Final   • eGFR Non  Am 12/28/2021 64  >59 mL/min/1.73 Final   • eGFR African Am 12/28/2021 73  >59 mL/min/1.73 Final    Comment: **In accordance with recommendations from the NKF-ASN Task force,**    [facility] is in the process of updating its eGFR calculation to the    2021 CKD-EPI creatinine equation that estimates kidney function    without a race variable.     • BUN/Creatinine Ratio 12/28/2021 26  12 - 28 Final   • Sodium 12/28/2021 140  134 - 144 mmol/L Final   • Potassium 12/28/2021 3.9  3.5 - 5.2 mmol/L Final   • Chloride 12/28/2021 104  96 - 106 mmol/L Final   • Total CO2 12/28/2021 21  20 - 29 mmol/L Final   • Calcium 12/28/2021 9.5  8.7 - 10.3 mg/dL Final   • Total Protein 12/28/2021 6.7  6.0 - 8.5 g/dL Final   • Albumin 12/28/2021 4.1  3.8 - 4.8 g/dL Final   • Globulin 12/28/2021 2.6  1.5 - 4.5 g/dL Final   • A/G Ratio 12/28/2021 1.6  1.2 - 2.2 Final   • Total Bilirubin 12/28/2021  <0.2  0.0 - 1.2 mg/dL Final   • Alkaline Phosphatase 12/28/2021 107  44 - 121 IU/L Final                  **Please note reference interval change**   • AST (SGOT) 12/28/2021 17  0 - 40 IU/L Final   • ALT (SGPT) 12/28/2021 19  0 - 32 IU/L Final   • WBC 12/28/2021 5.9  3.4 - 10.8 x10E3/uL Final   • RBC 12/28/2021 4.28  3.77 - 5.28 x10E6/uL Final   • Hemoglobin 12/28/2021 12.7  11.1 - 15.9 g/dL Final   • Hematocrit 12/28/2021 39.5  34.0 - 46.6 % Final   • MCV 12/28/2021 92  79 - 97 fL Final   • MCH 12/28/2021 29.7  26.6 - 33.0 pg Final   • MCHC 12/28/2021 32.2  31.5 - 35.7 g/dL Final   • RDW 12/28/2021 12.8  11.7 - 15.4 % Final   • Platelets 12/28/2021 306  150 - 450 x10E3/uL Final   • Neutrophil Rel % 12/28/2021 53  Not Estab. % Final   • Lymphocyte Rel % 12/28/2021 31  Not Estab. % Final   • Monocyte Rel % 12/28/2021 11  Not Estab. % Final   • Eosinophil Rel % 12/28/2021 3  Not Estab. % Final   • Basophil Rel % 12/28/2021 1  Not Estab. % Final   • Neutrophils Absolute 12/28/2021 3.2  1.4 - 7.0 x10E3/uL Final   • Lymphocytes Absolute 12/28/2021 1.8  0.7 - 3.1 x10E3/uL Final   • Monocytes Absolute 12/28/2021 0.6  0.1 - 0.9 x10E3/uL Final   • Eosinophils Absolute 12/28/2021 0.2  0.0 - 0.4 x10E3/uL Final   • Basophils Absolute 12/28/2021 0.1  0.0 - 0.2 x10E3/uL Final   • Immature Granulocyte Rel % 12/28/2021 1  Not Estab. % Final   • Immature Grans Absolute 12/28/2021 0.0  0.0 - 0.1 x10E3/uL Final       Current Outpatient Medications:   •  albuterol sulfate  (90 Base) MCG/ACT inhaler, Inhale 2 puffs., Disp: , Rfl:   •  atorvastatin (LIPITOR) 80 MG tablet, TAKE 1 TABLET BY MOUTH EVERY DAY IN THE EVENING, Disp: 90 tablet, Rfl: 1  •  budesonide-formoterol (SYMBICORT) 160-4.5 MCG/ACT inhaler, Inhale 2 puffs., Disp: , Rfl:   •  docusate sodium 100 MG capsule, Take 1 capsule by mouth 2 (Two) Times a Day As Needed for Constipation., Disp: 40 each, Rfl: 1  •  metFORMIN ER (GLUCOPHAGE-XR) 500 MG 24 hr tablet, TAKE 1 TABLET  BY MOUTH EVERY DAY WITH BREAKFAST, Disp: 90 tablet, Rfl: 1  •  metoprolol succinate XL (TOPROL-XL) 50 MG 24 hr tablet, TAKE 1 TABLET BY MOUTH EVERY DAY AT NIGHT, Disp: 90 tablet, Rfl: 1  •  montelukast (SINGULAIR) 10 MG tablet, Take 10 mg by mouth every night at bedtime., Disp: , Rfl:   •  traZODone (DESYREL) 100 MG tablet, TAKE 2 TABLETS BY MOUTH EVERY NIGHT, Disp: 180 tablet, Rfl: 1  •  triamterene-hydrochlorothiazide (MAXZIDE-25) 37.5-25 MG per tablet, TAKE 1 TABLET BY MOUTH EVERY DAY IN THE MORNING, Disp: 90 tablet, Rfl: 1      Assessment/Plan   Diagnoses and all orders for this visit:    1. Primary hypertension (Primary)  -     CBC & Differential; Future  -     Comprehensive Metabolic Panel; Future  -     LP+LDL / HDL Ratio ([facility]); Future  -     TSH Rfx On Abnormal To Free T4; Future  -     Hemoglobin A1c; Future    2. Hyperlipidemia, unspecified hyperlipidemia type  -     CBC & Differential; Future  -     Comprehensive Metabolic Panel; Future  -     LP+LDL / HDL Ratio ([facility]); Future  -     TSH Rfx On Abnormal To Free T4; Future  -     Hemoglobin A1c; Future    3. Elevated glucose  -     Hemoglobin A1c; Future        1.  HPL- ok with lipitor  2. HTN- ok with current meds  3.  Urinary freq-  doi a UA and cx

## 2022-01-04 NOTE — PATIENT INSTRUCTIONS
Medicare Wellness  Personal Prevention Plan of Service     Date of Office Visit:  2022  Encounter Provider:  Diane Macdonald MD  Place of Service:  [facility]  Patient Name: Tran Sosa  :  1950    As part of the Medicare Wellness portion of your visit today, we are providing you with this personalized preventive plan of services (PPPS). This plan is based upon recommendations of the [place] Preventive Services Task Force (USPSTF) and the Advisory Committee on Immunization Practices (ACIP).    This lists the preventive care services that should be considered, and provides dates of when you are due. Items listed as completed are up-to-date and do not require any further intervention.    Health Maintenance   Topic Date Due   • DXA SCAN  2021   • ANNUAL WELLNESS VISIT  2021   • PAP SMEAR  2022 (Originally 8/10/2020)   • LIPID PANEL  2022   • COLORECTAL CANCER SCREENING  10/09/2023   • TDAP/TD VACCINES (2 - Td or Tdap) 2028   • HEPATITIS C SCREENING  Completed   • COVID-19 Vaccine  Completed   • INFLUENZA VACCINE  Completed   • Pneumococcal Vaccine 65+  Completed   • ZOSTER VACCINE  Completed   • MAMMOGRAM  Discontinued       No orders of the defined types were placed in this encounter.      Return in about 6 months (around 2022).

## 2022-01-08 LAB
BACTERIA UR CULT: ABNORMAL
OTHER ANTIBIOTIC SUSC ISLT: ABNORMAL

## 2022-01-10 RX ORDER — CIPROFLOXACIN 250 MG/1
250 TABLET, FILM COATED ORAL 2 TIMES DAILY
Qty: 6 TABLET | Refills: 0 | Status: SHIPPED | OUTPATIENT
Start: 2022-01-10 | End: 2022-06-27

## 2022-02-10 ENCOUNTER — HOSPITAL ENCOUNTER (OUTPATIENT)
Dept: BONE DENSITY | Facility: HOSPITAL | Age: 72
Discharge: HOME | End: 2022-02-10
Admitting: INTERNAL MEDICINE

## 2022-02-10 DIAGNOSIS — Z78.0 POST-MENOPAUSAL: ICD-10-CM

## 2022-02-10 PROCEDURE — 77080 DXA BONE DENSITY AXIAL: CPT

## 2022-03-02 RX ORDER — ATORVASTATIN CALCIUM 80 MG/1
TABLET, FILM COATED ORAL
Qty: 90 TABLET | Refills: 1 | Status: SHIPPED | OUTPATIENT
Start: 2022-03-02 | End: 2022-08-23

## 2022-03-15 RX ORDER — TRAZODONE HYDROCHLORIDE 100 MG/1
200 TABLET ORAL NIGHTLY
Qty: 180 TABLET | Refills: 1 | Status: SHIPPED | OUTPATIENT
Start: 2022-03-15 | End: 2022-09-07

## 2022-05-03 ENCOUNTER — TELEPHONE (OUTPATIENT)
Dept: INTERNAL MEDICINE | Facility: CLINIC | Age: 72
End: 2022-05-03

## 2022-05-03 DIAGNOSIS — R42 VERTIGO: Primary | ICD-10-CM

## 2022-05-03 NOTE — TELEPHONE ENCOUNTER
REFERRAL ORDERED    ----- Message from Tran Sosa sent at 5/3/2022  8:09 AM EDT -----  Regarding: Benign vertigo  I called [facility] as they have PT’s that are trained in that speciality and they need a referral. Thanks.

## 2022-05-26 DIAGNOSIS — I47.1 PAT (PAROXYSMAL ATRIAL TACHYCARDIA): ICD-10-CM

## 2022-05-26 RX ORDER — METOPROLOL SUCCINATE 50 MG/1
TABLET, EXTENDED RELEASE ORAL
Qty: 90 TABLET | Refills: 1 | Status: SHIPPED | OUTPATIENT
Start: 2022-05-26 | End: 2022-11-21

## 2022-06-20 RX ORDER — TRIAMTERENE AND HYDROCHLOROTHIAZIDE 37.5; 25 MG/1; MG/1
TABLET ORAL
Qty: 90 TABLET | Refills: 1 | Status: SHIPPED | OUTPATIENT
Start: 2022-06-20 | End: 2022-12-14

## 2022-06-27 ENCOUNTER — OFFICE VISIT (OUTPATIENT)
Dept: INTERNAL MEDICINE | Facility: CLINIC | Age: 72
End: 2022-06-27

## 2022-06-27 VITALS
OXYGEN SATURATION: 95 % | DIASTOLIC BLOOD PRESSURE: 74 MMHG | HEART RATE: 74 BPM | HEIGHT: 71 IN | SYSTOLIC BLOOD PRESSURE: 120 MMHG | TEMPERATURE: 97.5 F | WEIGHT: 220.1 LBS | BODY MASS INDEX: 30.81 KG/M2

## 2022-06-27 DIAGNOSIS — M54.41 ACUTE RIGHT-SIDED LOW BACK PAIN WITH RIGHT-SIDED SCIATICA: Primary | ICD-10-CM

## 2022-06-27 DIAGNOSIS — M25.551 RIGHT HIP PAIN: ICD-10-CM

## 2022-06-27 PROCEDURE — 99213 OFFICE O/P EST LOW 20 MIN: CPT | Performed by: NURSE PRACTITIONER

## 2022-06-27 RX ORDER — FLUTICASONE PROPIONATE 50 MCG
SPRAY, SUSPENSION (ML) NASAL
COMMUNITY
Start: 2022-04-11

## 2022-06-27 RX ORDER — MELOXICAM 15 MG/1
15 TABLET ORAL DAILY
Qty: 30 TABLET | Refills: 0 | Status: SHIPPED | OUTPATIENT
Start: 2022-06-27 | End: 2022-07-19

## 2022-06-27 RX ORDER — METHOCARBAMOL 500 MG/1
500 TABLET, FILM COATED ORAL 2 TIMES DAILY PRN
Qty: 30 TABLET | Refills: 0 | Status: SHIPPED | OUTPATIENT
Start: 2022-06-27 | End: 2022-07-19

## 2022-06-27 NOTE — PROGRESS NOTES
Subjective   Tran Sosa is a 71 y.o. female.     Chief Complaint   Patient presents with   • Back Pain     Pt c/o back pain that radiate down to rt butt cheek X 2 weeks.        History of Present Illness   She is here today with c/o right side low back pain with radiation to right buttocks. Symptoms started 2 weeks ago. She had been walking more frequently prior to symptoms starting. She does not notice any particular movement that makes it better or worse.  She has to reposition frequently secondary to discomfort.  She is struggling with sleep at night secondary to pain.  She feels like the muscles in her right buttocks are very tight, worse at the end of the day.  She is also having some right hip pain.  She notes occasional radiation in pain to the right foot with some paresthesias.  She has been using advil with some improvement, but this is short-lived.   A few months ago she had a fall backwards onto concrete with a concussion.  Positive hx of left hip arthroplasty.  She denies any weakness, bowel or bladder dysfunction, saddle anesthesia.    The following portions of the patient's history were reviewed and updated as appropriate: allergies, current medications, past family history, past medical history, past social history, past surgical history and problem list.    Review of Systems   Constitutional: Negative for chills, fatigue and fever.   Respiratory: Negative for cough, chest tightness, shortness of breath and wheezing.    Cardiovascular: Negative for chest pain, palpitations and leg swelling.   Musculoskeletal: Positive for arthralgias (right hip), back pain and gait problem.   Neurological: Positive for numbness (intermittent right foot). Negative for weakness.   Psychiatric/Behavioral: Negative for suicidal ideas and depressed mood. The patient is not nervous/anxious.        Objective   Physical Exam  Constitutional:       Appearance: She is well-developed.   Neck:      Thyroid: No thyroid mass,  thyromegaly or thyroid tenderness.      Vascular: No carotid bruit.      Trachea: Trachea normal.   Cardiovascular:      Rate and Rhythm: Normal rate and regular rhythm.      Chest Wall: PMI is not displaced.      Pulses:           Radial pulses are 2+ on the right side and 2+ on the left side.        Dorsalis pedis pulses are 2+ on the right side and 2+ on the left side.        Posterior tibial pulses are 2+ on the right side and 2+ on the left side.      Heart sounds: S1 normal and S2 normal.   Pulmonary:      Effort: Pulmonary effort is normal.      Breath sounds: Normal breath sounds.   Musculoskeletal:      Cervical back: Normal.      Thoracic back: Normal.      Lumbar back: Spasms, tenderness (right buttocks) and bony tenderness (right SI) present. No swelling, edema, deformity, signs of trauma or lacerations. Decreased range of motion. Negative right straight leg raise test and negative left straight leg raise test. No scoliosis.      Right hip: No deformity, lacerations, tenderness, bony tenderness or crepitus. Decreased range of motion (pain with abduction). Normal strength.      Right lower leg: No edema.      Left lower leg: No edema.   Lymphadenopathy:      Head:      Right side of head: No submental, submandibular, tonsillar or occipital adenopathy.      Left side of head: No submental, submandibular, tonsillar or occipital adenopathy.      Cervical: No cervical adenopathy.   Skin:     General: Skin is warm and dry.      Capillary Refill: Capillary refill takes less than 2 seconds.      Nails: There is no clubbing.   Neurological:      Mental Status: She is alert and oriented to person, place, and time.      Sensory: Sensation is intact.      Motor: Motor function is intact.      Gait: Gait abnormal (antalgic gate).      Deep Tendon Reflexes:      Reflex Scores:       Patellar reflexes are 2+ on the right side and 2+ on the left side.  Psychiatric:         Attention and Perception: Attention normal.          Mood and Affect: Mood and affect normal.         Speech: Speech normal.         Behavior: Behavior normal.         Thought Content: Thought content normal.         Cognition and Memory: Cognition normal.         Vitals:    06/27/22 1515   BP: 120/74   Pulse: 74   Temp: 97.5 °F (36.4 °C)   SpO2: 95%      Body mass index is 30.7 kg/m².    Assessment & Plan   Diagnoses and all orders for this visit:    1. Acute right-sided low back pain with right-sided sciatica (Primary)  -     XR Spine Lumbar 2 or 3 View  -     meloxicam (MOBIC) 15 MG tablet; Take 1 tablet by mouth Daily.  Dispense: 30 tablet; Refill: 0  -     methocarbamol (Robaxin) 500 MG tablet; Take 1 tablet by mouth 2 (Two) Times a Day As Needed for Muscle Spasms.  Dispense: 30 tablet; Refill: 0  -     Ambulatory Referral to Physical Therapy Evaluate and treat    2. Right hip pain  -     XR Hip With or Without Pelvis 2 - 3 View Right  -     meloxicam (MOBIC) 15 MG tablet; Take 1 tablet by mouth Daily.  Dispense: 30 tablet; Refill: 0  -     methocarbamol (Robaxin) 500 MG tablet; Take 1 tablet by mouth 2 (Two) Times a Day As Needed for Muscle Spasms.  Dispense: 30 tablet; Refill: 0  -     Ambulatory Referral to Physical Therapy Evaluate and treat        1.  Acute right-sided low back pain with right-sided sciatica/right hip pain-check x-ray lumbar spine and right hip as she did recently have a fall a few months ago.  Start meloxicam 15 mg once daily.  Take this with food.  No other NSAIDs with this.  Tylenol is okay to use.  Recommend application of heat or ice, gentle stretching.  We will send in a prescription for Robaxin 500 mg at bedtime.  No driving or alcohol with this.  Discussed fall risk reduction techniques at home.  Referral placed to physical therapy for evaluation and treatment.       Answers for HPI/ROS submitted by the patient on 6/27/2022  What is the primary reason for your visit?: Back Pain

## 2022-06-28 ENCOUNTER — HOSPITAL ENCOUNTER (OUTPATIENT)
Dept: GENERAL RADIOLOGY | Facility: HOSPITAL | Age: 72
Discharge: HOME | End: 2022-06-28

## 2022-06-28 PROCEDURE — 73502 X-RAY EXAM HIP UNI 2-3 VIEWS: CPT

## 2022-06-28 PROCEDURE — 72100 X-RAY EXAM L-S SPINE 2/3 VWS: CPT

## 2022-07-13 RX ORDER — METFORMIN HYDROCHLORIDE 500 MG/1
TABLET, EXTENDED RELEASE ORAL
Qty: 90 TABLET | Refills: 1 | Status: SHIPPED | OUTPATIENT
Start: 2022-07-13 | End: 2023-01-23

## 2022-07-18 ENCOUNTER — OFFICE VISIT (OUTPATIENT)
Dept: ORTHOPEDIC SURGERY | Facility: CLINIC | Age: 72
End: 2022-07-18

## 2022-07-18 VITALS — RESPIRATION RATE: 18 BRPM | TEMPERATURE: 97.7 F | BODY MASS INDEX: 31.5 KG/M2 | WEIGHT: 220 LBS | HEIGHT: 70 IN

## 2022-07-18 DIAGNOSIS — M25.512 LEFT SHOULDER PAIN, UNSPECIFIED CHRONICITY: Primary | ICD-10-CM

## 2022-07-18 DIAGNOSIS — M19.019 ARTHRITIS OF SHOULDER: ICD-10-CM

## 2022-07-18 PROCEDURE — 20610 DRAIN/INJ JOINT/BURSA W/O US: CPT | Performed by: ORTHOPAEDIC SURGERY

## 2022-07-18 PROCEDURE — 73030 X-RAY EXAM OF SHOULDER: CPT | Performed by: ORTHOPAEDIC SURGERY

## 2022-07-18 PROCEDURE — 99204 OFFICE O/P NEW MOD 45 MIN: CPT | Performed by: ORTHOPAEDIC SURGERY

## 2022-07-18 RX ORDER — IBUPROFEN 200 MG
600 TABLET ORAL 2 TIMES DAILY
COMMUNITY

## 2022-07-18 RX ORDER — LIDOCAINE HYDROCHLORIDE 10 MG/ML
2 INJECTION, SOLUTION EPIDURAL; INFILTRATION; INTRACAUDAL; PERINEURAL
Status: COMPLETED | OUTPATIENT
Start: 2022-07-18 | End: 2022-07-18

## 2022-07-18 RX ORDER — METHYLPREDNISOLONE ACETATE 80 MG/ML
80 INJECTION, SUSPENSION INTRA-ARTICULAR; INTRALESIONAL; INTRAMUSCULAR; SOFT TISSUE
Status: COMPLETED | OUTPATIENT
Start: 2022-07-18 | End: 2022-07-18

## 2022-07-18 RX ADMIN — METHYLPREDNISOLONE ACETATE 80 MG: 80 INJECTION, SUSPENSION INTRA-ARTICULAR; INTRALESIONAL; INTRAMUSCULAR; SOFT TISSUE at 16:58

## 2022-07-18 RX ADMIN — LIDOCAINE HYDROCHLORIDE 2 ML: 10 INJECTION, SOLUTION EPIDURAL; INFILTRATION; INTRACAUDAL; PERINEURAL at 16:58

## 2022-07-18 NOTE — PROGRESS NOTES
Patient: Tran Sosa    YOB: 1950    Medical Record Number: [id number]    Chief Complaints:  Left shoulder pain    History of Present Illness:     72 y.o. female patient who presents with a complaint of left shoulder pain.  She reports that the symptoms first started years ago after a flu shot.  Her symptoms have been aggravated recently by helping care for her granddaughter.  Pain is moderate, constant and aching.  She reports difficulty with routine daily activities at this point.  The symptoms have been getting progressively worse.  She denies any alleviating factors although she has never had any formal treatment.  She denies any shooting pain down the arm, weakness, numbness or paresthesias.     Allergies:   Allergies   Allergen Reactions   • Morphine And Related Rash   • Sulfa Antibiotics Hives       Home Medications:    Current Outpatient Medications:   •  albuterol sulfate  (90 Base) MCG/ACT inhaler, Inhale 2 puffs., Disp: , Rfl:   •  atorvastatin (LIPITOR) 80 MG tablet, TAKE 1 TABLET BY MOUTH EVERY DAY IN THE EVENING, Disp: 90 tablet, Rfl: 1  •  docusate sodium 100 MG capsule, Take 1 capsule by mouth 2 (Two) Times a Day As Needed for Constipation., Disp: 40 each, Rfl: 1  •  fluticasone (FLONASE) 50 MCG/ACT nasal spray, INSTILL 2 SPRAYS INTO NOSE DAILY FOR 10 DAYS., Disp: , Rfl:   •  ibuprofen (Advil) 200 MG tablet, Take 600 mg by mouth 2 (Two) Times a Day., Disp: , Rfl:   •  metFORMIN ER (GLUCOPHAGE-XR) 500 MG 24 hr tablet, TAKE 1 TABLET BY MOUTH EVERY DAY WITH BREAKFAST, Disp: 90 tablet, Rfl: 1  •  methocarbamol (Robaxin) 500 MG tablet, Take 1 tablet by mouth 2 (Two) Times a Day As Needed for Muscle Spasms., Disp: 30 tablet, Rfl: 0  •  metoprolol succinate XL (TOPROL-XL) 50 MG 24 hr tablet, TAKE 1 TABLET BY MOUTH EVERY DAY AT NIGHT, Disp: 90 tablet, Rfl: 1  •  montelukast (SINGULAIR) 10 MG tablet, Take 10 mg by mouth every night at bedtime., Disp: , Rfl:   •  traZODone (DESYREL)  100 MG tablet, TAKE 2 TABLETS BY MOUTH EVERY NIGHT, Disp: 180 tablet, Rfl: 1  •  triamterene-hydrochlorothiazide (MAXZIDE-25) 37.5-25 MG per tablet, TAKE 1 TABLET BY MOUTH EVERY DAY IN THE MORNING, Disp: 90 tablet, Rfl: 1  •  meloxicam (MOBIC) 15 MG tablet, Take 1 tablet by mouth Daily., Disp: 30 tablet, Rfl: 0    Past Medical History:   Diagnosis Date   • Arthralgia of multiple sites    • Arthritis     OSTEO   • Asthma 2021   • Cancer (HCC)     RIGHT BREAST CANCER. WITH CHEMO   • COPD (chronic obstructive pulmonary disease) (HCC)     MILD   • Diverticulosis    • Elevated cholesterol    • Elevated TSH    • GERD (gastroesophageal reflux disease)    • Hiatal hernia    • History of 2019 novel coronavirus disease (COVID-19)     2020   • History of breast cancer 1984    RIGHT    • History of bronchitis    • History of pneumonia    • Hyperlipidemia    • Hypertension    • Junctional rhythm 1990    ONE EPISODE   • Lung nodule     BIOPSY BENIGN   • Mild shortness of breath     LASTING EFFECT FROM PREVIOUS COVID INFECTION   • Mitral regurgitation     Mild per 2-D echocardiogram 2017   • Osteopenia    • Osteoporosis    • PAC (premature atrial contraction)     WITH SOME SVT IN THE PAST. REASON FOR METOPROLOL   • PAT (paroxysmal atrial tachycardia) (HCC)    • Postmenopausal status    • Reactive airway disease    • Recurrent urinary tract infection     RESOLVED   • Rib fracture     YEARS AGO   • Right knee pain     TORN MENSICUS   • Seasonal allergies    • Sleep apnea     CPAP USED   • Uterine leiomyoma     HYST       Past Surgical History:   Procedure Laterality Date   • APPENDECTOMY     • BREAST SURGERY     • CARDIAC CATHETERIZATION       at [facility]; told normal coronary arteries    •  SECTION     • COLONOSCOPY     • FRACTURE SURGERY     • HAMMER TOE REPAIR Right 2016    Procedure: MULTIPLE RT CLAW TOE REPAIR WITH WEIL OSTEOTOMY; RIGHT GREAT TOE AKIN OSTEOTOMY.;  Surgeon: Rigoberto Ba MD;   Location: [facility];  Service:    • HYSTERECTOMY      W/BSO   • JOINT REPLACEMENT     • LUNG SURGERY      WEDGE RESECTION? RIGHT ?LOBE. BENIGN PULM NODULE   • MASTECTOMY Left     PROPHYLACTICALLY   • MASTECTOMY PARTIAL WITH AXILLARY LYMPH NODE EXCISION Right 1984    MALIGNANT   • ORIF WRIST FRACTURE Right     HARDWARE SINCE REMOVED   • ORIF WRIST FRACTURE Left     WITH HARDWARE   • TONSILLECTOMY     • TOTAL HIP ARTHROPLASTY Left 2017    Procedure: LT TOTAL HIP ARTHROPLASTY;  Surgeon: Yaya Reina MD;  Location: [facility] OR;  Service:    • TOTAL KNEE ARTHROPLASTY Right 2020    Procedure: TOTAL KNEE ARTHROPLASTY RIGHT;  Surgeon: Yaya Reina MD;  Location: [facility] OR;  Service: Orthopedics;  Laterality: Right;   • TOTAL KNEE ARTHROPLASTY Left 2021    Procedure: TOTAL KNEE ARTHROPLASTY LEFT;  Surgeon: Yaya Reina MD;  Location: [facility] OR;  Service: Orthopedics;  Laterality: Left;   • TRAM FLAP DELAYED     • UTERINE ARTERY EMBOLIZATION         Social History     Occupational History   • Occupation: RN AT [employer]   Tobacco Use   • Smoking status: Former Smoker     Packs/day: 1.00     Years: 10.00     Pack years: 10.00     Types: Cigarettes     Quit date:      Years since quittin.5   • Smokeless tobacco: Never Used   • Tobacco comment: QUIT AT AGE 30 YRS. OLD   Vaping Use   • Vaping Use: Never used   Substance and Sexual Activity   • Alcohol use: Yes     Comment: TWICE MONTHLY 1-2 DRINKS   • Drug use: Never   • Sexual activity: Defer      Social History     Social History Narrative   • Not on file       Family History   Problem Relation Age of Onset   • Ovarian cancer Mother    • Lung cancer Father    • Hypertension Sister         benign essential hypertension   • Hyperlipidemia Sister    • Other Sister         RENAL DISEASE   • Hypertension Brother         benign essential hypertension   • Hyperlipidemia Brother    • Malig Hyperthermia Neg Hx        Review of  "Systems:      Constitutional: Denies fever, shaking or chills   Eyes: Denies change in visual acuity   HEENT: Denies nasal congestion or sore throat   Respiratory: Denies cough or shortness of breath   Cardiovascular: Denies chest pain or edema  Endocrine: Denies tremors, palpitations, intolerance of heat or cold, polyuria, polydipsia.  GI: Denies abdominal pain, nausea, vomiting, bloody stools or diarrhea  : Denies frequency, urgency, incontinence, retention, or nocturia.  Musculoskeletal: Denies numbness, tingling or loss of motor function   Integument: Denies rash, lesion or ulceration   Neurologic: Denies headache or focal weakness, deficits  Heme: Denies spontaneous or excessive bleeding, epistaxis, hematuria, melena, fatigue, enlarged or tender lymph nodes.      All other pertinent positives and negatives as noted above in HPI.    Physical Exam:   72 y.o. female  Vitals:    07/18/22 0905   Resp: 18   Temp: 97.7 °F (36.5 °C)   Weight: 99.8 kg (220 lb)   Height: 177.8 cm (70\")       General:  Patient is awake and alert.  Appears in no acute distress or discomfort.    Psych:  Affect and demeanor are appropriate.    Eyes:  Conjunctiva and sclera appear grossly normal.  Eyes track well and EOM seem to be intact.    Ears:  No gross abnormalities.  Hearing adequate for the exam.    Cardiovascular:  Regular rate and rhythm.    Lungs:  Good chest expansion.  Breathing unlabored.    Spine:  Neck appears grossly normal.  No palpable masses or adenopathy.  Good motion.  Spurling's maneuver is negative for any shoulder or arm symptoms.    Extremities:  Left shoulder is examined.  Skin is benign.  No obvious gross abnormalities.  No palpable masses or adenopathy.  Moderate tenderness noted over anterior glenohumeral joint and rotator interval.  Motion is to 145 of forward elevation, 30 external rotation, minus 10 degrees of horizontal abduction, back pocket internal rotation.  No instability.  Rotator cuff strength seems " to be well preserved but the exam is limited due to discomfort with resisted active motion.  Good motor and sensory function in her lower arm and hand.  Palpable radial pulse.  Brisk capillary refill.         Radiology:  AP, scapular Y, and axillary views of the left shoulder are ordered by myself and reviewed to evaluate the patient's complaint.  No comparison films are immediately available.  The x-rays show severe glenohumeral osteoarthritis with bone on bone, osteophyte formation, and subchondral sclerosis.  The acromiohumeral interval measures normal.    Assessment/Plan:  Left shoulder osteoarthritis    We discussed treatment options in detail including conservative treatment versus surgical options.  Regarding conservative treatment, we discussed appropriate activity modifications, anti-inflammatories, injections, and physical therapy.  We also discussed the option of an arthroplasty and all that would entail.  I have recommended that we start with a conservative approach and the patient agrees.    She has acknowledged understanding of the information and elected for an injection.  The risk, benefits and alternatives were discussed including the elevated risk with diabetes.  She consented and the injection was performed as described below.  Going forward, patient will follow-up with me on an as-needed basis.    Large Joint Arthrocentesis: L glenohumeral  Date/Time: 7/18/2022 4:58 PM  Consent given by: patient  Site marked: site marked  Timeout: Immediately prior to procedure a time out was called to verify the correct patient, procedure, equipment, support staff and site/side marked as required   Supporting Documentation  Indications: pain   Procedure Details  Location: shoulder - L glenohumeral  Preparation: Patient was prepped and draped in the usual sterile fashion  Needle size: 25 G  Approach: anterior  Medications administered: 80 mg methylPREDNISolone acetate 80 MG/ML; 2 mL lidocaine PF 1% 1 %  Patient  tolerance: patient tolerated the procedure well with no immediate complications        Christopher Kamara MD    07/18/2022    CC to Diane Macdonald MD

## 2022-07-19 ENCOUNTER — OFFICE VISIT (OUTPATIENT)
Dept: INTERNAL MEDICINE | Facility: CLINIC | Age: 72
End: 2022-07-19

## 2022-07-19 VITALS
BODY MASS INDEX: 30.58 KG/M2 | WEIGHT: 213.6 LBS | DIASTOLIC BLOOD PRESSURE: 70 MMHG | TEMPERATURE: 97.3 F | HEIGHT: 70 IN | HEART RATE: 68 BPM | OXYGEN SATURATION: 96 % | SYSTOLIC BLOOD PRESSURE: 126 MMHG

## 2022-07-19 DIAGNOSIS — J30.9 ALLERGIC RHINITIS, UNSPECIFIED SEASONALITY, UNSPECIFIED TRIGGER: ICD-10-CM

## 2022-07-19 DIAGNOSIS — I10 PRIMARY HYPERTENSION: ICD-10-CM

## 2022-07-19 DIAGNOSIS — R73.09 ELEVATED GLUCOSE: ICD-10-CM

## 2022-07-19 DIAGNOSIS — E78.5 HYPERLIPIDEMIA, UNSPECIFIED HYPERLIPIDEMIA TYPE: Primary | ICD-10-CM

## 2022-07-19 PROCEDURE — 99214 OFFICE O/P EST MOD 30 MIN: CPT | Performed by: INTERNAL MEDICINE

## 2022-07-19 NOTE — PROGRESS NOTES
Subjective   Tran Sosa is a 72 y.o. female here to follow up on prediabetes, htn, hpL with labs.    History of Present Illness   She has had recurrent issues with utis and a fall and back pain  She is finally getting better  The only issues now is she needs a right shoulder replacement.  She also has a bad sinusitis and now is using NNS daily  Pt has been taking BP meds as prescribed without any problems.  No HA  No episodes of orthostasis  Pt has been taking cholesterol meds as prescribed.  No difficulties with myalgias.     The following portions of the patient's history were reviewed and updated as appropriate: allergies, current medications, past medical history, past social history and problem list.    Review of Systems    Objective   Physical Exam  Vitals reviewed.   Constitutional:       Appearance: She is well-developed.   HENT:      Head: Normocephalic and atraumatic.      Right Ear: External ear normal.      Left Ear: External ear normal.   Eyes:      Conjunctiva/sclera: Conjunctivae normal.      Pupils: Pupils are equal, round, and reactive to light.   Neck:      Thyroid: No thyromegaly.      Trachea: No tracheal deviation.   Cardiovascular:      Rate and Rhythm: Normal rate and regular rhythm.      Heart sounds: Normal heart sounds.   Pulmonary:      Effort: Pulmonary effort is normal.      Breath sounds: Normal breath sounds.   Abdominal:      General: Bowel sounds are normal. There is no distension.      Palpations: Abdomen is soft.      Tenderness: There is no abdominal tenderness.   Musculoskeletal:         General: No deformity. Normal range of motion.      Cervical back: Normal range of motion.   Skin:     General: Skin is warm and dry.   Neurological:      Mental Status: She is alert and oriented to person, place, and time.   Psychiatric:         Behavior: Behavior normal.         Thought Content: Thought content normal.         Judgment: Judgment normal.         Vitals:    07/19/22 0952    BP: 126/70   Pulse: 68   Temp: 97.3 °F (36.3 °C)   SpO2: 96%     Results Encounter on 07/04/2022   Component Date Value Ref Range Status   • WBC 07/12/2022 5.0  3.4 - 10.8 x10E3/uL Final   • RBC 07/12/2022 4.18  3.77 - 5.28 x10E6/uL Final   • Hemoglobin 07/12/2022 12.4  11.1 - 15.9 g/dL Final   • Hematocrit 07/12/2022 38.2  34.0 - 46.6 % Final   • MCV 07/12/2022 91  79 - 97 fL Final   • MCH 07/12/2022 29.7  26.6 - 33.0 pg Final   • MCHC 07/12/2022 32.5  31.5 - 35.7 g/dL Final   • RDW 07/12/2022 13.0  11.7 - 15.4 % Final   • Platelets 07/12/2022 269  150 - 450 x10E3/uL Final   • Neutrophil Rel % 07/12/2022 47  Not Estab. % Final   • Lymphocyte Rel % 07/12/2022 38  Not Estab. % Final   • Monocyte Rel % 07/12/2022 11  Not Estab. % Final   • Eosinophil Rel % 07/12/2022 3  Not Estab. % Final   • Basophil Rel % 07/12/2022 1  Not Estab. % Final   • Neutrophils Absolute 07/12/2022 2.4  1.4 - 7.0 x10E3/uL Final   • Lymphocytes Absolute 07/12/2022 1.9  0.7 - 3.1 x10E3/uL Final   • Monocytes Absolute 07/12/2022 0.5  0.1 - 0.9 x10E3/uL Final   • Eosinophils Absolute 07/12/2022 0.2  0.0 - 0.4 x10E3/uL Final   • Basophils Absolute 07/12/2022 0.0  0.0 - 0.2 x10E3/uL Final   • Immature Granulocyte Rel % 07/12/2022 0  Not Estab. % Final   • Immature Grans Absolute 07/12/2022 0.0  0.0 - 0.1 x10E3/uL Final   • Glucose 07/12/2022 105 (A) 65 - 99 mg/dL Final   • BUN 07/12/2022 22  8 - 27 mg/dL Final   • Creatinine 07/12/2022 1.01 (A) 0.57 - 1.00 mg/dL Final   • EGFR Result 07/12/2022 60  >59 mL/min/1.73 Final   • BUN/Creatinine Ratio 07/12/2022 22  12 - 28 Final   • Sodium 07/12/2022 138  134 - 144 mmol/L Final   • Potassium 07/12/2022 4.0  3.5 - 5.2 mmol/L Final   • Chloride 07/12/2022 103  96 - 106 mmol/L Final   • Total CO2 07/12/2022 20  20 - 29 mmol/L Final   • Calcium 07/12/2022 9.1  8.7 - 10.3 mg/dL Final   • Total Protein 07/12/2022 6.6  6.0 - 8.5 g/dL Final   • Albumin 07/12/2022 4.0  3.8 - 4.8 g/dL Final   • Globulin  07/12/2022 2.6  1.5 - 4.5 g/dL Final   • A/G Ratio 07/12/2022 1.5  1.2 - 2.2 Final   • Total Bilirubin 07/12/2022 0.3  0.0 - 1.2 mg/dL Final   • Alkaline Phosphatase 07/12/2022 138 (A) 44 - 121 IU/L Final   • AST (SGOT) 07/12/2022 19  0 - 40 IU/L Final   • ALT (SGPT) 07/12/2022 17  0 - 32 IU/L Final   • Total Cholesterol 07/12/2022 166  100 - 199 mg/dL Final   • Triglycerides 07/12/2022 179 (A) 0 - 149 mg/dL Final   • HDL Cholesterol 07/12/2022 37 (A) >39 mg/dL Final   • VLDL Cholesterol Yoan 07/12/2022 31  5 - 40 mg/dL Final   • LDL Chol Calc (NIH) 07/12/2022 98  0 - 99 mg/dL Final   • LDL/HDL RATIO 07/12/2022 2.6  0.0 - 3.2 ratio Final    Comment:                                     LDL/HDL Ratio                                              Men  Women                                1/2 Avg.Risk  1.0    1.5                                    Avg.Risk  3.6    3.2                                 2X Avg.Risk  6.2    5.0                                 3X Avg.Risk  8.0    6.1     • TSH 07/12/2022 3.350  0.450 - 4.500 uIU/mL Final   • Hemoglobin A1C 07/12/2022 6.2 (A) 4.8 - 5.6 % Final    Comment:          Prediabetes: 5.7 - 6.4           Diabetes: >6.4           Glycemic control for adults with diabetes: <7.0       Current Outpatient Medications   Medication Instructions   • albuterol sulfate  (90 Base) MCG/ACT inhaler 2 puffs, Inhalation   • atorvastatin (LIPITOR) 80 MG tablet TAKE 1 TABLET BY MOUTH EVERY DAY IN THE EVENING   • docusate sodium 100 MG capsule 1 capsule, Oral, 2 Times Daily PRN   • fluticasone (FLONASE) 50 MCG/ACT nasal spray INSTILL 2 SPRAYS INTO NOSE DAILY FOR 10 DAYS.   • ibuprofen (ADVIL,MOTRIN) 600 mg, Oral, 2 Times Daily   • metFORMIN ER (GLUCOPHAGE-XR) 500 MG 24 hr tablet TAKE 1 TABLET BY MOUTH EVERY DAY WITH BREAKFAST   • metoprolol succinate XL (TOPROL-XL) 50 MG 24 hr tablet TAKE 1 TABLET BY MOUTH EVERY DAY AT NIGHT   • montelukast (SINGULAIR) 10 mg, Oral, Every Night at Bedtime   •  traZODone (DESYREL) 200 mg, Oral, Nightly   • triamterene-hydrochlorothiazide (MAXZIDE-25) 37.5-25 MG per tablet TAKE 1 TABLET BY MOUTH EVERY DAY IN THE MORNING       Body mass index is 30.65 kg/m².         Assessment & Plan   Diagnoses and all orders for this visit:    1. Hyperlipidemia, unspecified hyperlipidemia type (Primary)    2. Primary hypertension    3. Allergic rhinitis, unspecified seasonality, unspecified trigger    4. Elevated glucose    1.  HPL- she is doing well with current lipitor  2.  HTN-  Ok with maxide  3.  AR-  She is doing well with NSS  4. Elevated gluc-she is doing well with metformin               Answers for HPI/ROS submitted by the patient on 7/18/2022  What is the primary reason for your visit?: Physical

## 2022-07-20 ENCOUNTER — PATIENT ROUNDING (BHMG ONLY) (OUTPATIENT)
Dept: ORTHOPEDIC SURGERY | Facility: CLINIC | Age: 72
End: 2022-07-20

## 2022-07-20 NOTE — PROGRESS NOTES
A [employer] Message has been sent to the patient for PATIENT ROUNDING with [facility] [place]

## 2022-08-23 RX ORDER — ATORVASTATIN CALCIUM 80 MG/1
TABLET, FILM COATED ORAL
Qty: 90 TABLET | Refills: 1 | Status: SHIPPED | OUTPATIENT
Start: 2022-08-23 | End: 2023-02-22

## 2022-09-07 RX ORDER — TRAZODONE HYDROCHLORIDE 100 MG/1
200 TABLET ORAL NIGHTLY
Qty: 180 TABLET | Refills: 1 | Status: SHIPPED | OUTPATIENT
Start: 2022-09-07 | End: 2023-03-06

## 2022-09-26 ENCOUNTER — TELEPHONE (OUTPATIENT)
Dept: INTERNAL MEDICINE | Facility: CLINIC | Age: 72
End: 2022-09-26

## 2022-10-03 ENCOUNTER — OFFICE VISIT (OUTPATIENT)
Dept: INTERNAL MEDICINE | Facility: CLINIC | Age: 72
End: 2022-10-03

## 2022-10-03 VITALS
HEART RATE: 77 BPM | HEIGHT: 70 IN | TEMPERATURE: 98.2 F | BODY MASS INDEX: 31.05 KG/M2 | OXYGEN SATURATION: 97 % | SYSTOLIC BLOOD PRESSURE: 130 MMHG | DIASTOLIC BLOOD PRESSURE: 82 MMHG | WEIGHT: 216.9 LBS

## 2022-10-03 DIAGNOSIS — Z23 NEED FOR INFLUENZA VACCINATION: ICD-10-CM

## 2022-10-03 DIAGNOSIS — M25.551 RIGHT HIP PAIN: Primary | ICD-10-CM

## 2022-10-03 PROCEDURE — G0008 ADMIN INFLUENZA VIRUS VAC: HCPCS | Performed by: INTERNAL MEDICINE

## 2022-10-03 PROCEDURE — 90662 IIV NO PRSV INCREASED AG IM: CPT | Performed by: INTERNAL MEDICINE

## 2022-10-03 PROCEDURE — 99214 OFFICE O/P EST MOD 30 MIN: CPT | Performed by: INTERNAL MEDICINE

## 2022-10-03 RX ORDER — METHYLPREDNISOLONE 4 MG/1
TABLET ORAL
Qty: 21 TABLET | Refills: 0 | Status: SHIPPED | OUTPATIENT
Start: 2022-10-03 | End: 2023-01-20

## 2022-10-03 RX ORDER — CYCLOBENZAPRINE HCL 10 MG
10 TABLET ORAL 3 TIMES DAILY PRN
Qty: 30 TABLET | Refills: 2 | Status: SHIPPED | OUTPATIENT
Start: 2022-10-03 | End: 2023-01-20

## 2022-10-03 NOTE — PROGRESS NOTES
Subjective   Tran Sosa is a 72 y.o. female here today for right hip pain from a fall x 5 weeks     History of Present Illness   SHe has been having severe pain in the left posterior hip for 5 weeks  She is afraid of falling due to the pain.  She is walking funny due to the pain and is now having left knee pain  She fell in June and hit her head  She has really had pain since then and it got a little better for a while but now worse over the last 5 weeks    The following portions of the patient's history were reviewed and updated as appropriate: allergies, current medications, past medical history, past surgical history and problem list.    Review of Systems    Objective   Physical Exam  Vitals reviewed.   Constitutional:       Appearance: She is well-developed.   HENT:      Head: Normocephalic and atraumatic.      Right Ear: External ear normal.      Left Ear: External ear normal.   Eyes:      Conjunctiva/sclera: Conjunctivae normal.      Pupils: Pupils are equal, round, and reactive to light.   Neck:      Thyroid: No thyromegaly.      Trachea: No tracheal deviation.   Cardiovascular:      Rate and Rhythm: Normal rate and regular rhythm.      Heart sounds: Normal heart sounds.   Pulmonary:      Effort: Pulmonary effort is normal.      Breath sounds: Normal breath sounds.   Abdominal:      General: Bowel sounds are normal. There is no distension.      Palpations: Abdomen is soft.      Tenderness: There is no abdominal tenderness.   Musculoskeletal:         General: No deformity. Normal range of motion.      Cervical back: Normal range of motion.   Skin:     General: Skin is warm and dry.   Neurological:      Mental Status: She is alert and oriented to person, place, and time.   Psychiatric:         Behavior: Behavior normal.         Thought Content: Thought content normal.         Judgment: Judgment normal.         Vitals:    10/03/22 0823   BP: 130/82   Pulse: 77   Temp: 98.2 °F (36.8 °C)   SpO2: 97%     Body  mass index is 31.12 kg/m².       Current Outpatient Medications   Medication Instructions   • albuterol sulfate  (90 Base) MCG/ACT inhaler 2 puffs, Inhalation   • atorvastatin (LIPITOR) 80 MG tablet TAKE 1 TABLET BY MOUTH EVERY DAY IN THE EVENING   • cyclobenzaprine (FLEXERIL) 10 mg, Oral, 3 Times Daily PRN   • docusate sodium 100 MG capsule 1 capsule, Oral, 2 Times Daily PRN   • fluticasone (FLONASE) 50 MCG/ACT nasal spray INSTILL 2 SPRAYS INTO NOSE DAILY FOR 10 DAYS.   • ibuprofen (ADVIL,MOTRIN) 600 mg, Oral, 2 Times Daily   • metFORMIN ER (GLUCOPHAGE-XR) 500 MG 24 hr tablet TAKE 1 TABLET BY MOUTH EVERY DAY WITH BREAKFAST   • methylPREDNISolone (MEDROL) 4 MG dose pack Take as directed on package instructions.   • metoprolol succinate XL (TOPROL-XL) 50 MG 24 hr tablet TAKE 1 TABLET BY MOUTH EVERY DAY AT NIGHT   • montelukast (SINGULAIR) 10 mg, Oral, Every Night at Bedtime   • traZODone (DESYREL) 200 mg, Oral, Nightly   • triamterene-hydrochlorothiazide (MAXZIDE-25) 37.5-25 MG per tablet TAKE 1 TABLET BY MOUTH EVERY DAY IN THE MORNING         Assessment & Plan   Diagnoses and all orders for this visit:    1. Right hip pain (Primary)    Other orders  -     methylPREDNISolone (MEDROL) 4 MG dose pack; Take as directed on package instructions.  Dispense: 21 tablet; Refill: 0  -     cyclobenzaprine (FLEXERIL) 10 MG tablet; Take 1 tablet by mouth 3 (Three) Times a Day As Needed for Muscle Spasms.  Dispense: 30 tablet; Refill: 2    1.  Right hip ain - ? Sciatica versus primary hip issue  Try pred and flexeril at night  I have refferred for pt   Mat need a MRI  She will let me know how she is doing

## 2022-10-10 ENCOUNTER — APPOINTMENT (OUTPATIENT)
Dept: GENERAL RADIOLOGY | Facility: HOSPITAL | Age: 72
End: 2022-10-10

## 2022-10-10 PROCEDURE — 73630 X-RAY EXAM OF FOOT: CPT | Performed by: FAMILY MEDICINE

## 2022-10-24 ENCOUNTER — CLINICAL SUPPORT (OUTPATIENT)
Dept: ORTHOPEDIC SURGERY | Facility: CLINIC | Age: 72
End: 2022-10-24

## 2022-10-24 VITALS — BODY MASS INDEX: 30.24 KG/M2 | HEIGHT: 71 IN | WEIGHT: 216 LBS | TEMPERATURE: 97.6 F

## 2022-10-24 DIAGNOSIS — M19.019 ARTHRITIS OF SHOULDER: Primary | ICD-10-CM

## 2022-10-24 PROCEDURE — 20610 DRAIN/INJ JOINT/BURSA W/O US: CPT | Performed by: NURSE PRACTITIONER

## 2022-10-24 RX ORDER — METHYLPREDNISOLONE ACETATE 80 MG/ML
80 INJECTION, SUSPENSION INTRA-ARTICULAR; INTRALESIONAL; INTRAMUSCULAR; SOFT TISSUE
Status: COMPLETED | OUTPATIENT
Start: 2022-10-24 | End: 2022-10-24

## 2022-10-24 RX ADMIN — METHYLPREDNISOLONE ACETATE 80 MG: 80 INJECTION, SUSPENSION INTRA-ARTICULAR; INTRALESIONAL; INTRAMUSCULAR; SOFT TISSUE at 10:38

## 2022-10-24 NOTE — PROGRESS NOTES
Ms. Sosa comes in today for follow-up.  Injections have worked well in the past.  The patient would like to get a repeat injection today.  The risks, benefits and alternatives were discussed and the patient consented.  Going forward, the patient will follow-up as needed.    ENRRIQUE Wright    10/24/2022    Large Joint Arthrocentesis: L glenohumeral  Date/Time: 10/24/2022 10:38 AM  Consent given by: patient  Site marked: site marked  Timeout: Immediately prior to procedure a time out was called to verify the correct patient, procedure, equipment, support staff and site/side marked as required   Supporting Documentation  Indications: pain   Procedure Details  Location: shoulder - L glenohumeral  Preparation: Patient was prepped and draped in the usual sterile fashion  Needle gauge: 21g.  Approach: anterior  Medications administered: 80 mg methylPREDNISolone acetate 80 MG/ML; 2 mL lidocaine (cardiac)  Patient tolerance: patient tolerated the procedure well with no immediate complications

## 2022-11-21 DIAGNOSIS — I47.1 PAT (PAROXYSMAL ATRIAL TACHYCARDIA): ICD-10-CM

## 2022-11-21 RX ORDER — METOPROLOL SUCCINATE 50 MG/1
TABLET, EXTENDED RELEASE ORAL
Qty: 90 TABLET | Refills: 1 | Status: SHIPPED | OUTPATIENT
Start: 2022-11-21

## 2022-12-14 RX ORDER — TRIAMTERENE AND HYDROCHLOROTHIAZIDE 37.5; 25 MG/1; MG/1
TABLET ORAL
Qty: 90 TABLET | Refills: 1 | Status: SHIPPED | OUTPATIENT
Start: 2022-12-14

## 2023-01-12 DIAGNOSIS — I10 PRIMARY HYPERTENSION: ICD-10-CM

## 2023-01-12 DIAGNOSIS — J30.9 ALLERGIC RHINITIS, UNSPECIFIED SEASONALITY, UNSPECIFIED TRIGGER: ICD-10-CM

## 2023-01-12 DIAGNOSIS — R73.09 ELEVATED GLUCOSE: ICD-10-CM

## 2023-01-12 DIAGNOSIS — E78.5 HYPERLIPIDEMIA, UNSPECIFIED HYPERLIPIDEMIA TYPE: ICD-10-CM

## 2023-01-14 LAB
ALBUMIN SERPL-MCNC: 4.8 G/DL (ref 3.5–5.2)
ALBUMIN/CREAT UR: 7 MG/G CREAT (ref 0–29)
ALBUMIN/GLOB SERPL: 2.4 G/DL
ALP SERPL-CCNC: 70 U/L (ref 39–117)
ALT SERPL-CCNC: 16 U/L (ref 1–33)
AST SERPL-CCNC: 15 U/L (ref 1–32)
BASOPHILS # BLD AUTO: 0.06 10*3/MM3 (ref 0–0.2)
BASOPHILS NFR BLD AUTO: 0.8 % (ref 0–1.5)
BILIRUB SERPL-MCNC: 0.5 MG/DL (ref 0–1.2)
BUN SERPL-MCNC: 24 MG/DL (ref 8–23)
BUN/CREAT SERPL: 20.5 (ref 7–25)
CALCIUM SERPL-MCNC: 9.3 MG/DL (ref 8.6–10.5)
CHLORIDE SERPL-SCNC: 100 MMOL/L (ref 98–107)
CHOLEST SERPL-MCNC: 178 MG/DL (ref 0–200)
CO2 SERPL-SCNC: 26.3 MMOL/L (ref 22–29)
CREAT SERPL-MCNC: 1.17 MG/DL (ref 0.57–1)
CREAT UR-MCNC: 66.6 MG/DL
EGFRCR SERPLBLD CKD-EPI 2021: 50.3 ML/MIN/1.73
EOSINOPHIL # BLD AUTO: 0.18 10*3/MM3 (ref 0–0.4)
EOSINOPHIL NFR BLD AUTO: 2.5 % (ref 0.3–6.2)
ERYTHROCYTE [DISTWIDTH] IN BLOOD BY AUTOMATED COUNT: 12.4 % (ref 12.3–15.4)
GLOBULIN SER CALC-MCNC: 2 GM/DL
GLUCOSE SERPL-MCNC: 90 MG/DL (ref 65–99)
HBA1C MFR BLD: 5.8 % (ref 4.8–5.6)
HCT VFR BLD AUTO: 36.8 % (ref 34–46.6)
HDLC SERPL-MCNC: 47 MG/DL (ref 40–60)
HGB BLD-MCNC: 12.1 G/DL (ref 12–15.9)
IMM GRANULOCYTES # BLD AUTO: 0.01 10*3/MM3 (ref 0–0.05)
IMM GRANULOCYTES NFR BLD AUTO: 0.1 % (ref 0–0.5)
LDLC SERPL CALC-MCNC: 103 MG/DL (ref 0–100)
LDLC/HDLC SERPL: 2.09 {RATIO}
LYMPHOCYTES # BLD AUTO: 1.56 10*3/MM3 (ref 0.7–3.1)
LYMPHOCYTES NFR BLD AUTO: 21.5 % (ref 19.6–45.3)
MCH RBC QN AUTO: 30 PG (ref 26.6–33)
MCHC RBC AUTO-ENTMCNC: 32.9 G/DL (ref 31.5–35.7)
MCV RBC AUTO: 91.3 FL (ref 79–97)
MICROALBUMIN UR-MCNC: 4.8 UG/ML
MONOCYTES # BLD AUTO: 0.7 10*3/MM3 (ref 0.1–0.9)
MONOCYTES NFR BLD AUTO: 9.6 % (ref 5–12)
NEUTROPHILS # BLD AUTO: 4.75 10*3/MM3 (ref 1.7–7)
NEUTROPHILS NFR BLD AUTO: 65.5 % (ref 42.7–76)
NRBC BLD AUTO-RTO: 0 /100 WBC (ref 0–0.2)
PLATELET # BLD AUTO: 306 10*3/MM3 (ref 140–450)
POTASSIUM SERPL-SCNC: 3.8 MMOL/L (ref 3.5–5.2)
PROT SERPL-MCNC: 6.8 G/DL (ref 6–8.5)
RBC # BLD AUTO: 4.03 10*6/MM3 (ref 3.77–5.28)
SODIUM SERPL-SCNC: 139 MMOL/L (ref 136–145)
TRIGL SERPL-MCNC: 163 MG/DL (ref 0–150)
TSH SERPL DL<=0.005 MIU/L-ACNC: 1.95 UIU/ML (ref 0.27–4.2)
VLDLC SERPL CALC-MCNC: 28 MG/DL (ref 5–40)
WBC # BLD AUTO: 7.26 10*3/MM3 (ref 3.4–10.8)

## 2023-01-20 ENCOUNTER — OFFICE VISIT (OUTPATIENT)
Dept: INTERNAL MEDICINE | Facility: CLINIC | Age: 73
End: 2023-01-20
Payer: MEDICARE

## 2023-01-20 VITALS
TEMPERATURE: 97.3 F | OXYGEN SATURATION: 98 % | HEART RATE: 82 BPM | DIASTOLIC BLOOD PRESSURE: 72 MMHG | HEIGHT: 71 IN | WEIGHT: 219 LBS | SYSTOLIC BLOOD PRESSURE: 120 MMHG | BODY MASS INDEX: 30.66 KG/M2

## 2023-01-20 DIAGNOSIS — Z00.00 MEDICARE ANNUAL WELLNESS VISIT, SUBSEQUENT: Primary | ICD-10-CM

## 2023-01-20 DIAGNOSIS — Z87.440 HISTORY OF RECURRENT UTIS: ICD-10-CM

## 2023-01-20 PROBLEM — Z85.3 HISTORY OF LEFT BREAST CANCER: Status: ACTIVE | Noted: 2023-01-20

## 2023-01-20 PROCEDURE — 1160F RVW MEDS BY RX/DR IN RCRD: CPT | Performed by: INTERNAL MEDICINE

## 2023-01-20 PROCEDURE — 99213 OFFICE O/P EST LOW 20 MIN: CPT | Performed by: INTERNAL MEDICINE

## 2023-01-20 PROCEDURE — 1170F FXNL STATUS ASSESSED: CPT | Performed by: INTERNAL MEDICINE

## 2023-01-20 PROCEDURE — G0439 PPPS, SUBSEQ VISIT: HCPCS | Performed by: INTERNAL MEDICINE

## 2023-01-20 PROCEDURE — 1125F AMNT PAIN NOTED PAIN PRSNT: CPT | Performed by: INTERNAL MEDICINE

## 2023-01-20 PROCEDURE — 1159F MED LIST DOCD IN RCRD: CPT | Performed by: INTERNAL MEDICINE

## 2023-01-20 RX ORDER — BUDESONIDE AND FORMOTEROL FUMARATE DIHYDRATE 160; 4.5 UG/1; UG/1
2 AEROSOL RESPIRATORY (INHALATION) 2 TIMES DAILY
COMMUNITY
Start: 2022-01-03 | End: 2023-01-20 | Stop reason: SDUPTHER

## 2023-01-20 RX ORDER — OMEPRAZOLE 20 MG/1
20 CAPSULE, DELAYED RELEASE ORAL DAILY
Qty: 90 CAPSULE | Refills: 3 | Status: SHIPPED | OUTPATIENT
Start: 2023-01-20

## 2023-01-20 RX ORDER — OMEPRAZOLE 20 MG/1
20 CAPSULE, DELAYED RELEASE ORAL DAILY
COMMUNITY
Start: 2022-12-02 | End: 2023-01-20

## 2023-01-20 RX ORDER — BUDESONIDE AND FORMOTEROL FUMARATE DIHYDRATE 160; 4.5 UG/1; UG/1
2 AEROSOL RESPIRATORY (INHALATION) 2 TIMES DAILY
Qty: 10.2 G | Refills: 5 | Status: SHIPPED | OUTPATIENT
Start: 2023-01-20 | End: 2025-01-29

## 2023-01-20 RX ORDER — ESTRADIOL 0.1 MG/G
CREAM VAGINAL
Qty: 42.5 G | Refills: 3 | Status: SHIPPED | OUTPATIENT
Start: 2023-01-20

## 2023-01-20 NOTE — PROGRESS NOTES
The ABCs of the Annual Wellness Visit  Subsequent Medicare Wellness Visit    Subjective    Tran Sosa is a 72 y.o. female who presents for a Subsequent Medicare Wellness Visit.    The following portions of the patient's history were reviewed and   updated as appropriate: allergies, current medications, past medical history, past social history and problem list.    Compared to one year ago, the patient feels her physical   health is the same.    Compared to one year ago, the patient feels her mental   health is the same.    Recent Hospitalizations:  She was not admitted to the hospital during the last year.       Current Medical Providers:  Patient Care Team:  Diane Macdonald MD as PCP - General  Diane Macdonald MD as PCP - Family Medicine  Lavon Walker MD as Consulting Physician (Pulmonary Disease)  Koby Choudhury MD as Consulting Physician (Cardiology)    Outpatient Medications Prior to Visit   Medication Sig Dispense Refill   • albuterol sulfate  (90 Base) MCG/ACT inhaler Inhale 2 puffs.     • atorvastatin (LIPITOR) 80 MG tablet TAKE 1 TABLET BY MOUTH EVERY DAY IN THE EVENING 90 tablet 1   • budesonide-formoterol (SYMBICORT) 160-4.5 MCG/ACT inhaler Inhale 2 puffs 2 (Two) Times a Day.     • docusate sodium 100 MG capsule Take 1 capsule by mouth 2 (Two) Times a Day As Needed for Constipation. 40 each 1   • fluticasone (FLONASE) 50 MCG/ACT nasal spray INSTILL 2 SPRAYS INTO NOSE DAILY FOR 10 DAYS.     • ibuprofen (ADVIL,MOTRIN) 200 MG tablet Take 600 mg by mouth 2 (Two) Times a Day.     • metFORMIN ER (GLUCOPHAGE-XR) 500 MG 24 hr tablet TAKE 1 TABLET BY MOUTH EVERY DAY WITH BREAKFAST 90 tablet 1   • metoprolol succinate XL (TOPROL-XL) 50 MG 24 hr tablet TAKE 1 TABLET BY MOUTH EVERY DAY AT NIGHT 90 tablet 1   • montelukast (SINGULAIR) 10 MG tablet Take 10 mg by mouth every night at bedtime.     • omeprazole (priLOSEC) 20 MG capsule Take 20 mg by mouth Daily.     • traZODone (DESYREL) 100 MG tablet TAKE 2 TABLETS  "BY MOUTH EVERY NIGHT 180 tablet 1   • triamterene-hydrochlorothiazide (MAXZIDE-25) 37.5-25 MG per tablet TAKE 1 TABLET BY MOUTH EVERY DAY IN THE MORNING 90 tablet 1   • cyclobenzaprine (FLEXERIL) 10 MG tablet Take 1 tablet by mouth 3 (Three) Times a Day As Needed for Muscle Spasms. 30 tablet 2   • methylPREDNISolone (MEDROL) 4 MG dose pack Take as directed on package instructions. 21 tablet 0     No facility-administered medications prior to visit.       No opioid medication identified on active medication list. I have reviewed chart for other potential  high risk medication/s and harmful drug interactions in the elderly.          Aspirin is not on active medication list.  Aspirin use is not indicated based on review of current medical condition/s. Risk of harm outweighs potential benefits.  .    Patient Active Problem List   Diagnosis   • Atopic rhinitis   • Arthralgia of multiple joints   • Hyperlipidemia   • Hypertension   • Osteopenia   • PAT (paroxysmal atrial tachycardia) (HCC)   • Sinus tachycardia   • SHAI (obstructive sleep apnea)   • OA (osteoarthritis) of hip   • Primary osteoarthritis of right knee   • Pulmonary embolism without acute cor pulmonale (HCC)   • Acute respiratory failure with hypoxemia (HCC)   • COVID-19   • Primary osteoarthritis of left knee   • Asthma   • Lung nodule     Advance Care Planning  Advance Directive is not on file.  ACP discussion was held with the patient during this visit. Patient has an advance directive (not in EMR), copy requested.     Objective    Vitals:    01/20/23 0857   BP: 120/72   BP Location: Left arm   Patient Position: Sitting   Pulse: 82   Temp: 97.3 °F (36.3 °C)   TempSrc: Infrared   SpO2: 98%   Weight: 99.3 kg (219 lb)   Height: 179.1 cm (70.5\")   PainSc:   3     Estimated body mass index is 30.98 kg/m² as calculated from the following:    Height as of this encounter: 179.1 cm (70.5\").    Weight as of this encounter: 99.3 kg (219 lb).    BMI is >= 30 and <35. " (Class 1 Obesity). The following options were offered after discussion;: exercise counseling/recommendations and nutrition counseling/recommendations      Does the patient have evidence of cognitive impairment? No    Lab Results   Component Value Date    CHLPL 178 2023    TRIG 163 (H) 2023    HDL 47 2023     (H) 2023    VLDL 28 2023    HGBA1C 5.80 (H) 2023        HEALTH RISK ASSESSMENT    Smoking Status:  Social History     Tobacco Use   Smoking Status Former   • Packs/day: 1.00   • Years: 10.00   • Pack years: 10.00   • Types: Cigarettes   • Quit date: 1980   • Years since quittin.0   Smokeless Tobacco Never   Tobacco Comments    QUIT AT AGE 30 YRS. OLD     Alcohol Consumption:  Social History     Substance and Sexual Activity   Alcohol Use Yes    Comment: Occassional     Fall Risk Screen:    STEADI Fall Risk Assessment was completed, and patient is at HIGH risk for falls. Assessment completed on:2023    Depression Screening:  PHQ-2/PHQ-9 Depression Screening 2023   Little Interest or Pleasure in Doing Things 0-->not at all   Feeling Down, Depressed or Hopeless 0-->not at all   PHQ-9: Brief Depression Severity Measure Score 0       Health Habits and Functional and Cognitive Screening:  Functional & Cognitive Status 2023   Do you have difficulty preparing food and eating? No   Do you have difficulty bathing yourself, getting dressed or grooming yourself? No   Do you have difficulty using the toilet? No   Do you have difficulty moving around from place to place? No   Do you have trouble with steps or getting out of a bed or a chair? No   Current Diet Limited Junk Food   Dental Exam Up to date   Eye Exam Up to date   Exercise (times per week) 3 times per week   Current Exercises Include House Cleaning        Exercise Comment babysitting   Current Exercise Activities Include -   Do you need help using the phone?  No   Are you deaf or do you have serious  difficulty hearing?  Yes   Do you need help with transportation? No   Do you need help shopping? No   Do you need help preparing meals?  No   Do you need help with housework?  No   Do you need help with laundry? No   Do you need help taking your medications? No   Do you need help managing money? No   Do you ever drive or ride in a car without wearing a seat belt? No   Have you felt unusual stress, anger or loneliness in the last month? No   Who do you live with? Alone   If you need help, do you have trouble finding someone available to you? No   Have you been bothered in the last four weeks by sexual problems? No   Do you have difficulty concentrating, remembering or making decisions? No       Age-appropriate Screening Schedule:  Refer to the list below for future screening recommendations based on patient's age, sex and/or medical conditions. Orders for these recommended tests are listed in the plan section. The patient has been provided with a written plan.    Health Maintenance   Topic Date Due   • DIABETIC EYE EXAM  07/05/2023   • HEMOGLOBIN A1C  07/13/2023   • DIABETIC FOOT EXAM  07/19/2023   • LIPID PANEL  01/13/2024   • URINE MICROALBUMIN  01/13/2024   • DXA SCAN  02/10/2024   • TDAP/TD VACCINES (2 - Td or Tdap) 07/18/2028   • INFLUENZA VACCINE  Completed   • ZOSTER VACCINE  Completed   • MAMMOGRAM  Discontinued   • PAP SMEAR  Discontinued                CMS Preventative Services Quick Reference  Risk Factors Identified During Encounter  None Identified  The above risks/problems have been discussed with the patient.  Pertinent information has been shared with the patient in the After Visit Summary.  An After Visit Summary and PPPS were made available to the patient.    Follow Up:   Next Medicare Wellness visit to be scheduled in 1 year.       Additional E&M Note during same encounter follows:  Patient has multiple medical problems which are significant and separately identifiable that require additional work  "above and beyond the Medicare Wellness Visit.      Chief Complaint  Hyperlipidemia and Heartburn    Subjective        HPI  Tran Sosa is also being seen today for she has been having some increasing reflux sx  She has been taking 20mg omeprazole  Most days  She does feel,sanju this work well  She does feel like her her asthma is better using the symbicort but also thinks the PPI helps           Objective   Vital Signs:  /72 (BP Location: Left arm, Patient Position: Sitting)   Pulse 82   Temp 97.3 °F (36.3 °C) (Infrared)   Ht 179.1 cm (70.5\")   Wt 99.3 kg (219 lb)   SpO2 98%   BMI 30.98 kg/m²     Physical Exam  Vitals reviewed.   Constitutional:       Appearance: She is well-developed.   HENT:      Head: Normocephalic and atraumatic.      Right Ear: External ear normal.      Left Ear: External ear normal.   Eyes:      Conjunctiva/sclera: Conjunctivae normal.      Pupils: Pupils are equal, round, and reactive to light.   Neck:      Thyroid: No thyromegaly.      Trachea: No tracheal deviation.   Cardiovascular:      Rate and Rhythm: Normal rate and regular rhythm.      Heart sounds: Normal heart sounds.   Pulmonary:      Effort: Pulmonary effort is normal.      Breath sounds: Normal breath sounds.   Abdominal:      General: Bowel sounds are normal. There is no distension.      Palpations: Abdomen is soft.      Tenderness: There is no abdominal tenderness.   Musculoskeletal:         General: No deformity. Normal range of motion.      Cervical back: Normal range of motion.   Skin:     General: Skin is warm and dry.   Neurological:      Mental Status: She is alert and oriented to person, place, and time.   Psychiatric:         Behavior: Behavior normal.         Thought Content: Thought content normal.         Judgment: Judgment normal.          The following data was reviewed by: Diane Macdonald MD on 01/20/2023:  Common labs    Common Labs 4/26/22 7/12/22 7/12/22 7/12/22 7/12/22 1/13/23 1/13/23 1/13/23 1/13/23 " 1/13/23     0711 0711 0711 0711 0802 0802 0802 0802 0802   Glucose   105 (A)      90    BUN   22      24 (A)    Creatinine   1.01 (A)      1.17 (A)    Sodium   138      139    Potassium   4.0      3.8    Chloride   103      100    Calcium   9.1      9.3    Total Protein   6.6      6.8    Albumin   4.0      4.8    Total Bilirubin   0.3      0.5    Alkaline Phosphatase   138 (A)      70    AST (SGOT)   19      15    ALT (SGPT)   17      16    WBC 8.16 5.0        7.26   Hemoglobin 11.1 (A) 12.4        12.1   Hematocrit 34.8 (A) 38.2        36.8   Platelets 262 269        306   Total Cholesterol    166    178     Triglycerides    179 (A)    163 (A)     HDL Cholesterol    37 (A)    47     LDL Cholesterol     98    103 (A)     Hemoglobin A1C     6.2 (A) 5.80 (A)       Microalbumin, Urine       4.8      (A) Abnormal value       Comments are available for some flowsheets but are not being displayed.                      Assessment and Plan   There are no diagnoses linked to this encounter.         Follow Up   No follow-ups on file.  Patient was given instructions and counseling regarding her condition or for health maintenance advice. Please see specific information pulled into the AVS if appropriate.     1. Recurrent uti-  Hx of breast cancer 40years ago   No recurrent  I think this many years later the risk is miniscual of some topical estrgen ext 3x a week  2.  GERD-  She is doing well with otc  I will send an rcx

## 2023-01-23 RX ORDER — METFORMIN HYDROCHLORIDE 500 MG/1
TABLET, EXTENDED RELEASE ORAL
Qty: 90 TABLET | Refills: 1 | Status: SHIPPED | OUTPATIENT
Start: 2023-01-23

## 2023-02-06 ENCOUNTER — TELEPHONE (OUTPATIENT)
Dept: ORTHOPEDIC SURGERY | Facility: CLINIC | Age: 73
End: 2023-02-06

## 2023-02-06 NOTE — TELEPHONE ENCOUNTER
Caller: YARED JOYCE    Relationship to patient: SELF    Best call back number:  [phone]    Chief complaint:  PATIENT REQUESTING APPT. FOR LEFT SHOULDER INJECTION AT THE EP OFFICE.    Type of visit:  F/U/INJ

## 2023-02-13 ENCOUNTER — CLINICAL SUPPORT (OUTPATIENT)
Dept: ORTHOPEDIC SURGERY | Facility: CLINIC | Age: 73
End: 2023-02-13
Payer: MEDICARE

## 2023-02-13 VITALS — TEMPERATURE: 97.3 F | WEIGHT: 216.1 LBS | HEIGHT: 71 IN | BODY MASS INDEX: 30.25 KG/M2

## 2023-02-13 DIAGNOSIS — M25.512 CHRONIC LEFT SHOULDER PAIN: Primary | ICD-10-CM

## 2023-02-13 DIAGNOSIS — M19.019 ARTHRITIS OF SHOULDER: ICD-10-CM

## 2023-02-13 DIAGNOSIS — G89.29 CHRONIC LEFT SHOULDER PAIN: Primary | ICD-10-CM

## 2023-02-13 PROCEDURE — 20610 DRAIN/INJ JOINT/BURSA W/O US: CPT | Performed by: NURSE PRACTITIONER

## 2023-02-13 RX ORDER — METHYLPREDNISOLONE ACETATE 80 MG/ML
80 INJECTION, SUSPENSION INTRA-ARTICULAR; INTRALESIONAL; INTRAMUSCULAR; SOFT TISSUE
Status: COMPLETED | OUTPATIENT
Start: 2023-02-13 | End: 2023-02-13

## 2023-02-13 RX ORDER — LIDOCAINE HYDROCHLORIDE 10 MG/ML
2 INJECTION, SOLUTION EPIDURAL; INFILTRATION; INTRACAUDAL; PERINEURAL
Status: COMPLETED | OUTPATIENT
Start: 2023-02-13 | End: 2023-02-13

## 2023-02-13 RX ADMIN — METHYLPREDNISOLONE ACETATE 80 MG: 80 INJECTION, SUSPENSION INTRA-ARTICULAR; INTRALESIONAL; INTRAMUSCULAR; SOFT TISSUE at 10:18

## 2023-02-13 RX ADMIN — LIDOCAINE HYDROCHLORIDE 2 ML: 10 INJECTION, SOLUTION EPIDURAL; INFILTRATION; INTRACAUDAL; PERINEURAL at 10:18

## 2023-02-13 NOTE — PROGRESS NOTES
Ms. Sosa comes in today for follow-up.  Injections have worked well in the past.  The patient would like to get a repeat injection today.  The risks, benefits and alternatives were discussed and the patient consented.  Going forward, the patient will follow-up as needed.    ENRRIQUE Wright    02/13/2023      Large Joint Arthrocentesis: L glenohumeral  Date/Time: 2/13/2023 10:18 AM  Consent given by: patient  Site marked: site marked  Timeout: Immediately prior to procedure a time out was called to verify the correct patient, procedure, equipment, support staff and site/side marked as required   Supporting Documentation  Indications: pain   Procedure Details  Location: shoulder - L glenohumeral  Preparation: Patient was prepped and draped in the usual sterile fashion  Needle gauge: 21G.  Approach: posterior  Medications administered: 80 mg methylPREDNISolone acetate 80 MG/ML; 2 mL lidocaine PF 1% 1 %  Patient tolerance: patient tolerated the procedure well with no immediate complications

## 2023-02-14 ENCOUNTER — OFFICE VISIT (OUTPATIENT)
Dept: INTERNAL MEDICINE | Facility: CLINIC | Age: 73
End: 2023-02-14
Payer: MEDICARE

## 2023-02-14 VITALS
HEIGHT: 71 IN | HEART RATE: 65 BPM | TEMPERATURE: 97.3 F | SYSTOLIC BLOOD PRESSURE: 140 MMHG | BODY MASS INDEX: 30.83 KG/M2 | WEIGHT: 220.2 LBS | DIASTOLIC BLOOD PRESSURE: 92 MMHG | OXYGEN SATURATION: 98 %

## 2023-02-14 DIAGNOSIS — J01.00 ACUTE MAXILLARY SINUSITIS, RECURRENCE NOT SPECIFIED: Primary | ICD-10-CM

## 2023-02-14 PROCEDURE — 99214 OFFICE O/P EST MOD 30 MIN: CPT | Performed by: INTERNAL MEDICINE

## 2023-02-14 RX ORDER — AMOXICILLIN AND CLAVULANATE POTASSIUM 875; 125 MG/1; MG/1
1 TABLET, FILM COATED ORAL 2 TIMES DAILY
Qty: 20 TABLET | Refills: 0 | Status: SHIPPED | OUTPATIENT
Start: 2023-02-14

## 2023-02-14 NOTE — PROGRESS NOTES
Subjective   Tran Sosa is a 72 y.o. female here today for headache, tooth pain, congestion, cough x 5 weeks    History of Present Illness   She has had sinus drainage for 2-3 weeks  Now with sig sinus pressure and tooth pain  Lost of yellow/green mucous  No fever some chills   No NVD  No sob  Cough from drainage      The following portions of the patient's history were reviewed and updated as appropriate: allergies, current medications, past medical history, past social history and problem list.  No ill contact  Last sinusitis 1 year ago    Review of Systems   Constitutional: Positive for fatigue. Negative for fever.   HENT: Positive for postnasal drip, sinus pressure and sinus pain. Negative for ear pain and facial swelling.    Respiratory: Positive for cough. Negative for shortness of breath.    Cardiovascular: Negative.    Gastrointestinal: Negative.    Genitourinary: Negative.        Objective   Physical Exam  Vitals reviewed.   Constitutional:       Appearance: She is well-developed.   HENT:      Head: Normocephalic and atraumatic.      Right Ear: External ear normal.      Left Ear: External ear normal.   Eyes:      Conjunctiva/sclera: Conjunctivae normal.      Pupils: Pupils are equal, round, and reactive to light.   Neck:      Thyroid: No thyromegaly.      Trachea: No tracheal deviation.   Cardiovascular:      Rate and Rhythm: Normal rate and regular rhythm.      Heart sounds: Normal heart sounds.   Pulmonary:      Effort: Pulmonary effort is normal.      Breath sounds: Normal breath sounds.   Abdominal:      General: Bowel sounds are normal. There is no distension.      Palpations: Abdomen is soft.      Tenderness: There is no abdominal tenderness.   Musculoskeletal:         General: No deformity. Normal range of motion.      Cervical back: Normal range of motion.   Skin:     General: Skin is warm and dry.   Neurological:      Mental Status: She is alert and oriented to person, place, and time.    Psychiatric:         Behavior: Behavior normal.         Thought Content: Thought content normal.         Judgment: Judgment normal.         Vitals:    02/14/23 0859   BP: 140/92   Pulse: 65   Temp: 97.3 °F (36.3 °C)   SpO2: 98%     Body mass index is 31.15 kg/m².       Current Outpatient Medications:   •  albuterol sulfate  (90 Base) MCG/ACT inhaler, Inhale 2 puffs., Disp: , Rfl:   •  atorvastatin (LIPITOR) 80 MG tablet, TAKE 1 TABLET BY MOUTH EVERY DAY IN THE EVENING, Disp: 90 tablet, Rfl: 1  •  budesonide-formoterol (SYMBICORT) 160-4.5 MCG/ACT inhaler, Inhale 2 puffs 2 (Two) Times a Day., Disp: 10.2 g, Rfl: 5  •  docusate sodium 100 MG capsule, Take 1 capsule by mouth 2 (Two) Times a Day As Needed for Constipation., Disp: 40 each, Rfl: 1  •  estradiol (ESTRACE VAGINAL) 0.1 MG/GM vaginal cream, Apply pea sized amount externally 2x a week, Disp: 42.5 g, Rfl: 3  •  fluticasone (FLONASE) 50 MCG/ACT nasal spray, INSTILL 2 SPRAYS INTO NOSE DAILY FOR 10 DAYS., Disp: , Rfl:   •  ibuprofen (ADVIL,MOTRIN) 200 MG tablet, Take 600 mg by mouth 2 (Two) Times a Day., Disp: , Rfl:   •  metFORMIN ER (GLUCOPHAGE-XR) 500 MG 24 hr tablet, TAKE 1 TABLET BY MOUTH EVERY DAY WITH BREAKFAST, Disp: 90 tablet, Rfl: 1  •  metoprolol succinate XL (TOPROL-XL) 50 MG 24 hr tablet, TAKE 1 TABLET BY MOUTH EVERY DAY AT NIGHT, Disp: 90 tablet, Rfl: 1  •  montelukast (SINGULAIR) 10 MG tablet, Take 10 mg by mouth every night at bedtime., Disp: , Rfl:   •  omeprazole (priLOSEC) 20 MG capsule, Take 1 capsule by mouth Daily., Disp: 90 capsule, Rfl: 3  •  traZODone (DESYREL) 100 MG tablet, TAKE 2 TABLETS BY MOUTH EVERY NIGHT, Disp: 180 tablet, Rfl: 1  •  triamterene-hydrochlorothiazide (MAXZIDE-25) 37.5-25 MG per tablet, TAKE 1 TABLET BY MOUTH EVERY DAY IN THE MORNING, Disp: 90 tablet, Rfl: 1  •  amoxicillin-clavulanate (Augmentin) 875-125 MG per tablet, Take 1 tablet by mouth 2 (Two) Times a Day., Disp: 20 tablet, Rfl: 0  No current  facility-administered medications for this visit.      Assessment & Plan   Diagnoses and all orders for this visit:    1. Acute maxillary sinusitis, recurrence not specified (Primary)    Other orders  -     amoxicillin-clavulanate (Augmentin) 875-125 MG per tablet; Take 1 tablet by mouth 2 (Two) Times a Day.  Dispense: 20 tablet; Refill: 0      1. Sinusitis-  She clearly has a sinus infection  Treat with augmentin  Increase fluids jeanmarie warm fluids and call back if sx worsen or fail to improve

## 2023-02-22 RX ORDER — ATORVASTATIN CALCIUM 80 MG/1
TABLET, FILM COATED ORAL
Qty: 90 TABLET | Refills: 1 | Status: SHIPPED | OUTPATIENT
Start: 2023-02-22

## 2023-03-06 RX ORDER — TRAZODONE HYDROCHLORIDE 100 MG/1
200 TABLET ORAL NIGHTLY
Qty: 180 TABLET | Refills: 1 | Status: SHIPPED | OUTPATIENT
Start: 2023-03-06

## 2023-05-24 ENCOUNTER — OFFICE VISIT (OUTPATIENT)
Dept: INTERNAL MEDICINE | Facility: CLINIC | Age: 73
End: 2023-05-24
Payer: MEDICARE

## 2023-05-24 VITALS
WEIGHT: 222.6 LBS | TEMPERATURE: 97.8 F | OXYGEN SATURATION: 96 % | HEART RATE: 95 BPM | BODY MASS INDEX: 31.16 KG/M2 | HEIGHT: 71 IN | SYSTOLIC BLOOD PRESSURE: 122 MMHG | DIASTOLIC BLOOD PRESSURE: 72 MMHG

## 2023-05-24 DIAGNOSIS — Z87.440 HISTORY OF RECURRENT UTIS: Primary | ICD-10-CM

## 2023-05-24 DIAGNOSIS — K59.00 CONSTIPATION, UNSPECIFIED CONSTIPATION TYPE: ICD-10-CM

## 2023-05-24 DIAGNOSIS — I47.1 PAT (PAROXYSMAL ATRIAL TACHYCARDIA): ICD-10-CM

## 2023-05-24 DIAGNOSIS — R30.0 DYSURIA: ICD-10-CM

## 2023-05-24 LAB
BILIRUB BLD-MCNC: NEGATIVE MG/DL
CLARITY, POC: CLEAR
COLOR UR: YELLOW
EXPIRATION DATE: ABNORMAL
GLUCOSE UR STRIP-MCNC: NEGATIVE MG/DL
KETONES UR QL: NEGATIVE
LEUKOCYTE EST, POC: ABNORMAL
Lab: ABNORMAL
NITRITE UR-MCNC: NEGATIVE MG/ML
PH UR: 5.5 [PH] (ref 5–8)
PROT UR STRIP-MCNC: NEGATIVE MG/DL
RBC # UR STRIP: ABNORMAL /UL
SP GR UR: 1.01 (ref 1–1.03)
UROBILINOGEN UR QL: ABNORMAL

## 2023-05-24 RX ORDER — METOPROLOL SUCCINATE 50 MG/1
TABLET, EXTENDED RELEASE ORAL
Qty: 90 TABLET | Refills: 1 | Status: SHIPPED | OUTPATIENT
Start: 2023-05-24

## 2023-05-24 RX ORDER — AMOXICILLIN AND CLAVULANATE POTASSIUM 875; 125 MG/1; MG/1
1 TABLET, FILM COATED ORAL 2 TIMES DAILY
Qty: 14 TABLET | Refills: 0 | Status: SHIPPED | OUTPATIENT
Start: 2023-05-24

## 2023-05-24 NOTE — PROGRESS NOTES
Subjective   Tran Sosa is a 72 y.o. female.     History of Present Illness   The patient is here today with c/o for urinary frequency and urgency.     Started menopause at 37 yrs old due to chemo treatment from breast cancer.  Fatigue started 3-4 days ago, tired and up more more at night due to urgency. Denies hematuria. Last UTI in December and  March x2 and treated with Augmentin.  Constipated for weeks, then has diarrhea and back to constipation. Not emptying bladder.    The following portions of the patient's history were reviewed and updated as appropriate: allergies, current medications, past family history, past medical history, past social history, past surgical history and problem list.    Review of Systems   Constitutional: Negative.    Respiratory: Negative.    Cardiovascular: Negative.    Genitourinary: Positive for frequency and urgency. Negative for flank pain and hematuria.       Objective   Physical Exam  Constitutional:       Appearance: Normal appearance. She is well-developed.   Neck:      Thyroid: No thyromegaly.   Cardiovascular:      Rate and Rhythm: Normal rate and regular rhythm.      Heart sounds: Normal heart sounds.   Pulmonary:      Effort: Pulmonary effort is normal.      Breath sounds: Normal breath sounds.   Abdominal:      Tenderness: There is no right CVA tenderness or left CVA tenderness.   Musculoskeletal:      Cervical back: Normal range of motion and neck supple.   Lymphadenopathy:      Cervical: No cervical adenopathy.   Skin:     General: Skin is warm and dry.   Neurological:      Mental Status: She is alert.   Psychiatric:         Behavior: Behavior normal.         Thought Content: Thought content normal.         Judgment: Judgment normal.         Vitals:    05/24/23 1355   BP: 122/72   Pulse: 95   Temp: 97.8 °F (36.6 °C)   SpO2: 96%     Body mass index is 31.48 kg/m².      Current Outpatient Medications:   •  albuterol sulfate  (90 Base) MCG/ACT inhaler, Inhale 2  puffs., Disp: , Rfl:   •  atorvastatin (LIPITOR) 80 MG tablet, TAKE 1 TABLET BY MOUTH EVERY DAY IN THE EVENING, Disp: 90 tablet, Rfl: 1  •  budesonide-formoterol (SYMBICORT) 160-4.5 MCG/ACT inhaler, Inhale 2 puffs 2 (Two) Times a Day., Disp: 10.2 g, Rfl: 5  •  docusate sodium 100 MG capsule, Take 1 capsule by mouth 2 (Two) Times a Day As Needed for Constipation., Disp: 40 each, Rfl: 1  •  estradiol (ESTRACE VAGINAL) 0.1 MG/GM vaginal cream, Apply pea sized amount externally 2x a week, Disp: 42.5 g, Rfl: 3  •  fluticasone (FLONASE) 50 MCG/ACT nasal spray, INSTILL 2 SPRAYS INTO NOSE DAILY FOR 10 DAYS., Disp: , Rfl:   •  ibuprofen (ADVIL,MOTRIN) 200 MG tablet, Take 3 tablets by mouth 2 (Two) Times a Day., Disp: , Rfl:   •  metFORMIN ER (GLUCOPHAGE-XR) 500 MG 24 hr tablet, TAKE 1 TABLET BY MOUTH EVERY DAY WITH BREAKFAST, Disp: 90 tablet, Rfl: 1  •  metoprolol succinate XL (TOPROL-XL) 50 MG 24 hr tablet, TAKE 1 TABLET BY MOUTH EVERY DAY AT NIGHT, Disp: 90 tablet, Rfl: 1  •  montelukast (SINGULAIR) 10 MG tablet, Take 1 tablet by mouth every night at bedtime., Disp: , Rfl:   •  omeprazole (priLOSEC) 20 MG capsule, Take 1 capsule by mouth Daily., Disp: 90 capsule, Rfl: 3  •  traZODone (DESYREL) 100 MG tablet, TAKE 2 TABLETS BY MOUTH EVERY NIGHT, Disp: 180 tablet, Rfl: 1  •  triamterene-hydrochlorothiazide (MAXZIDE-25) 37.5-25 MG per tablet, TAKE 1 TABLET BY MOUTH EVERY DAY IN THE MORNING, Disp: 90 tablet, Rfl: 1  •  amoxicillin-clavulanate (AUGMENTIN) 875-125 MG per tablet, Take 1 tablet by mouth 2 (Two) Times a Day., Disp: 14 tablet, Rfl: 0  Assessment & Plan   Diagnoses and all orders for this visit:    1. History of recurrent UTIs (Primary)  -     amoxicillin-clavulanate (AUGMENTIN) 875-125 MG per tablet; Take 1 tablet by mouth 2 (Two) Times a Day.  Dispense: 14 tablet; Refill: 0  -     Ambulatory Referral to Urology    2. Dysuria  -     POCT urinalysis dipstick, automated  -     Urine Culture - Urine, Urine, Clean  Catch    3. Constipation, unspecified constipation type                 1. Frequent UTIs- refer to urology, will give augmentin, cx urine  2. Constipation- will need C-scope soon, needs further eval of this, she will get the name and send it to us so we can place referral    Size Of Lesion: .3cm on right leg .5cm on left leg Detail Level: Detailed Morphology Per Location (Optional): Tan gray Macules

## 2023-05-26 LAB
BACTERIA UR CULT: NORMAL
BACTERIA UR CULT: NORMAL

## 2023-06-07 ENCOUNTER — TELEPHONE (OUTPATIENT)
Dept: INTERNAL MEDICINE | Facility: CLINIC | Age: 73
End: 2023-06-07
Payer: MEDICARE

## 2023-06-07 ENCOUNTER — TELEPHONE (OUTPATIENT)
Dept: ORTHOPEDIC SURGERY | Facility: CLINIC | Age: 73
End: 2023-06-07

## 2023-06-07 RX ORDER — SCOLOPAMINE TRANSDERMAL SYSTEM 1 MG/1
1 PATCH, EXTENDED RELEASE TRANSDERMAL
Qty: 4 EACH | Refills: 0 | Status: SHIPPED | OUTPATIENT
Start: 2023-06-07

## 2023-06-07 NOTE — TELEPHONE ENCOUNTER
Caller: Tran Sosa    Relationship to patient: Self    Best call back number:     Chief complaint: LEFT SHOULDER PAIN    Type of visit: CORTISONE INJECTION  LAST INJ: 2/13/23    Requested date: ASAP        Date of Service: 10/30/2017    PULMONARY CONSULTATION     HISTORY OF PRESENT ILLNESS:  The patient was seen and examined.  I was asked by Dr. Resendiz to evaluate the patient who was admitted with altered mental status and possible syncope.  The patient is known to us from prior hospitalization.  The patient has a history of alcohol use.  The patient was found down at a grocery store and was brought to the hospital.  The patient has a bruise on his forehead.  He was found to be hyponatremic and has a low bicarbonate despite minimal elevation of serum creatinine.  The patient admits drinking some alcohol.  Also has a history of seizure.  The CT of the head is unremarkable.  There is soft tissue swelling on the scalp.  He was also found to be hyponatremic, slightly acidotic, and has other electrolyte abnormalities.  At the time of my evaluation the patient is more interactive but is still confused.  He denies any chest pain, pleurisy, pleuritic type of discomfort and dizziness prior to the episode.  The patient does not remember the episode as such.  No urinary incontinence and no witnessed seizure activity according to our information.    OUTPATIENT MEDICATIONS:  Campral.  Allopurinol.  Clonidine.   Cyclobenzaprine.  Gabapentin.  Levothyroxine.  Metoprolol.  Thiamine.  Multivitamin.    PAST MEDICAL HISTORY:  The patient's other medical problems include history of anemia, anxiety, depression, dry skin, reflux disease, high cholesterol, hypothyroidism, lung cancer, malignant neoplasm, nodule of the lung, history of wedge resection of lung, possible sleep apnea, colonoscopy, esophagogastroduodenoscopy, laryngoscopy, resection of lung lesion.  The patient has a history of smoking in the past.  The patient has a history of right upper lobe wedge resection, lower lobe wedge resection and lymph node dissection in 2016 at [facility].  The right upper lobe wedge resection was consistent with squamous cell carcinoma.   The patient also has a history of chronic pancreatitis.  The patient also has a history of squamous cell carcinoma of tonsils.  He also has a history of adenocarcinoma of colon.    PHYSICAL EXAMINATION:  GENERAL:  At the time of my evaluation the patient is awake, interactive but confused.  VITAL SIGNS:  Blood pressure is 130/74 with a pulse of 83 beats per minute, respiratory rate is 19 breaths per minute.  Oxygen saturation is % on supplemental oxygen.  LUNGS:  Breath sounds are equal bilaterally but very mild expiratory wheezing.  HEART:  Sound S1, S2 normal, tachycardia.  ABDOMEN:  Soft and nontender, no guarding, no rigidity, no rebound.  EXTREMITIES:  No edema and no calf muscle tenderness.  NECK:  Supple.  Trachea is central.  HEENT:  Pupils are equal and reactive.  There is a bruise on the forehead.  NEUROLOGIC:  Awake, confused.  Moves extremities.    DIAGNOSTIC DATA:   Chest x-ray has been ordered and will be done later.      CT scan of the head was reported unremarkable.      The rest of the labs show a serum creatinine of 1.50, sodium is 128, potassium is 3.3, chloride of 88, bicarbonate is 18, glucose is 161, pH is 7.32/33, bicarbonate is 17.  Troponin is negative.  Alcohol level is not detected.  Comprehensive metabolic panel shows sodium of 131, potassium of 3.3, chloride of 89, bicarbonate is 19, glucose is 155, BUN 20, creatinine is 1.30.  White cell count is 4.6, hemoglobin is 12.6, hematocrit is 37.5 and platelet count is 117.  Magnesium is 1.7.  Magnesium repeat was 1.2.    IMPRESSION:  1.  Altered level of consciousness with improvement.  2.  Hyponatremia.  3.  Metabolic acidosis.  4.  History of syncope.  5.  Scalp hematoma.  6.  Hypomagnesemia.  7.  History of squamous cell carcinoma of lung, status post resection.  8.  History of squamous cell carcinoma of the tonsils.    PLAN:  The plan of care is as follows:  1.  The patient will be admitted to the floor.  2.  I will check the serum  and urine osmolality of the patient.  3.  Check the TSH level.  4.  Check the osmolar gap with calculated and measured serum osmolality.  5.  Continue IV fluids, Normal Saline at a rate of 100 mL an hour.  6.  Watch for alcohol withdrawal.  7.  Thiamine and Folic Acid.  8.  I will repeat patient's electrolytes again later today.  9.  Watch for alcohol withdrawal.  10.  Aspiration precautions, keep height of bed 30 degrees.  11.  I would favor keeping the patient n.p.o. for now.  12.  Watch for hypoglycemia.  13.  Sequential compression stockings for DVT prophylaxis.    Thank you very much for asking me to see the patient.      Dictated By: Jose Luis Tran MD  Signing Provider: Jose Luis Tran MD    AA/lbi ([id number])  DD: 10/30/2017 18:48:12 TD: 10/30/2017 20:07:30    Copy Sent To:     cc: Lev Resendiz MD

## 2023-06-07 NOTE — TELEPHONE ENCOUNTER
----- Message from Tran Sosa sent at 6/7/2023 11:42 AM EDT -----  Regarding: Scopolamine patch  Contact: [phone]  , I’m going on a cruise to [place] on July 15th and was wondering about getting a prescription for a scopolamine patch. I have not been on a cruise before, but have gotten sick on smaller boats, rides, postop nausea, etc. if you agree, I’d like to get the patches soon and possibly try one at home to make sure there’s no adverse reactions.  Thanks, Tran Sosa.

## 2023-06-19 ENCOUNTER — CLINICAL SUPPORT (OUTPATIENT)
Dept: ORTHOPEDIC SURGERY | Facility: CLINIC | Age: 73
End: 2023-06-19
Payer: MEDICARE

## 2023-06-19 VITALS — TEMPERATURE: 97.5 F | BODY MASS INDEX: 30.35 KG/M2 | HEIGHT: 71 IN | WEIGHT: 216.8 LBS

## 2023-06-19 DIAGNOSIS — M19.019 ARTHRITIS OF SHOULDER: ICD-10-CM

## 2023-06-19 DIAGNOSIS — G89.29 CHRONIC LEFT SHOULDER PAIN: Primary | ICD-10-CM

## 2023-06-19 DIAGNOSIS — M25.512 CHRONIC LEFT SHOULDER PAIN: Primary | ICD-10-CM

## 2023-06-19 RX ORDER — LIDOCAINE HYDROCHLORIDE 10 MG/ML
2 INJECTION, SOLUTION EPIDURAL; INFILTRATION; INTRACAUDAL; PERINEURAL
Status: COMPLETED | OUTPATIENT
Start: 2023-06-19 | End: 2023-06-19

## 2023-06-19 RX ORDER — METHYLPREDNISOLONE ACETATE 80 MG/ML
80 INJECTION, SUSPENSION INTRA-ARTICULAR; INTRALESIONAL; INTRAMUSCULAR; SOFT TISSUE
Status: COMPLETED | OUTPATIENT
Start: 2023-06-19 | End: 2023-06-19

## 2023-06-19 RX ADMIN — METHYLPREDNISOLONE ACETATE 80 MG: 80 INJECTION, SUSPENSION INTRA-ARTICULAR; INTRALESIONAL; INTRAMUSCULAR; SOFT TISSUE at 08:42

## 2023-06-19 RX ADMIN — LIDOCAINE HYDROCHLORIDE 2 ML: 10 INJECTION, SOLUTION EPIDURAL; INFILTRATION; INTRACAUDAL; PERINEURAL at 08:42

## 2023-06-19 NOTE — PROGRESS NOTES
Ms. Sosa comes in today for follow-up.  Injections have worked well in the past.  The patient would like to get a repeat injection today.  The risks, benefits and alternatives were discussed and the patient consented.  Going forward, the patient will follow-up as needed.    ENRRIQUE Wright    06/19/2023      Large Joint Arthrocentesis: L glenohumeral  Date/Time: 6/19/2023 8:42 AM  Consent given by: patient  Site marked: site marked  Timeout: Immediately prior to procedure a time out was called to verify the correct patient, procedure, equipment, support staff and site/side marked as required   Supporting Documentation  Indications: pain   Procedure Details  Location: shoulder - L glenohumeral  Preparation: Patient was prepped and draped in the usual sterile fashion  Needle gauge: 21 G.  Approach: anterior  Medications administered: 80 mg methylPREDNISolone acetate 80 MG/ML; 2 mL lidocaine PF 1% 1 %  Patient tolerance: patient tolerated the procedure well with no immediate complications

## 2023-07-07 PROBLEM — M20.41 HAMMER TOE OF RIGHT FOOT: Status: ACTIVE | Noted: 2023-07-07

## 2023-07-29 ENCOUNTER — DOCUMENTATION (OUTPATIENT)
Dept: INTERNAL MEDICINE | Facility: CLINIC | Age: 73
End: 2023-07-29
Payer: MEDICARE

## 2023-08-22 RX ORDER — ATORVASTATIN CALCIUM 80 MG/1
TABLET, FILM COATED ORAL
Qty: 90 TABLET | Refills: 1 | Status: SHIPPED | OUTPATIENT
Start: 2023-08-22

## 2023-09-05 RX ORDER — TRAZODONE HYDROCHLORIDE 100 MG/1
200 TABLET ORAL NIGHTLY
Qty: 180 TABLET | Refills: 1 | Status: SHIPPED | OUTPATIENT
Start: 2023-09-05

## 2023-09-13 DIAGNOSIS — I10 PRIMARY HYPERTENSION: ICD-10-CM

## 2023-09-13 DIAGNOSIS — R73.09 ELEVATED GLUCOSE: ICD-10-CM

## 2023-09-13 DIAGNOSIS — E78.5 HYPERLIPIDEMIA, UNSPECIFIED HYPERLIPIDEMIA TYPE: Primary | ICD-10-CM

## 2023-09-15 LAB
ALBUMIN SERPL-MCNC: 4.3 G/DL (ref 3.5–5.2)
ALBUMIN/GLOB SERPL: 1.8 G/DL
ALP SERPL-CCNC: 68 U/L (ref 39–117)
ALT SERPL-CCNC: 19 U/L (ref 1–33)
AST SERPL-CCNC: 16 U/L (ref 1–32)
BASOPHILS # BLD AUTO: 0.04 10*3/MM3 (ref 0–0.2)
BASOPHILS NFR BLD AUTO: 0.8 % (ref 0–1.5)
BILIRUB SERPL-MCNC: 0.3 MG/DL (ref 0–1.2)
BUN SERPL-MCNC: 14 MG/DL (ref 8–23)
BUN/CREAT SERPL: 16.1 (ref 7–25)
CALCIUM SERPL-MCNC: 9.4 MG/DL (ref 8.6–10.5)
CHLORIDE SERPL-SCNC: 102 MMOL/L (ref 98–107)
CHOLEST SERPL-MCNC: 144 MG/DL (ref 0–200)
CO2 SERPL-SCNC: 27 MMOL/L (ref 22–29)
CREAT SERPL-MCNC: 0.87 MG/DL (ref 0.57–1)
EGFRCR SERPLBLD CKD-EPI 2021: 71.3 ML/MIN/1.73
EOSINOPHIL # BLD AUTO: 0.15 10*3/MM3 (ref 0–0.4)
EOSINOPHIL NFR BLD AUTO: 3 % (ref 0.3–6.2)
ERYTHROCYTE [DISTWIDTH] IN BLOOD BY AUTOMATED COUNT: 13.4 % (ref 12.3–15.4)
GLOBULIN SER CALC-MCNC: 2.4 GM/DL
GLUCOSE SERPL-MCNC: 106 MG/DL (ref 65–99)
HBA1C MFR BLD: 5.9 % (ref 4.8–5.6)
HCT VFR BLD AUTO: 38.3 % (ref 34–46.6)
HDLC SERPL-MCNC: 37 MG/DL (ref 40–60)
HGB BLD-MCNC: 12.5 G/DL (ref 12–15.9)
IMM GRANULOCYTES # BLD AUTO: 0.01 10*3/MM3 (ref 0–0.05)
IMM GRANULOCYTES NFR BLD AUTO: 0.2 % (ref 0–0.5)
LDLC SERPL CALC-MCNC: 77 MG/DL (ref 0–100)
LDLC/HDLC SERPL: 1.94 {RATIO}
LYMPHOCYTES # BLD AUTO: 1.49 10*3/MM3 (ref 0.7–3.1)
LYMPHOCYTES NFR BLD AUTO: 30.1 % (ref 19.6–45.3)
MCH RBC QN AUTO: 29.9 PG (ref 26.6–33)
MCHC RBC AUTO-ENTMCNC: 32.6 G/DL (ref 31.5–35.7)
MCV RBC AUTO: 91.6 FL (ref 79–97)
MONOCYTES # BLD AUTO: 0.46 10*3/MM3 (ref 0.1–0.9)
MONOCYTES NFR BLD AUTO: 9.3 % (ref 5–12)
NEUTROPHILS # BLD AUTO: 2.8 10*3/MM3 (ref 1.7–7)
NEUTROPHILS NFR BLD AUTO: 56.6 % (ref 42.7–76)
NRBC BLD AUTO-RTO: 0 /100 WBC (ref 0–0.2)
PLATELET # BLD AUTO: 282 10*3/MM3 (ref 140–450)
POTASSIUM SERPL-SCNC: 3.4 MMOL/L (ref 3.5–5.2)
PROT SERPL-MCNC: 6.7 G/DL (ref 6–8.5)
RBC # BLD AUTO: 4.18 10*6/MM3 (ref 3.77–5.28)
SODIUM SERPL-SCNC: 139 MMOL/L (ref 136–145)
TRIGL SERPL-MCNC: 176 MG/DL (ref 0–150)
TSH SERPL DL<=0.005 MIU/L-ACNC: 3.29 UIU/ML (ref 0.27–4.2)
VLDLC SERPL CALC-MCNC: 30 MG/DL (ref 5–40)
WBC # BLD AUTO: 4.95 10*3/MM3 (ref 3.4–10.8)

## 2023-09-21 ENCOUNTER — OFFICE VISIT (OUTPATIENT)
Dept: INTERNAL MEDICINE | Facility: CLINIC | Age: 73
End: 2023-09-21
Payer: MEDICARE

## 2023-09-21 VITALS
OXYGEN SATURATION: 95 % | BODY MASS INDEX: 27.65 KG/M2 | WEIGHT: 197.5 LBS | HEART RATE: 90 BPM | SYSTOLIC BLOOD PRESSURE: 116 MMHG | HEIGHT: 71 IN | DIASTOLIC BLOOD PRESSURE: 74 MMHG | TEMPERATURE: 98.2 F

## 2023-09-21 DIAGNOSIS — I10 PRIMARY HYPERTENSION: Primary | ICD-10-CM

## 2023-09-21 DIAGNOSIS — E78.5 HYPERLIPIDEMIA, UNSPECIFIED HYPERLIPIDEMIA TYPE: ICD-10-CM

## 2023-09-21 DIAGNOSIS — E66.9 CLASS 1 OBESITY WITH SERIOUS COMORBIDITY IN ADULT, UNSPECIFIED BMI, UNSPECIFIED OBESITY TYPE: ICD-10-CM

## 2023-09-21 PROBLEM — U07.1 COVID-19: Status: RESOLVED | Noted: 2020-07-16 | Resolved: 2023-09-21

## 2023-09-21 PROCEDURE — 3074F SYST BP LT 130 MM HG: CPT | Performed by: INTERNAL MEDICINE

## 2023-09-21 PROCEDURE — 3078F DIAST BP <80 MM HG: CPT | Performed by: INTERNAL MEDICINE

## 2023-09-21 PROCEDURE — 99213 OFFICE O/P EST LOW 20 MIN: CPT | Performed by: INTERNAL MEDICINE

## 2023-09-21 PROCEDURE — 1159F MED LIST DOCD IN RCRD: CPT | Performed by: INTERNAL MEDICINE

## 2023-09-21 PROCEDURE — 1160F RVW MEDS BY RX/DR IN RCRD: CPT | Performed by: INTERNAL MEDICINE

## 2023-09-21 PROCEDURE — 3044F HG A1C LEVEL LT 7.0%: CPT | Performed by: INTERNAL MEDICINE

## 2023-09-21 NOTE — PROGRESS NOTES
Subjective   Tran Sosa is a 73 y.o. female.     Hyperlipidemia    Hypertension     She is doing well with semagluatide that she is getting from a pharmacy in [facility]      The following portions of the patient's history were reviewed and updated as appropriate: allergies, current medications, past family history, past medical history, past social history, past surgical history, and problem list.    Review of Systems    Objective   Physical Exam  Vitals reviewed.   Constitutional:       Appearance: She is well-developed.   HENT:      Head: Normocephalic and atraumatic.      Right Ear: External ear normal.      Left Ear: External ear normal.   Eyes:      Conjunctiva/sclera: Conjunctivae normal.      Pupils: Pupils are equal, round, and reactive to light.   Neck:      Thyroid: No thyromegaly.      Trachea: No tracheal deviation.   Cardiovascular:      Rate and Rhythm: Normal rate and regular rhythm.      Heart sounds: Normal heart sounds.   Pulmonary:      Effort: Pulmonary effort is normal.      Breath sounds: Normal breath sounds.   Abdominal:      General: Bowel sounds are normal. There is no distension.      Palpations: Abdomen is soft.      Tenderness: There is no abdominal tenderness.   Musculoskeletal:         General: No deformity. Normal range of motion.      Cervical back: Normal range of motion.   Skin:     General: Skin is warm and dry.   Neurological:      Mental Status: She is alert and oriented to person, place, and time.   Psychiatric:         Behavior: Behavior normal.         Thought Content: Thought content normal.         Judgment: Judgment normal.       Vitals:    09/21/23 1445   BP: 116/74   Pulse: 90   Temp: 98.2 °F (36.8 °C)   SpO2: 95%     Body mass index is 27.94 kg/m².         Assessment & Plan   Diagnoses and all orders for this visit:    1. Primary hypertension (Primary)    2. Hyperlipidemia, unspecified hyperlipidemia type    3. Class 1 obesity with serious comorbidity in adult,  unspecified BMI, unspecified obesity type       HTN-  ok with current meds  HPL- ok with current meds  Insomnia-  she is doing well with trazodone  Weight loss-  she is doing well with semaglutide.  Her joint mobility is greatly improved.  Her glucose tolerance is also improved.  She had an elevated A1c and that is now down nearly to normal

## 2023-11-26 DIAGNOSIS — I47.19 PAT (PAROXYSMAL ATRIAL TACHYCARDIA): ICD-10-CM

## 2023-11-26 RX ORDER — METOPROLOL SUCCINATE 50 MG/1
TABLET, EXTENDED RELEASE ORAL
Qty: 90 TABLET | Refills: 1 | Status: SHIPPED | OUTPATIENT
Start: 2023-11-26

## 2024-01-02 RX ORDER — OMEPRAZOLE 20 MG/1
CAPSULE, DELAYED RELEASE ORAL
Qty: 90 CAPSULE | Refills: 1 | Status: SHIPPED | OUTPATIENT
Start: 2024-01-02

## 2024-01-16 RX ORDER — TRIAMTERENE AND HYDROCHLOROTHIAZIDE 37.5; 25 MG/1; MG/1
TABLET ORAL
Qty: 90 TABLET | Refills: 1 | Status: SHIPPED | OUTPATIENT
Start: 2024-01-16

## 2024-01-16 RX ORDER — METFORMIN HYDROCHLORIDE 500 MG/1
TABLET, EXTENDED RELEASE ORAL
Qty: 90 TABLET | Refills: 1 | Status: SHIPPED | OUTPATIENT
Start: 2024-01-16

## 2024-01-29 ENCOUNTER — OFFICE VISIT (OUTPATIENT)
Dept: ORTHOPEDIC SURGERY | Facility: CLINIC | Age: 74
End: 2024-01-29
Payer: MEDICARE

## 2024-01-29 VITALS — WEIGHT: 177.3 LBS | HEIGHT: 71 IN | TEMPERATURE: 98.6 F | BODY MASS INDEX: 24.82 KG/M2

## 2024-01-29 DIAGNOSIS — G89.29 CHRONIC LEFT SHOULDER PAIN: Primary | ICD-10-CM

## 2024-01-29 DIAGNOSIS — M25.512 CHRONIC LEFT SHOULDER PAIN: Primary | ICD-10-CM

## 2024-01-29 DIAGNOSIS — M19.019 ARTHRITIS OF SHOULDER: ICD-10-CM

## 2024-01-29 PROCEDURE — 73030 X-RAY EXAM OF SHOULDER: CPT | Performed by: NURSE PRACTITIONER

## 2024-01-29 PROCEDURE — 20610 DRAIN/INJ JOINT/BURSA W/O US: CPT | Performed by: NURSE PRACTITIONER

## 2024-01-29 PROCEDURE — 1160F RVW MEDS BY RX/DR IN RCRD: CPT | Performed by: NURSE PRACTITIONER

## 2024-01-29 PROCEDURE — 1159F MED LIST DOCD IN RCRD: CPT | Performed by: NURSE PRACTITIONER

## 2024-01-29 RX ORDER — LIDOCAINE HYDROCHLORIDE 20 MG/ML
2 INJECTION, SOLUTION EPIDURAL; INFILTRATION; INTRACAUDAL; PERINEURAL
Status: COMPLETED | OUTPATIENT
Start: 2024-01-29 | End: 2024-01-29

## 2024-01-29 RX ORDER — METHYLPREDNISOLONE ACETATE 80 MG/ML
80 INJECTION, SUSPENSION INTRA-ARTICULAR; INTRALESIONAL; INTRAMUSCULAR; SOFT TISSUE
Status: COMPLETED | OUTPATIENT
Start: 2024-01-29 | End: 2024-01-29

## 2024-01-29 RX ADMIN — LIDOCAINE HYDROCHLORIDE 2 ML: 20 INJECTION, SOLUTION EPIDURAL; INFILTRATION; INTRACAUDAL; PERINEURAL at 13:05

## 2024-01-29 RX ADMIN — METHYLPREDNISOLONE ACETATE 80 MG: 80 INJECTION, SUSPENSION INTRA-ARTICULAR; INTRALESIONAL; INTRAMUSCULAR; SOFT TISSUE at 13:05

## 2024-01-29 NOTE — PROGRESS NOTES
Ms. Sosa comes in today for follow-up.  Injections have worked well in the past.  The patient would like to get a repeat injection today.      Imaging:  AP, scapular Y, and axillary views of the left shoulder are ordered by myself and reviewed to evaluate the patient's complaint.  These are compared to previous x-rays.  The x-rays show severe glenohumeral osteoarthritis with bone on bone degeneration, osteophyte formation, and subchondral sclerosis.  The acromiohumeral interval measures normal.  No significant degenerative changes compared to previous films.    The risks, benefits and alternatives were discussed and the patient consented.  Going forward, the patient will follow-up as needed.    ENRRIQUE Wright    01/29/2024    Large Joint Arthrocentesis: L glenohumeral  Date/Time: 1/29/2024 1:05 PM  Consent given by: patient  Site marked: site marked  Timeout: Immediately prior to procedure a time out was called to verify the correct patient, procedure, equipment, support staff and site/side marked as required   Supporting Documentation  Indications: pain   Procedure Details  Location: shoulder - L glenohumeral  Preparation: Patient was prepped and draped in the usual sterile fashion  Needle gauge: 21 G.  Approach: anterior  Medications administered: 2 mL lidocaine PF 2% 2 %; 80 mg methylPREDNISolone acetate 80 MG/ML  Patient tolerance: patient tolerated the procedure well with no immediate complications

## 2024-01-31 RX ORDER — TRAZODONE HYDROCHLORIDE 100 MG/1
200 TABLET ORAL NIGHTLY
Qty: 180 TABLET | Refills: 1 | OUTPATIENT
Start: 2024-01-31

## 2024-01-31 RX ORDER — ATORVASTATIN CALCIUM 80 MG/1
TABLET, FILM COATED ORAL
Qty: 90 TABLET | Refills: 1 | OUTPATIENT
Start: 2024-01-31

## 2024-01-31 RX ORDER — ATORVASTATIN CALCIUM 80 MG/1
TABLET, FILM COATED ORAL
Qty: 90 TABLET | Refills: 1 | Status: SHIPPED | OUTPATIENT
Start: 2024-01-31

## 2024-03-04 RX ORDER — TRAZODONE HYDROCHLORIDE 100 MG/1
200 TABLET ORAL NIGHTLY
Qty: 180 TABLET | Refills: 3 | Status: SHIPPED | OUTPATIENT
Start: 2024-03-04

## 2024-03-18 DIAGNOSIS — Z00.00 MEDICARE ANNUAL WELLNESS VISIT, SUBSEQUENT: Primary | ICD-10-CM

## 2024-03-18 DIAGNOSIS — E78.5 HYPERLIPIDEMIA, UNSPECIFIED HYPERLIPIDEMIA TYPE: ICD-10-CM

## 2024-03-18 DIAGNOSIS — R73.09 ELEVATED GLUCOSE: ICD-10-CM

## 2024-03-18 DIAGNOSIS — I10 PRIMARY HYPERTENSION: ICD-10-CM

## 2024-03-19 LAB
ALBUMIN SERPL-MCNC: 3.7 G/DL (ref 3.5–5.2)
ALBUMIN/GLOB SERPL: 1.5 G/DL
ALP SERPL-CCNC: 81 U/L (ref 39–117)
ALT SERPL-CCNC: 15 U/L (ref 1–33)
AST SERPL-CCNC: 11 U/L (ref 1–32)
BASOPHILS # BLD AUTO: 0.05 10*3/MM3 (ref 0–0.2)
BASOPHILS NFR BLD AUTO: 0.8 % (ref 0–1.5)
BILIRUB SERPL-MCNC: 0.3 MG/DL (ref 0–1.2)
BUN SERPL-MCNC: 20 MG/DL (ref 8–23)
BUN/CREAT SERPL: 20.8 (ref 7–25)
CALCIUM SERPL-MCNC: 9 MG/DL (ref 8.6–10.5)
CHLORIDE SERPL-SCNC: 103 MMOL/L (ref 98–107)
CHOLEST SERPL-MCNC: 151 MG/DL (ref 0–200)
CO2 SERPL-SCNC: 25 MMOL/L (ref 22–29)
CREAT SERPL-MCNC: 0.96 MG/DL (ref 0.57–1)
EGFRCR SERPLBLD CKD-EPI 2021: 63.4 ML/MIN/1.73
EOSINOPHIL # BLD AUTO: 0.12 10*3/MM3 (ref 0–0.4)
EOSINOPHIL NFR BLD AUTO: 2 % (ref 0.3–6.2)
ERYTHROCYTE [DISTWIDTH] IN BLOOD BY AUTOMATED COUNT: 12.7 % (ref 12.3–15.4)
GLOBULIN SER CALC-MCNC: 2.5 GM/DL
GLUCOSE SERPL-MCNC: 99 MG/DL (ref 65–99)
HBA1C MFR BLD: 5.5 % (ref 4.8–5.6)
HCT VFR BLD AUTO: 39.7 % (ref 34–46.6)
HDLC SERPL-MCNC: 51 MG/DL (ref 40–60)
HGB BLD-MCNC: 13.1 G/DL (ref 12–15.9)
IMM GRANULOCYTES # BLD AUTO: 0.04 10*3/MM3 (ref 0–0.05)
IMM GRANULOCYTES NFR BLD AUTO: 0.7 % (ref 0–0.5)
LDLC SERPL CALC-MCNC: 69 MG/DL (ref 0–100)
LDLC/HDLC SERPL: 1.22 {RATIO}
LYMPHOCYTES # BLD AUTO: 1.65 10*3/MM3 (ref 0.7–3.1)
LYMPHOCYTES NFR BLD AUTO: 27.3 % (ref 19.6–45.3)
MCH RBC QN AUTO: 30.3 PG (ref 26.6–33)
MCHC RBC AUTO-ENTMCNC: 33 G/DL (ref 31.5–35.7)
MCV RBC AUTO: 91.9 FL (ref 79–97)
MONOCYTES # BLD AUTO: 0.59 10*3/MM3 (ref 0.1–0.9)
MONOCYTES NFR BLD AUTO: 9.8 % (ref 5–12)
NEUTROPHILS # BLD AUTO: 3.59 10*3/MM3 (ref 1.7–7)
NEUTROPHILS NFR BLD AUTO: 59.4 % (ref 42.7–76)
NRBC BLD AUTO-RTO: 0 /100 WBC (ref 0–0.2)
PLATELET # BLD AUTO: 268 10*3/MM3 (ref 140–450)
POTASSIUM SERPL-SCNC: 3.9 MMOL/L (ref 3.5–5.2)
PROT SERPL-MCNC: 6.2 G/DL (ref 6–8.5)
RBC # BLD AUTO: 4.32 10*6/MM3 (ref 3.77–5.28)
SODIUM SERPL-SCNC: 139 MMOL/L (ref 136–145)
TRIGL SERPL-MCNC: 188 MG/DL (ref 0–150)
TSH SERPL DL<=0.005 MIU/L-ACNC: 2.11 UIU/ML (ref 0.27–4.2)
VLDLC SERPL CALC-MCNC: 31 MG/DL (ref 5–40)
WBC # BLD AUTO: 6.04 10*3/MM3 (ref 3.4–10.8)

## 2024-03-21 ENCOUNTER — OFFICE VISIT (OUTPATIENT)
Dept: INTERNAL MEDICINE | Facility: CLINIC | Age: 74
End: 2024-03-21
Payer: MEDICARE

## 2024-03-21 VITALS
SYSTOLIC BLOOD PRESSURE: 112 MMHG | HEIGHT: 71 IN | WEIGHT: 187.2 LBS | BODY MASS INDEX: 26.21 KG/M2 | HEART RATE: 80 BPM | TEMPERATURE: 98 F | DIASTOLIC BLOOD PRESSURE: 68 MMHG | OXYGEN SATURATION: 97 %

## 2024-03-21 DIAGNOSIS — E11.43 TYPE 2 DIABETES MELLITUS WITH AUTONOMIC NEUROPATHY, UNSPECIFIED WHETHER LONG TERM INSULIN USE: ICD-10-CM

## 2024-03-21 DIAGNOSIS — E78.5 HYPERLIPIDEMIA, UNSPECIFIED HYPERLIPIDEMIA TYPE: Primary | ICD-10-CM

## 2024-03-21 DIAGNOSIS — Z78.0 POST-MENOPAUSAL: ICD-10-CM

## 2024-03-21 DIAGNOSIS — I10 PRIMARY HYPERTENSION: ICD-10-CM

## 2024-03-21 NOTE — PROGRESS NOTES
The ABCs of the Annual Wellness Visit  Subsequent Medicare Wellness Visit    Subjective    Tran Sosa is a 73 y.o. female who presents for a Subsequent Medicare Wellness Visit.    The following portions of the patient's history were reviewed and   updated as appropriate: allergies, current medications, past family history, past medical history, past social history, past surgical history, and problem list.    Compared to one year ago, the patient feels her physical   health is the same.    Compared to one year ago, the patient feels her mental   health is the same.    Recent Hospitalizations:  She was not admitted to the hospital during the last year.       Current Medical Providers:  Patient Care Team:  Diane Macdonald MD as PCP - General  Diane Macdonald MD as PCP - Family Medicine  Lavon Walker MD as Consulting Physician (Pulmonary Disease)  Koby Choudhury MD as Consulting Physician (Cardiology)    Outpatient Medications Prior to Visit   Medication Sig Dispense Refill    albuterol sulfate  (90 Base) MCG/ACT inhaler Inhale 2 puffs.      atorvastatin (LIPITOR) 80 MG tablet TAKE 1 TABLET BY MOUTH EVERY DAY IN THE EVENING 90 tablet 1    budesonide-formoterol (SYMBICORT) 160-4.5 MCG/ACT inhaler Inhale 2 puffs 2 (Two) Times a Day. 10.2 g 5    docusate sodium 100 MG capsule Take 1 capsule by mouth 2 (Two) Times a Day As Needed for Constipation. 40 each 1    estradiol (ESTRACE VAGINAL) 0.1 MG/GM vaginal cream Apply pea sized amount externally 2x a week 42.5 g 3    fluticasone (FLONASE) 50 MCG/ACT nasal spray INSTILL 2 SPRAYS INTO NOSE DAILY FOR 10 DAYS.      ibuprofen (ADVIL,MOTRIN) 200 MG tablet Take 3 tablets by mouth 2 (Two) Times a Day.      metoprolol succinate XL (TOPROL-XL) 50 MG 24 hr tablet TAKE 1 TABLET BY MOUTH EVERY DAY AT NIGHT 90 tablet 1    montelukast (SINGULAIR) 10 MG tablet Take 1 tablet by mouth every night at bedtime.      omeprazole (priLOSEC) 20 MG capsule TAKE 1 CAPSULE BY MOUTH ONE TIME  "DAILY 90 capsule 1    traZODone (DESYREL) 100 MG tablet Take 2 tablets by mouth Every Night. 180 tablet 3    triamterene-hydrochlorothiazide (MAXZIDE-25) 37.5-25 MG per tablet TAKE 1 TABLET BY MOUTH EVERY DAY IN THE MORNING 90 tablet 1    Unable to find 1 each 1 (One) Time. Med Name: compounded Ozempic.      metFORMIN ER (GLUCOPHAGE-XR) 500 MG 24 hr tablet TAKE 1 TABLET BY MOUTH EVERY DAY WITH BREAKFAST 90 tablet 1     No facility-administered medications prior to visit.       No opioid medication identified on active medication list. I have reviewed chart for other potential  high risk medication/s and harmful drug interactions in the elderly.        Aspirin is not on active medication list.  Aspirin use is not indicated based on review of current medical condition/s. Risk of harm outweighs potential benefits.  .    Patient Active Problem List   Diagnosis    Atopic rhinitis    Arthralgia of multiple joints    Hyperlipidemia    Hypertension    Osteopenia    PAT (paroxysmal atrial tachycardia)    Sinus tachycardia    SHAI (obstructive sleep apnea)    OA (osteoarthritis) of hip    Primary osteoarthritis of right knee    Pulmonary embolism without acute cor pulmonale    Acute respiratory failure with hypoxemia    Primary osteoarthritis of left knee    Asthma    Lung nodule    History of left breast cancer    Hammer toe of right foot     Advance Care Planning   Advance Care Planning     Advance Directive is not on file.  ACP discussion was held with the patient during this visit. Patient has an advance directive (not in EMR), copy requested.     Objective    Vitals:    03/21/24 1512   BP: 112/68   BP Location: Left arm   Patient Position: Sitting   Pulse: 80   Temp: 98 °F (36.7 °C)   TempSrc: Oral   SpO2: 97%   Weight: 84.9 kg (187 lb 3.2 oz)   Height: 179.1 cm (70.5\")     Estimated body mass index is 26.48 kg/m² as calculated from the following:    Height as of this encounter: 179.1 cm (70.5\").    Weight as of this " encounter: 84.9 kg (187 lb 3.2 oz).    BMI is >= 25 and <30. (Overweight) The following options were offered after discussion;: exercise counseling/recommendations and nutrition counseling/recommendations      Does the patient have evidence of cognitive impairment? No    Lab Results   Component Value Date    CHLPL 151 2024    TRIG 188 (H) 2024    HDL 51 2024    LDL 69 2024    VLDL 31 2024    HGBA1C 5.50 2024        HEALTH RISK ASSESSMENT    Smoking Status:  Social History     Tobacco Use   Smoking Status Former    Current packs/day: 0.00    Average packs/day: 1 pack/day for 10.0 years (10.0 ttl pk-yrs)    Types: Cigarettes    Start date: 1970    Quit date: 1980    Years since quittin.2   Smokeless Tobacco Never   Tobacco Comments    QUIT AT AGE 30 YRS. OLD     Alcohol Consumption:  Social History     Substance and Sexual Activity   Alcohol Use Yes    Comment: Occassional     Fall Risk Screen:    STEADI Fall Risk Assessment was completed, and patient is at LOW risk for falls.Assessment completed on:3/21/2024    Depression Screening:      3/21/2024     3:15 PM   PHQ-2/PHQ-9 Depression Screening   Little Interest or Pleasure in Doing Things 0-->not at all   Feeling Down, Depressed or Hopeless 0-->not at all   PHQ-9: Brief Depression Severity Measure Score 0       Health Habits and Functional and Cognitive Screenin/20/2023     8:00 AM   Functional & Cognitive Status   Do you have difficulty preparing food and eating? No   Do you have difficulty bathing yourself, getting dressed or grooming yourself? No   Do you have difficulty using the toilet? No   Do you have difficulty moving around from place to place? No   Do you have trouble with steps or getting out of a bed or a chair? No   Current Diet Limited Junk Food   Dental Exam Up to date   Eye Exam Up to date   Exercise (times per week) 3 times per week   Current Exercises Include House Cleaning        Exercise  Comment babysitting   Do you need help using the phone?  No   Are you deaf or do you have serious difficulty hearing?  Yes   Do you need help to go to places out of walking distance? No   Do you need help shopping? No   Do you need help preparing meals?  No   Do you need help with housework?  No   Do you need help with laundry? No   Do you need help taking your medications? No   Do you need help managing money? No   Do you ever drive or ride in a car without wearing a seat belt? No   Have you felt unusual stress, anger or loneliness in the last month? No   Who do you live with? Alone   If you need help, do you have trouble finding someone available to you? No   Have you been bothered in the last four weeks by sexual problems? No   Do you have difficulty concentrating, remembering or making decisions? No       Age-appropriate Screening Schedule:  Refer to the list below for future screening recommendations based on patient's age, sex and/or medical conditions. Orders for these recommended tests are listed in the plan section. The patient has been provided with a written plan.    Health Maintenance   Topic Date Due    DIABETIC EYE EXAM  07/05/2023    DIABETIC FOOT EXAM  07/19/2023    COVID-19 Vaccine (7 - 2023-24 season) 09/01/2023    COLORECTAL CANCER SCREENING  10/09/2023    URINE MICROALBUMIN  01/13/2024    ANNUAL WELLNESS VISIT  01/20/2024    BMI FOLLOWUP  01/20/2024    DXA SCAN  02/10/2024    HEMOGLOBIN A1C  09/18/2024    LIPID PANEL  03/18/2025    TDAP/TD VACCINES (2 - Td or Tdap) 07/18/2028    HEPATITIS C SCREENING  Completed    RSV Vaccine - Adults  Completed    INFLUENZA VACCINE  Completed    Pneumococcal Vaccine 65+  Completed    ZOSTER VACCINE  Completed    PAP SMEAR  Discontinued                  CMS Preventative Services Quick Reference  Risk Factors Identified During Encounter  None Identified  The above risks/problems have been discussed with the patient.  Pertinent information has been shared with the  "patient in the After Visit Summary.  An After Visit Summary and PPPS were made available to the patient.    Follow Up:   Next Medicare Wellness visit to be scheduled in 1 year.       Additional E&M Note during same encounter follows:  Patient has multiple medical problems which are significant and separately identifiable that require additional work above and beyond the Medicare Wellness Visit.      Chief Complaint  Hyperlipidemia and Insomnia    Subjective        HPI  Tran Sosa is also being seen today for fu with fl  She has been on ozempic and she has done well with this and she is on 1mg and doing well  Pt has been taking BP meds as prescribed without any problems.  No HA  No episodes of orthostasis  Pt has been taking cholesterol meds as prescribed.  No difficulties with myalgias.   Pt has been compliant with meds for GERD.  No sx as long as pt takes medicine as prescribed.  No epigastric pain or reflux sx         Objective   Vital Signs:  /68 (BP Location: Left arm, Patient Position: Sitting)   Pulse 80   Temp 98 °F (36.7 °C) (Oral)   Ht 179.1 cm (70.5\")   Wt 84.9 kg (187 lb 3.2 oz)   SpO2 97%   BMI 26.48 kg/m²     Physical Exam     The following data was reviewed by: Diane Macdonald MD on 03/21/2024:  Common labs          9/14/2023    07:09 3/18/2024    09:05   Common Labs   Glucose 106  99    BUN 14  20    Creatinine 0.87  0.96    Sodium 139  139    Potassium 3.4  3.9    Chloride 102  103    Calcium 9.4  9.0    Total Protein 6.7  6.2    Albumin 4.3  3.7    Total Bilirubin 0.3  0.3    Alkaline Phosphatase 68  81    AST (SGOT) 16  11    ALT (SGPT) 19  15    WBC 4.95  6.04    Hemoglobin 12.5  13.1    Hematocrit 38.3  39.7    Platelets 282  268    Total Cholesterol 144  151    Triglycerides 176  188    HDL Cholesterol 37  51    LDL Cholesterol  77  69    Hemoglobin A1C 5.90  5.50                 Assessment and Plan   There are no diagnoses linked to this encounter.         Follow Up   No follow-ups " on file.  Patient was given instructions and counseling regarding her condition or for health maintenance advice. Please see specific information pulled into the AVS if appropriate.     Weight loss- with ozempic  she is eating well and exercising  HPL-  ok with lipitor  GERD- ok with omeprazole  HTN-- ok with current meds

## 2024-05-13 ENCOUNTER — OFFICE VISIT (OUTPATIENT)
Dept: ORTHOPEDIC SURGERY | Facility: CLINIC | Age: 74
End: 2024-05-13
Payer: MEDICARE

## 2024-05-13 VITALS — HEIGHT: 71 IN | TEMPERATURE: 98.6 F | BODY MASS INDEX: 25.8 KG/M2 | WEIGHT: 184.3 LBS

## 2024-05-13 DIAGNOSIS — M19.019 ARTHRITIS OF SHOULDER: ICD-10-CM

## 2024-05-13 DIAGNOSIS — G89.29 CHRONIC LEFT SHOULDER PAIN: Primary | ICD-10-CM

## 2024-05-13 DIAGNOSIS — M25.512 CHRONIC LEFT SHOULDER PAIN: Primary | ICD-10-CM

## 2024-05-13 RX ORDER — LIDOCAINE HYDROCHLORIDE 10 MG/ML
2 INJECTION, SOLUTION EPIDURAL; INFILTRATION; INTRACAUDAL; PERINEURAL
Status: COMPLETED | OUTPATIENT
Start: 2024-05-13 | End: 2024-05-13

## 2024-05-13 RX ORDER — METHYLPREDNISOLONE ACETATE 80 MG/ML
80 INJECTION, SUSPENSION INTRA-ARTICULAR; INTRALESIONAL; INTRAMUSCULAR; SOFT TISSUE
Status: COMPLETED | OUTPATIENT
Start: 2024-05-13 | End: 2024-05-13

## 2024-05-13 RX ADMIN — METHYLPREDNISOLONE ACETATE 80 MG: 80 INJECTION, SUSPENSION INTRA-ARTICULAR; INTRALESIONAL; INTRAMUSCULAR; SOFT TISSUE at 10:25

## 2024-05-13 RX ADMIN — LIDOCAINE HYDROCHLORIDE 2 ML: 10 INJECTION, SOLUTION EPIDURAL; INFILTRATION; INTRACAUDAL; PERINEURAL at 10:25

## 2024-05-13 NOTE — PROGRESS NOTES
Ms. Sosa comes in today for follow-up.  Injections have worked well in the past.  The patient would like to get a repeat injection today.  The risks, benefits and alternatives were discussed and the patient consented.  Going forward, the patient will follow-up as needed.    ENRRIQUE Wright    05/13/2024    Large Joint Arthrocentesis: L glenohumeral  Date/Time: 5/13/2024 10:25 AM  Consent given by: patient  Site marked: site marked  Timeout: Immediately prior to procedure a time out was called to verify the correct patient, procedure, equipment, support staff and site/side marked as required   Supporting Documentation  Indications: pain   Procedure Details  Location: shoulder - L glenohumeral  Preparation: Patient was prepped and draped in the usual sterile fashion  Needle gauge: 21g.  Approach: anterior  Medications administered: 2 mL lidocaine PF 1% 1 %; 80 mg methylPREDNISolone acetate 80 MG/ML  Patient tolerance: patient tolerated the procedure well with no immediate complications

## 2024-05-17 ENCOUNTER — HOSPITAL ENCOUNTER (OUTPATIENT)
Dept: BONE DENSITY | Facility: HOSPITAL | Age: 74
Discharge: HOME | End: 2024-05-17
Payer: MEDICARE

## 2024-05-17 DIAGNOSIS — E78.5 HYPERLIPIDEMIA, UNSPECIFIED HYPERLIPIDEMIA TYPE: ICD-10-CM

## 2024-05-17 DIAGNOSIS — Z78.0 POST-MENOPAUSAL: ICD-10-CM

## 2024-05-17 DIAGNOSIS — I10 PRIMARY HYPERTENSION: ICD-10-CM

## 2024-05-17 PROCEDURE — 77080 DXA BONE DENSITY AXIAL: CPT

## 2024-05-20 DIAGNOSIS — I47.19 PAT (PAROXYSMAL ATRIAL TACHYCARDIA): ICD-10-CM

## 2024-05-20 RX ORDER — METOPROLOL SUCCINATE 50 MG/1
TABLET, EXTENDED RELEASE ORAL
Qty: 90 TABLET | Refills: 1 | Status: SHIPPED | OUTPATIENT
Start: 2024-05-20

## 2024-06-23 DIAGNOSIS — J45.909 UNSPECIFIED ASTHMA, UNCOMPLICATED: ICD-10-CM

## 2024-06-24 RX ORDER — MONTELUKAST SODIUM 10 MG/1
10 TABLET ORAL
Qty: 90 TABLET | Refills: 3 | OUTPATIENT
Start: 2024-06-24

## 2024-07-10 RX ORDER — TRIAMTERENE AND HYDROCHLOROTHIAZIDE 37.5; 25 MG/1; MG/1
TABLET ORAL
Qty: 90 TABLET | Refills: 1 | Status: SHIPPED | OUTPATIENT
Start: 2024-07-10

## 2024-07-22 RX ORDER — ATORVASTATIN CALCIUM 80 MG/1
TABLET, FILM COATED ORAL
Qty: 90 TABLET | Refills: 1 | Status: SHIPPED | OUTPATIENT
Start: 2024-07-22

## 2024-08-13 RX ORDER — OMEPRAZOLE 20 MG/1
CAPSULE, DELAYED RELEASE ORAL
Qty: 90 CAPSULE | Refills: 1 | Status: SHIPPED | OUTPATIENT
Start: 2024-08-13

## 2024-09-12 NOTE — ANESTHESIA PREPROCEDURE EVALUATION
Anesthesia Evaluation     Patient summary reviewed and Nursing notes reviewed   NPO Solid Status: > 8 hours       Airway   Mallampati: II  TM distance: >3 FB  Neck ROM: full  Dental - normal exam     Pulmonary - normal exam    breath sounds clear to auscultation  (+) a smoker Former, sleep apnea on CPAP,   Cardiovascular - normal exam    Rhythm: regular  Rate: normal    (+) hypertension, valvular problems/murmurs MR, hyperlipidemia  (-) angina, orthopnea, PND, RENTERIA      Neuro/Psych  (+) numbness (Sciatica),    GI/Hepatic/Renal/Endo - negative ROS     Musculoskeletal     Abdominal    Substance History - negative use     OB/GYN negative ob/gyn ROS         Other   (+) arthritis   history of cancer                                  Anesthesia Plan    ASA 3     general and regional   (FIC for PO Pain)  intravenous induction   Anesthetic plan and risks discussed with patient.       Pt SOPHIA requesting refill on diazepam, uses [facility] pharmacy [address] in [place]

## 2024-09-23 ENCOUNTER — OFFICE VISIT (OUTPATIENT)
Dept: ORTHOPEDIC SURGERY | Facility: CLINIC | Age: 74
End: 2024-09-23
Payer: MEDICARE

## 2024-09-23 VITALS — WEIGHT: 184.6 LBS | BODY MASS INDEX: 26.43 KG/M2 | HEIGHT: 70 IN | TEMPERATURE: 98.7 F

## 2024-09-23 DIAGNOSIS — G89.29 CHRONIC LEFT SHOULDER PAIN: Primary | ICD-10-CM

## 2024-09-23 DIAGNOSIS — M25.512 CHRONIC LEFT SHOULDER PAIN: Primary | ICD-10-CM

## 2024-09-23 DIAGNOSIS — M19.019 ARTHRITIS OF SHOULDER: ICD-10-CM

## 2024-09-23 PROCEDURE — 1160F RVW MEDS BY RX/DR IN RCRD: CPT | Performed by: NURSE PRACTITIONER

## 2024-09-23 PROCEDURE — 1159F MED LIST DOCD IN RCRD: CPT | Performed by: NURSE PRACTITIONER

## 2024-09-23 PROCEDURE — 20610 DRAIN/INJ JOINT/BURSA W/O US: CPT | Performed by: NURSE PRACTITIONER

## 2024-09-23 RX ORDER — LIDOCAINE HYDROCHLORIDE 10 MG/ML
2 INJECTION, SOLUTION EPIDURAL; INFILTRATION; INTRACAUDAL; PERINEURAL
Status: COMPLETED | OUTPATIENT
Start: 2024-09-23 | End: 2024-09-23

## 2024-09-23 RX ORDER — METHYLPREDNISOLONE ACETATE 80 MG/ML
80 INJECTION, SUSPENSION INTRA-ARTICULAR; INTRALESIONAL; INTRAMUSCULAR; SOFT TISSUE
Status: COMPLETED | OUTPATIENT
Start: 2024-09-23 | End: 2024-09-23

## 2024-09-23 RX ADMIN — LIDOCAINE HYDROCHLORIDE 2 ML: 10 INJECTION, SOLUTION EPIDURAL; INFILTRATION; INTRACAUDAL; PERINEURAL at 08:02

## 2024-09-23 RX ADMIN — METHYLPREDNISOLONE ACETATE 80 MG: 80 INJECTION, SUSPENSION INTRA-ARTICULAR; INTRALESIONAL; INTRAMUSCULAR; SOFT TISSUE at 08:02

## 2024-09-26 ENCOUNTER — OFFICE VISIT (OUTPATIENT)
Dept: INTERNAL MEDICINE | Facility: CLINIC | Age: 74
End: 2024-09-26
Payer: MEDICARE

## 2024-09-26 VITALS
OXYGEN SATURATION: 98 % | BODY MASS INDEX: 25.76 KG/M2 | SYSTOLIC BLOOD PRESSURE: 110 MMHG | HEIGHT: 71 IN | DIASTOLIC BLOOD PRESSURE: 80 MMHG | WEIGHT: 184 LBS | HEART RATE: 86 BPM | TEMPERATURE: 98.1 F

## 2024-09-26 DIAGNOSIS — R63.4 WEIGHT LOSS DUE TO MEDICATION: ICD-10-CM

## 2024-09-26 DIAGNOSIS — E78.5 HYPERLIPIDEMIA, UNSPECIFIED HYPERLIPIDEMIA TYPE: ICD-10-CM

## 2024-09-26 DIAGNOSIS — I10 PRIMARY HYPERTENSION: Primary | ICD-10-CM

## 2024-09-26 DIAGNOSIS — T50.905A WEIGHT LOSS DUE TO MEDICATION: ICD-10-CM

## 2024-09-26 PROCEDURE — 1125F AMNT PAIN NOTED PAIN PRSNT: CPT | Performed by: INTERNAL MEDICINE

## 2024-09-26 PROCEDURE — 3044F HG A1C LEVEL LT 7.0%: CPT | Performed by: INTERNAL MEDICINE

## 2024-09-26 PROCEDURE — 99214 OFFICE O/P EST MOD 30 MIN: CPT | Performed by: INTERNAL MEDICINE

## 2024-09-26 PROCEDURE — 3074F SYST BP LT 130 MM HG: CPT | Performed by: INTERNAL MEDICINE

## 2024-09-26 PROCEDURE — G2211 COMPLEX E/M VISIT ADD ON: HCPCS | Performed by: INTERNAL MEDICINE

## 2024-09-26 PROCEDURE — 3079F DIAST BP 80-89 MM HG: CPT | Performed by: INTERNAL MEDICINE

## 2024-09-26 RX ORDER — ATORVASTATIN CALCIUM 80 MG/1
1 TABLET, FILM COATED ORAL EVERY EVENING
COMMUNITY

## 2024-10-25 ENCOUNTER — TELEPHONE (OUTPATIENT)
Dept: INTERNAL MEDICINE | Facility: CLINIC | Age: 74
End: 2024-10-25
Payer: MEDICARE

## 2024-10-25 DIAGNOSIS — Z12.11 COLON CANCER SCREENING: Primary | ICD-10-CM

## 2024-10-25 NOTE — TELEPHONE ENCOUNTER
Caller: Tran Sosa    Relationship: Self    Best call back number:     [phone] (Mobile)       What is the medical concern/diagnosis: COLONOSCOPY     What specialty or service is being requested: GASTRO     What is the provider, practice or medical service name: [facility]     What is the office location: [place]     What is the office phone number: [phone]    Any additional details:

## 2024-11-13 DIAGNOSIS — I47.19 PAT (PAROXYSMAL ATRIAL TACHYCARDIA): ICD-10-CM

## 2024-11-13 RX ORDER — METOPROLOL SUCCINATE 50 MG/1
TABLET, EXTENDED RELEASE ORAL
Qty: 90 TABLET | Refills: 1 | Status: SHIPPED | OUTPATIENT
Start: 2024-11-13

## 2025-01-06 RX ORDER — TRIAMTERENE AND HYDROCHLOROTHIAZIDE 37.5; 25 MG/1; MG/1
TABLET ORAL
Qty: 90 TABLET | Refills: 1 | Status: SHIPPED | OUTPATIENT
Start: 2025-01-06

## 2025-01-16 RX ORDER — ATORVASTATIN CALCIUM 80 MG/1
80 TABLET, FILM COATED ORAL EVERY EVENING
Qty: 90 TABLET | Refills: 1 | Status: SHIPPED | OUTPATIENT
Start: 2025-01-16

## 2025-02-10 ENCOUNTER — OFFICE VISIT (OUTPATIENT)
Dept: ORTHOPEDIC SURGERY | Facility: CLINIC | Age: 75
End: 2025-02-10
Payer: MEDICARE

## 2025-02-10 VITALS — HEIGHT: 71 IN | WEIGHT: 180.3 LBS | TEMPERATURE: 98.3 F | BODY MASS INDEX: 25.24 KG/M2

## 2025-02-10 DIAGNOSIS — G89.29 CHRONIC LEFT SHOULDER PAIN: Primary | ICD-10-CM

## 2025-02-10 DIAGNOSIS — M25.512 CHRONIC LEFT SHOULDER PAIN: Primary | ICD-10-CM

## 2025-02-10 DIAGNOSIS — M19.019 ARTHRITIS OF SHOULDER: ICD-10-CM

## 2025-02-10 RX ORDER — LIDOCAINE HYDROCHLORIDE 10 MG/ML
2 INJECTION, SOLUTION EPIDURAL; INFILTRATION; INTRACAUDAL; PERINEURAL
Status: COMPLETED | OUTPATIENT
Start: 2025-02-10 | End: 2025-02-10

## 2025-02-10 RX ORDER — METHYLPREDNISOLONE ACETATE 80 MG/ML
80 INJECTION, SUSPENSION INTRA-ARTICULAR; INTRALESIONAL; INTRAMUSCULAR; SOFT TISSUE
Status: COMPLETED | OUTPATIENT
Start: 2025-02-10 | End: 2025-02-10

## 2025-02-10 RX ORDER — ALBUTEROL SULFATE 90 UG/1
2 INHALANT RESPIRATORY (INHALATION) EVERY 6 HOURS PRN
COMMUNITY
Start: 2025-01-09

## 2025-02-10 RX ORDER — POLYETHYLENE GLYCOL 3350, SODIUM CHLORIDE, SODIUM BICARBONATE, POTASSIUM CHLORIDE 420; 11.2; 5.72; 1.48 G/4L; G/4L; G/4L; G/4L
4000 POWDER, FOR SOLUTION ORAL EVERY 24 HOURS
COMMUNITY
Start: 2024-11-13

## 2025-02-10 RX ADMIN — METHYLPREDNISOLONE ACETATE 80 MG: 80 INJECTION, SUSPENSION INTRA-ARTICULAR; INTRALESIONAL; INTRAMUSCULAR; SOFT TISSUE at 15:10

## 2025-02-10 RX ADMIN — LIDOCAINE HYDROCHLORIDE 2 ML: 10 INJECTION, SOLUTION EPIDURAL; INFILTRATION; INTRACAUDAL; PERINEURAL at 15:10

## 2025-02-10 NOTE — PROGRESS NOTES
Ms. Sosa comes in today for follow-up.  Injections have worked well in the past.  The patient would like to get a repeat injection today.      Imaging:  AP, scapular Y, and axillary views of the left shoulder are ordered by myself and reviewed to evaluate the patient's complaint.  These are compared to previous x-rays.  The x-rays show severe glenohumeral osteoarthritis with bone on bone degeneration, osteophyte formation, and subchondral sclerosis.  The acromiohumeral interval measures normal.  No significant degenerative changes compared to previous films.     The risks, benefits and alternatives were discussed and the patient consented.  Going forward, the patient will follow-up as needed.    ENRRIQUE Wright    02/10/2025      Large Joint Arthrocentesis: L glenohumeral  Date/Time: 2/10/2025 3:10 PM  Consent given by: patient  Site marked: site marked  Timeout: Immediately prior to procedure a time out was called to verify the correct patient, procedure, equipment, support staff and site/side marked as required   Supporting Documentation  Indications: pain   Procedure Details  Location: shoulder - L glenohumeral  Preparation: Patient was prepped and draped in the usual sterile fashion  Needle size: 22 G  Approach: anterior  Medications administered: 2 mL lidocaine PF 1% 1 %; 80 mg methylPREDNISolone acetate 80 MG/ML  Patient tolerance: patient tolerated the procedure well with no immediate complications

## 2025-02-17 ENCOUNTER — READMISSION MANAGEMENT (OUTPATIENT)
Dept: CALL CENTER | Facility: HOSPITAL | Age: 75
End: 2025-02-17
Payer: MEDICARE

## 2025-02-17 NOTE — OUTREACH NOTE
Prep Survey      Flowsheet Row Responses   [facility] facility patient discharged from? Non-[facility]   Is LACE score < 7 ? Non-BH Discharge   Eligibility Not Eligible   What are the reasons patient is not eligible? [facility]  [Skilled Nursing Facility]   Does the patient have one of the following disease processes/diagnoses(primary or secondary)? Other   Prep survey completed? Yes            Veronica RODRIGUEZ - Registered Nurse

## 2025-02-21 RX ORDER — TRAZODONE HYDROCHLORIDE 100 MG/1
200 TABLET ORAL NIGHTLY
Qty: 180 TABLET | Refills: 1 | Status: SHIPPED | OUTPATIENT
Start: 2025-02-21

## 2025-03-20 DIAGNOSIS — E78.5 HYPERLIPIDEMIA, UNSPECIFIED HYPERLIPIDEMIA TYPE: ICD-10-CM

## 2025-03-20 DIAGNOSIS — E11.43 TYPE 2 DIABETES MELLITUS WITH AUTONOMIC NEUROPATHY, UNSPECIFIED WHETHER LONG TERM INSULIN USE: ICD-10-CM

## 2025-03-20 DIAGNOSIS — Z00.00 MEDICARE ANNUAL WELLNESS VISIT, SUBSEQUENT: Primary | ICD-10-CM

## 2025-03-20 DIAGNOSIS — I10 PRIMARY HYPERTENSION: ICD-10-CM

## 2025-03-22 LAB
ALBUMIN SERPL-MCNC: 3.9 G/DL (ref 3.5–5.2)
ALBUMIN/GLOB SERPL: 1.6 G/DL
ALP SERPL-CCNC: 183 U/L (ref 39–117)
ALT SERPL-CCNC: 13 U/L (ref 1–33)
AST SERPL-CCNC: 18 U/L (ref 1–32)
BASOPHILS # BLD AUTO: 0.06 10*3/MM3 (ref 0–0.2)
BASOPHILS NFR BLD AUTO: 0.8 % (ref 0–1.5)
BILIRUB SERPL-MCNC: 0.3 MG/DL (ref 0–1.2)
BUN SERPL-MCNC: 20 MG/DL (ref 8–23)
BUN/CREAT SERPL: 26.7 (ref 7–25)
CALCIUM SERPL-MCNC: 9.4 MG/DL (ref 8.6–10.5)
CHLORIDE SERPL-SCNC: 103 MMOL/L (ref 98–107)
CHOLEST SERPL-MCNC: 135 MG/DL (ref 0–200)
CO2 SERPL-SCNC: 25.2 MMOL/L (ref 22–29)
CREAT SERPL-MCNC: 0.75 MG/DL (ref 0.57–1)
EGFRCR SERPLBLD CKD-EPI 2021: 84.7 ML/MIN/1.73
EOSINOPHIL # BLD AUTO: 0.21 10*3/MM3 (ref 0–0.4)
EOSINOPHIL NFR BLD AUTO: 3 % (ref 0.3–6.2)
ERYTHROCYTE [DISTWIDTH] IN BLOOD BY AUTOMATED COUNT: 13.6 % (ref 12.3–15.4)
GLOBULIN SER CALC-MCNC: 2.5 GM/DL
GLUCOSE SERPL-MCNC: 87 MG/DL (ref 65–99)
HBA1C MFR BLD: 5.7 % (ref 4.8–5.6)
HCT VFR BLD AUTO: 37.3 % (ref 34–46.6)
HDLC SERPL-MCNC: 46 MG/DL (ref 40–60)
HGB BLD-MCNC: 12.8 G/DL (ref 12–15.9)
IMM GRANULOCYTES # BLD AUTO: 0.02 10*3/MM3 (ref 0–0.05)
IMM GRANULOCYTES NFR BLD AUTO: 0.3 % (ref 0–0.5)
LDLC SERPL CALC-MCNC: 71 MG/DL (ref 0–100)
LDLC/HDLC SERPL: 1.53 {RATIO}
LYMPHOCYTES # BLD AUTO: 1.28 10*3/MM3 (ref 0.7–3.1)
LYMPHOCYTES NFR BLD AUTO: 18 % (ref 19.6–45.3)
MCH RBC QN AUTO: 30.8 PG (ref 26.6–33)
MCHC RBC AUTO-ENTMCNC: 34.3 G/DL (ref 31.5–35.7)
MCV RBC AUTO: 89.7 FL (ref 79–97)
MONOCYTES # BLD AUTO: 0.67 10*3/MM3 (ref 0.1–0.9)
MONOCYTES NFR BLD AUTO: 9.4 % (ref 5–12)
NEUTROPHILS # BLD AUTO: 4.86 10*3/MM3 (ref 1.7–7)
NEUTROPHILS NFR BLD AUTO: 68.5 % (ref 42.7–76)
NRBC BLD AUTO-RTO: 0 /100 WBC (ref 0–0.2)
PLATELET # BLD AUTO: 295 10*3/MM3 (ref 140–450)
POTASSIUM SERPL-SCNC: 4.5 MMOL/L (ref 3.5–5.2)
PROT SERPL-MCNC: 6.4 G/DL (ref 6–8.5)
RBC # BLD AUTO: 4.16 10*6/MM3 (ref 3.77–5.28)
SODIUM SERPL-SCNC: 139 MMOL/L (ref 136–145)
TRIGL SERPL-MCNC: 94 MG/DL (ref 0–150)
TSH SERPL DL<=0.005 MIU/L-ACNC: 1.14 UIU/ML (ref 0.27–4.2)
VLDLC SERPL CALC-MCNC: 18 MG/DL (ref 5–40)
WBC # BLD AUTO: 7.1 10*3/MM3 (ref 3.4–10.8)

## 2025-03-28 ENCOUNTER — OFFICE VISIT (OUTPATIENT)
Dept: INTERNAL MEDICINE | Facility: CLINIC | Age: 75
End: 2025-03-28
Payer: MEDICARE

## 2025-03-28 VITALS
DIASTOLIC BLOOD PRESSURE: 62 MMHG | HEIGHT: 71 IN | OXYGEN SATURATION: 97 % | BODY MASS INDEX: 25.51 KG/M2 | TEMPERATURE: 98.1 F | SYSTOLIC BLOOD PRESSURE: 94 MMHG | WEIGHT: 182.2 LBS | HEART RATE: 84 BPM

## 2025-03-28 DIAGNOSIS — M80.00XD OSTEOPOROSIS WITH CURRENT PATHOLOGICAL FRACTURE WITH ROUTINE HEALING, UNSPECIFIED OSTEOPOROSIS TYPE, SUBSEQUENT ENCOUNTER: ICD-10-CM

## 2025-03-28 DIAGNOSIS — M85.80 OSTEOPENIA, UNSPECIFIED LOCATION: ICD-10-CM

## 2025-03-28 DIAGNOSIS — E55.9 VITAMIN D DEFICIENCY: Primary | ICD-10-CM

## 2025-03-28 DIAGNOSIS — T07.XXXA FRACTURES: ICD-10-CM

## 2025-03-28 DIAGNOSIS — E78.5 HYPERLIPIDEMIA, UNSPECIFIED HYPERLIPIDEMIA TYPE: ICD-10-CM

## 2025-03-28 DIAGNOSIS — Z00.00 MEDICARE ANNUAL WELLNESS VISIT, SUBSEQUENT: ICD-10-CM

## 2025-03-28 DIAGNOSIS — I10 PRIMARY HYPERTENSION: ICD-10-CM

## 2025-03-28 DIAGNOSIS — K21.00 GASTROESOPHAGEAL REFLUX DISEASE WITH ESOPHAGITIS, UNSPECIFIED WHETHER HEMORRHAGE: ICD-10-CM

## 2025-03-28 RX ORDER — HYDROCODONE BITARTRATE AND ACETAMINOPHEN 10; 325 MG/1; MG/1
1 TABLET ORAL EVERY 6 HOURS PRN
COMMUNITY
Start: 2025-02-16

## 2025-03-28 RX ORDER — POLYETHYLENE GLYCOL 3350 17 G/17G
POWDER, FOR SOLUTION ORAL DAILY
COMMUNITY
Start: 2025-02-17

## 2025-03-28 RX ORDER — ERGOCALCIFEROL 1.25 MG/1
50000 CAPSULE, LIQUID FILLED ORAL WEEKLY
Qty: 12 CAPSULE | Refills: 0 | Status: SHIPPED | OUTPATIENT
Start: 2025-03-28

## 2025-03-28 NOTE — PROGRESS NOTES
Subjective   Tran Sosa is a 74 y.o. female.   Fu with FL  Hyperlipidemia    Hypertension    Insomnia     S/p fall with multiple fractures  she tripped at the gym  fracture right humerus/hip and pelvic fracture  Pt has been taking cholesterol meds as prescribed.  No difficulties with myalgias.   Pt has been compliant with meds for GERD.  No sx as long as pt takes medicine as prescribed.  No epigastric pain or reflux sx  Pt has been taking BP meds as prescribed without any problems.  No HA  No episodes of orthostasis    The following portions of the patient's history were reviewed and updated as appropriate: allergies, current medications, past family history, past medical history, past social history, past surgical history, and problem list.    Review of Systems   Psychiatric/Behavioral:  The patient has insomnia.        Objective   Physical Exam  Vitals reviewed.   Constitutional:       Appearance: She is well-developed.   HENT:      Head: Normocephalic and atraumatic.      Right Ear: External ear normal.      Left Ear: External ear normal.   Eyes:      Conjunctiva/sclera: Conjunctivae normal.      Pupils: Pupils are equal, round, and reactive to light.   Neck:      Thyroid: No thyromegaly.      Trachea: No tracheal deviation.   Cardiovascular:      Rate and Rhythm: Normal rate and regular rhythm.      Heart sounds: Normal heart sounds.   Pulmonary:      Effort: Pulmonary effort is normal.      Breath sounds: Normal breath sounds.   Abdominal:      General: Bowel sounds are normal. There is no distension.      Palpations: Abdomen is soft.      Tenderness: There is no abdominal tenderness.   Musculoskeletal:         General: No deformity. Normal range of motion.      Cervical back: Normal range of motion.   Skin:     General: Skin is warm and dry.   Neurological:      Mental Status: She is alert and oriented to person, place, and time.   Psychiatric:         Behavior: Behavior normal.         Thought Content:  Thought content normal.         Judgment: Judgment normal.         Vitals:    03/28/25 0953   BP: 94/62   Pulse: 84   Temp: 98.1 °F (36.7 °C)   SpO2: 97%     Body mass index is 25.77 kg/m².         Assessment & Plan   Diagnoses and all orders for this visit:    1. Vitamin D deficiency (Primary)  -     vitamin D (ERGOCALCIFEROL) 1.25 MG (21673 UT) capsule capsule; Take 1 capsule by mouth 1 (One) Time Per Week.  Dispense: 12 capsule; Refill: 0    2. Primary hypertension  Assessment & Plan:               3. Hyperlipidemia, unspecified hyperlipidemia type  Assessment & Plan:                4. Fractures    5. Osteopenia, unspecified location  Assessment & Plan:             6. Gastroesophageal reflux disease with esophagitis, unspecified whether hemorrhage    7. Medicare annual wellness visit, subsequent    8. Osteoporosis with current pathological fracture with routine healing, unspecified osteoporosis type, subsequent encounter  -     Ambulatory Referral to Rheumatology     Osteoporosis-  penia- on dexa but multiple fractures  she has been on reclast and fosamax and evista in the past  with do vit D rx and refer to rheum for eval of this  GERD- ok with ppi  HPL-  ok with lipitor  HTN-  ok with maxide  5.  Insomnia-  no sleeping well as she in on her back  but trazodone normally does ok  6.  Multiple fractures- wokr with rheum on osteoporosis and cont the PT  she is doing better will cont to folow

## 2025-03-28 NOTE — PROGRESS NOTES
Subjective   The ABCs of the Annual Wellness Visit  Medicare Wellness Visit      Tran Sosa is a 74 y.o. patient who presents for a Medicare Wellness Visit.    The following portions of the patient's history were reviewed and   updated as appropriate: allergies, current medications, past family history, past medical history, past social history, past surgical history, and problem list.    Compared to one year ago, the patient's physical   health is worse.due to fractures  otherwise better  Compared to one year ago, the patient's mental   health is the same.    Recent Hospitalizations:  She was not admitted to the hospital during the last year.     Current Medical Providers:  Patient Care Team:  Diane Macdonald MD as PCP - General  Diaen Macdonald MD as PCP - Family Medicine  Lavno Walker MD as Consulting Physician (Pulmonary Disease)  Koby Choudhury MD as Consulting Physician (Cardiology)    Outpatient Medications Prior to Visit   Medication Sig Dispense Refill    albuterol sulfate  (90 Base) MCG/ACT inhaler Inhale 2 puffs Every 6 (Six) Hours As Needed.      atorvastatin (LIPITOR) 80 MG tablet TAKE 1 TABLET BY MOUTH EVERY DAY IN THE EVENING 90 tablet 1    Cholecalciferol (Vitamin D3) 25 MCG capsule Take  by mouth.      docusate sodium 100 MG capsule Take 1 capsule by mouth 2 (Two) Times a Day As Needed for Constipation. 40 each 1    HYDROcodone-acetaminophen (NORCO)  MG per tablet Take 1 tablet by mouth Every 6 (Six) Hours As Needed.      ibuprofen (ADVIL,MOTRIN) 200 MG tablet Take 3 tablets by mouth 2 (Two) Times a Day.      melatonin 5 MG tablet tablet Take 2 tablets by mouth Every Night.      metoprolol succinate XL (TOPROL-XL) 50 MG 24 hr tablet TAKE 1 TABLET BY MOUTH EVERY DAY AT NIGHT 90 tablet 1    montelukast (SINGULAIR) 10 MG tablet Take 1 tablet by mouth every night at bedtime.      omeprazole (priLOSEC) 20 MG capsule TAKE 1 CAPSULE BY MOUTH ONE TIME DAILY 90 capsule 1    polyethylene glycol  "(MiraLax) 17 GM/SCOOP powder Take  by mouth Daily.      polyethylene glycol-electrolytes (NULYTELY) 420 g solution Take 4,000 mL by mouth Daily.      traZODone (DESYREL) 100 MG tablet TAKE 2 TABLETS BY MOUTH EVERY NIGHT 180 tablet 1    triamterene-hydrochlorothiazide (MAXZIDE-25) 37.5-25 MG per tablet TAKE 1 TABLET BY MOUTH EVERY DAY IN THE MORNING 90 tablet 1    Unable to find 1 each 1 (One) Time Per Week. Med Name: semaglutide 5 mg/ml, 1 mg weekly       No facility-administered medications prior to visit.     Opioid medication/s are on active medication list.  and I have evaluated her active treatment plan and pain score trends (see table).  Vitals:    03/28/25 0953   PainSc: 3      I have reviewed the chart for potential of high risk medication and harmful drug interactions in the elderly.        Aspirin is not on active medication list.  Aspirin use is not indicated based on review of current medical condition/s. Risk of harm outweighs potential benefits.  .    Patient Active Problem List   Diagnosis    Atopic rhinitis    Arthralgia of multiple joints    Hyperlipidemia    Hypertension    Osteopenia    PAT (paroxysmal atrial tachycardia)    Sinus tachycardia    SHAI (obstructive sleep apnea)    OA (osteoarthritis) of hip    Primary osteoarthritis of right knee    Pulmonary embolism without acute cor pulmonale    Acute respiratory failure with hypoxemia    Primary osteoarthritis of left knee    Asthma    Lung nodule    History of left breast cancer    Hammer toe of right foot     Advance Care Planning Advance Directive is not on file.  ACP discussion was held with the patient during this visit. Patient has an advance directive (not in EMR), copy requested.            Objective   Vitals:    03/28/25 0953   BP: 94/62   BP Location: Left arm   Patient Position: Sitting   Pulse: 84   Temp: 98.1 °F (36.7 °C)   TempSrc: Oral   SpO2: 97%   Weight: 82.6 kg (182 lb 3.2 oz)   Height: 179.1 cm (70.5\")   PainSc: 3  " "      Estimated body mass index is 25.77 kg/m² as calculated from the following:    Height as of this encounter: 179.1 cm (70.5\").    Weight as of this encounter: 82.6 kg (182 lb 3.2 oz).    BMI is >= 25 and <30. (Overweight) The following options were offered after discussion;: exercise counseling/recommendations and nutrition counseling/recommendations           Does the patient have evidence of cognitive impairment? No  Lab Results   Component Value Date    CHLPL 135 2025    TRIG 94 2025    HDL 46 2025    LDL 71 2025    VLDL 18 2025    HGBA1C 5.70 (H) 2025                                                                                               Health  Risk Assessment    Smoking Status:  Social History     Tobacco Use   Smoking Status Former    Current packs/day: 0.00    Average packs/day: 1 pack/day for 10.0 years (10.0 ttl pk-yrs)    Types: Cigarettes, Cigars    Start date: 1970    Quit date: 1980    Years since quittin.2   Smokeless Tobacco Never   Tobacco Comments    QUIT AT AGE 30 YRS. OLD     Alcohol Consumption:  Social History     Substance and Sexual Activity   Alcohol Use Yes    Comment: Occassional       Fall Risk Screen  STEADI Fall Risk Assessment was completed, and patient is at HIGH risk for falls. Assessment completed on:3/28/2025    Depression Screening   Little interest or pleasure in doing things? Not at all   Feeling down, depressed, or hopeless? Not at all   PHQ-2 Total Score 0      Health Habits and Functional and Cognitive Screening:      3/27/2025     5:16 PM   Functional & Cognitive Status   Do you have difficulty preparing food and eating? No   Do you have difficulty bathing yourself, getting dressed or grooming yourself? No   Do you have difficulty using the toilet? No   Do you have difficulty moving around from place to place? No   Do you have trouble with steps or getting out of a bed or a chair? No   Current Diet Well Balanced Diet "   Dental Exam Up to date   Eye Exam Up to date   Exercise (times per week) 5 times per week   Current Exercises Include House Cleaning;Swim Aerobics Class;Walking;Yard Work   Do you need help using the phone?  No   Are you deaf or do you have serious difficulty hearing?  No   Do you need help to go to places out of walking distance? No   Do you need help shopping? No   Do you need help preparing meals?  No   Do you need help with housework?  No   Do you need help with laundry? No   Do you need help taking your medications? No   Do you need help managing money? No   Do you ever drive or ride in a car without wearing a seat belt? No   Have you felt unusual stress, anger or loneliness in the last month? No   Who do you live with? Alone   If you need help, do you have trouble finding someone available to you? No   Have you been bothered in the last four weeks by sexual problems? No   Do you have difficulty concentrating, remembering or making decisions? No           Age-appropriate Screening Schedule:  Refer to the list below for future screening recommendations based on patient's age, sex and/or medical conditions. Orders for these recommended tests are listed in the plan section. The patient has been provided with a written plan.    Health Maintenance List  Health Maintenance   Topic Date Due    DIABETIC EYE EXAM  07/05/2023    COLORECTAL CANCER SCREENING  10/09/2023    URINE MICROALBUMIN-CREATININE RATIO (uACR)  01/13/2024    ANNUAL WELLNESS VISIT  03/21/2025    DIABETIC FOOT EXAM  03/21/2025    COVID-19 Vaccine (8 - 2024-25 season) 05/07/2025    HEMOGLOBIN A1C  09/21/2025    LIPID PANEL  03/21/2026    DXA SCAN  05/17/2026    TDAP/TD VACCINES (2 - Td or Tdap) 07/18/2028    HEPATITIS C SCREENING  Completed    INFLUENZA VACCINE  Completed    Pneumococcal Vaccine 50+  Completed    ZOSTER VACCINE  Completed    MAMMOGRAM  Discontinued                                                                                                                                                 CMS Preventative Services Quick Reference  Risk Factors Identified During Encounter  Fall Risk-High or Moderate: Discussed Fall Prevention in the home and seeing pt    The above risks/problems have been discussed with the patient.  Pertinent information has been shared with the patient in the After Visit Summary.  An After Visit Summary and PPPS were made available to the patient.    Follow Up:   Next Medicare Wellness visit to be scheduled in 1 year.     Assessment & Plan  Vitamin D deficiency         Primary hypertension           Hyperlipidemia, unspecified hyperlipidemia type            Fractures         Osteopenia, unspecified location         Gastroesophageal reflux disease with esophagitis, unspecified whether hemorrhage         Medicare annual wellness visit, subsequent              Follow Up:   No follow-ups on file.

## 2025-05-08 DIAGNOSIS — I47.19 PAT (PAROXYSMAL ATRIAL TACHYCARDIA): ICD-10-CM

## 2025-05-08 RX ORDER — METOPROLOL SUCCINATE 50 MG/1
50 TABLET, EXTENDED RELEASE ORAL
Qty: 90 TABLET | Refills: 1 | Status: SHIPPED | OUTPATIENT
Start: 2025-05-08

## 2025-06-12 NOTE — OUTREACH NOTE
Call Center TCM Note      Responses   [facility] patient discharged from?  [place]   Does the patient have one of the following disease processes/diagnoses(primary or secondary)?  Total Joint Replacement   Joint surgery performed?  Knee   TCM attempt successful?  No [verbal release is over a year old]   Unsuccessful attempts  Attempt 3          Kristyn Quiñones RN    1/22/2021, 14:07 EST       Sildenafil sent to [facility] per request.

## 2025-06-16 ENCOUNTER — OFFICE VISIT (OUTPATIENT)
Dept: ORTHOPEDIC SURGERY | Facility: CLINIC | Age: 75
End: 2025-06-16
Payer: MEDICARE

## 2025-06-16 VITALS — TEMPERATURE: 99.3 F | HEIGHT: 71 IN | BODY MASS INDEX: 26.23 KG/M2 | WEIGHT: 187.4 LBS

## 2025-06-16 DIAGNOSIS — M19.012 ARTHRITIS OF LEFT SHOULDER: ICD-10-CM

## 2025-06-16 DIAGNOSIS — M25.512 CHRONIC LEFT SHOULDER PAIN: Primary | ICD-10-CM

## 2025-06-16 DIAGNOSIS — E55.9 VITAMIN D DEFICIENCY: ICD-10-CM

## 2025-06-16 DIAGNOSIS — G89.29 CHRONIC LEFT SHOULDER PAIN: Primary | ICD-10-CM

## 2025-06-16 RX ORDER — ERGOCALCIFEROL 1.25 MG/1
50000 CAPSULE, LIQUID FILLED ORAL WEEKLY
Qty: 12 CAPSULE | Refills: 0 | OUTPATIENT
Start: 2025-06-16

## 2025-06-16 RX ORDER — LIDOCAINE HYDROCHLORIDE 10 MG/ML
2 INJECTION, SOLUTION EPIDURAL; INFILTRATION; INTRACAUDAL; PERINEURAL
Status: COMPLETED | OUTPATIENT
Start: 2025-06-16 | End: 2025-06-16

## 2025-06-16 RX ORDER — METHYLPREDNISOLONE ACETATE 80 MG/ML
80 INJECTION, SUSPENSION INTRA-ARTICULAR; INTRALESIONAL; INTRAMUSCULAR; SOFT TISSUE
Status: COMPLETED | OUTPATIENT
Start: 2025-06-16 | End: 2025-06-16

## 2025-06-16 RX ORDER — TRAZODONE HYDROCHLORIDE 100 MG/1
200 TABLET ORAL NIGHTLY
Qty: 180 TABLET | Refills: 1 | OUTPATIENT
Start: 2025-06-16

## 2025-06-16 RX ADMIN — LIDOCAINE HYDROCHLORIDE 2 ML: 10 INJECTION, SOLUTION EPIDURAL; INFILTRATION; INTRACAUDAL; PERINEURAL at 13:54

## 2025-06-16 RX ADMIN — METHYLPREDNISOLONE ACETATE 80 MG: 80 INJECTION, SUSPENSION INTRA-ARTICULAR; INTRALESIONAL; INTRAMUSCULAR; SOFT TISSUE at 13:54

## 2025-06-16 NOTE — PROGRESS NOTES
Ms. Sosa comes in today for follow-up.  Injections have worked well in the past.  Reports she fell 2 days after I last saw her.  The fall resulted in fracture of her right humerus and pelvis.  She was using a cane in her left hand which really aggravated her pain.  She would like to get a repeat injection today.       The risks, benefits and alternatives were discussed and the patient consented.  Going forward, the patient will follow-up as needed.    ENRRIQUE Wright    06/16/2025      Large Joint Arthrocentesis: L glenohumeral  Date/Time: 6/16/2025 1:54 PM  Consent given by: patient  Site marked: site marked  Timeout: Immediately prior to procedure a time out was called to verify the correct patient, procedure, equipment, support staff and site/side marked as required   Supporting Documentation  Indications: pain   Procedure Details  Location: shoulder - L glenohumeral  Preparation: Patient was prepped and draped in the usual sterile fashion  Needle size: 22 G  Approach: anterior  Medications administered: 80 mg methylPREDNISolone acetate 80 MG/ML; 2 mL lidocaine PF 1% 1 %  Patient tolerance: patient tolerated the procedure well with no immediate complications

## 2025-06-27 RX ORDER — TRIAMTERENE AND HYDROCHLOROTHIAZIDE 37.5; 25 MG/1; MG/1
1 TABLET ORAL EVERY MORNING
Qty: 90 TABLET | Refills: 1 | Status: SHIPPED | OUTPATIENT
Start: 2025-06-27

## 2025-07-02 RX ORDER — ATORVASTATIN CALCIUM 80 MG/1
80 TABLET, FILM COATED ORAL EVERY EVENING
Qty: 90 TABLET | Refills: 1 | Status: SHIPPED | OUTPATIENT
Start: 2025-07-02

## 2025-07-03 ENCOUNTER — LAB (OUTPATIENT)
Facility: HOSPITAL | Age: 75
End: 2025-07-03
Payer: MEDICARE

## 2025-07-03 ENCOUNTER — OFFICE VISIT (OUTPATIENT)
Age: 75
End: 2025-07-03
Payer: MEDICARE

## 2025-07-03 VITALS
TEMPERATURE: 97.7 F | HEART RATE: 80 BPM | OXYGEN SATURATION: 95 % | WEIGHT: 187.2 LBS | DIASTOLIC BLOOD PRESSURE: 74 MMHG | BODY MASS INDEX: 26.48 KG/M2 | SYSTOLIC BLOOD PRESSURE: 118 MMHG

## 2025-07-03 DIAGNOSIS — M19.90 CHRONIC OSTEOARTHRITIS: ICD-10-CM

## 2025-07-03 DIAGNOSIS — M80.00XA AGE-RELATED OSTEOPOROSIS WITH CURRENT PATHOLOGICAL FRACTURE, INITIAL ENCOUNTER: Primary | ICD-10-CM

## 2025-07-03 DIAGNOSIS — E56.9 VITAMIN DEFICIENCY: ICD-10-CM

## 2025-07-03 DIAGNOSIS — E28.39 PREMATURE OVARIAN FAILURE: ICD-10-CM

## 2025-07-03 LAB
25(OH)D3 SERPL-MCNC: 49.3 NG/ML (ref 30–100)
BASOPHILS # BLD AUTO: 0.05 10*3/MM3 (ref 0–0.2)
BASOPHILS NFR BLD AUTO: 0.8 % (ref 0–1.5)
CA-I SERPL ISE-MCNC: 1.25 MMOL/L (ref 1.15–1.35)
DEPRECATED RDW RBC AUTO: 45.4 FL (ref 37–54)
EOSINOPHIL # BLD AUTO: 0.18 10*3/MM3 (ref 0–0.4)
EOSINOPHIL NFR BLD AUTO: 2.9 % (ref 0.3–6.2)
ERYTHROCYTE [DISTWIDTH] IN BLOOD BY AUTOMATED COUNT: 13.5 % (ref 12.3–15.4)
HCT VFR BLD AUTO: 39.6 % (ref 34–46.6)
HGB BLD-MCNC: 13 G/DL (ref 12–15.9)
IMM GRANULOCYTES # BLD AUTO: 0.02 10*3/MM3 (ref 0–0.05)
IMM GRANULOCYTES NFR BLD AUTO: 0.3 % (ref 0–0.5)
LYMPHOCYTES # BLD AUTO: 1.46 10*3/MM3 (ref 0.7–3.1)
LYMPHOCYTES NFR BLD AUTO: 23.6 % (ref 19.6–45.3)
MCH RBC QN AUTO: 29.9 PG (ref 26.6–33)
MCHC RBC AUTO-ENTMCNC: 32.8 G/DL (ref 31.5–35.7)
MCV RBC AUTO: 91 FL (ref 79–97)
MONOCYTES # BLD AUTO: 0.52 10*3/MM3 (ref 0.1–0.9)
MONOCYTES NFR BLD AUTO: 8.4 % (ref 5–12)
NEUTROPHILS NFR BLD AUTO: 3.95 10*3/MM3 (ref 1.7–7)
NEUTROPHILS NFR BLD AUTO: 64 % (ref 42.7–76)
NRBC BLD AUTO-RTO: 0 /100 WBC (ref 0–0.2)
PLATELET # BLD AUTO: 270 10*3/MM3 (ref 140–450)
PMV BLD AUTO: 11.2 FL (ref 6–12)
PTH-INTACT SERPL-MCNC: 33.9 PG/ML (ref 15–65)
RBC # BLD AUTO: 4.35 10*6/MM3 (ref 3.77–5.28)
WBC NRBC COR # BLD AUTO: 6.18 10*3/MM3 (ref 3.4–10.8)

## 2025-07-03 PROCEDURE — 82330 ASSAY OF CALCIUM: CPT | Performed by: STUDENT IN AN ORGANIZED HEALTH CARE EDUCATION/TRAINING PROGRAM

## 2025-07-03 PROCEDURE — 36415 COLL VENOUS BLD VENIPUNCTURE: CPT | Performed by: STUDENT IN AN ORGANIZED HEALTH CARE EDUCATION/TRAINING PROGRAM

## 2025-07-03 PROCEDURE — 85025 COMPLETE CBC W/AUTO DIFF WBC: CPT | Performed by: STUDENT IN AN ORGANIZED HEALTH CARE EDUCATION/TRAINING PROGRAM

## 2025-07-03 PROCEDURE — 82306 VITAMIN D 25 HYDROXY: CPT | Performed by: STUDENT IN AN ORGANIZED HEALTH CARE EDUCATION/TRAINING PROGRAM

## 2025-07-03 PROCEDURE — 83970 ASSAY OF PARATHORMONE: CPT | Performed by: STUDENT IN AN ORGANIZED HEALTH CARE EDUCATION/TRAINING PROGRAM

## 2025-07-03 NOTE — PROGRESS NOTES
RHEUMATOLOGY NEW PATIENT VISIT  7/3/2025    Patient Name: Tran Sosa : 1950 Medical Record: [id number]  PCP: Diane Macdonald MD  Referring provider: Diane Macdonald    REASON FOR CONSULTATION    Management for osteoporosis with possible pathological fracture    History of Present Illness   Tran Sosa is a 74 y.o. female who presents for management for osteoporosis with possible pathological fracture    She had a recent fall on 2025, which resulted in fractures to her shoulder, hip, and two or three pelvic bones.      She she has had treatment for osteoporosis in the past, including use of  Evista, Reclast, and Fosamax.  She reports no long-term steroid use but has taken them in the past for breathing problems.     She has a history of breast cancer at age 34, which was treated with chemotherapy. Five months post-chemotherapy, she became pregnant and gave birth at age 37, after which she immediately entered menopause. She has not had any hormonal protection since then.    She was on Fosamax for an extended period, which led to severe esophagitis and stomach issues, necessitating an ER visit. She discontinued Fosamax and switched to Reclast.   Her last bone density scan was conducted in 2024:  LUMBAR SPINE:  The BMD measured in L1-L4 is 0.964 g/cm2 for a T-score of  -0.8 and a Z-score of 1.5.BMD increased by 9.2%    RIGHT HIP:  The BMD for the femoral neck is 0.619 g/cm2 for a T score of  -2.1 and a Z score of -0.2.BMD decreased by 5.6%  IMPRESSION:  1. Osteopenia.    She does not engage in weight-bearing exercises and has experienced multiple falls, resulting in head injuries and broken nose.   She has a history of vertigo, for which she received Epley treatment at .   She reports no significant cardiac problems but mentions a previous run of SVT, for which she takes metoprolol.    She reports no dental issues or upcoming procedures but has had crowns placed due to long-standing fillings.   Her  last dental work was approximately a year ago.     She has osteoarthritis and has undergone joint replacements- both knees and her left hip. She reports overall good health and resumed walking about 4 months post-accident. She can now comfortably walk 3.5 miles, 3 to 5 times a week.       PAST SURGICAL HISTORY: She has had both knees and her left hip replaced.    SOCIAL HISTORY  She does not smoke or consume alcohol. She worked as a nurse for about 50 years.    FAMILY HISTORY  She reports no family history of pathologic osteoporotic fractures.     Results  Labs  3/21/25  A1C 5.70,TSH wnl   CMP-alk phos-183. Lipid profile-wnl    Imaging   - DEXA scan: Osteopenic range      Current Outpatient Medications:     albuterol sulfate  (90 Base) MCG/ACT inhaler, Inhale 2 puffs Every 6 (Six) Hours As Needed., Disp: , Rfl:     atorvastatin (LIPITOR) 80 MG tablet, TAKE 1 TABLET BY MOUTH EVERY DAY IN THE EVENING, Disp: 90 tablet, Rfl: 1    Cholecalciferol (Vitamin D3) 25 MCG capsule, Take  by mouth., Disp: , Rfl:     docusate sodium 100 MG capsule, Take 1 capsule by mouth 2 (Two) Times a Day As Needed for Constipation., Disp: 40 each, Rfl: 1    ibuprofen (ADVIL,MOTRIN) 200 MG tablet, Take 3 tablets by mouth 2 (Two) Times a Day., Disp: , Rfl:     melatonin 5 MG tablet tablet, Take 2 tablets by mouth Every Night., Disp: , Rfl:     metoprolol succinate XL (TOPROL-XL) 50 MG 24 hr tablet, TAKE 1 TABLET BY MOUTH EVERY DAY AT NIGHT, Disp: 90 tablet, Rfl: 1    montelukast (SINGULAIR) 10 MG tablet, Take 1 tablet by mouth every night at bedtime., Disp: , Rfl:     omeprazole (priLOSEC) 20 MG capsule, TAKE 1 CAPSULE BY MOUTH ONE TIME DAILY, Disp: 90 capsule, Rfl: 1    polyethylene glycol (MiraLax) 17 GM/SCOOP powder, Take  by mouth Daily., Disp: , Rfl:     polyethylene glycol-electrolytes (NULYTELY) 420 g solution, Take 4,000 mL by mouth Daily., Disp: , Rfl:     traZODone (DESYREL) 100 MG tablet, TAKE 2 TABLETS BY MOUTH EVERY NIGHT,  Disp: 180 tablet, Rfl: 1    triamterene-hydrochlorothiazide (MAXZIDE-25) 37.5-25 MG per tablet, TAKE 1 TABLET BY MOUTH EVERY DAY IN THE MORNING, Disp: 90 tablet, Rfl: 1    Unable to find, 1 each 1 (One) Time Per Week. Med Name: semaglutide 5 mg/ml, 1 mg weekly, Disp: , Rfl:     vitamin D (ERGOCALCIFEROL) 1.25 MG (24142 UT) capsule capsule, Take 1 capsule by mouth 1 (One) Time Per Week., Disp: 12 capsule, Rfl: 0     Medications Discontinued During This Encounter   Medication Reason    HYDROcodone-acetaminophen (NORCO)  MG per tablet *Therapy completed           Past Medical History:   Diagnosis Date    Arrhythmia 3 years ago    Arthralgia of multiple sites     Arthritis     OSTEO    Asthma 05/28/2021    Cancer     RIGHT BREAST CANCER. WITH CHEMO    COPD (chronic obstructive pulmonary disease)     MILD    Diverticulosis     Elevated cholesterol     Elevated TSH     Fracture of wrist     GERD (gastroesophageal reflux disease)     Hiatal hernia     Hip arthrosis     History of 2019 novel coronavirus disease (COVID-19)     7/2020    History of breast cancer 1984    RIGHT     History of bronchitis     History of pneumonia     Hyperlipidemia     Hypertension     Junctional rhythm 1990    ONE EPISODE    Knee swelling     Lung nodule     BIOPSY BENIGN    Mild shortness of breath     LASTING EFFECT FROM PREVIOUS COVID INFECTION    Mitral regurgitation     Mild per 2-D echocardiogram 1/13/2017    Osteopenia     Osteoporosis     PAC (premature atrial contraction)     WITH SOME SVT IN THE PAST. REASON FOR METOPROLOL    PAT (paroxysmal atrial tachycardia)     Periarthritis of shoulder     Postmenopausal status     Reactive airway disease     Recurrent urinary tract infection     RESOLVED    Rib fracture     YEARS AGO    Right knee pain     TORN MENSICUS    Scoliosis     Seasonal allergies     Sleep apnea     CPAP USED    Uterine leiomyoma     HYST          Past Surgical History:   Procedure Laterality Date    APPENDECTOMY       BREAST SURGERY      CARDIAC CATHETERIZATION       at [facility]; told normal coronary arteries      SECTION      COLONOSCOPY      FOOT SURGERY      FRACTURE SURGERY      HAMMER TOE REPAIR Right 2016    Procedure: MULTIPLE RT CLAW TOE REPAIR WITH WEIL OSTEOTOMY; RIGHT GREAT TOE AKIN OSTEOTOMY.;  Surgeon: Rigoberto Ba MD;  Location: [facility] OR [facility];  Service:     HAND SURGERY      HYSTERECTOMY      W/BSO    JOINT REPLACEMENT      LUNG SURGERY      WEDGE RESECTION? RIGHT ?LOBE. BENIGN PULM NODULE    MASTECTOMY Left     PROPHYLACTICALLY    MASTECTOMY PARTIAL WITH AXILLARY LYMPH NODE EXCISION Right     MALIGNANT    ORIF WRIST FRACTURE Right     HARDWARE SINCE REMOVED    ORIF WRIST FRACTURE Left     WITH HARDWARE    TONSILLECTOMY      TOTAL HIP ARTHROPLASTY Left 2017    Procedure: LT TOTAL HIP ARTHROPLASTY;  Surgeon: Yaya Reina MD;  Location: [facility] MAIN OR;  Service:     TOTAL KNEE ARTHROPLASTY Right 2020    Procedure: TOTAL KNEE ARTHROPLASTY RIGHT;  Surgeon: Yaya Reina MD;  Location: [facility] MAIN OR;  Service: Orthopedics;  Laterality: Right;    TOTAL KNEE ARTHROPLASTY Left 2021    Procedure: TOTAL KNEE ARTHROPLASTY LEFT;  Surgeon: Yaya Reina MD;  Location: [facility] OR;  Service: Orthopedics;  Laterality: Left;    TRAM FLAP DELAYED      UTERINE ARTERY EMBOLIZATION      WRIST SURGERY         Allergies   Allergen Reactions    Morphine And Codeine Other (See Comments)    Sulfa Antibiotics Hives and Rash     Physical Exam      Vitals:    25 0757   BP: 118/74   BP Location: Left arm   Pulse: 80   Temp: 97.7 °F (36.5 °C)   SpO2: 95%   Weight: 84.9 kg (187 lb 3.2 oz)     Gen: Well-developed, well-nourished adult in no apparent distress. Pleasant and cooperative. Alert and oriented.   HEENT: EOMI, MMM, oropharynx clear without oral ulcers, no lacrimal gland enlargement, normal salivary pooling, normal temporal arteries without tenderness or  "beading.  Chest: Normal work of breathing. CTAB without wheezing/rhonchi/rales. ??  CV: RRR, normal S1/S2, no murmurs/rubs/gallops heard. ??  Extremities: Warm, 2+ distal pulses, no cyanosis, clubbing, or edema. Scar on the left hand from internal fixation procedure.  Neuro: Good comprehension/cognition. Muscle strength 5/5 in all extremities. Gait normal.??  ??Skin: No alopecia, nail change (including no nail pitting), rashes, bruising, petechiae, sclerodactyly, digital pits, telangiectasias, tophi, appreciable calcinosis, or nodules.??    Comprehensive Musculoskeletal Examination:?  - Jaw, neck without limited ROM.?  - Elbows, wrists, hands/fingers, knees, ankles, feet/toes: No deformity, erythema, warmth, swelling, effusion, tenderness, or limited ROM.??  - Right shoulder: Limited range of motion due to injury from fall.  - Left shoulder: Limited range of motion due to need for replacement.  - Lumbosacral spine and hips without limited ROM.?? Negative EVY.      LABS AND IMAGING ll laboratory data was reviewed and relevant values are noted as below.    Autoimmune Labs- No results found for: \"ANATITER\", \"SCL70\", \"DSDNA\", \"JO1\", \"CENTSCR\", \"RF\"  Assessment & Plan   Tran Sosa is a 74 y.o. female who presents for management for osteoporosis with possible pathological fracture  She had a recent fall on 02/12/2025, which resulted in fractures to her shoulder, hip, and two or three pelvic bones.      We discussed the following plan:  - According to the American Association of Clinical Endocrinologists and American College of Endocrinology (AACE-ACE) guidelines, initial treatment options for patients at high risk of fracture include bisphosphonates (alendronate, risedronate, zoledronate) and denosumab.    Given her high risk status due to findings of multiple fractures from recent fall, she is likely high risk to very high risk treatment status.     -She is not treatment naïve and has taken osteoporotic " medications in the past due to premature ovarian failure and early menopause.   Some of previous agents include Evista, Fosamax and Reclast.    -She will be a candidate for for anabolic agents.  We have discussed once monthly injection with romosozumab for period of 12 months and then transition to either bisphosphonate or Prolia.  Will have to run this through her insurance to see if request is approved.       - I have emphasized involvement in weight-bearing exercises, and limiting alcohol intake to support bone health.     -She will need Calcium and Vitamin D supplementation: Ensure adequate intake of calcium (1,200 mg/day) and vitamin D (800-1,000 IU/day) to support bone health.      -Patient has been provided with education material       In the meantime-     - Blood tests will be conducted to ensure her kidney function is normal, check calcium and vit D, phosphorus, magnesium     -Given she will need to transition to bisphosphonate or denosumab, on completion of 12 months of romosozumab, we advised her at some point.      -She will continue current management and follow up with her orthopedic surgeon for the left shoulder replacement recovery monitoring.    Follow-up: The patient will follow up in 1 year.    Diagnoses and all orders for this visit:    1. Age-related osteoporosis with current pathological fracture, initial encounter (Primary)  2. History of premature ovarian failure  3. Vitamin deficiency  Comments:  Past Vit D level was low at 5 ng/mL.has been on Vit D, but a new vitamin D level check will be performed today to assess the need for further supplementation.  Orders:  -     CBC & Differential  -     Vitamin D,25-Hydroxy  -     PTH, Intact  -     Calcium, Ionized  -     Cancel: Phosphorus  -     Cancel: Magnesium  -     Cancel: Comprehensive Metabolic Panel  -     Magnesium; Future  -     Phosphorus; Future  -     Comprehensive Metabolic Panel; Future    4. Chronic osteoarthritis     Patient or  patient representative verbalized consent for the use of Ambient Listening during the visit with  Elise Mckeon MD for chart documentation.      I spent 45 minutes caring for Tran on this date of service. This time includes time spent by me in the following activities:preparing for the visit, reviewing tests, obtaining and/or reviewing a separately obtained history, performing a medically appropriate examination and/or evaluation , counseling and educating the patient/family/caregiver, ordering medications, tests, or procedures, documenting information in the medical record, and independently interpreting results and communicating that information with the patient/family/caregiver

## 2025-07-04 PROBLEM — M80.00XA AGE-RELATED OSTEOPOROSIS WITH CURRENT PATHOLOGICAL FRACTURE: Status: ACTIVE | Noted: 2025-07-04

## 2025-07-04 PROBLEM — E56.9 VITAMIN DEFICIENCY: Status: ACTIVE | Noted: 2025-07-04

## 2025-07-04 PROBLEM — M19.90 CHRONIC OSTEOARTHRITIS: Status: ACTIVE | Noted: 2025-07-04

## 2025-07-04 PROBLEM — E28.39 PREMATURE OVARIAN FAILURE: Status: ACTIVE | Noted: 2025-07-04

## 2025-07-05 NOTE — PATIENT INSTRUCTIONS
Thanks for your new patient visit with rheumatology  You were evaluated for management of osteoporosis  We discussed the plan for starting an anabolic agent-romosozumab-this helps build bone  You will have to take this injection monthly for 12 months and then transition to another agent that prevents bone loss  Will obtain some baseline labs today  Following review of your results, we will place an order for the medication and see if insurance approves

## 2025-07-09 ENCOUNTER — LAB (OUTPATIENT)
Facility: HOSPITAL | Age: 75
End: 2025-07-09
Payer: MEDICARE

## 2025-07-09 PROCEDURE — 83735 ASSAY OF MAGNESIUM: CPT | Performed by: STUDENT IN AN ORGANIZED HEALTH CARE EDUCATION/TRAINING PROGRAM

## 2025-07-09 PROCEDURE — 84100 ASSAY OF PHOSPHORUS: CPT | Performed by: STUDENT IN AN ORGANIZED HEALTH CARE EDUCATION/TRAINING PROGRAM

## 2025-07-09 PROCEDURE — 80053 COMPREHEN METABOLIC PANEL: CPT | Performed by: STUDENT IN AN ORGANIZED HEALTH CARE EDUCATION/TRAINING PROGRAM

## 2025-08-04 RX ORDER — OMEPRAZOLE 20 MG/1
20 CAPSULE, DELAYED RELEASE ORAL DAILY
Qty: 90 CAPSULE | Refills: 1 | Status: SHIPPED | OUTPATIENT
Start: 2025-08-04

## 2025-08-05 ENCOUNTER — INFUSION (OUTPATIENT)
Dept: ONCOLOGY | Facility: HOSPITAL | Age: 75
End: 2025-08-05
Payer: MEDICARE

## 2025-08-05 ENCOUNTER — LAB (OUTPATIENT)
Dept: OTHER | Facility: HOSPITAL | Age: 75
End: 2025-08-05
Payer: MEDICARE

## 2025-08-05 DIAGNOSIS — M80.00XD AGE-RELATED OSTEOPOROSIS WITH CURRENT PATHOLOGICAL FRACTURE WITH ROUTINE HEALING, SUBSEQUENT ENCOUNTER: Primary | ICD-10-CM

## 2025-08-05 LAB
ALBUMIN SERPL-MCNC: 3.5 G/DL (ref 3.5–5.2)
ALBUMIN/GLOB SERPL: 1.2 G/DL
ALP SERPL-CCNC: 78 U/L (ref 39–117)
ALT SERPL W P-5'-P-CCNC: 16 U/L (ref 1–33)
ANION GAP SERPL CALCULATED.3IONS-SCNC: 10.4 MMOL/L (ref 5–15)
AST SERPL-CCNC: 18 U/L (ref 1–32)
BILIRUB SERPL-MCNC: 0.3 MG/DL (ref 0–1.2)
BUN SERPL-MCNC: 25.5 MG/DL (ref 8–23)
BUN/CREAT SERPL: 29 (ref 7–25)
CALCIUM SPEC-SCNC: 9.1 MG/DL (ref 8.6–10.5)
CHLORIDE SERPL-SCNC: 107 MMOL/L (ref 98–107)
CO2 SERPL-SCNC: 22.6 MMOL/L (ref 22–29)
CREAT SERPL-MCNC: 0.88 MG/DL (ref 0.57–1)
EGFRCR SERPLBLD CKD-EPI 2021: 69.5 ML/MIN/1.73
GLOBULIN UR ELPH-MCNC: 2.9 GM/DL
GLUCOSE SERPL-MCNC: 102 MG/DL (ref 65–99)
POTASSIUM SERPL-SCNC: 4.3 MMOL/L (ref 3.5–5.2)
PROT SERPL-MCNC: 6.4 G/DL (ref 6–8.5)
SODIUM SERPL-SCNC: 140 MMOL/L (ref 136–145)

## 2025-08-05 PROCEDURE — 80053 COMPREHEN METABOLIC PANEL: CPT | Performed by: STUDENT IN AN ORGANIZED HEALTH CARE EDUCATION/TRAINING PROGRAM

## 2025-08-05 PROCEDURE — 25010000002 ROMOSOZUMAB-AQQG 105 MG/1.17ML SOLUTION PREFILLED SYRINGE: Performed by: STUDENT IN AN ORGANIZED HEALTH CARE EDUCATION/TRAINING PROGRAM

## 2025-08-05 PROCEDURE — 36415 COLL VENOUS BLD VENIPUNCTURE: CPT

## 2025-08-05 PROCEDURE — 96372 THER/PROPH/DIAG INJ SC/IM: CPT

## 2025-08-05 RX ADMIN — ROMOSOZUMAB-AQQG 210 MG: 105 INJECTION, SOLUTION SUBCUTANEOUS at 11:39

## 2025-08-14 ENCOUNTER — TELEPHONE (OUTPATIENT)
Dept: INTERNAL MEDICINE | Facility: CLINIC | Age: 75
End: 2025-08-14
Payer: MEDICARE

## (undated) DEVICE — SPNG GZ WOVN 4X4IN 12PLY 10/BX STRL

## (undated) DEVICE — ADHS SKIN DERMABOND TOP ADVANCED

## (undated) DEVICE — HANDPIECE SET WITH COAXIAL HIGH FLOW TIP AND SUCTION TUBE: Brand: INTERPULSE

## (undated) DEVICE — DRAPE,REIN 53X77,STERILE: Brand: MEDLINE

## (undated) DEVICE — GLV SURG BIOGEL LTX PF 7 1/2

## (undated) DEVICE — BNDG ELAS ELITE V/CLOSE 6IN 5YD LF STRL

## (undated) DEVICE — DRSNG GZ PETROLTM XEROFORM CURAD 1X8IN STRL

## (undated) DEVICE — DUAL CUT SAGITTAL BLADE

## (undated) DEVICE — PIN DRL NOHEAD TROC 3.2X75MM

## (undated) DEVICE — APPL CHLORAPREP HI/LITE 26ML ORNG

## (undated) DEVICE — ANTIBACTERIAL UNDYED BRAIDED (POLYGLACTIN 910), SYNTHETIC ABSORBABLE SUTURE: Brand: COATED VICRYL

## (undated) DEVICE — OCCLUSIVE GAUZE STRIP,3% BISMUTH TRIBROMOPHENATE IN PETROLATUM BLEND: Brand: XEROFORM

## (undated) DEVICE — PAD,ABDOMINAL,8"X10",ST,LF: Brand: MEDLINE

## (undated) DEVICE — BNDG ELAS CO-FLEX SLF ADHR 6IN 5YD LF STRL

## (undated) DEVICE — UNDERCAST PADDING: Brand: DEROYAL

## (undated) DEVICE — STPLR SKIN VISISTAT WD 35CT

## (undated) DEVICE — TRAP FLD MINIVAC MEGADYNE 100ML

## (undated) DEVICE — NDL SPINE 20G 3 1/2 YEL STRL 1P/U

## (undated) DEVICE — GLV SURG PREMIERPRO ORTHO LTX PF SZ7.5 BRN

## (undated) DEVICE — DRSNG WND GZ PAD BORDERED 4X8IN STRL

## (undated) DEVICE — DECANT BG O JET

## (undated) DEVICE — KT DRN EVAC WND PVC PCH WTROC RND 10F400

## (undated) DEVICE — SYR LUERLOK 30CC

## (undated) DEVICE — PK HIP TOTL 40

## (undated) DEVICE — SUT MNCRYL 3/0 PS2 18IN MCP497G

## (undated) DEVICE — BNDG ELAS ELITE V/CLOSE 4IN 5YD LF STRL

## (undated) DEVICE — MAT FLR ABSORBENT LG 4FT 10 2.5FT

## (undated) DEVICE — CONTAINER,SPECIMEN,OR STERILE,4OZ: Brand: MEDLINE

## (undated) DEVICE — SYRINGE, LUER LOCK, 60ML: Brand: MEDLINE

## (undated) DEVICE — PENCL E/S ULTRAVAC TELESCP NOSE HOLSTR 10FT

## (undated) DEVICE — 3M™ IOBAN™ 2 ANTIMICROBIAL INCISE DRAPE 6640EZ: Brand: IOBAN™ 2

## (undated) DEVICE — ENCORE® LATEX ORTHO SIZE 7.5, STERILE LATEX POWDER-FREE SURGICAL GLOVE: Brand: ENCORE

## (undated) DEVICE — SUT TEVDEX 5 K61 30IN 79745

## (undated) DEVICE — DRSNG WND BORDR/ADHS NONADHR/GZ LF 4X14IN STRL

## (undated) DEVICE — PK KN TOTL 40

## (undated) DEVICE — SPNG LAP 18X18IN LF STRL PK/5

## (undated) DEVICE — RECIPROCATING BLADE HEAVY DUTY LONG, OFFSET  (77.6 X 0.77 X 11.2MM)

## (undated) DEVICE — 3M™ IOBAN™ 2 ANTIMICROBIAL INCISE DRAPE 6648EZ: Brand: IOBAN™ 2

## (undated) DEVICE — PILLW ABD SM

## (undated) DEVICE — HEWSON SUTURE RETRIEVER: Brand: HEWSON SUTURE RETRIEVER

## (undated) DEVICE — APPL CHLORAPREP W/TINT 26ML ORNG

## (undated) DEVICE — NEEDLE, QUINCKE, 20GX3.5": Brand: MEDLINE